# Patient Record
Sex: FEMALE | Race: WHITE | NOT HISPANIC OR LATINO | Employment: OTHER | URBAN - METROPOLITAN AREA
[De-identification: names, ages, dates, MRNs, and addresses within clinical notes are randomized per-mention and may not be internally consistent; named-entity substitution may affect disease eponyms.]

---

## 2017-11-16 ENCOUNTER — TRANSFERRED RECORDS (OUTPATIENT)
Dept: HEALTH INFORMATION MANAGEMENT | Facility: CLINIC | Age: 62
End: 2017-11-16

## 2017-11-17 ENCOUNTER — MEDICAL CORRESPONDENCE (OUTPATIENT)
Dept: HEALTH INFORMATION MANAGEMENT | Facility: CLINIC | Age: 62
End: 2017-11-17

## 2017-11-17 ENCOUNTER — TRANSFERRED RECORDS (OUTPATIENT)
Dept: HEALTH INFORMATION MANAGEMENT | Facility: CLINIC | Age: 62
End: 2017-11-17

## 2017-11-27 ENCOUNTER — TRANSFERRED RECORDS (OUTPATIENT)
Dept: HEALTH INFORMATION MANAGEMENT | Facility: CLINIC | Age: 62
End: 2017-11-27

## 2017-11-30 ENCOUNTER — TRANSFERRED RECORDS (OUTPATIENT)
Dept: HEALTH INFORMATION MANAGEMENT | Facility: CLINIC | Age: 62
End: 2017-11-30

## 2017-11-30 PROCEDURE — 00000346 ZZHCL STATISTIC REVIEW OUTSIDE SLIDES TC 88321: Performed by: OBSTETRICS & GYNECOLOGY

## 2017-12-01 LAB — COPATH REPORT: NORMAL

## 2017-12-03 ASSESSMENT — ENCOUNTER SYMPTOMS
POLYPHAGIA: 0
SORE THROAT: 0
SWOLLEN GLANDS: 0
NIGHT SWEATS: 1
EXERCISE INTOLERANCE: 0
DECREASED APPETITE: 0
DISTURBANCES IN COORDINATION: 0
WEIGHT LOSS: 0
TROUBLE SWALLOWING: 0
FATIGUE: 1
SLEEP DISTURBANCES DUE TO BREATHING: 0
ORTHOPNEA: 0
HEADACHES: 0
DIARRHEA: 0
SINUS CONGESTION: 0
CONSTIPATION: 0
MEMORY LOSS: 0
BOWEL INCONTINENCE: 0
SMELL DISTURBANCE: 0
SYNCOPE: 0
HYPOTENSION: 0
NUMBNESS: 0
HOARSE VOICE: 0
WEAKNESS: 1
TASTE DISTURBANCE: 0
LOSS OF CONSCIOUSNESS: 0
WEIGHT GAIN: 1
LEG PAIN: 0
NECK MASS: 0
JAUNDICE: 0
ALTERED TEMPERATURE REGULATION: 0
POLYDIPSIA: 0
DIZZINESS: 1
SPEECH CHANGE: 0
FEVER: 0
SINUS PAIN: 0
PALPITATIONS: 0
BLOOD IN STOOL: 0
LIGHT-HEADEDNESS: 1
VOMITING: 0
CHILLS: 0
TREMORS: 0
TINGLING: 0
PARALYSIS: 0
HALLUCINATIONS: 0
BRUISES/BLEEDS EASILY: 1
HEARTBURN: 0
RECTAL PAIN: 0
NAUSEA: 0
HYPERTENSION: 1
INCREASED ENERGY: 1
SEIZURES: 0
ABDOMINAL PAIN: 0
BLOATING: 1

## 2017-12-03 NOTE — PROGRESS NOTES
Consult Notes on Referred Patient    Date: 12/3/2017        Provider Not In System          RE: Julius Gilliam  : 1955  EJ: 2017    Dear  Provider Not In System:    I had the pleasure of seeing your patient Julius Gilliam here at the Gynecologic Cancer Clinic at the [facility] on 2017.  As you know she is a very pleasant 62 year old woman with a recent diagnosis of  High grade vulvar dysplasia.  Given these findings she was subsequently sent to the Gynecologic Cancer Clinic for new patient consultation.     She presented initially to [facility] and subsequently by Neisha Lei for evaluation of itching and mass.  Biospies were obtained of a lesion felt to be worrisome for malignancy.  These biopsies are sugestive of malignacy but disoriented and actual invasion is difficult to assess per our review.    SHe presents today largely without complaints and specifically denies bleeding or mass      Review of Systems:  Answers for HPI/ROS submitted by the patient on 12/3/2017   General Symptoms: Yes  Skin Symptoms: No  HENT Symptoms: Yes  EYE SYMPTOMS: No  HEART SYMPTOMS: Yes  LUNG SYMPTOMS: No  INTESTINAL SYMPTOMS: Yes  URINARY SYMPTOMS: No  GYNECOLOGIC SYMPTOMS: No  BREAST SYMPTOMS: No  SKELETAL SYMPTOMS: No  BLOOD SYMPTOMS: Yes  NERVOUS SYSTEM SYMPTOMS: Yes  MENTAL HEALTH SYMPTOMS: No  Fever: No  Loss of appetite: No  Weight loss: No  Weight gain: Yes  Fatigue: Yes  Night sweats: Yes  Chills: No  Increased stress: Yes  Excessive hunger: No  Excessive thirst: No  Feeling hot or cold when others believe the temperature is normal: No  Loss of height: Yes  Post-operative complications: No  Surgical site pain: No  Hallucinations: No  Change in or Loss of Energy: Yes  Hyperactivity: No  Confusion: No  Ear pain: No  Ear discharge: No  Hearing loss: Yes  Tinnitus: No  Nosebleeds: No  Congestion: No  Sinus pain: No  Trouble swallowing: No    Voice hoarseness: No  Mouth sores: No  Sore throat: No  Tooth pain: No  Gum tenderness: No  Bleeding gums: No  Change in taste: No  Change in sense of smell: No  Hearing aid used: No  Neck lump: No  Chest pain or pressure: No  Fast or irregular heartbeat: No  Pain in legs with walking: No  Trouble breathing while lying down: No  Fingers or toes appear blue: No  High blood pressure: Yes  Low blood pressure: No  Fainting: No  Murmurs: No  Pacemaker: No  Varicose veins: Yes  Wake up at night with shortness of breath: No  Light-headedness: Yes  Exercise intolerance: No  Heart burn or indigestion: No  Nausea: No  Vomiting: No  Abdominal pain: No  Bloating: Yes  Constipation: No  Diarrhea: No  Blood in stool: No  Black stools: No  Rectal or Anal pain: No  Fecal incontinence: No  Yellowing of skin or eyes: No  Vomit with blood: No  Change in stools: No  Anemia: No  Swollen glands: No  Easy bleeding or bruising: Yes  Trouble with coordination: No  Dizziness or trouble with balance: Yes  Fainting or black-out spells: No  Memory loss: No  Headache: No  Seizures: No  Speech problems: No  Tingling: No  Tremor: No  Weakness: Yes  Difficulty walking: No  Paralysis: No  Numbness: No    Past Medical History:    Past Medical History:   Diagnosis Date     Deafness in right ear      Multiple facial bone fractures (H)     also left ankle and left wrist     Vertigo          Past Surgical History:    Past Surgical History:   Procedure Laterality Date     ENDOSCOPIC STRIPPING VEIN(S)       FACIAL RECONSTRUCTION SURGERY      post horse trauma     repair of left arm fracture           Health Maintenance:  Health Maintenance Due   Topic Date Due     TETANUS IMMUNIZATION (SYSTEM ASSIGNED)  03/12/1974     HEPATITIS C SCREENING  03/12/1974     PAP SCREENING Q3 YR (SYSTEM ASSIGNED)  03/12/1977     LIPID SCREEN Q5 YR FEMALE (SYSTEM ASSIGNED)  03/12/2001     MAMMO SCREEN Q2 YR (SYSTEM ASSIGNED)  03/12/2006     COLON CANCER SCREEN (SYSTEM ASSIGNED)  " 03/12/2006     ADVANCE DIRECTIVE PLANNING Q5 YRS  03/12/2011     INFLUENZA VACCINE (SYSTEM ASSIGNED)  09/01/2017       Last Pap Smear: Remote    Last Mammogram: Last was >15 years ago (she reports she had a recent breast examination.    Last Colonoscopy: never                        Current Medications:     currently has no medications in their medication list.       Allergies:      No Known Allergies        Social History:     Social History   Substance Use Topics     Smoking status: Current Every Day Smoker     Smokeless tobacco: Not on file      Comment: 30 pack/yers     Alcohol use Yes      Comment: social       History   Drug Use Not on file           Family History:     The patient's family history is notable for .    Breast cancer in mother (age 81); no colon, ovarian, uterine cancer    Physical Exam:     PS 0  VS /BP (!) 161/108  Pulse 94  Temp 98.1  F (36.7  C) (Oral)  Resp 18  Ht 1.69 m (5' 6.53\")  Wt 65.7 kg (144 lb 12.8 oz)  SpO2 94%  BMI 23 kg/m2    General Appearance: Overtly anxious     HEENT:  no thyromegaly, no palpable nodules or masses        Cardiovascular: regular rate and rhythm, no gallops, rubs or murmurs     Respiratory: lungs clear, no rales, rhonchi or wheezes, normal diaphragmatic excursion    Musculoskeletal: extremities non tender and without edema    Skin: no lesions or rashes     Neurological: normal gait, no gross defects.  Interactive and appropriate in conversation     Psychiatric: appropriate mood and affect                               Hematological: normal cervical, supraclavicular and inguinal lymph nodes     Gastrointestinal:       abdomen soft, non-tender, non-distended, no organomegaly or masses    Genitourinary: External abnormal genitalia and with a ulcerative tumor which encircles most of the medial labia bilaterally (horseshoe shaped with involvement of the anterior anus).  After obtaining informed consent I attempt to re biopsy to document invasion.  Depite " multiple attempts at anesthetizing the patient with lidocaine there biopsy was obtained and cauterized with silver nitrate.  There are palpable nodes (2 in the left inguinal sandip area c/w mets) /   Assessment:    Julius Gilliam is a 62 year old woman with a new diagnosis of likely invasive vulvar cancer with likely inguinal sandip mets.     A total of 60 minutes was spent with the patient, 40 minutes of which were spent in counseling the patient and/or treatment planning.      Plan:     1.)   Vulvar cancer - I have discussed the findings with the patient and her sisters who were present throughout.  She is aware of my concern that this represents a non-resectable (stage III) vulvar cancer with inguinal sandip mets.  WE have discussed options for treatment and the potential difficulties of her living far away.  I have recommended the following:    A) CT/PET to r/o distant mets and evaluate the number fo inguinal mets  B) Resection of inguinal nodes if there is no distant disease.  C) radiation therapy consult with likely chemo potentiated RT to follow.     2.) Genetic risk factors were assessed and the patient does not meet the qualifications for a referral.      3.) Labs and/or tests ordered include:  See above, pre-operative labs.     4.) Health maintenance issues addressed today include none.    5.) Pre-op teaching was completed today.  Risks of surgery were discussed to include: bleeding, transfusion, infection, unintentional injury to surrounding organs/structures.      Thank you for allowing us to participate in the care of your patient.         Sincerely,    Santosh Vera      CC  Patient Care Team:  T[facility] Montesinos NP as PCP - General (Family Practice)  PROVIDER NOT IN SYSTEM

## 2017-12-04 ENCOUNTER — ONCOLOGY VISIT (OUTPATIENT)
Dept: ONCOLOGY | Facility: CLINIC | Age: 62
End: 2017-12-04
Attending: OBSTETRICS & GYNECOLOGY
Payer: COMMERCIAL

## 2017-12-04 VITALS
HEIGHT: 67 IN | BODY MASS INDEX: 22.73 KG/M2 | DIASTOLIC BLOOD PRESSURE: 108 MMHG | WEIGHT: 144.8 LBS | RESPIRATION RATE: 18 BRPM | OXYGEN SATURATION: 94 % | TEMPERATURE: 98.1 F | SYSTOLIC BLOOD PRESSURE: 161 MMHG | HEART RATE: 94 BPM

## 2017-12-04 DIAGNOSIS — C51.9 VULVAR CANCER, CARCINOMA (H): Primary | ICD-10-CM

## 2017-12-04 DIAGNOSIS — Z01.818 PRE-OP EXAM: ICD-10-CM

## 2017-12-04 LAB
ALBUMIN SERPL-MCNC: 3.6 G/DL (ref 3.4–5)
ALP SERPL-CCNC: 137 U/L (ref 40–150)
ALT SERPL W P-5'-P-CCNC: 54 U/L (ref 0–50)
ANION GAP SERPL CALCULATED.3IONS-SCNC: 8 MMOL/L (ref 3–14)
AST SERPL W P-5'-P-CCNC: 66 U/L (ref 0–45)
BASOPHILS # BLD AUTO: 0.1 10E9/L (ref 0–0.2)
BASOPHILS NFR BLD AUTO: 1.2 %
BILIRUB SERPL-MCNC: 1.6 MG/DL (ref 0.2–1.3)
BUN SERPL-MCNC: 7 MG/DL (ref 7–30)
CALCIUM SERPL-MCNC: 8.7 MG/DL (ref 8.5–10.1)
CHLORIDE SERPL-SCNC: 100 MMOL/L (ref 94–109)
CO2 SERPL-SCNC: 28 MMOL/L (ref 20–32)
CREAT SERPL-MCNC: 0.58 MG/DL (ref 0.52–1.04)
DIFFERENTIAL METHOD BLD: ABNORMAL
EOSINOPHIL # BLD AUTO: 0 10E9/L (ref 0–0.7)
EOSINOPHIL NFR BLD AUTO: 0.5 %
ERYTHROCYTE [DISTWIDTH] IN BLOOD BY AUTOMATED COUNT: 12.9 % (ref 10–15)
GFR SERPL CREATININE-BSD FRML MDRD: >90 ML/MIN/1.7M2
GLUCOSE SERPL-MCNC: 93 MG/DL (ref 70–99)
HCT VFR BLD AUTO: 45.9 % (ref 35–47)
HGB BLD-MCNC: 15.4 G/DL (ref 11.7–15.7)
IMM GRANULOCYTES # BLD: 0 10E9/L (ref 0–0.4)
IMM GRANULOCYTES NFR BLD: 0.3 %
LYMPHOCYTES # BLD AUTO: 1.5 10E9/L (ref 0.8–5.3)
LYMPHOCYTES NFR BLD AUTO: 17.3 %
MCH RBC QN AUTO: 34.5 PG (ref 26.5–33)
MCHC RBC AUTO-ENTMCNC: 33.6 G/DL (ref 31.5–36.5)
MCV RBC AUTO: 103 FL (ref 78–100)
MONOCYTES # BLD AUTO: 0.7 10E9/L (ref 0–1.3)
MONOCYTES NFR BLD AUTO: 8.1 %
NEUTROPHILS # BLD AUTO: 6.3 10E9/L (ref 1.6–8.3)
NEUTROPHILS NFR BLD AUTO: 72.6 %
NRBC # BLD AUTO: 0 10*3/UL
NRBC BLD AUTO-RTO: 0 /100
PLATELET # BLD AUTO: 234 10E9/L (ref 150–450)
POTASSIUM SERPL-SCNC: 3.9 MMOL/L (ref 3.4–5.3)
PROT SERPL-MCNC: 7.4 G/DL (ref 6.8–8.8)
RBC # BLD AUTO: 4.46 10E12/L (ref 3.8–5.2)
SODIUM SERPL-SCNC: 136 MMOL/L (ref 133–144)
WBC # BLD AUTO: 8.7 10E9/L (ref 4–11)

## 2017-12-04 PROCEDURE — 99212 OFFICE O/P EST SF 10 MIN: CPT | Mod: 25

## 2017-12-04 PROCEDURE — 85025 COMPLETE CBC W/AUTO DIFF WBC: CPT

## 2017-12-04 PROCEDURE — 88305 TISSUE EXAM BY PATHOLOGIST: CPT | Performed by: OBSTETRICS & GYNECOLOGY

## 2017-12-04 PROCEDURE — 56605 BIOPSY OF VULVA/PERINEUM: CPT | Mod: ZF | Performed by: OBSTETRICS & GYNECOLOGY

## 2017-12-04 PROCEDURE — 99203 OFFICE O/P NEW LOW 30 MIN: CPT | Mod: 25 | Performed by: OBSTETRICS & GYNECOLOGY

## 2017-12-04 PROCEDURE — 80053 COMPREHEN METABOLIC PANEL: CPT

## 2017-12-04 RX ORDER — MULTIPLE VITAMINS W/ MINERALS TAB 9MG-400MCG
1 TAB ORAL DAILY
COMMUNITY
End: 2018-05-21

## 2017-12-04 ASSESSMENT — PAIN SCALES - GENERAL: PAINLEVEL: NO PAIN (0)

## 2017-12-04 NOTE — MR AVS SNAPSHOT
After Visit Summary   12/4/2017    Julius Gilliam    MRN: [id number]           Patient Information     Date Of Birth          1955        Visit Information        Provider Department      12/4/2017 1:00 PM Santosh Vera MD [facility]        Today's Diagnoses     Vulvar cancer, carcinoma (H)    -  1    Pre-op exam           Follow-ups after your visit        Additional Services     RADIATION THERAPY REFERRAL       Vulvar cancer with overt clinical nodes                  Future tests that were ordered for you today     Open Future Orders        Priority Expected Expires Ordered    NPET Oncology (Eyes to Thighs) Routine  12/4/2018 12/4/2017            Who to contact     If you have questions or need follow up information about today's clinic visit or your schedule please contact [facility] directly at [phone].  Normal or non-critical lab and imaging results will be communicated to you by MyChart, letter or phone within 4 business days after the clinic has received the results. If you do not hear from us within 7 days, please contact the clinic through [employer] or phone. If you have a critical or abnormal lab result, we will notify you by phone as soon as possible.  Submit refill requests through [employer] or call your pharmacy and they will forward the refill request to us. Please allow 3 business days for your refill to be completed.          Additional Information About Your Visit        MyChart Information     [employer] gives you secure access to your electronic health record. If you see a primary care provider, you can also send messages to your care team and make appointments. If you have questions, please call your primary care clinic.  If you do not have a primary care provider, please call [phone] and they will assist you.        Care EveryWhere ID     This is your Care EveryWhere ID. This could be used by other organizations to  "access your [facility] medical records  [id number]        Your Vitals Were     Pulse Temperature Respirations Height Pulse Oximetry BMI (Body Mass Index)    94 98.1  F (36.7  C) (Oral) 18 1.69 m (5' 6.53\") 94% 23 kg/m2       Blood Pressure from Last 3 Encounters:   12/04/17 (!) 161/108    Weight from Last 3 Encounters:   12/04/17 65.7 kg (144 lb 12.8 oz)              We Performed the Following     Biopsy of Vulva     RADIATION THERAPY REFERRAL     Surgical pathology exam        Primary Care Provider Office Phone # Fax #    [facility] Montesinos, [phone]       [facility] [address] [facility] [address]        Equal Access to Services     CAREY COSME : Hadii melania ku hadasho Soomaali, waaxda luqadaha, qaybta kaalmada adeegyada, waxay brodyin haysaba cárdenas . So [facility] [phone].    ATENCIÓN: Si habla español, tiene a elizabeth disposición servicios gratuitos de asistencia lingüística. Llame al [phone].    We comply with applicable federal civil rights laws and [place] laws. We do not discriminate on the basis of race, color, national origin, age, disability, sex, sexual orientation, or gender identity.            Thank you!     Thank you for choosing [facility]  for your care. Our goal is always to provide you with excellent care. Hearing back from our patients is one way we can continue to improve our services. Please take a few minutes to complete the written survey that you may receive in the mail after your visit with us. Thank you!             Your Updated Medication List - Protect others around you: Learn how to safely use, store and throw away your medicines at www.disposemymeds.org.          This list is accurate as of: 12/4/17  3:51 PM.  Always use your most recent med list.                   Brand Name Dispense Instructions for use Diagnosis    LISINOPRIL PO      Take 10 mg by mouth daily        Multi-vitamin Tabs tablet      Take 1 tablet by " mouth daily

## 2017-12-04 NOTE — LETTER
2017       RE: Julius Gilliam  [address]   [facility] [address]     Dear Colleague,    Thank you for referring your patient, Julius Gilliam, to the [facility]. Please see a copy of my visit note below.                            Consult Notes on Referred Patient    Date: 12/3/2017       Dr. Roque Not In System          RE: Julius Gilliam  : 1955  EJ: 2017    Dear  Provider Not In System:    I had the pleasure of seeing your patient Julius Gilliam here at the Gynecologic Cancer Clinic at the [facility] on 2017.  As you know she is a very pleasant 62 year old woman with a recent diagnosis of  High grade vulvar dysplasia.  Given these findings she was subsequently sent to the Gynecologic Cancer Clinic for new patient consultation.     She presented initially to [facility] and subsequently by Neisha Lei for evaluation of itching and mass.  Biospies were obtained of a lesion felt to be worrisome for malignancy.  These biopsies are sugestive of malignacy but disoriented and actual invasion is difficult to assess per our review.    SHe presents today largely without complaints and specifically denies bleeding or mass      Review of Systems:  Answers for HPI/ROS submitted by the patient on 12/3/2017   General Symptoms: Yes  Skin Symptoms: No  HENT Symptoms: Yes  EYE SYMPTOMS: No  HEART SYMPTOMS: Yes  LUNG SYMPTOMS: No  INTESTINAL SYMPTOMS: Yes  URINARY SYMPTOMS: No  GYNECOLOGIC SYMPTOMS: No  BREAST SYMPTOMS: No  SKELETAL SYMPTOMS: No  BLOOD SYMPTOMS: Yes  NERVOUS SYSTEM SYMPTOMS: Yes  MENTAL HEALTH SYMPTOMS: No  Fever: No  Loss of appetite: No  Weight loss: No  Weight gain: Yes  Fatigue: Yes  Night sweats: Yes  Chills: No  Increased stress: Yes  Excessive hunger: No  Excessive thirst: No  Feeling hot or cold when others believe the temperature is normal: No  Loss of height: Yes  Post-operative complications: No  Surgical site  pain: No  Hallucinations: No  Change in or Loss of Energy: Yes  Hyperactivity: No  Confusion: No  Ear pain: No  Ear discharge: No  Hearing loss: Yes  Tinnitus: No  Nosebleeds: No  Congestion: No  Sinus pain: No  Trouble swallowing: No   Voice hoarseness: No  Mouth sores: No  Sore throat: No  Tooth pain: No  Gum tenderness: No  Bleeding gums: No  Change in taste: No  Change in sense of smell: No  Hearing aid used: No  Neck lump: No  Chest pain or pressure: No  Fast or irregular heartbeat: No  Pain in legs with walking: No  Trouble breathing while lying down: No  Fingers or toes appear blue: No  High blood pressure: Yes  Low blood pressure: No  Fainting: No  Murmurs: No  Pacemaker: No  Varicose veins: Yes  Wake up at night with shortness of breath: No  Light-headedness: Yes  Exercise intolerance: No  Heart burn or indigestion: No  Nausea: No  Vomiting: No  Abdominal pain: No  Bloating: Yes  Constipation: No  Diarrhea: No  Blood in stool: No  Black stools: No  Rectal or Anal pain: No  Fecal incontinence: No  Yellowing of skin or eyes: No  Vomit with blood: No  Change in stools: No  Anemia: No  Swollen glands: No  Easy bleeding or bruising: Yes  Trouble with coordination: No  Dizziness or trouble with balance: Yes  Fainting or black-out spells: No  Memory loss: No  Headache: No  Seizures: No  Speech problems: No  Tingling: No  Tremor: No  Weakness: Yes  Difficulty walking: No  Paralysis: No  Numbness: No    Past Medical History:    Past Medical History:   Diagnosis Date     Deafness in right ear      Multiple facial bone fractures (H)     also left ankle and left wrist     Vertigo          Past Surgical History:    Past Surgical History:   Procedure Laterality Date     ENDOSCOPIC STRIPPING VEIN(S)       FACIAL RECONSTRUCTION SURGERY      post horse trauma     repair of left arm fracture           Health Maintenance:  Health Maintenance Due   Topic Date Due     TETANUS IMMUNIZATION (SYSTEM ASSIGNED)  03/12/1974      "HEPATITIS C SCREENING  03/12/1974     PAP SCREENING Q3 YR (SYSTEM ASSIGNED)  03/12/1977     LIPID SCREEN Q5 YR FEMALE (SYSTEM ASSIGNED)  03/12/2001     MAMMO SCREEN Q2 YR (SYSTEM ASSIGNED)  03/12/2006     COLON CANCER SCREEN (SYSTEM ASSIGNED)  03/12/2006     ADVANCE DIRECTIVE PLANNING Q5 YRS  03/12/2011     INFLUENZA VACCINE (SYSTEM ASSIGNED)  09/01/2017       Last Pap Smear: Remote    Last Mammogram: Last was >15 years ago (she reports she had a recent breast examination.    Last Colonoscopy: never                        Current Medications:     currently has no medications in their medication list.       Allergies:      No Known Allergies        Social History:     Social History   Substance Use Topics     Smoking status: Current Every Day Smoker     Smokeless tobacco: Not on file      Comment: 30 pack/yers     Alcohol use Yes      Comment: social       History   Drug Use Not on file           Family History:     The patient's family history is notable for .    Breast cancer in mother (age 81); no colon, ovarian, uterine cancer    Physical Exam:     PS 0  VS /BP (!) 161/108  Pulse 94  Temp 98.1  F (36.7  C) (Oral)  Resp 18  Ht 1.69 m (5' 6.53\")  Wt 65.7 kg (144 lb 12.8 oz)  SpO2 94%  BMI 23 kg/m2    General Appearance: Overtly anxious     HEENT:  no thyromegaly, no palpable nodules or masses        Cardiovascular: regular rate and rhythm, no gallops, rubs or murmurs     Respiratory: lungs clear, no rales, rhonchi or wheezes, normal diaphragmatic excursion    Musculoskeletal: extremities non tender and without edema    Skin: no lesions or rashes     Neurological: normal gait, no gross defects.  Interactive and appropriate in conversation     Psychiatric: appropriate mood and affect                               Hematological: normal cervical, supraclavicular and inguinal lymph nodes     Gastrointestinal:       abdomen soft, non-tender, non-distended, no organomegaly or masses    Genitourinary: External " abnormal genitalia and with a ulcerative tumor which encircles most of the medial labia bilaterally (horseshoe shaped with involvement of the anterior anus).  After obtaining informed consent I attempt to re biopsy to document invasion.  Depite multiple attempts at anesthetizing the patient with lidocaine there biopsy was obtained and cauterized with silver nitrate.  There are palpable nodes (2 in the left inguinal sandip area c/w mets) /   Assessment:    Julius Gilliam is a 62 year old woman with a new diagnosis of likely invasive vulvar cancer with likely inguinal sandip mets.     A total of 60 minutes was spent with the patient, 40 minutes of which were spent in counseling the patient and/or treatment planning.      Plan:     1.)   Vulvar cancer - I have discussed the findings with the patient and her sisters who were present throughout.  She is aware of my concern that this represents a non-resectable (stage III) vulvar cancer with inguinal sandip mets.  WE have discussed options for treatment and the potential difficulties of her living far away.  I have recommended the following:    A) CT/PET to r/o distant mets and evaluate the number fo inguinal mets  B) Resection of inguinal nodes if there is no distant disease.  C) radiation therapy consult with likely chemo potentiated RT to follow.     2.) Genetic risk factors were assessed and the patient does not meet the qualifications for a referral.      3.) Labs and/or tests ordered include:  See above, pre-operative labs.     4.) Health maintenance issues addressed today include none.    5.) Pre-op teaching was completed today.  Risks of surgery were discussed to include: bleeding, transfusion, infection, unintentional injury to surrounding organs/structures.      Thank you for allowing us to participate in the care of your patient.         Sincerely,    Santosh Vera      CC  Patient Care Team:  T[facility] Montesinos NP as PCP - General (Family  Practice)  PROVIDER NOT IN SYSTEM

## 2017-12-04 NOTE — NURSING NOTE
"Oncology Rooming Note    December 4, 2017 12:38 PM   Julius Gilliam is a 62 year old female who presents for:    Chief Complaint   Patient presents with     Oncology Clinic Visit     New Pt Visit, SCC, Vulvar with Bilateral Lymphadenopathy.     Initial Vitals: BP (!) 162/117  Pulse 94  Temp 98.1  F (36.7  C) (Oral)  Resp 18  Ht 1.69 m (5' 6.53\")  Wt 65.7 kg (144 lb 12.8 oz)  SpO2 94%  BMI 23 kg/m2 Estimated body mass index is 23 kg/(m^2) as calculated from the following:    Height as of this encounter: 1.69 m (5' 6.53\").    Weight as of this encounter: 65.7 kg (144 lb 12.8 oz). Body surface area is 1.76 meters squared.  No Pain (0) Comment: Data Unavailable   No LMP recorded. Patient is postmenopausal.  Allergies reviewed: Yes  Medications reviewed: Yes    Medications: Medication refills not needed today.  Pharmacy name entered into EPIC: Data Unavailable    Clinical concerns: None Dr Vera was NOT notified.    7 minutes for nursing intake (face to face time)     Caro Clemens LPN              "

## 2017-12-05 NOTE — PATIENT INSTRUCTIONS
PET scan done in [place] at [facility]  OR date depending on PET results  Lab work today

## 2017-12-05 NOTE — NURSING NOTE
Pre Op Nurse Teaching Template    Relevant Diagnosis: vulvar cancer    Teaching Topic: removal of bilateral groin lymph nodes    Person(s) involved in teaching :  Patient  & other:*  Motivation Level:  Asks Questions:    Yes      Eager to Learn:     Yes     Cooperative:          Yes    Receptive (willing. Able to accept information):    Yes      Patient and those who are listed above demonstrates understanding of the following:   Reason for the appointment, diagnosis and treatment plan:   Yes   Knowledge of proper use of medications and conditions for which they are ordered (with special attention to potential side effects or drug interactions): Yes   Which situations necessitate calling provider and whom to contact: Yes         Nutritional needs and diet plan:  Yes      Pain management techniques:     Yes, Pain Scale   Diet:   Yes, [facility] Diet Instructions    Teaching Concerns addressed: Yes    Infection Prevention:  Patient and those who are listed above demonstrate understanding of the following:  Pre-Op CHG Bathing Instructions: Yes  Surgical procedure site care taught:   Yes   Signs and symptoms of infection taught: Yes       Instructional Materials Used/Given:  [facility] [place] Before You Surgery Booklet  Showering or Bathing before Surgery Instructions & CHG Product  Hysterectomy Guidelines  Pain Assessment Tool   Home Care after Major Abdominal or Vaginal Surgery  Map  Accommodations Brochure  Phone numbers for [facility] and [facility]  Copy of Surgical Consent      Preop Visit needed/not needed   Tests Ordered:  Pet scan at [facility]  Post Op Visit Scheduled:tbd  Comments:    Surgery date/time:tbd    Consent sent to file in medical records     .

## 2017-12-06 LAB — COPATH REPORT: NORMAL

## 2017-12-19 ENCOUNTER — TRANSFERRED RECORDS (OUTPATIENT)
Dept: HEALTH INFORMATION MANAGEMENT | Facility: CLINIC | Age: 62
End: 2017-12-19

## 2017-12-21 ENCOUNTER — TRANSFERRED RECORDS (OUTPATIENT)
Dept: HEALTH INFORMATION MANAGEMENT | Facility: CLINIC | Age: 62
End: 2017-12-21

## 2018-01-15 ENCOUNTER — PRE VISIT (OUTPATIENT)
Dept: RADIATION ONCOLOGY | Facility: CLINIC | Age: 63
End: 2018-01-15

## 2018-01-15 RX ORDER — PHENAZOPYRIDINE HYDROCHLORIDE 200 MG/1
200 TABLET, FILM COATED ORAL ONCE
Status: CANCELLED | OUTPATIENT
Start: 2018-01-15 | End: 2018-01-15

## 2018-01-15 NOTE — TELEPHONE ENCOUNTER
1.  Date of consult: 2/1/2018    2.  Reason for consult: vulvar cancer    3.  Referring provider/facility: Dr. Vera    4. Scheduled by: called patient to schedule    5.  Outside records requested from: [facility] - requested imaging    6.  Additional Information:

## 2018-01-16 ENCOUNTER — ANESTHESIA EVENT (OUTPATIENT)
Dept: SURGERY | Facility: CLINIC | Age: 63
End: 2018-01-16
Payer: COMMERCIAL

## 2018-01-19 ENCOUNTER — ANESTHESIA (OUTPATIENT)
Dept: SURGERY | Facility: CLINIC | Age: 63
End: 2018-01-19
Payer: COMMERCIAL

## 2018-01-28 ENCOUNTER — HEALTH MAINTENANCE LETTER (OUTPATIENT)
Age: 63
End: 2018-01-28

## 2018-02-07 ENCOUNTER — TRANSFERRED RECORDS (OUTPATIENT)
Dept: HEALTH INFORMATION MANAGEMENT | Facility: CLINIC | Age: 63
End: 2018-02-07

## 2018-02-09 ENCOUNTER — HOSPITAL ENCOUNTER (OUTPATIENT)
Facility: CLINIC | Age: 63
Discharge: HOME | End: 2018-02-10
Attending: OBSTETRICS & GYNECOLOGY | Admitting: OBSTETRICS & GYNECOLOGY
Payer: COMMERCIAL

## 2018-02-09 ENCOUNTER — SURGERY (OUTPATIENT)
Age: 63
End: 2018-02-09
Payer: COMMERCIAL

## 2018-02-09 DIAGNOSIS — Z98.890 H/O LYMPH NODE EXCISION: Primary | ICD-10-CM

## 2018-02-09 DIAGNOSIS — I10 ESSENTIAL HYPERTENSION: ICD-10-CM

## 2018-02-09 DIAGNOSIS — B19.20 HEPATITIS C VIRUS INFECTION, UNSPECIFIED CHRONICITY: ICD-10-CM

## 2018-02-09 LAB
CREAT SERPL-MCNC: 1.84 MG/DL (ref 0.52–1.04)
ERYTHROCYTE [DISTWIDTH] IN BLOOD BY AUTOMATED COUNT: 13.4 % (ref 10–15)
GFR SERPL CREATININE-BSD FRML MDRD: 28 ML/MIN/1.7M2
GLUCOSE BLDC GLUCOMTR-MCNC: 113 MG/DL (ref 70–99)
GLUCOSE BLDC GLUCOMTR-MCNC: 211 MG/DL (ref 70–99)
HCT VFR BLD AUTO: 44.3 % (ref 35–47)
HGB BLD-MCNC: 15.5 G/DL (ref 11.7–15.7)
MCH RBC QN AUTO: 35.5 PG (ref 26.5–33)
MCHC RBC AUTO-ENTMCNC: 35 G/DL (ref 31.5–36.5)
MCV RBC AUTO: 101 FL (ref 78–100)
PLATELET # BLD AUTO: 221 10E9/L (ref 150–450)
POTASSIUM SERPL-SCNC: 3.5 MMOL/L (ref 3.4–5.3)
RBC # BLD AUTO: 4.37 10E12/L (ref 3.8–5.2)
WBC # BLD AUTO: 11.8 10E9/L (ref 4–11)

## 2018-02-09 PROCEDURE — 25000125 ZZHC RX 250: Performed by: OBSTETRICS & GYNECOLOGY

## 2018-02-09 PROCEDURE — 82962 GLUCOSE BLOOD TEST: CPT

## 2018-02-09 PROCEDURE — 88307 TISSUE EXAM BY PATHOLOGIST: CPT | Performed by: OBSTETRICS & GYNECOLOGY

## 2018-02-09 PROCEDURE — 25000128 H RX IP 250 OP 636: Performed by: ANESTHESIOLOGY

## 2018-02-09 PROCEDURE — 38760 REMOVE GROIN LYMPH NODES: CPT | Mod: GC | Performed by: OBSTETRICS & GYNECOLOGY

## 2018-02-09 PROCEDURE — 37000008 ZZH ANESTHESIA TECHNICAL FEE, 1ST 30 MIN: Performed by: OBSTETRICS & GYNECOLOGY

## 2018-02-09 PROCEDURE — 36415 COLL VENOUS BLD VENIPUNCTURE: CPT | Performed by: ANESTHESIOLOGY

## 2018-02-09 PROCEDURE — 40000170 ZZH STATISTIC PRE-PROCEDURE ASSESSMENT II: Performed by: OBSTETRICS & GYNECOLOGY

## 2018-02-09 PROCEDURE — 82565 ASSAY OF CREATININE: CPT | Performed by: ANESTHESIOLOGY

## 2018-02-09 PROCEDURE — 40000065 ZZH STATISTIC EKG NON-CHARGEABLE

## 2018-02-09 PROCEDURE — 85027 COMPLETE CBC AUTOMATED: CPT | Performed by: ANESTHESIOLOGY

## 2018-02-09 PROCEDURE — 36000053 ZZH SURGERY LEVEL 2 EA 15 ADDTL MIN - UMMC: Performed by: OBSTETRICS & GYNECOLOGY

## 2018-02-09 PROCEDURE — 25000128 H RX IP 250 OP 636: Performed by: NURSE ANESTHETIST, CERTIFIED REGISTERED

## 2018-02-09 PROCEDURE — 27210794 ZZH OR GENERAL SUPPLY STERILE: Performed by: OBSTETRICS & GYNECOLOGY

## 2018-02-09 PROCEDURE — 36000051 ZZH SURGERY LEVEL 2 1ST 30 MIN - UMMC: Performed by: OBSTETRICS & GYNECOLOGY

## 2018-02-09 PROCEDURE — 25000125 ZZHC RX 250: Performed by: NURSE ANESTHETIST, CERTIFIED REGISTERED

## 2018-02-09 PROCEDURE — 84132 ASSAY OF SERUM POTASSIUM: CPT | Performed by: ANESTHESIOLOGY

## 2018-02-09 PROCEDURE — 71000015 ZZH RECOVERY PHASE 1 LEVEL 2 EA ADDTL HR: Performed by: OBSTETRICS & GYNECOLOGY

## 2018-02-09 PROCEDURE — 25000128 H RX IP 250 OP 636: Performed by: OBSTETRICS & GYNECOLOGY

## 2018-02-09 PROCEDURE — 25000132 ZZH RX MED GY IP 250 OP 250 PS 637: Performed by: OBSTETRICS & GYNECOLOGY

## 2018-02-09 PROCEDURE — 25000566 ZZH SEVOFLURANE, EA 15 MIN: Performed by: OBSTETRICS & GYNECOLOGY

## 2018-02-09 PROCEDURE — 37000009 ZZH ANESTHESIA TECHNICAL FEE, EACH ADDTL 15 MIN: Performed by: OBSTETRICS & GYNECOLOGY

## 2018-02-09 PROCEDURE — 93010 ELECTROCARDIOGRAM REPORT: CPT | Performed by: INTERNAL MEDICINE

## 2018-02-09 PROCEDURE — 71000014 ZZH RECOVERY PHASE 1 LEVEL 2 FIRST HR: Performed by: OBSTETRICS & GYNECOLOGY

## 2018-02-09 PROCEDURE — C9399 UNCLASSIFIED DRUGS OR BIOLOG: HCPCS | Performed by: NURSE ANESTHETIST, CERTIFIED REGISTERED

## 2018-02-09 PROCEDURE — P9041 ALBUMIN (HUMAN),5%, 50ML: HCPCS | Performed by: NURSE ANESTHETIST, CERTIFIED REGISTERED

## 2018-02-09 RX ORDER — LABETALOL HYDROCHLORIDE 5 MG/ML
10 INJECTION, SOLUTION INTRAVENOUS
Status: DISCONTINUED | OUTPATIENT
Start: 2018-02-09 | End: 2018-02-09 | Stop reason: HOSPADM

## 2018-02-09 RX ORDER — FENTANYL CITRATE 50 UG/ML
25-50 INJECTION, SOLUTION INTRAMUSCULAR; INTRAVENOUS
Status: DISCONTINUED | OUTPATIENT
Start: 2018-02-09 | End: 2018-02-09 | Stop reason: HOSPADM

## 2018-02-09 RX ORDER — NALOXONE HYDROCHLORIDE 0.4 MG/ML
.1-.4 INJECTION, SOLUTION INTRAMUSCULAR; INTRAVENOUS; SUBCUTANEOUS
Status: DISCONTINUED | OUTPATIENT
Start: 2018-02-09 | End: 2018-02-09

## 2018-02-09 RX ORDER — ACETAMINOPHEN 325 MG/1
650 TABLET ORAL EVERY 6 HOURS
Status: DISCONTINUED | OUTPATIENT
Start: 2018-02-09 | End: 2018-02-10 | Stop reason: HOSPADM

## 2018-02-09 RX ORDER — CEFAZOLIN SODIUM 2 G/100ML
2 INJECTION, SOLUTION INTRAVENOUS
Status: DISCONTINUED | OUTPATIENT
Start: 2018-02-09 | End: 2018-02-09 | Stop reason: HOSPADM

## 2018-02-09 RX ORDER — LIDOCAINE 40 MG/G
CREAM TOPICAL
Status: DISCONTINUED | OUTPATIENT
Start: 2018-02-09 | End: 2018-02-09 | Stop reason: HOSPADM

## 2018-02-09 RX ORDER — BUPIVACAINE HYDROCHLORIDE 2.5 MG/ML
INJECTION, SOLUTION INFILTRATION; PERINEURAL PRN
Status: DISCONTINUED | OUTPATIENT
Start: 2018-02-09 | End: 2018-02-09 | Stop reason: HOSPADM

## 2018-02-09 RX ORDER — HYDROMORPHONE HCL/0.9% NACL/PF 0.2MG/0.2
0.2 SYRINGE (ML) INTRAVENOUS
Status: DISCONTINUED | OUTPATIENT
Start: 2018-02-09 | End: 2018-02-10 | Stop reason: HOSPADM

## 2018-02-09 RX ORDER — ONDANSETRON 2 MG/ML
4 INJECTION INTRAMUSCULAR; INTRAVENOUS EVERY 30 MIN PRN
Status: DISCONTINUED | OUTPATIENT
Start: 2018-02-09 | End: 2018-02-09 | Stop reason: HOSPADM

## 2018-02-09 RX ORDER — ONDANSETRON 4 MG/1
4 TABLET, ORALLY DISINTEGRATING ORAL EVERY 30 MIN PRN
Status: DISCONTINUED | OUTPATIENT
Start: 2018-02-09 | End: 2018-02-09 | Stop reason: HOSPADM

## 2018-02-09 RX ORDER — OXYCODONE HYDROCHLORIDE 5 MG/1
5-10 TABLET ORAL
Status: DISCONTINUED | OUTPATIENT
Start: 2018-02-09 | End: 2018-02-10 | Stop reason: HOSPADM

## 2018-02-09 RX ORDER — ONDANSETRON 2 MG/ML
4 INJECTION INTRAMUSCULAR; INTRAVENOUS EVERY 6 HOURS PRN
Status: DISCONTINUED | OUTPATIENT
Start: 2018-02-09 | End: 2018-02-10 | Stop reason: HOSPADM

## 2018-02-09 RX ORDER — SODIUM CHLORIDE, SODIUM LACTATE, POTASSIUM CHLORIDE, CALCIUM CHLORIDE 600; 310; 30; 20 MG/100ML; MG/100ML; MG/100ML; MG/100ML
INJECTION, SOLUTION INTRAVENOUS CONTINUOUS
Status: DISCONTINUED | OUTPATIENT
Start: 2018-02-09 | End: 2018-02-09 | Stop reason: HOSPADM

## 2018-02-09 RX ORDER — ONDANSETRON 4 MG/1
4 TABLET, ORALLY DISINTEGRATING ORAL EVERY 6 HOURS PRN
Status: DISCONTINUED | OUTPATIENT
Start: 2018-02-09 | End: 2018-02-10 | Stop reason: HOSPADM

## 2018-02-09 RX ORDER — EPHEDRINE SULFATE 50 MG/ML
INJECTION, SOLUTION INTRAMUSCULAR; INTRAVENOUS; SUBCUTANEOUS PRN
Status: DISCONTINUED | OUTPATIENT
Start: 2018-02-09 | End: 2018-02-09

## 2018-02-09 RX ORDER — LIDOCAINE 40 MG/G
CREAM TOPICAL
Status: DISCONTINUED | OUTPATIENT
Start: 2018-02-09 | End: 2018-02-10 | Stop reason: HOSPADM

## 2018-02-09 RX ORDER — ONDANSETRON 2 MG/ML
INJECTION INTRAMUSCULAR; INTRAVENOUS PRN
Status: DISCONTINUED | OUTPATIENT
Start: 2018-02-09 | End: 2018-02-09

## 2018-02-09 RX ORDER — NALOXONE HYDROCHLORIDE 0.4 MG/ML
.1-.4 INJECTION, SOLUTION INTRAMUSCULAR; INTRAVENOUS; SUBCUTANEOUS
Status: DISCONTINUED | OUTPATIENT
Start: 2018-02-09 | End: 2018-02-10 | Stop reason: HOSPADM

## 2018-02-09 RX ORDER — ACETAMINOPHEN 10 MG/ML
1000 INJECTION, SOLUTION INTRAVENOUS ONCE
Status: COMPLETED | OUTPATIENT
Start: 2018-02-09 | End: 2018-02-09

## 2018-02-09 RX ORDER — PROPOFOL 10 MG/ML
INJECTION, EMULSION INTRAVENOUS PRN
Status: DISCONTINUED | OUTPATIENT
Start: 2018-02-09 | End: 2018-02-09

## 2018-02-09 RX ORDER — FENTANYL CITRATE 50 UG/ML
INJECTION, SOLUTION INTRAMUSCULAR; INTRAVENOUS PRN
Status: DISCONTINUED | OUTPATIENT
Start: 2018-02-09 | End: 2018-02-09

## 2018-02-09 RX ORDER — HYDROXYZINE HYDROCHLORIDE 25 MG/1
25 TABLET, FILM COATED ORAL EVERY 6 HOURS PRN
Status: DISCONTINUED | OUTPATIENT
Start: 2018-02-09 | End: 2018-02-10 | Stop reason: HOSPADM

## 2018-02-09 RX ORDER — CEFAZOLIN SODIUM 1 G/3ML
1 INJECTION, POWDER, FOR SOLUTION INTRAMUSCULAR; INTRAVENOUS SEE ADMIN INSTRUCTIONS
Status: DISCONTINUED | OUTPATIENT
Start: 2018-02-09 | End: 2018-02-09 | Stop reason: HOSPADM

## 2018-02-09 RX ORDER — ALBUMIN, HUMAN INJ 5% 5 %
SOLUTION INTRAVENOUS CONTINUOUS PRN
Status: DISCONTINUED | OUTPATIENT
Start: 2018-02-09 | End: 2018-02-09

## 2018-02-09 RX ORDER — SODIUM CHLORIDE, SODIUM LACTATE, POTASSIUM CHLORIDE, CALCIUM CHLORIDE 600; 310; 30; 20 MG/100ML; MG/100ML; MG/100ML; MG/100ML
INJECTION, SOLUTION INTRAVENOUS CONTINUOUS
Status: DISCONTINUED | OUTPATIENT
Start: 2018-02-09 | End: 2018-02-10

## 2018-02-09 RX ORDER — PHENAZOPYRIDINE HYDROCHLORIDE 200 MG/1
200 TABLET, FILM COATED ORAL ONCE
Status: DISCONTINUED | OUTPATIENT
Start: 2018-02-09 | End: 2018-02-09 | Stop reason: HOSPADM

## 2018-02-09 RX ADMIN — PHENYLEPHRINE HYDROCHLORIDE 100 MCG: 10 INJECTION, SOLUTION INTRAMUSCULAR; INTRAVENOUS; SUBCUTANEOUS at 14:24

## 2018-02-09 RX ADMIN — PHENYLEPHRINE HYDROCHLORIDE 100 MCG: 10 INJECTION, SOLUTION INTRAMUSCULAR; INTRAVENOUS; SUBCUTANEOUS at 14:22

## 2018-02-09 RX ADMIN — FENTANYL CITRATE 25 MCG: 50 INJECTION, SOLUTION INTRAMUSCULAR; INTRAVENOUS at 14:50

## 2018-02-09 RX ADMIN — PHENYLEPHRINE HYDROCHLORIDE 100 MCG: 10 INJECTION, SOLUTION INTRAMUSCULAR; INTRAVENOUS; SUBCUTANEOUS at 15:57

## 2018-02-09 RX ADMIN — VASOPRESSIN 1 UNITS: 20 INJECTION, SOLUTION INTRAMUSCULAR; SUBCUTANEOUS at 16:06

## 2018-02-09 RX ADMIN — CEFAZOLIN 2 G: 1 INJECTION, POWDER, FOR SOLUTION INTRAMUSCULAR; INTRAVENOUS at 14:37

## 2018-02-09 RX ADMIN — PHENYLEPHRINE HYDROCHLORIDE 100 MCG: 10 INJECTION, SOLUTION INTRAMUSCULAR; INTRAVENOUS; SUBCUTANEOUS at 14:55

## 2018-02-09 RX ADMIN — HYDROMORPHONE HYDROCHLORIDE 0.3 MG: 1 INJECTION, SOLUTION INTRAMUSCULAR; INTRAVENOUS; SUBCUTANEOUS at 17:09

## 2018-02-09 RX ADMIN — MIDAZOLAM 1 MG: 1 INJECTION INTRAMUSCULAR; INTRAVENOUS at 14:08

## 2018-02-09 RX ADMIN — PROPOFOL 100 MG: 10 INJECTION, EMULSION INTRAVENOUS at 14:23

## 2018-02-09 RX ADMIN — FENTANYL CITRATE 25 MCG: 50 INJECTION, SOLUTION INTRAMUSCULAR; INTRAVENOUS at 14:23

## 2018-02-09 RX ADMIN — PHENYLEPHRINE HYDROCHLORIDE 100 MCG: 10 INJECTION, SOLUTION INTRAMUSCULAR; INTRAVENOUS; SUBCUTANEOUS at 15:16

## 2018-02-09 RX ADMIN — ACETAMINOPHEN 650 MG: 325 TABLET, FILM COATED ORAL at 20:41

## 2018-02-09 RX ADMIN — FENTANYL CITRATE 25 MCG: 50 INJECTION, SOLUTION INTRAMUSCULAR; INTRAVENOUS at 15:47

## 2018-02-09 RX ADMIN — FENTANYL CITRATE 25 MCG: 50 INJECTION, SOLUTION INTRAMUSCULAR; INTRAVENOUS at 15:40

## 2018-02-09 RX ADMIN — ROCURONIUM BROMIDE 40 MG: 10 INJECTION INTRAVENOUS at 14:23

## 2018-02-09 RX ADMIN — Medication 5 MG: at 16:00

## 2018-02-09 RX ADMIN — BUPIVACAINE HYDROCHLORIDE 9 ML: 2.5 INJECTION, SOLUTION INFILTRATION; PERINEURAL at 15:55

## 2018-02-09 RX ADMIN — ALBUMIN HUMAN: 0.05 INJECTION, SOLUTION INTRAVENOUS at 15:21

## 2018-02-09 RX ADMIN — ROCURONIUM BROMIDE 10 MG: 10 INJECTION INTRAVENOUS at 15:55

## 2018-02-09 RX ADMIN — MIDAZOLAM 1 MG: 1 INJECTION INTRAMUSCULAR; INTRAVENOUS at 14:29

## 2018-02-09 RX ADMIN — ROCURONIUM BROMIDE 20 MG: 10 INJECTION INTRAVENOUS at 15:13

## 2018-02-09 RX ADMIN — ONDANSETRON 4 MG: 2 INJECTION INTRAMUSCULAR; INTRAVENOUS at 16:24

## 2018-02-09 RX ADMIN — PHENYLEPHRINE HYDROCHLORIDE 100 MCG: 10 INJECTION, SOLUTION INTRAMUSCULAR; INTRAVENOUS; SUBCUTANEOUS at 14:25

## 2018-02-09 RX ADMIN — FAMOTIDINE 20 MG: 10 INJECTION, SOLUTION INTRAVENOUS at 20:41

## 2018-02-09 RX ADMIN — ACETAMINOPHEN 1000 MG: 10 INJECTION, SOLUTION INTRAVENOUS at 15:01

## 2018-02-09 RX ADMIN — VASOPRESSIN 1 UNITS: 20 INJECTION, SOLUTION INTRAMUSCULAR; SUBCUTANEOUS at 16:19

## 2018-02-09 RX ADMIN — ROCURONIUM BROMIDE 10 MG: 10 INJECTION INTRAVENOUS at 14:36

## 2018-02-09 RX ADMIN — BUPIVACAINE HYDROCHLORIDE 10 ML: 2.5 INJECTION, SOLUTION INFILTRATION; PERINEURAL at 16:37

## 2018-02-09 RX ADMIN — SODIUM CHLORIDE, POTASSIUM CHLORIDE, SODIUM LACTATE AND CALCIUM CHLORIDE: 600; 310; 30; 20 INJECTION, SOLUTION INTRAVENOUS at 15:16

## 2018-02-09 RX ADMIN — SODIUM CHLORIDE, POTASSIUM CHLORIDE, SODIUM LACTATE AND CALCIUM CHLORIDE 250 ML: 600; 310; 30; 20 INJECTION, SOLUTION INTRAVENOUS at 12:23

## 2018-02-09 RX ADMIN — PHENYLEPHRINE HYDROCHLORIDE 100 MCG: 10 INJECTION, SOLUTION INTRAMUSCULAR; INTRAVENOUS; SUBCUTANEOUS at 15:27

## 2018-02-09 RX ADMIN — SUGAMMADEX 200 MG: 100 INJECTION, SOLUTION INTRAVENOUS at 16:36

## 2018-02-09 RX ADMIN — Medication 10 MG: at 16:04

## 2018-02-09 ASSESSMENT — LIFESTYLE VARIABLES: TOBACCO_USE: 1

## 2018-02-09 NOTE — OR NURSING
Dr. Carrasco at bedside and wants pt to have a total of 1 L of fluid prior to getting orthostatic BP.

## 2018-02-09 NOTE — PROGRESS NOTES
SPIRITUAL HEALTH SERVICES  [facility] 3C   PRE-SURGERY VISIT    Had pre-surgery visit with pt.  Provided spiritual support, prayer.     Neisha [facility]  Oncology   Pager [phone]

## 2018-02-09 NOTE — OR NURSING
Dr. Carrasco notified 12 lead EKG results, blood test results, and recent blood pressure reading post IV fluid bolus.  Per Dr. Carrasco, orthostatic blood pressures to be done and notify results.    Handoff report given to Rama Godoy RN.

## 2018-02-09 NOTE — DISCHARGE SUMMARY
Gynecologic Oncology Discharge Summary    Patient: Julius Gilliam  MRN: [id number]    Admit Date: 2/9/2018  Discharge Date: 2/10/2018  Admitting Provider: Dr. Vera  Discharge Provider: Chantelle Saeed MD    Admission Dx:   - Vulvar cancer  - Hypertension  - Acute kidney injury  - Tobacco use    Discharge Dx:  - Same as above  - Improvement in YSA    Patient Active Problem List   Diagnosis     Malignant neoplasm of vulva (H)     H/O lymph node excision       Procedures:  Bilateral Inguinal Lymph Node Dissection and placement of bilateral inguinal KASIA drains    Prior to Admission Medications:  Prescriptions Prior to Admission   Medication Sig Dispense Refill Last Dose     multivitamin, therapeutic with minerals (MULTI-VITAMIN) TABS tablet Take 1 tablet by mouth daily   Past Month at Unknown time     [DISCONTINUED] CHLORTHALIDONE PO Take 25 mg by mouth daily   2/7/2018     [DISCONTINUED] LISINOPRIL PO Take 40 mg by mouth daily    2/8/2018 at 0800       Discharge Medications:     Review of your medicines      START taking       Dose / Directions    acetaminophen 325 MG tablet   Commonly known as:  TYLENOL        Dose:  650 mg   Take 2 tablets (650 mg) by mouth every 6 hours   Quantity:  30 tablet   Refills:  0       oxyCODONE IR 5 MG tablet   Commonly known as:  ROXICODONE        Dose:  5 mg   Take 1 tablet (5 mg) by mouth every 4 hours as needed for pain   Quantity:  20 tablet   Refills:  0       sennosides 8.6 MG tablet   Commonly known as:  SENOKOT        Dose:  1 tablet   Take 1 tablet by mouth 2 times daily as needed for constipation   Quantity:  60 tablet   Refills:  0         CONTINUE these medicines which have NOT CHANGED       Dose / Directions    Multi-vitamin Tabs tablet        Dose:  1 tablet   Take 1 tablet by mouth daily   Refills:  0         STOP taking          CHLORTHALIDONE PO           LISINOPRIL PO                Where to get your medicines      These medications were sent to [facility]  [facility] - [place], [place] - [address]  [address], [facility] [address]     Phone:  [phone]      acetaminophen 325 MG tablet     sennosides 8.6 MG tablet         Some of these will need a paper prescription and others can be bought over the counter. Ask your nurse if you have questions.     Bring a paper prescription for each of these medications      oxyCODONE IR 5 MG tablet             Consultations:   None    Brief History of Illness:  Julius Gilliam is a 63 yo female with history of high grade vulvar dysplasia. She had a history of itching and a vulvar mass. On exam, there was concern for metastatic vulvar cancer to her inguinal lymph nodes. Therefore, lymphadenectomy was recommended. She will also need adjuvant chemotherapy for her cancer. The risks, benefits and alternatives to the procedure were discussed and she agreed to proceed.     Hospital Course:  Dz:   - Preoperative diagnosis was vulvar lesion concerning for dysplasia and vulvar cancer.  Final pathology is pending at the time of discharge.  She will follow-up postoperatively for a care plan.  FEN:   - She was maintained on IVF until POD#0, when her diet was slowly advanced.  By discharge, she was tolerating a regular diet without nausea and vomiting and able to maintain her hydration without IVF supplementation.  Pain:   - Her pain was initially controlled on PO pain medications.   Her pain was well controlled on this and she was discharged home with these medications.  CV:   - She has a history of hypertension and her antihypertensives were recently increased. Her antihypertensives were held in house due to hypotension. She was instructed to follow up with her primary care provider to restart these medications. She was also given a referral for internal medicine for further management of hypertension. For her Hepatitis C, she was given a referral to GI for further management. Her vital signs were stable while in house and  she had no acute CV issues.  PULM:   - She has no history of pulmonary issues. She was initially given O2 supplementation in to maintain her O2 sats in the immediate postop period and was transitioned off of this without difficulty.  By discharge, her O2 sats were greater than 94% on RA.  She was encouraged to use her bedside IS while in house.  She had no acute pulmonary issues while in house.  HEME:   - Her preoperative Hgb was 15.5.  Her hgb dropped to 12.8 postoperatively.  She had no other acute heme issues while in house.  GI:   - She was made NPO prior to the procedure.  On POD#0, her diet was advanced to clear liquids and then advanced slowly as tolerated.  At the time of discharge, she was tolerating a regular diet without nausea and vomiting.  She will be discharged with a bowel regimen to prevent constipation in the postoperative period.  She had no acute GI issues while in house.  :    - The patient was found to have YAS on admission with a Cr of 1.51, thought to be secondary to hypotension from recent changes in antihypertensives and subsequent hypotension. With IV hydration, her Cr improved to 1.15. A mcwilliams catheter was placed at the time of the surgery.  Once ambulating unassisted, the mcwilliams catheter was removed.  Prior to discharge, the patient was voiding spontaneously without difficulty.  She had no acute  issues while in house.  ID:   - The patient was AF during her hospitalization.  She received standard preoperative antibiotics without incident.    ENDO:   - no issues  PSYCH/NEURO:   - no issues  PPX:    -  She was given SCDs and PPI during her hospital course.  She tolerated these prophylactic interventions without incident.  They were discontinued at the time of her discharge.      Discharge Instructions and Follow up:  Ms. Julius Gilliam was discharged from the hospital with follow up as below.    Discharge Diet: Regular  Discharge Activity: Activity as tolerated  Discharge Follow up:  Dr. Vera in 10-14 days; Dr. Leal on 2/26.            Follow up with primary provider regarding restarting blood pressure medications           Follow up with GI regarding management of Hepatitis C    Discharge Disposition:  Discharged to home    Discharge Staff: Chantelle Pina MD   Resident Physician, PGY2  Obstetrics, Gynecology, and Women's Health    Chantelle Saeed MD  Gynecologic Oncology  [facility]

## 2018-02-09 NOTE — ANESTHESIA PREPROCEDURE EVALUATION
Anesthesia Evaluation     . Pt has had prior anesthetic.     No history of anesthetic complications          ROS/MED HX    ENT/Pulmonary:     (+)tobacco use, Current use 1 packs/day  , . .    Neurologic:       Cardiovascular: Comment: Hypotensive/dizzy on arrival to preop, EKG- NSR with occasional PACs. Patient reports recent increase in lisinopril dose and addition of chlorthalidone after which she has been experiencing some dizziness.  Given 1 L IVF in preop with improvement in symptoms and BP    (+) hypertension----. : . . . :. .       METS/Exercise Tolerance:     Hematologic:         Musculoskeletal:         GI/Hepatic:     (+) hepatitis type C,       Renal/Genitourinary: Comment: Bump in Cr from 0.5 to 1.8 in setting of new antihypertensives, giving IVF    (+) chronic renal disease, type: ARF,       Endo:         Psychiatric:         Infectious Disease:         Malignancy:   (+) Malignancy History of Other  Other CA vulva status post         Other:                     Physical Exam  Normal systems: dental    Airway   Mallampati: III  TM distance: >3 FB  Neck ROM: full    Dental     Cardiovascular   Rhythm and rate: regular and normal      Pulmonary    breath sounds clear to auscultation                    Anesthesia Plan      History & Physical Review  History and physical reviewed and following examination; no interval change.    ASA Status:  2 .    NPO Status:  > 8 hours    Plan for General and ETT with Intravenous induction. Maintenance will be Balanced.    PONV prophylaxis:  Ondansetron (or other 5HT-3) and Dexamethasone or Solumedrol  Given concern for wanting to start therapy and needing tissue will plan on proceeding.  Patient BP improved and symptoms resolving with IVF.  Will recommend surgery service keep her postop to monitor      Postoperative Care  Postoperative pain management:  IV analgesics.      Consents  Anesthetic plan, risks, benefits and alternatives discussed with:  Patient..           ANESTHESIA PREOP EVALUATION    HPI: Julius Gilliam is a 62 year old female who presents for Procedure(s):  Exam Under Anesthesia, Inguinal And Femoral Lymph Node Dissection - Wound Class: I-Clean    PMHx/PSHx/ROS:  Past Medical History:   Diagnosis Date     Deafness in right ear      Hypertension      Multiple facial bone fractures (H)     also left ankle and left wrist     Vertigo        Past Surgical History:   Procedure Laterality Date     ENDOSCOPIC STRIPPING VEIN(S)       FACIAL RECONSTRUCTION SURGERY      post horse trauma     repair of left arm fracture         Past Anes Hx: No personal or family h/o anesthesia problems    Soc Hx:   Social History   Substance Use Topics     Smoking status: Current Every Day Smoker     Types: Cigarettes     Smokeless tobacco: Never Used      Comment: <1 pack/ day.  30 pack/yers     Alcohol use Yes      Comment: 0-3 drinks per day       Allergies: No Known Allergies    Meds:   Current Facility-Administered Medications   Medication     ceFAZolin (ANCEF) intermittent infusion 2 g in 100 mL dextrose PRE-MIX     ceFAZolin (ANCEF) 1 g vial to attach to  ml bag for ADULT or 50 ml bag for PEDS     phenazopyridine (PYRIDIUM) tablet 200 mg     lidocaine 1 % 1 mL     lidocaine (LMX4) kit     sodium chloride (PF) 0.9% PF flush 3 mL     sodium chloride (PF) 0.9% PF flush 3 mL     lactated ringers infusion     lactated ringers infusion       NPO Status: >8 hours     Labs:    BMP:  Recent Labs   Lab Test  02/09/18   1226  12/04/17   1534   NA   --   136   POTASSIUM  3.5  3.9   CHLORIDE   --   100   CO2   --   28   BUN   --   7   CR  1.84*  0.58   GLC   --   93   BERHANE   --   8.7     CBC:   Recent Labs   Lab Test  02/09/18   1226   WBC  11.8*   RBC  4.37   HGB  15.5   HCT  44.3   MCV  101*   MCH  35.5*   MCHC  35.0   RDW  13.4   PLT  221     Coags:  No results for input(s): INR, PTT, FIBR in the last 48357 hours.    Mara Carrasco MD  Staff Anesthesiologist  Pager  [phone]  2/9/2018  2:04 PM                      .

## 2018-02-09 NOTE — IP AVS SNAPSHOT
[facility] [address] [address]    Phone:  [phone]    Fax:  [phone]                                       After Visit Summary   2/9/2018    Julius Gilliam    MRN: [id number]           After Visit Summary Signature Page     I have received my discharge instructions, and my questions have been answered. I have discussed any challenges I see with this plan with the nurse or doctor.    ..........................................................................................................................................  Patient/Patient Representative Signature      ..........................................................................................................................................  Patient Representative Print Name and Relationship to Patient    ..................................................               ................................................  Date                                            Time    ..........................................................................................................................................  Reviewed by Signature/Title    ...................................................              ..............................................  Date                                                            Time

## 2018-02-09 NOTE — IP AVS SNAPSHOT
MRN:[id number]                      After Visit Summary   2/9/2018    Julius Gilliam    MRN: [id number]           Thank you!     Thank you for choosing [facility] for your care. Our goal is always to provide you with excellent care. Hearing back from our patients is one way we can continue to improve our services. Please take a few minutes to complete the written survey that you may receive in the mail after you visit with us. Thank you!        Patient Information     Date Of Birth          1955        About your hospital stay     You were admitted on:  February 9, 2018 You last received care in the:  Unit 6D Observation [facility]    You were discharged on:  February 10, 2018        Reason for your hospital stay       For lymph node dissection and post-op care                  Who to Call     For medical emergencies, please call 911.  For non-urgent questions about your medical care, please call your primary care provider or clinic, [phone]  For questions related to your surgery, please call your surgery clinic        Attending Provider     Provider Santosh Lopez MD Oncology       Primary Care Provider Office Phone # Fax #    Tova ADE Montesinos [phone]       When to contact your care team       Call the Gyn Onc clinic or go to the ED if you have any of the following: temperature greater than 100.4F,  increased shortness of breath, increased drainage greater than 100mL from drain sites or leaking around the drain sites, or increased pain not controlled with oral medications.                  After Care Instructions     Activity       Your activity upon discharge: activity as tolerated. No lifting >15lbs for 6weeks.            Diet       Follow this diet upon discharge: Regular            Diet Instructions       Resume pre-procedure diet            Discharge Instructions       GENERAL POST-OPERATIVE  PATIENT INSTRUCTIONS      FOLLOW-UP:     Call Surgeon if you have:  Temperature greater than 100.4  Persistent nausea and vomiting  Severe uncontrolled pain  Redness, tenderness, or signs of infection (pain, swelling, redness, odor or green/yellow discharge around the site)  Difficulty breathing, headache or visual disturbances  Hives  Persistent dizziness or light-headedness  Extreme fatigue  Any other questions or concerns you may have after discharge    In an emergency, call 911 or go to an Emergency Department at a nearby hospital       WOUND CARE INSTRUCTIONS:  Keep a dry clean dressing on the wound if there is drainage. If you had a bandage initially, it may be removed after 24 hours.  Once the wound has quit draining you may leave it open to air.  If clothing rubs against the wound or causes irritation and the wound is not draining you may cover it with a dry dressing during the daytime.  Try to keep the wound dry and avoid ointments on the wound unless directed to do so.  If the wound becomes bright red and painful or starts to drain infected material that is not clear, please contact your physician immediately.    1.  You may shower 24 hrs after surgery   2.  No soaking in the tub for 4 weeks       DIET:  There are no dietary restrictions.  You may eat any foods that you can tolerate unless instructed otherwise.  It is a good idea to eat a high fiber diet and take in plenty of fluids to prevent constipation.  If you become constipated, please follow the instructions below.    ACTIVITY:  You are encouraged to cough, deep breath and use your incentive spirometer if you were given one, every 15-30 minutes when awake.  This will help prevent respiratory complications and low grade fevers post-operatively.  You may want to hug a pillow when coughing and sneezing to add additional support to the surgical area, if you had abdominal surgery, which will decrease pain during these times.      1.  No heavy lifting >20lbs or strenuous exercise for  six-eight weeks.  No exercise in which you are using core muscles (yoga, pilates, swimming, weight lifting)  2.  You may walk as much as you wish.  You are encouraged to increase your activity each day after surgery.  Stairs are okay.   3.  Nothing per vagina for eight weeks.  No tampons, no intercourse, no douching.  You can expect some light vaginal spotting and discharge for up to six weeks.  If bleeding becomes heavy, please contact the office.     MEDICATIONS:  Try to take narcotic medications and anti-inflammatory medications, such as tylenol, ibuprofen, naprosyn, etc., with food.  This will minimize stomach upset from the medication.  Should you develop nausea and vomiting from the pain medication, or develop a rash, please discontinue the medication and contact your physician.  You should not drive, make important decisions, or operate machinery when taking narcotic pain medication.    OTHER:  Patients are often constipated after general anesthesia and surgery.  The patient should continue to take stool softeners (for example, Senokot-S) for the next six weeks (unless diarrhea develops) and consume adequate amounts of water.  If the patient remains constipated or unable to pass stool, please try one or all of the following measures:  1.  Milk of Magnesia 30cc twice a day as needed by mouth  2.  Metamucil 2 tablespoons in 12 ounces of fluid  3.  Dulcolax oral or suppositories  4.  Prunes or prune juice  5.  Miralax daily      QUESTIONS:  Please feel free to call your physician or the hospital  if you have any questions, and they will be glad to assist you.            Discharge Instructions       Your blood pressure medication have been held. Please do not take them on discharge until you follow up with your primary care provider regarding your blood pressure.            Dressing       Keep dressing clean and dry.  Dressing / incisional care as instructed by RN and or Surgeon            Ice to affected  area       Ice to operative site PRN            Monitor and record       Drain output. If either drain puts out less than 20mL per day please call clinic to be seen for possible removal.            No Alcohol       For 24 hours post procedure            No driving or operating machinery        until the day after procedure            No lifting        No lifting over 20 lbs and no strenuous physical activity for 6 weeks            Tubes and drains       You are going home with the following tubes or drains: 2 KASIA drains  These drains will stay in place for 10-14 days until clinic follow up (should make at appt for within 10-14 days after discharge date) UNLESS they either drain is putting out less than 20mL/day--then you should call clinic to be seen earlier.    The Mark Mcdonald drainage system (KASIA drain) draws out fluid that collects under your incision (surgical cut) after your surgery.    It has a soft plastic bulb with a stopper and flexible tubing attached (see Figure 1). The drainage end of the tubing (flat white portion) is placed into your surgical site through a small opening near your incision. This area is called the insertion site. A suture (stitch) will hold it in place. The rest of the tube will extend outside your body and will be attached to the bulb.    When the bulb is compressed (squeezed) with the stopper in place, a constant gentle suction is created. The bulb should be compressed at all times, except when you are emptying the drainage.    How long you will have your Mark-Mcdonald depends on your surgery and the amount of drainage you're having. Everyone's drainage is different. Some people drain a lot, some only a little. The Mark-Mcdonald is usually removed when the drainage is 20 mL or less over 24 hours. You will record the amount of drainage in the drainage log. It's important to bring the log with you to your follow-up appointments.    CARING FOR YOUR KASIA DRAIN AT HOME WILL  INCLUDE:   Milking the tubing to help move clots.   Emptying the drain 2 times a day and recording the amount of drainage on the Mark-Mcdonald Drainage Record. *If you have more than 1 drain, make sure to measure and record the drainage of each one separately. Do not add them together.     Caring for your insertion site.   Recognizing when there is a problem.    MILKING THE TUBING  These steps will help you move clots through the tubing and keep the drainage flowing.    Milk the tubing before you open the stopper to empty and measure your drainage. You should also do this if you see fluid leaking around the insertion site.  1.Clean your hands. To wash your hands with soap and water, wet your hands, apply soap, rub them together thoroughly for 15 seconds, then rinse. Dry your hands with a disposable towel, and use that same towel to turn off the faucet. If you're using an alcohol-based hand , cover all of your hands with it, rubbing them together until they're dry.  2.Look in the mirror at the tubing. This will help you see where your hands need to be.  3.Pinch the tubing close to where it goes into your skin between the thumb and forefinger of your hand. This will help to make sure that you're not tugging on your skin, which can be painful.  4.With the thumb and forefinger of your other hand, pinch the tubing right below your other fingers. Keeping your fingers pinched; slide them down the tubing, pushing any clots down toward the drainage bulb. You may want to use alcohol wipes to help you slide your fingers down the tubing.  5.Repeat steps 3 and 4 as necessary to push clots from the tubing into the bulb. If you are not able to move a clot into the bulb and there is little or no drainage in the bulb, call your doctor or nurse.    EMPTYING YOUR KASIA DRAIN AND RECORDING THE DRAINAGE  You will need to empty your Mark-Mcdonald in the morning and in the evening.    Supplies   Measuring container your nurse gave  you   Mark-Mcdonald Drainage Record   Pen or pencil    Instructions  1.Prepare a clean area to work on and gather your supplies. This can be done in your bathroom or in an area with a dry, uncluttered surface.  2.Clean your hands. To wash your hands with soap and water, wet your hands, apply soap, rub them together thoroughly for 15 seconds, then rinse. Dry your hands with a disposable towel, and use that same towel to turn off the faucet. If you're using an alcohol-based hand , cover all of your hands with it, rubbing them together until they're dry.  3.If the drainage bulb is attached to your surgical bra or wrap, first remove it from there.  4.Unplug the stopper on top of the bulb. This will cause the bulb to expand. Do not touch the inside of the stopper or the inner area of the opening on the bulb.  5.Turn the bulb upside down, gently squeeze the bulb, and pour the drainage into the measuring container (see Figure 2).  6.Turn your bulb right side up.  7.Squeeze the bulb until your fingers feel the palm of your hand.  8.Continue to squeeze the bulb while you replug the stopper.  9.Check to see that the bulb stays fully compressed to ensure a constant gentle suction.  10.Do not let the drain dangle. You may tuck the drains into your underwear or under an abdominal wrap if you have one.  11.Check the amount and color of drainage in the measuring container. The first couple of days after surgery, the fluid may be dark red in color. This is normal. As you continue to heal it may appear pink or pale yellow.  12.Record this amount and the color of drainage on your Mark-Mcdonald Drainage Record.  13.Flush the drainage down the toilet and rinse the measuring container with water.  14.At the end of each day, add up the total amount of drainage for the 24-hour period and record it in the last column of the drainage record. If you have more than 1 drain, measure and record each one separately.    CARING FOR THE  INSERTION SITE  Once you have emptied the drainage, clean your hands again. Check the area around the insertion site. Look for tenderness, swelling, or pus from the insertion site. If you have any of these, or if you have a temperature of 101  F (38.3  C) or higher, you may have an infection. Call your doctor's office.    Sometimes the drain causes redness about the size of a dime at your insertion site. This is normal. Your healthcare provider will tell you if you should place a bandage over the insertion site.    Keep your insertion site clean and dry by washing it with soap and water and then gently patting it dry.            Wound care and dressings       Instructions to care for your wound at home: keep wound clean and dry, may get incision wet in shower but do not soak or scrub.                  Follow-up Appointments     Adult [facility] Follow-up and recommended labs and tests       Follow up with Dr. Vera for a post-op visit in 10-14 days. Please call to make this appointment.  Follow up with Dr. Leal in radiation oncology as previously scheduled 2/26/18 at 1pm    Appointments on [place] and/or [facility] (with [facility] or [facility] provider or service). Call [phone] if you haven't heard regarding these appointments within 7 days of discharge.                  Your next 10 appointments already scheduled     Feb 26, 2018  1:00 PM CST   CONSULT with Yvon Leal MD   Radiation Oncology Clinic ([facility])    [facility]  1st Floor  [address] [id number] [phone]              Additional Services     GASTROENTEROLOGY ADULT REF CONSULT ONLY       Preferred Location: [facility] [phone]      Please be aware that coverage of these services is subject to the terms and limitations of your health insurance plan.  Call member services at your health plan with any benefit or coverage questions.  Any procedures must be performed at a  [facility] OR coordinated by your clinic's referral office.    Please bring the following with you to your appointment:    (1) Any X-Rays, CTs or MRIs which have been performed.  Contact the facility where they were done to arrange for  prior to your scheduled appointment.    (2) List of current medications   (3) This referral request   (4) Any documents/labs given to you for this referral            INTERNAL MEDICINE REFERRAL       Follow up with your primary care doctor in 2-4 weeks for your blood pressure medications. Those medications have been discontinued because your blood pressure has been low while in the hospital. They will re-start your blood pressure medications when appropriate.     Your provider has referred you to: [facility]: Primary Care Clinic - [facility] Clinics and Surgery [facility] [phone]   [email or web address]    Please be aware that coverage of these services is subject to the terms and limitations of your health insurance plan.  Call member services at your health plan with any benefit or coverage questions.      Please bring the following to your appointment:  >>   Any x-rays, CTs or MRIs which have been performed.  Contact the facility where they were done to arrange for  prior to your scheduled appointment.   >>   List of current medications   >>   This referral request   >>   Any documents/labs given to you for this referral                  Additional Information     If you use hormonal birth control (such as the pill, patch, ring or implants): You'll need a second form of birth control for 7 days (condoms, a diaphragm or contraceptive foam). While in the hospital, you received a medicine called Bridion. Your normal birth control will not work as well for a week after taking this medicine.          Pending Results     Date and Time Order Name Status Description    2/9/2018 1211 EKG 12-lead, tracing only Preliminary         "     Statement of Approval     Ordered          02/10/18 1048  I have reviewed and agree with all the recommendations and orders detailed in this document.  EFFECTIVE NOW     Approved and electronically signed by:  Maria Isabel Pina MD             Admission Information     Date & Time Provider Department Dept. Phone    2/9/2018 Santosh Vera MD Unit 6D Observation [facility] [phone]      Your Vitals Were     Blood Pressure Pulse Temperature Respirations Height Weight    108/70 (BP Location: Right arm) 76 97.9  F (36.6  C) (Oral) 16 1.689 m (5' 6.5\") 63.8 kg (140 lb 10.5 oz)    Pulse Oximetry BMI (Body Mass Index)                98% 22.36 kg/m2          [email or web address] Information     [employer] gives you secure access to your electronic health record. If you see a primary care provider, you can also send messages to your care team and make appointments. If you have questions, please call your primary care clinic.  If you do not have a primary care provider, please call [phone] and they will assist you.        Care EveryWhere ID     This is your Care EveryWhere ID. This could be used by other organizations to access your [facility] medical records  [id number]        Equal Access to Services     CAREY COSME AH: Hadii melania Miguel, waroseannda janny, qaybta kaalmada surjit, talha jones. So [facility] [phone].    ATENCIÓN: Si habla español, tiene a elizabeth disposición servicios gratuitos de asistencia lingüística. Teodora al [phone].    We comply with applicable federal civil rights laws and [place] laws. We do not discriminate on the basis of race, color, national origin, age, disability, sex, sexual orientation, or gender identity.               Review of your medicines      START taking        Dose / Directions    acetaminophen 325 MG tablet   Commonly known as:  TYLENOL        Dose:  650 mg   Take 2 tablets (650 mg) by mouth every 6 hours   Quantity:  30 tablet "   Refills:  0       oxyCODONE IR 5 MG tablet   Commonly known as:  ROXICODONE        Dose:  5 mg   Take 1 tablet (5 mg) by mouth every 4 hours as needed for pain   Quantity:  20 tablet   Refills:  0       sennosides 8.6 MG tablet   Commonly known as:  SENOKOT        Dose:  1 tablet   Take 1 tablet by mouth 2 times daily as needed for constipation   Quantity:  60 tablet   Refills:  0         CONTINUE these medicines which have NOT CHANGED        Dose / Directions    Multi-vitamin Tabs tablet        Dose:  1 tablet   Take 1 tablet by mouth daily   Refills:  0         STOP taking     CHLORTHALIDONE PO           LISINOPRIL PO                Where to get your medicines      These medications were sent to [facility] [place], [place] - [address]  [address] [address]     Phone:  [phone]     acetaminophen 325 MG tablet    sennosides 8.6 MG tablet         Some of these will need a paper prescription and others can be bought over the counter. Ask your nurse if you have questions.     Bring a paper prescription for each of these medications     oxyCODONE IR 5 MG tablet                Protect others around you: Learn how to safely use, store and throw away your medicines at www.disposemymeds.org.        Information about OPIOIDS     PRESCRIPTION OPIOIDS: WHAT YOU NEED TO KNOW    Prescription opioids can be used to help relieve moderate to severe pain and are often prescribed following a surgery or injury, or for certain health conditions. These medications can be an important part of treatment but also come with serious risks. It is important to work with your health care provider to make sure you are getting the safest, most effective care.    WHAT ARE THE RISKS AND SIDE EFFECTS OF OPIOID USE?  Prescription opioids carry serious risks of addiction and overdose, especially with prolonged use. An opioid overdose, often marked by slowed breathing can cause sudden death. The  use of prescription opioids can have a number of side effects as well, even when taken as directed:      Tolerance - meaning you might need to take more of a medication for the same pain relief    Physical dependence - meaning you have symptoms of withdrawal when a medication is stopped    Increased sensitivity to pain    Constipation    Nausea, vomiting, and dry mouth    Sleepiness and dizziness    Confusion    Depression    Low levels of testosterone that can result in lower sex drive, energy, and strength    Itching and sweating    RISKS ARE GREATER WITH:    History of drug misuse, substance use disorder, or overdose    Mental health conditions (such as depression or anxiety)    Sleep apnea    Older age (65 years or older)    Pregnancy    Avoid alcohol while taking prescription opioids.   Also, unless specifically advised by your health care provider, medications to avoid include:    Benzodiazepines (such as Xanax or Valium)    Muscle relaxants (such as Soma or Flexeril)    Hypnotics (such as Ambien or Lunesta)    Other prescription opioids    KNOW YOUR OPTIONS:  Talk to your health care provider about ways to manage your pain that do not involve prescription opioids. Some of these options may actually work better and have fewer risks and side effects:    Pain relievers such as acetaminophen, ibuprofen, and naproxen    Some medications that are also used for depression or seizures    Physical therapy and exercise    Cognitive behavioral therapy, a psychological, goal-directed approach, in which patients learn how to modify physical, behavioral, and emotional triggers of pain and stress    IF YOU ARE PRESCRIBED OPIOIDS FOR PAIN:    Never take opioids in greater amounts or more often than prescribed    Follow up with your primary health care provider and work together to create a plan on how to manage your pain.    Talk about ways to help manage your pain that do not involve prescription opioids    Talk about all  concerns and side effects    Help prevent misuse and abuse    Never sell or share prescription opioids    Never use another person's prescription opioids    Store prescription opioids in a secure place and out of reach of others (this may include visitors, children, friends, and family)    Visit www.cdc.gov/drugoverdose to learn about risks of opioid abuse and overdose    If you believe you may be struggling with addiction, tell your health care provider and ask for guidance or call [facility]'s National Helpline at 5-274-451-HELP    LEARN MORE / www.cdc.gov/drugoverdose/prescribing/guideline.html    Safely dispose of unused prescription opioids: Find your local drug take-back programs and more information about the importance of safe disposal at www.doseofreality.[place].gov             Medication List: This is a list of all your medications and when to take them. Check marks below indicate your daily home schedule. Keep this list as a reference.      Medications           Morning Afternoon Evening Bedtime As Needed    acetaminophen 325 MG tablet   Commonly known as:  TYLENOL   Take 2 tablets (650 mg) by mouth every 6 hours   Last time this was given:  650 mg on 2/10/2018  8:29 AM                                Multi-vitamin Tabs tablet   Take 1 tablet by mouth daily                                oxyCODONE IR 5 MG tablet   Commonly known as:  ROXICODONE   Take 1 tablet (5 mg) by mouth every 4 hours as needed for pain                                sennosides 8.6 MG tablet   Commonly known as:  SENOKOT   Take 1 tablet by mouth 2 times daily as needed for constipation

## 2018-02-09 NOTE — ANESTHESIA CARE TRANSFER NOTE
Patient: Julius Gilliam    Procedure(s):  Bilateral Inguinal Lymph Node Dissection - Wound Class: I-Clean    Diagnosis: Vulvar Cancer  Diagnosis Additional Information: No value filed.    Anesthesia Type:   General, ETT     Note:  Airway :Face Mask  Patient transferred to:PACU  Comments: Pt to recovery room.  Spontaneous respirations.   Report given to RN.  Handoff Report: Identifed the Patient, Identified the Reponsible Provider, Reviewed the pertinent medical history, Discussed the surgical course, Reviewed Intra-OP anesthesia mangement and issues during anesthesia, Set expectations for post-procedure period and Allowed opportunity for questions and acknowledgement of understanding      Vitals: (Last set prior to Anesthesia Care Transfer)    CRNA VITALS  2/9/2018 1618 - 2/9/2018 1652      2/9/2018             Pulse: 86    SpO2: 99 %    Resp Rate (set): 10                Electronically Signed By: CAROLE Knox CRNA  February 9, 2018  4:52 PM

## 2018-02-09 NOTE — ANESTHESIA POSTPROCEDURE EVALUATION
Patient: Julius Gilliam    Procedure(s):  Bilateral Inguinal Lymph Node Dissection - Wound Class: I-Clean    Diagnosis:Vulvar Cancer  Diagnosis Additional Information: No value filed.    Anesthesia Type:  General, ETT    Note:  Anesthesia Post Evaluation    Patient location during evaluation: PACU  Patient participation: Able to fully participate in evaluation  Level of consciousness: awake and alert  Pain management: satisfactory to patient  Airway patency: patent  Cardiovascular status: acceptable and stable  Respiratory status: acceptable and spontaneous ventilation  Hydration status: euvolemic  PONV: controlled     Anesthetic complications: None          Last vitals:  Vitals:    02/09/18 1700 02/09/18 1715 02/09/18 1730   BP: 98/65 102/59 (!) 88/57   Resp: 16 12 16   Temp: 36.3  C (97.4  F) 36.2  C (97.2  F) 36.1  C (97  F)   SpO2: 99% 95% 93%         Electronically Signed By: Pancho Arroyo MD  February 9, 2018  5:45 PM

## 2018-02-09 NOTE — OR NURSING
Patient arrived to pre op area unsteady gait requiring SBA, c/o nausea, headache, and dizziness.  Blood pressure 70's/50's.  Dr. Carrasco notified and has assessed patient in pre op.  Verbal order with readback obtained for stat 12 lead EKG, CBC, Potassium, creatinine, and IV fluid bolus 250 ml LR.    Patient assisted by writing RN and NST with changing into surgical gown.

## 2018-02-09 NOTE — BRIEF OP NOTE
"   [facility], [place]  Brief Operative Note    Pre-operative diagnosis:    - Vulvar Cancer    Post-operative diagnosis   - same    Procedure:  Bilateral Inguinal Lymph Node Dissection and placement of bilateral inguinal KASIA drains    Surgeon:     * Santosh Vera MD - Primary    Assistants:  Noe Horvath - Fellow  Sarahi Levy - PGY3  Maggie [facility] - MS3    Anesthesia: General   Estimated blood loss: 20mL  IVF: 1000mL, 250mL albumin (intra-op); 1000mL (pre-op)  UOP: 100mL    Drains:  Mark-Mcdonald x2 - bilateral inguinal regions    Findings: bilateral ulcerated erythematous \"horse-shoe shaped\" vulvar lesion with 1/3 to 1/2 of vaginal involvement.  Palpable left lymph nodes on EUA.  No palpable nodes on right. On dissection, 3spn5pd and 2x2cm left lymph nodes. On right, 6wiw6bw and smaller 5mm lymph nodes. Hemostatic at end of case.  KASIA drain placed in right and left inguinal region.     Specimens:   ID Type Source Tests Collected by Time Destination   A : Left inguinal lymph node Tissue Lymph Node, Inguinal, Left SURGICAL PATHOLOGY EXAM Santosh Vera MD 2/9/2018  3:32 PM    B : Right inguinal lymph nodes Tissue Lymph Node, Inguinal, Right SURGICAL PATHOLOGY EXAM Santosh Vera MD 2/9/2018  4:13 PM      Complications: none apparent  Implants: none    Sarahi Levy MD MPH  OB/GYN, PGY3  Pager: [phone]  2/9/2018  4:47 PM        "

## 2018-02-10 VITALS
BODY MASS INDEX: 22.08 KG/M2 | WEIGHT: 140.65 LBS | TEMPERATURE: 97.9 F | HEIGHT: 67 IN | DIASTOLIC BLOOD PRESSURE: 70 MMHG | HEART RATE: 76 BPM | SYSTOLIC BLOOD PRESSURE: 108 MMHG | OXYGEN SATURATION: 98 % | RESPIRATION RATE: 16 BRPM

## 2018-02-10 LAB
ALBUMIN SERPL-MCNC: 2.9 G/DL (ref 3.4–5)
ALP SERPL-CCNC: 92 U/L (ref 40–150)
ALT SERPL W P-5'-P-CCNC: 32 U/L (ref 0–50)
ANION GAP SERPL CALCULATED.3IONS-SCNC: 10 MMOL/L (ref 3–14)
AST SERPL W P-5'-P-CCNC: 36 U/L (ref 0–45)
BILIRUB SERPL-MCNC: 1.5 MG/DL (ref 0.2–1.3)
BUN SERPL-MCNC: 30 MG/DL (ref 7–30)
CALCIUM SERPL-MCNC: 8 MG/DL (ref 8.5–10.1)
CHLORIDE SERPL-SCNC: 93 MMOL/L (ref 94–109)
CO2 SERPL-SCNC: 26 MMOL/L (ref 20–32)
CREAT SERPL-MCNC: 1.15 MG/DL (ref 0.52–1.04)
ERYTHROCYTE [DISTWIDTH] IN BLOOD BY AUTOMATED COUNT: 13.5 % (ref 10–15)
GFR SERPL CREATININE-BSD FRML MDRD: 48 ML/MIN/1.7M2
GLUCOSE BLDC GLUCOMTR-MCNC: 111 MG/DL (ref 70–99)
GLUCOSE SERPL-MCNC: 94 MG/DL (ref 70–99)
HCT VFR BLD AUTO: 37.7 % (ref 35–47)
HGB BLD-MCNC: 12.8 G/DL (ref 11.7–15.7)
MCH RBC QN AUTO: 34.5 PG (ref 26.5–33)
MCHC RBC AUTO-ENTMCNC: 34 G/DL (ref 31.5–36.5)
MCV RBC AUTO: 102 FL (ref 78–100)
PLATELET # BLD AUTO: 179 10E9/L (ref 150–450)
POTASSIUM SERPL-SCNC: 3.4 MMOL/L (ref 3.4–5.3)
PROT SERPL-MCNC: 6.1 G/DL (ref 6.8–8.8)
RBC # BLD AUTO: 3.71 10E12/L (ref 3.8–5.2)
SODIUM SERPL-SCNC: 130 MMOL/L (ref 133–144)
WBC # BLD AUTO: 7.2 10E9/L (ref 4–11)

## 2018-02-10 PROCEDURE — 40000893 ZZH STATISTIC PT IP EVAL DEFER: Performed by: PHYSICAL THERAPIST

## 2018-02-10 PROCEDURE — 99024 POSTOP FOLLOW-UP VISIT: CPT | Mod: GC | Performed by: OBSTETRICS & GYNECOLOGY

## 2018-02-10 PROCEDURE — 85027 COMPLETE CBC AUTOMATED: CPT | Performed by: OBSTETRICS & GYNECOLOGY

## 2018-02-10 PROCEDURE — 36415 COLL VENOUS BLD VENIPUNCTURE: CPT | Performed by: OBSTETRICS & GYNECOLOGY

## 2018-02-10 PROCEDURE — 25000132 ZZH RX MED GY IP 250 OP 250 PS 637: Performed by: OBSTETRICS & GYNECOLOGY

## 2018-02-10 PROCEDURE — 80053 COMPREHEN METABOLIC PANEL: CPT | Performed by: OBSTETRICS & GYNECOLOGY

## 2018-02-10 PROCEDURE — 25000128 H RX IP 250 OP 636: Performed by: OBSTETRICS & GYNECOLOGY

## 2018-02-10 PROCEDURE — 82962 GLUCOSE BLOOD TEST: CPT | Mod: 91

## 2018-02-10 RX ORDER — SENNOSIDES 8.6 MG
1 TABLET ORAL 2 TIMES DAILY PRN
Qty: 60 TABLET | Refills: 0 | Status: ON HOLD | OUTPATIENT
Start: 2018-02-10 | End: 2018-04-06

## 2018-02-10 RX ORDER — OXYCODONE HYDROCHLORIDE 5 MG/1
5 TABLET ORAL EVERY 4 HOURS PRN
Qty: 20 TABLET | Refills: 0 | Status: SHIPPED | OUTPATIENT
Start: 2018-02-09 | End: 2018-04-20

## 2018-02-10 RX ORDER — ACETAMINOPHEN 325 MG/1
650 TABLET ORAL EVERY 6 HOURS
Qty: 30 TABLET | Refills: 0 | Status: SHIPPED | OUTPATIENT
Start: 2018-02-10

## 2018-02-10 RX ADMIN — ACETAMINOPHEN 650 MG: 325 TABLET, FILM COATED ORAL at 01:46

## 2018-02-10 RX ADMIN — SODIUM CHLORIDE, POTASSIUM CHLORIDE, SODIUM LACTATE AND CALCIUM CHLORIDE: 600; 310; 30; 20 INJECTION, SOLUTION INTRAVENOUS at 01:47

## 2018-02-10 RX ADMIN — ACETAMINOPHEN 650 MG: 325 TABLET, FILM COATED ORAL at 08:29

## 2018-02-10 RX ADMIN — RANITIDINE 150 MG: 150 TABLET, FILM COATED ORAL at 08:29

## 2018-02-10 NOTE — PROGRESS NOTES
Gynecologic Oncology Progress Note  2/10/2018    POD#1 s/p b/l inguinal lymph node dissection    Disease: At least stage IIIA vulvar cancer, bilateral extensive vulvar lesion including anus    S: Feeling well, no complaints, no pain. Tolerated salad w/ no nausea or vomiting. Passing flatus. Denies dizziness sitting up, not yet ambulated. Mcwilliams in place.    O:  Vitals:    02/09/18 1830 02/09/18 1845 02/09/18 1923 02/09/18 2237   BP: (!) 86/52 (!) 88/53 (!) 87/63    BP Location:   Right arm Right arm   Pulse:    76   Resp: 16 16 16 14   Temp: 98.1  F (36.7  C)  98.1  F (36.7  C) 97.7  F (36.5  C)   TempSrc: Oral  Oral Oral   SpO2: 96% 95% 95% 95%   Weight:       Height:         Gen: alert and oriented, resting in bed  Cardio: rrr, nl s1 and s2, no m/r/g  Resp: lungs CTAB, no wheezes or crackles  Abdomen: soft, nontender to palpation  Incisions: bandages overlying b/l inguinal incisions, 1cm shadowing on left bandage at midline, none on right bandage. Right drain w/ serosang 75cc, left drain w/ serosang 75cc  Extremities: BLEs non-tender with SCDs in place    I/O last 3 completed shifts:  In: 1900 [I.V.:1650]  Out: 405 [Urine:370; Drains:15; Blood:20]     UOP: 180cc/hr since [place] (not yet recorded at 0500AM)  L drain: 75cc since [place]  R drain: 75cc since [place]    Labs:  Hgb 15.5 pre-op > 12.8  Cr 1.84 pre-op > 1.15    Asessment: 62 year old POD#1 s/p b/l inguinal LN dissection. Doing well.    Active Problem List:  - Vulvar cancer  - HTN  - Tobacco use  - YAS    FEN: Regular diet, mIVF LR 125cc/hr--d/c now  Pain: Scheduled tylenol and PRN PO oxycodone, silvadene cream  Heme: VSS stable, abd soft, no concern for active bleeding. EBL 20. Hgb drop likely diultional  CV: H/o HTN on lisinopril & chlorthalidone at home w/ hypotension pre-op and YAS - meds held  Pulm: Current smoker, nicotine gum PRN  GI: No PO intake yet. ADAT, prn antiemetics and bowel regimen   : Remove mcwilliams. YAS likely d/t diuretics induced dehydration. BL Cr  0.61.  ID: NI  Endo: NI  Psych/MSK/neuro: NI  PPX: SCDs, incentive spirometer  Dispo: Home likely today    Eleonora Nguyen MD PGY2  02/10/18  [facility] pgr [phone]    IBola personally examined and evaluated this patient on 02/10/18.  I discussed the patient with the resident and care team, and agree with the assessment and plan of care as documented in the residents note above.    I personally reviewed vital signs, laboratory values and imaging results.    Pt doing well post-operatively.  Plan d/c home today following trial of void.  Will d/c home with drains in place and home care.    Chantelle Saeed MD  Gynecologic Oncology  [facility]

## 2018-02-10 NOTE — PLAN OF CARE
Problem: Patient Care Overview  Goal: Individualization & Mutuality  Vitals stable,afebrile,Bell catheter was discontinued at 0830,pt voided spontaneously post discontinuation.Pain was adequately managed with scheduled tylenol.Right and left .KASIA patent,bloody  output.KASIA drain teaching done,pt returned demonstration.Patient ambulated [place] without problems.Post op pain comfortably managed with scheduled tylenol.Patient was seen by gyn team,discharge order written,discharge order and instructions explained to patient,discharge,follow up appointment explained to patient, medications explained to patient,pt would  discharge medication form pharmacy on her way home.Supplies for drain care at home given to pt,care of drain at home instruction booklet given to patient.Patient verbalized understanding of information,all questions were answered, teach back done by patient.Copy of discharge order given to patient..Patient was discharge home accompanied by her family at 1250

## 2018-02-10 NOTE — PROGRESS NOTES
Left KASIA drain malfunctioning and losing suction. New KASIA drain obtained from OR control desk and old KASIA drain replaced. New KASIA drain functioning properly. Reviewed again with patient how to use KASIA drain and how to record output. She was able to demonstrate appropriate use of KASIA drain.     Maria Isabel Pina MD   Resident Physician, PGY2  Obstetrics, Gynecology, and Women's Health

## 2018-02-10 NOTE — PROGRESS NOTES
Patient S/P  B/L inguinal lymph node dissection POD #1. Patient is alert and oriented X4. Dressings to bilateral groin sites. Right side CDI. Left side drainage present and marked, no changes. KASIA drains in place, Left output 80ml and Right output 45ml. Bell output was 1200. Patient denied pain but received scheduled tylenol. Tolerated Regular Diet.

## 2018-02-10 NOTE — PLAN OF CARE
Problem: Patient Care Overview  Goal: Plan of Care/Patient Progress Review  PT 6D: PT orders received. Pt seen up walking IND in [place] with visitor, discussed the role of PT with pt, pt denies any needs, reports she is moving IND without concerns, preparing for discharge later today. PT will defer and orders will be completed.

## 2018-02-10 NOTE — OR NURSING
Dr. Melo notified of SBP in the mid 80's and MAP's >60 in the PACU. He said he was OK with this for now.

## 2018-02-10 NOTE — PROGRESS NOTES
Gynecologic Oncology Postoperative Check Note  2/9/2018    S: Feeling well, no complaints, no pain. Hasn't eaten yet but no nausea or vomiting. Denies dizziness sitting up, not yet ambulated. Bell in place.    O:  Vitals:    02/09/18 1815 02/09/18 1830 02/09/18 1845 02/09/18 1923   BP: (!) 89/58 (!) 86/52 (!) 88/53 (!) 87/63   BP Location:    Right arm   Resp: 16 16 16 16   Temp:  98.1  F (36.7  C)  98.1  F (36.7  C)   TempSrc:  Oral  Oral   SpO2: 95% 96% 95% 95%   Weight:       Height:         Gen: alert and oriented, resting in bed  Cardio: rrr, nl s1 and s2, no m/r/g  Resp: lungs CTAB, no wheezes or crackles  Abdomen: soft, nontender to palpation  Incisions: bandages overlying b/l inguinal incisions, 1cm shadowing on left bandage at midline, none on right bandage. Right drain w/ serosang 25cc, left drain w/ serosang 10cc  Extremities: BLEs non-tender with SCDs in place    I/O last 3 completed shifts:  In: 300 [I.V.:300]  Out: 50 [Urine:50]     Hgb 15.5 pre-op  Cr 1.8 pre-op    A: 62 year old POD#0 s/p b/l inguinal LN dissection. Doing well.    FEN: Regular diet, mIVF LR 125cc/hr--d/c in AM if creatinine improved  Pain: Scheduled tylenol and PRN PO oxycodone, silvadene cream  Heme: VSS stable, abd soft, no concern for active bleeding. EBL 20. AM CBC ordered  CV: H/o HTN on lisinopril & chlorthalidone at home w/ hypotension pre-op and YAS - meds held  Pulm: Current smoker, nicotine gum PRN  GI: No PO intake yet. ADAT, prn antiemetics and bowel regimen   : Bell in place, remove POD#1. YAS likely d/t diuretics induced dehydration. BL Cr 0.61. AM BMP ordered    PPX: SCDs, incentive spirometer  Dispo: Home likely POD#1-2     Eleonora Nguyen MD  OB/GYN PGY2  02/09/18 7:28 PM  Gyn pgr [phone]

## 2018-02-12 ENCOUNTER — CARE COORDINATION (OUTPATIENT)
Dept: ONCOLOGY | Facility: CLINIC | Age: 63
End: 2018-02-12

## 2018-02-12 LAB — INTERPRETATION ECG - MUSE: NORMAL

## 2018-02-12 NOTE — OP NOTE
GYNECOLOGIC ONCOLOGY OPERATIVE NOTE    PATIENT: Julius Gilliam  MRN: [id number]  DATE OF SURGERY: 02/09/2018                PREOPERATIVE DIAGNOSES:   1. Squamous cell carcinoma of the vulva     POSTOPERATIVE DIAGNOSES:   1. Squamous cell carcinoma of the vulva     OPERATIVE PROCEDURES:   1. Exam under anesthesia  2. Bilateral inguinal lymph node dissection     SURGEON: Santosh Vera MD      ASSISTANTS:   1. Noe Horvath MD, 2nd year fellow  2. Sarahi Levy MD, 3rd year resident     ANESTHESIA: General endotracheal      ESTIMATED BLOOD LOSS: 20 mL     TOTAL INTRAVENOUS FLUIDS: crystalloid 1000 mL, albumin 250 ml      TOTAL URINE OUTPUT: 100 mL, clear urine     TRANSFUSIONS: none     DRAIN: mcwilliams cathether, bilateral inguinal KASIA drains     SPECIMENS REMOVED: Bilateral inguinal lymph nodes     OPERATIVE FINDINGS:   On exam under anesthesia, bilateral indurated and ulcerated vulvar lesion in a horse-shoe shape involving the anus and 1/3-1/2 of vagina. There were at least 2 palpable lymph nodes on the left inguinal region, with largest ~ 2-3 cm. No palpable nodes noted in right inguinal region. On dissection, 3 enlarged lymph nodes resected on the left, with largest ~ 2x3 cm. On the right, we removed smaller lymph nodes. KASIA drains were placed in the beds of bilateral inguinal lymph nodes at end of procedure.      COMPLICATIONS: None noted.      CONDITION: Stable on transfer.      INDICATIONS FOR PROCEDURE: This patient is a 62 year old with known SCC of the vulva who was seen in clinic with Dr. Vera. There, he palpated several enlarged inguinal lymph nodes on the left and plan was made to debulk the nodes prior to definitive chemoradiation therapy with radiation oncology.      OPERATIVE PROCEDURE IN DETAIL: The consent was reviewed with the patient in the preoperative setting and confirmed. She was transferred to the operating room and placed on the operating room table in the dorsal lithotomy position.  General anesthesia was administered in the usual manner. The patient was then prepped and draped in the standard fashion. A timeout was called at which point the patient's name, operative procedure and site was confirmed by the operative team.      On exam under anesthesia, known vulvar carcinoma was appreciated with large, erythematous, horse-shoe shaped lesion with involvement of anus and lower 1/3-1/2 of vagina. We started the case on the left side. The landmarks of anterior superior iliac spine and pubic tubercle were delineated and line drawn over the inguinal ligament. Approximately 2 cm inferior, a 8 cm incision was made parallel to the inguinal canal. The artery was palpated to help identify our landmarks. The superficial nodes were easily identified due to their enlarged size. Caution was taken to maintain good vasculature to the skin during dissection. The deep fascia was identified and greater saphenous vein noted. Careful dissection was carried out to keep vessels intact. We had to carry out an extensive dissection of the inguinal triangle to remove the enlarged lymph nodes. In all, there was ~ 3 enlarged lymph nodes, with largest measuring ~ 2x3 cm. The borders of the dissection included the sartorious, the adductor, and the inguinal canal. We did not palpate any enlarged deep inguinal lymph nodes and so dissection was not carried out further. We did use one clip in the bed across a large vein. We irrigated the bed copiously. We then closed the space in multiple layers after we placed a KASIA drain. Skin was then closed with staples.    We then turned our attention to the right. In a similar manner, the landmarks of anterior superior iliac spine and pubic tubercle were delineated and line drawn over the inguinal ligament. Approximately 2 cm inferior, a 8 cm incision was made parallel to the inguinal canal. The artery was palpated to help identify our landmarks. The superficial sandip packet was then removed  without dificulty. Caution was taken to maintain good vasculature to the skin during dissection. The deep fascia was identified and greater saphenous vein noted. Careful dissection was carried out to keep vessels intact. The borders of the dissection included the sartorious, the adductor, and the inguinal canal. We did not palpate any enlarged deep inguinal lymph nodes and so dissection was not carried out further. We did use one clip in the bed across a large vein. We irrigated the bed copiously. We then closed the space in multiple layers after we placed a KASIA drain. Skin was then closed with chivo.    Dr. Vera was present for the entire procedure.    Noe Horvath MD  Gynecologic Oncology Fellow

## 2018-02-14 LAB — COPATH REPORT: NORMAL

## 2018-02-20 NOTE — PROGRESS NOTES
Surgery on 2/9/18  Post op visit 2/19/18 2/12/18  Left message on voice mail    2/20/18   Spoke with pt confirmed post op appt  Pt is doing well   Denies any issues or complaints  No fever, legs no redness/swelling/tenderness, incision looks fine but still draining  Eating and drinking well  No issues eating/drinking, urinating,

## 2018-02-25 NOTE — PROGRESS NOTES
Follow Up Notes on Referred Patient    Date: 2018       Dr. Tova Montesinos, NP  [facility]  [address] [place] [address]       RE: Julius Gilliam  : 1955  EJ: 2018    Dear Dr. Tova Montesinos:    Julius Gilliam is a 62 year old woman with a diagnosis of  vulvar cancer with inguinal node mets .   She is here today for post-operative follow-up.     She underwent bilateral inguinal LN disection on 2018    PATH:   FINAL DIAGNOSIS:   A. Lymph nodes, left inguinal:   - Metastatic squamous cell carcinoma to three of seven lymph nodes   examined (3/7)   - The largest metastatic focus is 2.0 cm in greatest dimension with extranodal extension     B. Lymph nodes, right inguinal:   - Two reactive lymph nodes, negative for malignancy (0/2)       Review of Systems:    Answers for HPI/ROS submitted by the patient on 2018   General Symptoms: No  Skin Symptoms: No  HENT Symptoms: No  EYE SYMPTOMS: No  HEART SYMPTOMS: Yes  LUNG SYMPTOMS: No  INTESTINAL SYMPTOMS: No  URINARY SYMPTOMS: No  GYNECOLOGIC SYMPTOMS: No  BREAST SYMPTOMS: No  SKELETAL SYMPTOMS: No  BLOOD SYMPTOMS: No  NERVOUS SYSTEM SYMPTOMS: No  MENTAL HEALTH SYMPTOMS: No  Chest pain or pressure: No  Fast or irregular heartbeat: No  Pain in legs with walking: No  Trouble breathing while lying down: No  Fingers or toes appear blue: No  High blood pressure: Yes  Low blood pressure: Yes  Fainting: No  Murmurs: No  Pacemaker: No  Varicose veins: Yes  Edema or swelling: No  Wake up at night with shortness of breath: No  Light-headedness: No  Exercise intolerance: No      Past Medical History:    Past Medical History:   Diagnosis Date     Deafness in right ear      Hypertension      Multiple facial bone fractures (H)     also left ankle and left wrist     Vertigo          Past Surgical History:    Past Surgical History:   Procedure Laterality Date     DISSECT LYMPH NODE INGUINAL N/A 2018    Procedure:  DISSECT LYMPH NODE INGUINAL;  Bilateral Inguinal Lymph Node Dissection;  Surgeon: Santosh Vera MD;  Location: [facility] OR     ENDOSCOPIC STRIPPING VEIN(S)       FACIAL RECONSTRUCTION SURGERY      post horse trauma     repair of left arm fracture           Health Maintenance Due   Topic Date Due     TETANUS IMMUNIZATION (SYSTEM ASSIGNED)  03/12/1974     HEPATITIS C SCREENING  03/12/1974     PAP SCREENING Q3 YR (SYSTEM ASSIGNED)  03/12/1977     LIPID SCREEN Q5 YR FEMALE (SYSTEM ASSIGNED)  03/12/2001     MAMMO SCREEN Q2 YR (SYSTEM ASSIGNED)  03/12/2006     COLON CANCER SCREEN (SYSTEM ASSIGNED)  03/12/2006     ADVANCE DIRECTIVE PLANNING Q5 YRS  03/12/2011     INFLUENZA VACCINE (SYSTEM ASSIGNED)  09/01/2017       Current Medications:     Current Outpatient Prescriptions   Medication Sig Dispense Refill     acetaminophen (TYLENOL) 325 MG tablet Take 2 tablets (650 mg) by mouth every 6 hours 30 tablet 0     oxyCODONE IR (ROXICODONE) 5 MG tablet Take 1 tablet (5 mg) by mouth every 4 hours as needed for pain 20 tablet 0     sennosides (SENOKOT) 8.6 MG tablet Take 1 tablet by mouth 2 times daily as needed for constipation 60 tablet 0     multivitamin, therapeutic with minerals (MULTI-VITAMIN) TABS tablet Take 1 tablet by mouth daily           Allergies:      No Known Allergies     Social History:     Social History   Substance Use Topics     Smoking status: Current Every Day Smoker     Types: Cigarettes     Smokeless tobacco: Never Used      Comment: <1 pack/ day.  30 pack/yers     Alcohol use Yes      Comment: 0-3 drinks per day       History   Drug Use No         Family History:     The patient's family history is notable for .    No family history on file.      Physical Exam:     There were no vitals taken for this visit.  There is no height or weight on file to calculate BMI.    General Appearance: healthy and alert, no distress     HEENT:  no thyromegaly, no palpable nodules or masses         Cardiovascular: regular rate and rhythm, no gallops, rubs or murmurs     Respiratory: lungs clear, no rales, rhonchi or wheezes, normal diaphragmatic excursion    Musculoskeletal: extremities non tender and without edema    Skin: no lesions or rashes     Neurological: normal gait, no gross defects     Psychiatric: appropriate mood and affect                               Hematological: normal cervical, supraclavicular and inguinal lymph nodes     Gastrointestinal:       abdomen soft, non-tender, non-distended, no organomegaly or masses    Genitourinary: External genitalia and urethral meatus appears normal.  Vagina is smooth without nodularity or masses.  Cervix appears normal and without lesions.  Bimanual exam reveal no masses, nodularity or fullness.  Recto-vaginal exam confirms these findings.      Assessment:    Julius Gilliam is a 62 year old woman with a diagnosis of vulvar cancer with extensive involvement of the labia, distal vagina and left inguinal LN.    A total of 30 minutes was spent with the patient, 25 minutes of which were spent in counseling the patient and/or treatment planning.      Plan:     1.)   Vulvar cancer - we have discussed the pathologic and clinical findings.  I have recommended that she consider a combination of chemo and RT for definitive treatment.  She will likely have significant scarring and may require reconstruction if there is significant tissue loss.  Alternately she may be a candidate for chemo followed by radiation to a smaller field.  She is meeting with the radiation oncology team today.     2.) Genetic risk factors were assessed and the patient does not meet the qualifications for a referral.      3.) Labs and/or tests ordered include:  none.     4.) Health maintenance issues addressed today include none.      Santosh CURRAN  Patient Care Team:  Tova De Luna, NP as PCP - General (Family Practice)  TOVA DE LUNA

## 2018-02-26 ENCOUNTER — OFFICE VISIT (OUTPATIENT)
Dept: RADIATION ONCOLOGY | Facility: CLINIC | Age: 63
End: 2018-02-26
Attending: RADIOLOGY
Payer: COMMERCIAL

## 2018-02-26 ENCOUNTER — CARE COORDINATION (OUTPATIENT)
Dept: ONCOLOGY | Facility: CLINIC | Age: 63
End: 2018-02-26

## 2018-02-26 ENCOUNTER — ONCOLOGY VISIT (OUTPATIENT)
Dept: ONCOLOGY | Facility: CLINIC | Age: 63
End: 2018-02-26
Attending: OBSTETRICS & GYNECOLOGY
Payer: COMMERCIAL

## 2018-02-26 VITALS
HEART RATE: 66 BPM | TEMPERATURE: 98.2 F | OXYGEN SATURATION: 96 % | DIASTOLIC BLOOD PRESSURE: 85 MMHG | WEIGHT: 144.7 LBS | RESPIRATION RATE: 18 BRPM | BODY MASS INDEX: 23.25 KG/M2 | HEIGHT: 66 IN | SYSTOLIC BLOOD PRESSURE: 144 MMHG

## 2018-02-26 VITALS
DIASTOLIC BLOOD PRESSURE: 93 MMHG | OXYGEN SATURATION: 94 % | WEIGHT: 146.5 LBS | HEART RATE: 98 BPM | SYSTOLIC BLOOD PRESSURE: 153 MMHG | BODY MASS INDEX: 23.29 KG/M2

## 2018-02-26 DIAGNOSIS — C51.9 VULVAR CANCER, CARCINOMA (H): Primary | ICD-10-CM

## 2018-02-26 DIAGNOSIS — C51.9 VULVAR CANCER (H): Primary | ICD-10-CM

## 2018-02-26 PROBLEM — Z79.899 ENCOUNTER FOR LONG-TERM (CURRENT) USE OF MEDICATIONS: Status: ACTIVE | Noted: 2018-02-26

## 2018-02-26 PROCEDURE — G0463 HOSPITAL OUTPT CLINIC VISIT: HCPCS | Mod: ZF

## 2018-02-26 PROCEDURE — G0463 HOSPITAL OUTPT CLINIC VISIT: HCPCS | Mod: 25 | Performed by: RADIOLOGY

## 2018-02-26 PROCEDURE — 99024 POSTOP FOLLOW-UP VISIT: CPT | Mod: ZP | Performed by: OBSTETRICS & GYNECOLOGY

## 2018-02-26 RX ORDER — ALBUTEROL SULFATE 0.83 MG/ML
2.5 SOLUTION RESPIRATORY (INHALATION)
Status: CANCELLED | OUTPATIENT
Start: 2018-04-17

## 2018-02-26 RX ORDER — LORAZEPAM 2 MG/ML
1 INJECTION INTRAMUSCULAR EVERY 6 HOURS PRN
Status: CANCELLED
Start: 2018-03-12

## 2018-02-26 RX ORDER — EPINEPHRINE 1 MG/ML
0.3 INJECTION, SOLUTION, CONCENTRATE INTRAVENOUS EVERY 5 MIN PRN
Status: CANCELLED | OUTPATIENT
Start: 2018-03-19

## 2018-02-26 RX ORDER — DEXTROSE, SODIUM CHLORIDE, AND POTASSIUM CHLORIDE 5; .45; .15 G/100ML; G/100ML; G/100ML
2000 INJECTION INTRAVENOUS ONCE
Status: CANCELLED
Start: 2018-03-12 | End: 2018-03-12

## 2018-02-26 RX ORDER — PALONOSETRON 0.05 MG/ML
0.25 INJECTION, SOLUTION INTRAVENOUS ONCE
Status: CANCELLED
Start: 2018-04-10

## 2018-02-26 RX ORDER — EPINEPHRINE 1 MG/ML
0.3 INJECTION, SOLUTION, CONCENTRATE INTRAVENOUS EVERY 5 MIN PRN
Status: CANCELLED | OUTPATIENT
Start: 2018-04-17

## 2018-02-26 RX ORDER — LORAZEPAM 2 MG/ML
1 INJECTION INTRAMUSCULAR EVERY 6 HOURS PRN
Status: CANCELLED
Start: 2018-03-19

## 2018-02-26 RX ORDER — MEPERIDINE HYDROCHLORIDE 25 MG/ML
25 INJECTION INTRAMUSCULAR; INTRAVENOUS; SUBCUTANEOUS EVERY 30 MIN PRN
Status: CANCELLED | OUTPATIENT
Start: 2018-03-12

## 2018-02-26 RX ORDER — DEXTROSE, SODIUM CHLORIDE, AND POTASSIUM CHLORIDE 5; .45; .15 G/100ML; G/100ML; G/100ML
2000 INJECTION INTRAVENOUS ONCE
Status: CANCELLED
Start: 2018-04-17 | End: 2018-04-16

## 2018-02-26 RX ORDER — SODIUM CHLORIDE 9 MG/ML
1000 INJECTION, SOLUTION INTRAVENOUS CONTINUOUS PRN
Status: CANCELLED
Start: 2018-03-19

## 2018-02-26 RX ORDER — PALONOSETRON 0.05 MG/ML
0.25 INJECTION, SOLUTION INTRAVENOUS ONCE
Status: CANCELLED
Start: 2018-04-02

## 2018-02-26 RX ORDER — EPINEPHRINE 1 MG/ML
0.3 INJECTION, SOLUTION, CONCENTRATE INTRAVENOUS EVERY 5 MIN PRN
Status: CANCELLED | OUTPATIENT
Start: 2018-04-02

## 2018-02-26 RX ORDER — DEXTROSE, SODIUM CHLORIDE, AND POTASSIUM CHLORIDE 5; .45; .15 G/100ML; G/100ML; G/100ML
2000 INJECTION INTRAVENOUS ONCE
Status: CANCELLED
Start: 2018-03-19 | End: 2018-03-19

## 2018-02-26 RX ORDER — SODIUM CHLORIDE 9 MG/ML
1000 INJECTION, SOLUTION INTRAVENOUS CONTINUOUS PRN
Status: CANCELLED
Start: 2018-04-02

## 2018-02-26 RX ORDER — DIPHENHYDRAMINE HYDROCHLORIDE 50 MG/ML
50 INJECTION INTRAMUSCULAR; INTRAVENOUS
Status: CANCELLED
Start: 2018-04-02

## 2018-02-26 RX ORDER — ALBUTEROL SULFATE 0.83 MG/ML
2.5 SOLUTION RESPIRATORY (INHALATION)
Status: CANCELLED | OUTPATIENT
Start: 2018-03-12

## 2018-02-26 RX ORDER — ALBUTEROL SULFATE 90 UG/1
1-2 AEROSOL, METERED RESPIRATORY (INHALATION)
Status: CANCELLED
Start: 2018-04-17

## 2018-02-26 RX ORDER — DIPHENHYDRAMINE HYDROCHLORIDE 50 MG/ML
50 INJECTION INTRAMUSCULAR; INTRAVENOUS
Status: CANCELLED
Start: 2018-04-17

## 2018-02-26 RX ORDER — ALBUTEROL SULFATE 0.83 MG/ML
2.5 SOLUTION RESPIRATORY (INHALATION)
Status: CANCELLED | OUTPATIENT
Start: 2018-04-02

## 2018-02-26 RX ORDER — PALONOSETRON 0.05 MG/ML
0.25 INJECTION, SOLUTION INTRAVENOUS ONCE
Status: CANCELLED
Start: 2018-03-26

## 2018-02-26 RX ORDER — PALONOSETRON 0.05 MG/ML
0.25 INJECTION, SOLUTION INTRAVENOUS ONCE
Status: CANCELLED
Start: 2018-03-19

## 2018-02-26 RX ORDER — ALBUTEROL SULFATE 0.83 MG/ML
2.5 SOLUTION RESPIRATORY (INHALATION)
Status: CANCELLED | OUTPATIENT
Start: 2018-04-10

## 2018-02-26 RX ORDER — PALONOSETRON 0.05 MG/ML
0.25 INJECTION, SOLUTION INTRAVENOUS ONCE
Status: CANCELLED
Start: 2018-04-17

## 2018-02-26 RX ORDER — LORAZEPAM 2 MG/ML
1 INJECTION INTRAMUSCULAR EVERY 6 HOURS PRN
Status: CANCELLED
Start: 2018-04-10

## 2018-02-26 RX ORDER — DEXTROSE, SODIUM CHLORIDE, AND POTASSIUM CHLORIDE 5; .45; .15 G/100ML; G/100ML; G/100ML
2000 INJECTION INTRAVENOUS ONCE
Status: CANCELLED
Start: 2018-03-26 | End: 2018-03-26

## 2018-02-26 RX ORDER — ALBUTEROL SULFATE 90 UG/1
1-2 AEROSOL, METERED RESPIRATORY (INHALATION)
Status: CANCELLED
Start: 2018-04-10

## 2018-02-26 RX ORDER — LORAZEPAM 2 MG/ML
1 INJECTION INTRAMUSCULAR EVERY 6 HOURS PRN
Status: CANCELLED
Start: 2018-04-17

## 2018-02-26 RX ORDER — EPINEPHRINE 0.3 MG/.3ML
0.3 INJECTION SUBCUTANEOUS EVERY 5 MIN PRN
Status: CANCELLED | OUTPATIENT
Start: 2018-03-26

## 2018-02-26 RX ORDER — MEPERIDINE HYDROCHLORIDE 25 MG/ML
25 INJECTION INTRAMUSCULAR; INTRAVENOUS; SUBCUTANEOUS EVERY 30 MIN PRN
Status: CANCELLED | OUTPATIENT
Start: 2018-03-19

## 2018-02-26 RX ORDER — MEPERIDINE HYDROCHLORIDE 25 MG/ML
25 INJECTION INTRAMUSCULAR; INTRAVENOUS; SUBCUTANEOUS EVERY 30 MIN PRN
Status: CANCELLED | OUTPATIENT
Start: 2018-03-26

## 2018-02-26 RX ORDER — MEPERIDINE HYDROCHLORIDE 25 MG/ML
25 INJECTION INTRAMUSCULAR; INTRAVENOUS; SUBCUTANEOUS EVERY 30 MIN PRN
Status: CANCELLED | OUTPATIENT
Start: 2018-04-17

## 2018-02-26 RX ORDER — EPINEPHRINE 0.3 MG/.3ML
0.3 INJECTION SUBCUTANEOUS EVERY 5 MIN PRN
Status: CANCELLED | OUTPATIENT
Start: 2018-04-17

## 2018-02-26 RX ORDER — METHYLPREDNISOLONE SODIUM SUCCINATE 125 MG/2ML
125 INJECTION, POWDER, LYOPHILIZED, FOR SOLUTION INTRAMUSCULAR; INTRAVENOUS
Status: CANCELLED
Start: 2018-04-10

## 2018-02-26 RX ORDER — EPINEPHRINE 1 MG/ML
0.3 INJECTION, SOLUTION, CONCENTRATE INTRAVENOUS EVERY 5 MIN PRN
Status: CANCELLED | OUTPATIENT
Start: 2018-04-10

## 2018-02-26 RX ORDER — ALBUTEROL SULFATE 90 UG/1
1-2 AEROSOL, METERED RESPIRATORY (INHALATION)
Status: CANCELLED
Start: 2018-04-02

## 2018-02-26 RX ORDER — METHYLPREDNISOLONE SODIUM SUCCINATE 125 MG/2ML
125 INJECTION, POWDER, LYOPHILIZED, FOR SOLUTION INTRAMUSCULAR; INTRAVENOUS
Status: CANCELLED
Start: 2018-03-26

## 2018-02-26 RX ORDER — DEXTROSE, SODIUM CHLORIDE, AND POTASSIUM CHLORIDE 5; .45; .15 G/100ML; G/100ML; G/100ML
2000 INJECTION INTRAVENOUS ONCE
Status: CANCELLED
Start: 2018-04-02 | End: 2018-04-02

## 2018-02-26 RX ORDER — SODIUM CHLORIDE 9 MG/ML
1000 INJECTION, SOLUTION INTRAVENOUS CONTINUOUS PRN
Status: CANCELLED
Start: 2018-03-26

## 2018-02-26 RX ORDER — LORAZEPAM 2 MG/ML
1 INJECTION INTRAMUSCULAR EVERY 6 HOURS PRN
Status: CANCELLED
Start: 2018-03-26

## 2018-02-26 RX ORDER — METHYLPREDNISOLONE SODIUM SUCCINATE 125 MG/2ML
125 INJECTION, POWDER, LYOPHILIZED, FOR SOLUTION INTRAMUSCULAR; INTRAVENOUS
Status: CANCELLED
Start: 2018-04-17

## 2018-02-26 RX ORDER — ALBUTEROL SULFATE 90 UG/1
1-2 AEROSOL, METERED RESPIRATORY (INHALATION)
Status: CANCELLED
Start: 2018-03-12

## 2018-02-26 RX ORDER — PALONOSETRON 0.05 MG/ML
0.25 INJECTION, SOLUTION INTRAVENOUS ONCE
Status: CANCELLED
Start: 2018-03-12

## 2018-02-26 RX ORDER — SODIUM CHLORIDE 9 MG/ML
1000 INJECTION, SOLUTION INTRAVENOUS CONTINUOUS PRN
Status: CANCELLED
Start: 2018-03-12

## 2018-02-26 RX ORDER — ALBUTEROL SULFATE 0.83 MG/ML
2.5 SOLUTION RESPIRATORY (INHALATION)
Status: CANCELLED | OUTPATIENT
Start: 2018-03-19

## 2018-02-26 RX ORDER — ALBUTEROL SULFATE 90 UG/1
1-2 AEROSOL, METERED RESPIRATORY (INHALATION)
Status: CANCELLED
Start: 2018-03-19

## 2018-02-26 RX ORDER — METHYLPREDNISOLONE SODIUM SUCCINATE 125 MG/2ML
125 INJECTION, POWDER, LYOPHILIZED, FOR SOLUTION INTRAMUSCULAR; INTRAVENOUS
Status: CANCELLED
Start: 2018-03-12

## 2018-02-26 RX ORDER — EPINEPHRINE 0.3 MG/.3ML
0.3 INJECTION SUBCUTANEOUS EVERY 5 MIN PRN
Status: CANCELLED | OUTPATIENT
Start: 2018-03-19

## 2018-02-26 RX ORDER — MEPERIDINE HYDROCHLORIDE 25 MG/ML
25 INJECTION INTRAMUSCULAR; INTRAVENOUS; SUBCUTANEOUS EVERY 30 MIN PRN
Status: CANCELLED | OUTPATIENT
Start: 2018-04-10

## 2018-02-26 RX ORDER — EPINEPHRINE 0.3 MG/.3ML
0.3 INJECTION SUBCUTANEOUS EVERY 5 MIN PRN
Status: CANCELLED | OUTPATIENT
Start: 2018-03-12

## 2018-02-26 RX ORDER — EPINEPHRINE 1 MG/ML
0.3 INJECTION, SOLUTION, CONCENTRATE INTRAVENOUS EVERY 5 MIN PRN
Status: CANCELLED | OUTPATIENT
Start: 2018-03-12

## 2018-02-26 RX ORDER — DEXTROSE, SODIUM CHLORIDE, AND POTASSIUM CHLORIDE 5; .45; .15 G/100ML; G/100ML; G/100ML
2000 INJECTION INTRAVENOUS ONCE
Status: CANCELLED
Start: 2018-04-10 | End: 2018-04-09

## 2018-02-26 RX ORDER — MEPERIDINE HYDROCHLORIDE 25 MG/ML
25 INJECTION INTRAMUSCULAR; INTRAVENOUS; SUBCUTANEOUS EVERY 30 MIN PRN
Status: CANCELLED | OUTPATIENT
Start: 2018-04-02

## 2018-02-26 RX ORDER — ALBUTEROL SULFATE 90 UG/1
1-2 AEROSOL, METERED RESPIRATORY (INHALATION)
Status: CANCELLED
Start: 2018-03-26

## 2018-02-26 RX ORDER — DIPHENHYDRAMINE HYDROCHLORIDE 50 MG/ML
50 INJECTION INTRAMUSCULAR; INTRAVENOUS
Status: CANCELLED
Start: 2018-04-10

## 2018-02-26 RX ORDER — EPINEPHRINE 0.3 MG/.3ML
0.3 INJECTION SUBCUTANEOUS EVERY 5 MIN PRN
Status: CANCELLED | OUTPATIENT
Start: 2018-04-10

## 2018-02-26 RX ORDER — ALBUTEROL SULFATE 0.83 MG/ML
2.5 SOLUTION RESPIRATORY (INHALATION)
Status: CANCELLED | OUTPATIENT
Start: 2018-03-26

## 2018-02-26 RX ORDER — DIPHENHYDRAMINE HYDROCHLORIDE 50 MG/ML
50 INJECTION INTRAMUSCULAR; INTRAVENOUS
Status: CANCELLED
Start: 2018-03-12

## 2018-02-26 RX ORDER — SODIUM CHLORIDE 9 MG/ML
1000 INJECTION, SOLUTION INTRAVENOUS CONTINUOUS PRN
Status: CANCELLED
Start: 2018-04-10

## 2018-02-26 RX ORDER — SODIUM CHLORIDE 9 MG/ML
1000 INJECTION, SOLUTION INTRAVENOUS CONTINUOUS PRN
Status: CANCELLED
Start: 2018-04-17

## 2018-02-26 RX ORDER — METHYLPREDNISOLONE SODIUM SUCCINATE 125 MG/2ML
125 INJECTION, POWDER, LYOPHILIZED, FOR SOLUTION INTRAMUSCULAR; INTRAVENOUS
Status: CANCELLED
Start: 2018-04-02

## 2018-02-26 RX ORDER — DIPHENHYDRAMINE HYDROCHLORIDE 50 MG/ML
50 INJECTION INTRAMUSCULAR; INTRAVENOUS
Status: CANCELLED
Start: 2018-03-26

## 2018-02-26 RX ORDER — EPINEPHRINE 0.3 MG/.3ML
0.3 INJECTION SUBCUTANEOUS EVERY 5 MIN PRN
Status: CANCELLED | OUTPATIENT
Start: 2018-04-02

## 2018-02-26 RX ORDER — LORAZEPAM 2 MG/ML
1 INJECTION INTRAMUSCULAR EVERY 6 HOURS PRN
Status: CANCELLED
Start: 2018-04-02

## 2018-02-26 RX ORDER — METHYLPREDNISOLONE SODIUM SUCCINATE 125 MG/2ML
125 INJECTION, POWDER, LYOPHILIZED, FOR SOLUTION INTRAMUSCULAR; INTRAVENOUS
Status: CANCELLED
Start: 2018-03-19

## 2018-02-26 RX ORDER — DIPHENHYDRAMINE HYDROCHLORIDE 50 MG/ML
50 INJECTION INTRAMUSCULAR; INTRAVENOUS
Status: CANCELLED
Start: 2018-03-19

## 2018-02-26 RX ORDER — EPINEPHRINE 1 MG/ML
0.3 INJECTION, SOLUTION, CONCENTRATE INTRAVENOUS EVERY 5 MIN PRN
Status: CANCELLED | OUTPATIENT
Start: 2018-03-26

## 2018-02-26 ASSESSMENT — ENCOUNTER SYMPTOMS
BLURRED VISION: 0
DIZZINESS: 0
EXERCISE INTOLERANCE: 0
SEIZURES: 1
CONSTIPATION: 0
FALLS: 1
EYE PAIN: 0
FEVER: 0
LEG PAIN: 0
FREQUENCY: 0
COUGH: 0
DIARRHEA: 0
BLOOD IN STOOL: 0
WEIGHT LOSS: 0
LIGHT-HEADEDNESS: 0
NECK PAIN: 0
HYPERTENSION: 1
DEPRESSION: 0
NAUSEA: 0
INSOMNIA: 0
PALPITATIONS: 0
DIAPHORESIS: 0
ORTHOPNEA: 0
BRUISES/BLEEDS EASILY: 0
TINGLING: 0
SORE THROAT: 0
HYPOTENSION: 1
SYNCOPE: 0
HEMATURIA: 0
BACK PAIN: 0
DOUBLE VISION: 0
VOMITING: 0
NERVOUS/ANXIOUS: 1
HEADACHES: 0
CHILLS: 0
SLEEP DISTURBANCES DUE TO BREATHING: 0
DYSURIA: 0
SHORTNESS OF BREATH: 0
HEARTBURN: 0

## 2018-02-26 ASSESSMENT — PAIN SCALES - GENERAL: PAINLEVEL: NO PAIN (0)

## 2018-02-26 NOTE — PROGRESS NOTES
Care Coordinator Note  Message for patient to return to clinic to review chemotherapy part of treatment plan.    Heide SUAZO, RN  Care Coordinator  Gynecologic Cancer   Office:  [phone]  Pager: [phone] #[phone]

## 2018-02-26 NOTE — LETTER
2018       RE: Julius Gilliam  PO   [facility] [address]     Dear Colleague,    Thank you for referring your patient, Julius Gilliam, to the [facility]. Please see a copy of my visit note below.                    Follow Up Notes on Referred Patient    Date: 2018       Dr. Tova Montesinos, NP  [facility]  [address] [place] [address]       RE: Julius Gilliam  : 1955  EJ: 2018    Dear Dr. Tova Montesinos:    Julius Gilliam is a 62 year old woman with a diagnosis of  vulvar cancer with inguinal node mets .   She is here today for post-operative follow-up.     She underwent bilateral inguinal LN disection on 2018    PATH:   FINAL DIAGNOSIS:   A. Lymph nodes, left inguinal:   - Metastatic squamous cell carcinoma to three of seven lymph nodes   examined (3/7)   - The largest metastatic focus is 2.0 cm in greatest dimension with extranodal extension     B. Lymph nodes, right inguinal:   - Two reactive lymph nodes, negative for malignancy (0/2)       Review of Systems:    Answers for HPI/ROS submitted by the patient on 2018   General Symptoms: No  Skin Symptoms: No  HENT Symptoms: No  EYE SYMPTOMS: No  HEART SYMPTOMS: Yes  LUNG SYMPTOMS: No  INTESTINAL SYMPTOMS: No  URINARY SYMPTOMS: No  GYNECOLOGIC SYMPTOMS: No  BREAST SYMPTOMS: No  SKELETAL SYMPTOMS: No  BLOOD SYMPTOMS: No  NERVOUS SYSTEM SYMPTOMS: No  MENTAL HEALTH SYMPTOMS: No  Chest pain or pressure: No  Fast or irregular heartbeat: No  Pain in legs with walking: No  Trouble breathing while lying down: No  Fingers or toes appear blue: No  High blood pressure: Yes  Low blood pressure: Yes  Fainting: No  Murmurs: No  Pacemaker: No  Varicose veins: Yes  Edema or swelling: No  Wake up at night with shortness of breath: No  Light-headedness: No  Exercise intolerance: No      Past Medical History:    Past Medical History:   Diagnosis Date     Deafness in right ear      Hypertension       Multiple facial bone fractures (H)     also left ankle and left wrist     Vertigo          Past Surgical History:    Past Surgical History:   Procedure Laterality Date     DISSECT LYMPH NODE INGUINAL N/A 2/9/2018    Procedure: DISSECT LYMPH NODE INGUINAL;  Bilateral Inguinal Lymph Node Dissection;  Surgeon: Santosh Vera MD;  Location: [facility] OR     ENDOSCOPIC STRIPPING VEIN(S)       FACIAL RECONSTRUCTION SURGERY      post horse trauma     repair of left arm fracture           Health Maintenance Due   Topic Date Due     TETANUS IMMUNIZATION (SYSTEM ASSIGNED)  03/12/1974     HEPATITIS C SCREENING  03/12/1974     PAP SCREENING Q3 YR (SYSTEM ASSIGNED)  03/12/1977     LIPID SCREEN Q5 YR FEMALE (SYSTEM ASSIGNED)  03/12/2001     MAMMO SCREEN Q2 YR (SYSTEM ASSIGNED)  03/12/2006     COLON CANCER SCREEN (SYSTEM ASSIGNED)  03/12/2006     ADVANCE DIRECTIVE PLANNING Q5 YRS  03/12/2011     INFLUENZA VACCINE (SYSTEM ASSIGNED)  09/01/2017       Current Medications:     Current Outpatient Prescriptions   Medication Sig Dispense Refill     acetaminophen (TYLENOL) 325 MG tablet Take 2 tablets (650 mg) by mouth every 6 hours 30 tablet 0     oxyCODONE IR (ROXICODONE) 5 MG tablet Take 1 tablet (5 mg) by mouth every 4 hours as needed for pain 20 tablet 0     sennosides (SENOKOT) 8.6 MG tablet Take 1 tablet by mouth 2 times daily as needed for constipation 60 tablet 0     multivitamin, therapeutic with minerals (MULTI-VITAMIN) TABS tablet Take 1 tablet by mouth daily           Allergies:      No Known Allergies     Social History:     Social History   Substance Use Topics     Smoking status: Current Every Day Smoker     Types: Cigarettes     Smokeless tobacco: Never Used      Comment: <1 pack/ day.  30 pack/yers     Alcohol use Yes      Comment: 0-3 drinks per day       History   Drug Use No         Family History:     The patient's family history is notable for .    No family history on file.      Physical Exam:     There  were no vitals taken for this visit.  There is no height or weight on file to calculate BMI.    General Appearance: healthy and alert, no distress     HEENT:  no thyromegaly, no palpable nodules or masses        Cardiovascular: regular rate and rhythm, no gallops, rubs or murmurs     Respiratory: lungs clear, no rales, rhonchi or wheezes, normal diaphragmatic excursion    Musculoskeletal: extremities non tender and without edema    Skin: no lesions or rashes     Neurological: normal gait, no gross defects     Psychiatric: appropriate mood and affect                               Hematological: normal cervical, supraclavicular and inguinal lymph nodes     Gastrointestinal:       abdomen soft, non-tender, non-distended, no organomegaly or masses    Genitourinary: External genitalia and urethral meatus appears normal.  Vagina is smooth without nodularity or masses.  Cervix appears normal and without lesions.  Bimanual exam reveal no masses, nodularity or fullness.  Recto-vaginal exam confirms these findings.      Assessment:    Julius Gilliam is a 62 year old woman with a diagnosis of vulvar cancer with extensive involvement of the labia, distal vagina and left inguinal LN.    A total of 30 minutes was spent with the patient, 25 minutes of which were spent in counseling the patient and/or treatment planning.      Plan:     1.)   Vulvar cancer - we have discussed the pathologic and clinical findings.  I have recommended that she consider a combination of chemo and RT for definitive treatment.  She will likely have significant scarring and may require reconstruction if there is significant tissue loss.  Alternately she may be a candidate for chemo followed by radiation to a smaller field.  She is meeting with the radiation oncology team today.     2.) Genetic risk factors were assessed and the patient does not meet the qualifications for a referral.      3.) Labs and/or tests ordered include:  none.     4.) Health  maintenance issues addressed today include none.      Santosh Vera      CC  Patient Care Team:  Tova Montesinos NP as PCP - General (Family Practice)

## 2018-02-26 NOTE — PROGRESS NOTES
HPI  INITIAL PATIENT ASSESSMENT    Diagnosis: Vulvar cancer    Prior radiation therapy: None    Prior chemotherapy: None      Prior hormonal therapy:No    Pain Eval:  Denies    Psychosocial  Living arrangements: self  Fall Risk: independent   referral needs: Not needed    Advanced Directive: Yes - Location: In hospital chart  Implantable Cardiac Device? No    Onset of menarche: @ age 14  LMP: No LMP recorded. Patient is postmenopausal.  Onset of menopause: @ age 56  Abnormal vaginal bleeding/discharge: No  Are you pregnant? No  Reproductive note: 2 children        Review of Systems   Constitutional: Negative for chills, diaphoresis, fever, malaise/fatigue and weight loss.   HENT: Negative for ear pain, hearing loss, nosebleeds and sore throat.    Eyes: Negative for blurred vision, double vision and pain.   Respiratory: Negative for cough and shortness of breath.    Cardiovascular: Negative for chest pain and leg swelling.   Gastrointestinal: Negative for blood in stool, constipation, diarrhea, heartburn, nausea and vomiting.   Genitourinary: Negative for dysuria, frequency, hematuria and urgency.   Musculoskeletal: Positive for falls (slipped on steps on home today. Denies injury). Negative for back pain, joint pain and neck pain.   Skin: Positive for itching.   Neurological: Positive for seizures (At @ age 23 after fetting hit in the head with a cabnet. ). Negative for dizziness, tingling and headaches.   Endo/Heme/Allergies: Does not bruise/bleed easily.   Psychiatric/Behavioral: Negative for depression. The patient is nervous/anxious (Appointments and treatment). The patient does not have insomnia.        Nurse face-to-face time: Level 4:  15 min face to face time

## 2018-02-26 NOTE — NURSING NOTE
"Oncology Rooming Note    February 26, 2018 10:43 AM   Julius Gilliam is a 62 year old female who presents for:    Chief Complaint   Patient presents with     Oncology Clinic Visit     Post Op Drain Check.     Initial Vitals: /71  Pulse 66  Temp 98.2  F (36.8  C)  Resp 18  Ht 1.689 m (5' 6.5\")  Wt 65.6 kg (144 lb 11.2 oz)  SpO2 96%  BMI 23.01 kg/m2 Estimated body mass index is 23.01 kg/(m^2) as calculated from the following:    Height as of this encounter: 1.689 m (5' 6.5\").    Weight as of this encounter: 65.6 kg (144 lb 11.2 oz). Body surface area is 1.75 meters squared.  No Pain (0) Comment: Data Unavailable   No LMP recorded. Patient is postmenopausal.  Allergies reviewed: Yes  Medications reviewed: Yes    Medications: Medication refills not needed today.  Pharmacy name entered into EPIC: Data Unavailable    Clinical concerns: None Dr Vera was NOT notified.    7 minutes for nursing intake (face to face time)     Caro Clemens LPN              "

## 2018-02-26 NOTE — LETTER
2/26/2018       RE: Julius Gilliam  PO   [facility] [address]     Dear Colleague,    Thank you for referring your patient, Julius Gilliam, to the [facility]Y [facility]IC. Please see a copy of my visit note below.      HPI  INITIAL PATIENT ASSESSMENT    Diagnosis: Vulvar cancer    Prior radiation therapy: None    Prior chemotherapy: None      Prior hormonal therapy:No    Pain Eval:  Denies    Psychosocial  Living arrangements: self  Fall Risk: independent   referral needs: Not needed    Advanced Directive: Yes - Location: In hospital chart  Implantable Cardiac Device? No    Onset of menarche: @ age 14  LMP: No LMP recorded. Patient is postmenopausal.  Onset of menopause: @ age 56  Abnormal vaginal bleeding/discharge: No  Are you pregnant? No  Reproductive note: 2 children        Review of Systems   Constitutional: Negative for chills, diaphoresis, fever, malaise/fatigue and weight loss.   HENT: Negative for ear pain, hearing loss, nosebleeds and sore throat.    Eyes: Negative for blurred vision, double vision and pain.   Respiratory: Negative for cough and shortness of breath.    Cardiovascular: Negative for chest pain and leg swelling.   Gastrointestinal: Negative for blood in stool, constipation, diarrhea, heartburn, nausea and vomiting.   Genitourinary: Negative for dysuria, frequency, hematuria and urgency.   Musculoskeletal: Positive for falls (slipped on steps on home today. Denies injury). Negative for back pain, joint pain and neck pain.   Skin: Positive for itching.   Neurological: Positive for seizures (At @ age 23 after fetting hit in the head with a cabnet. ). Negative for dizziness, tingling and headaches.   Endo/Heme/Allergies: Does not bruise/bleed easily.   Psychiatric/Behavioral: Negative for depression. The patient is nervous/anxious (Appointments and treatment). The patient does not have insomnia.        Nurse face-to-face time: Level 4:  15 min face to face  time          Dictation #: [id number]    Service Date: 02/26/2018      PROBLEM:  Squamous cell carcinoma of the vulva, status post bilateral inguinal lymph node dissection, FIGO stage IIIC (T2 N2c).      Ms. Gilliam was seen for initial consultation in the Department of Radiation Oncology on 02/26/2018 at the request of Dr. Santosh Vera.      HISTORY OF PRESENT ILLNESS:  Ms. Gilliam is a 62-year-old female with a recently diagnosed locoregionally advanced vulvar cancer.  She presented in the fall of last year for full physical examination after not seeking medical care for many years.  During the visit, she reported a 6-month history of a mass in her vulvar area, as well as a 6-week history of a palpable lump in her left groin.  Examination revealed a large fungating mass encompassing the vulva with definite lymphadenopathy in the left groin and probable lymphadenopathy in the right groin.  She was subsequently referred to Dr. Kb Hallman on 11/18/2017.  On his examination, there was a fungating red mass encompassing the vaginal introitus which extended out onto the labia minora and majora bilaterally. Additionally, there was definite lymphadenopathy felt in the left inguinal region.  Speculum examination was not possible due to patient discomfort.  Biopsy revealed extensive squamous cell carcinoma in situ with focal area suspicious for invasion.  The slides were reviewed at the [facility] and there was 1 small area felt to represent well-differentiated invasive squamous carcinoma.      On 12/04/2017, Ms. Gilliam saw Dr. Santosh Vera.  Examination again showed ulcerative tumor which encircled most of the medial labia bilaterally in a horseshoe shape, as well as involvement of the anterior anus.  There were 2 palpable nodes in the left inguinal region.  PET/CT scan on 12/19/2017, showed max SUV of the vulvar mass to be 13.2.  There were multiple left groin nodes, the majority of which did  not demonstrate any FDG uptake except for 1 groin node with a small amount of hypermetabolic FDG uptake along the medial margin.  Dr. Vera recommended bilateral groin dissection followed by definitive radiation therapy for the vulvar mass.      On 02/09/2018, Ms. Gilliam underwent examination under anesthesia and bilateral inguinal lymph node dissection.  Intraoperatively, the known carcinoma was again appreciated, with involvement of the anus, as well as lower 3rd to half of the vagina.  During the dissection, there were at least 3 abnormal lymph nodes on the left side, with the largest measuring 2 x 3 cm.  These were all superficial and were removed.  Because there were no palpable enlarged deep inguinal nodes, the dissection was not carried out further.  On the right side, there were no palpable deep inguinal lymph nodes; therefore, only a superficial dissection was performed.  Final pathology revealed 3/7 positive lymph nodes on the left side, with the largest metastatic focus 2.0 cm in greatest dimension, with extracapsular extension.  On the right side, 2 lymph nodes were reactive.      Ms. Gilliam is here today with her daughter for discussion of radiation therapy.  She saw Dr. Vera earlier today and her bilateral inguinal drains were removed.  She reports minimal pain or discomfort from her vulvar mass.  In fact, she only had to take Tylenol after surgery.  She denies any change in her bowel habits or urinary habits.  She denies any vaginal bleeding.      PAST MEDICAL HISTORY AND PAST SURGICAL HISTORY:   1.  Remote history of facial fracture after being kicked in the face by a horse.  She did have facial reconstruction.   2.  History of left ankle fracture x 2 and left wrist fracture x 2.  She underwent surgical repair of her left wrist.   3.  Right-sided vertigo and deafness diagnosed in 2012.  She was recommended for further evaluation with MRI; however, this was not followed up, initially due  to lack of insurance, and more recently, she was not thought to be an MRI candidate due to her facial reconstruction.   4.  Status post vein stripping.   5.  Hepatitis C positivity.  This was diagnosed in 11/2017.  She has an appointment with Dr. Maryanne jorge at [facility] on 03/23/2018.      CHEMOTHERAPY HISTORY:  None.        RADIATION HISTORY:  None.      IMPLANTABLE CARDIAC DEVICE:  None.      MEDICATIONS:   1.  Tylenol.   2.  Oxycodone.   3.  Senokot.   4.  Multivitamin.      ALLERGIES:  NO KNOWN DRUG ALLERGIES.      FAMILY HISTORY:  Her mother was diagnosed with breast cancer at age 81.  She did move to [place] for her cancer treatment.  She passed away at age 83.  It was unclear whether the cause of death was due to breast cancer.      SOCIAL HISTORY:  Ms. Gilliam lives in [place], [place], which is near [place].  She is .  She has 2 children, both of whom live pretty close to her.  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  She is a social drinker and she has a 30-pack-year smoking history.  She is trying to cut down on her smoking, currently smoking about less than a pack a day.      REVIEW OF SYSTEMS:  A full review of system was performed by the nursing staff.  Positives include skin itching and some anxiety.  Please see their assessment sheet for details.      PHYSICAL EXAMINATION:   VITAL SIGNS:  Weight 66.5 pounds, height 5 feet 6-1/2 inches.  Blood pressure 153/93, pulse 98.   GENERAL:  She appears somewhat nervous but is in no acute distress, alert and oriented, speech fluent, interaction appropriate.   HEENT:  Unremarkable.   NECK:  Supple.   CARDIOVASCULAR:  Well-perfused.  No cyanosis.   RESPIRATORY:  Breathing comfortably on room air.  No audible wheezing.   ABDOMEN:  Soft, nontender.   EXTREMITIES:  No ankle edema.   GENITOURINARY:  On inspection, there is an erythematous mass involving both the labia minora and labia majora  bilaterally.  Anteriorly, the mass encroaches upon the urethra without further extension into the clitoris.  Posteriorly, there is definite involvement of the anterior anus.  The mass does extend beyond the anus, however, on both sides.  Upon insertion of the finger into the vagina, there is definite vaginal involvement primarily along the posterior wall from 3 o'clock to 9 o'clock.  The extent of vaginal involvement is at the most distal 2-3 cm.  Groin incision is clean, dry and intact, Steri-Strip in place.  No palpable mass in either groin.      ASSESSMENT AND PLAN:  In summary, Ms. Gilliam is a 62-year-old female with a local regionally advanced squamous cell carcinoma of the vulva.  She has a very impressive mass involving bilateral labia minora and labia majora.  She has definite involvement of the anterior anus and the tumor also encroached upon the urethra.  Because of this, she is not felt to be a surgical candidate.  She also has bulky lymphadenopathy on the left side and is status post bilateral inguinal dissection and found to have 3 positive lymph nodes in the left groin, with the largest being 2 cm with extracapsular extension.      I agree with Dr. Vera that her primary disease is best treated with concurrent chemoradiation therapy.  Additionally, due to the positive lymph node in the left groin, she also requires adjuvant radiation therapy to her bilateral groin, and low pelvis.  We discussed the rationale for radiation therapy as well as logistics and side effects.  She agrees to proceed.  She lives near [place] and will be arranged to stay in [facility] during her chemoradiotherapy.      Ms. Gilliam just had her drains removed this morning.  According to her record, she still had about 70+ mL of fluid from her left groin.  Therefore, we will allow a bit more healing and bring her back this Friday for a simulation.  She is also to connect with Dr. Vera's nurse for chemo teaching.       Thanks for allowing us to participate in the care of this patient.  Please do not hesitate to contact us with questions or concerns.         ALLAN LYON MD             D: 2018   T: 2018   MT: HÉCTOR      Name:     HELDER LOWE   MRN:      [id number]        Account:      [id number]   :      1955           Service Date: 2018      Document: [id number]

## 2018-02-26 NOTE — PATIENT INSTRUCTIONS
Preventive Care:    Breast Cancer Screening: During our visit today, we discussed that it is recommended you receive breast cancer screening. Please call or make an appointment with your primary care provider to discuss this with them. You may also call the  [facility] scheduling line ([phone]) to set up a mammography appointment at the [facility] within the [facility].    Colorectal Cancer Screening: During our visit today, we discussed that it is recommended you receive colorectal cancer screening. Please call or make an appointment with your primary care provider to discuss this. You may also call the  [facility] scheduling line ([phone]) to set up a colonoscopy appointment.    Pt will be starting chemo radiation soon and would like to postpone these studies until after she completes her treatment.    RN postponed both studies for 6 months until 8/31/18

## 2018-02-26 NOTE — MR AVS SNAPSHOT
After Visit Summary   2/26/2018    Julius Gilliam    MRN: [id number]           Patient Information     Date Of Birth          1955        Visit Information        Provider Department      2/26/2018 1:00 PM Yvon Leal MD Radiation Oncology Clinic        Care Instructions    Preventive Care:    Breast Cancer Screening: During our visit today, we discussed that it is recommended you receive breast cancer screening. Please call or make an appointment with your primary care provider to discuss this with them. You may also call the [facility] scheduling line ([phone]) to set up a mammography appointment at the Breast Center within the [facility].    Colorectal Cancer Screening: During our visit today, we discussed that it is recommended you receive colorectal cancer screening. Please call or make an appointment with your primary care provider to discuss this. You may also call the [facility] scheduling line ([phone]) to set up a colonoscopy appointment.    Pt will be starting chemo radiation soon and would like to postpone these studies until after she completes her treatment.    RN postponed both studies for 6 months until 8/31/18            Follow-ups after your visit        Your next 10 appointments already scheduled     Mar 02, 2018 11:00 AM CST   TCT/SIM Suite Visit with Yvon Leal MD   Radiation Oncology Clinic ([facility])    [facility]  1st Floor  [address] [facility] [place] [facility] [id number] [phone]            Mar 23, 2018  7:00 AM CDT   Lab with  LAB   [facility] ([facility])    [address]  1st Floor  [facility] [id number] [phone]            Mar 23, 2018  8:00 AM CDT   (Arrive by 7:45 AM)   New General Liver with Malgorzata Madden MD   [facility] ([facility])    [address]  [address]  [facility]  [id number] [phone]              Who to contact     Please call your clinic at [phone] to:    Ask questions about your health    Make or cancel appointments    Discuss your medicines    Learn about your test results    Speak to your doctor            Additional Information About Your Visit        [employer] Information     [employer] gives you secure access to your electronic health record. If you see a primary care provider, you can also send messages to your care team and make appointments. If you have questions, please call your primary care clinic.  If you do not have a primary care provider, please call [phone] and they will assist you.      [employer] is an electronic gateway that provides easy, online access to your medical records. With [employer], you can request a clinic appointment, read your test results, renew a prescription or communicate with your care team.     To access your existing account, please contact your [facility] or call [phone] for assistance.        Care EveryWhere ID     This is your Care EveryWhere ID. This could be used by other organizations to access your [facility] medical records  [id number]        Your Vitals Were     Pulse Pulse Oximetry BMI (Body Mass Index)             98 94% 23.29 kg/m2          Blood Pressure from Last 3 Encounters:   02/26/18 (!) 153/93   02/26/18 144/85   02/10/18 108/70    Weight from Last 3 Encounters:   02/26/18 66.5 kg (146 lb 8 oz)   02/26/18 65.6 kg (144 lb 11.2 oz)   02/09/18 63.8 kg (140 lb 10.5 oz)              Today, you had the following     No orders found for display         Today's Medication Changes          These changes are accurate as of 2/26/18  2:26 PM.  If you have any questions, ask your nurse or doctor.               These medicines have changed or have updated prescriptions.        Dose/Directions    acetaminophen 325 MG tablet   Commonly known as:  TYLENOL   This may have changed:    - when  to take this  - reasons to take this   Used for:  H/O lymph node excision        Dose:  650 mg   Take 2 tablets (650 mg) by mouth every 6 hours   Quantity:  30 tablet   Refills:  0                Primary Care Provider Office Phone # Fax #    Tova Montesinos [phone]       [facility] [address] [facility] [address]        Equal Access to Services     CAREY COSME : Hadii aad ku hadasho Soomaali, waaxda luqadaha, qaybta kaalmada adeegyada, waxay idiin hayaan adeaguila korinash ladaxn . So [facility] [phone].    ATENCIÓN: Si isabel humphreys, tiene a elizabeth disposición servicios gratuitos de asistencia lingüística. Silvestreame al [phone].    We comply with applicable federal civil rights laws and [place] laws. We do not discriminate on the basis of race, color, national origin, age, disability, sex, sexual orientation, or gender identity.            Thank you!     Thank you for choosing RADIATION ONCOLOGY CLINIC  for your care. Our goal is always to provide you with excellent care. Hearing back from our patients is one way we can continue to improve our services. Please take a few minutes to complete the written survey that you may receive in the mail after your visit with us. Thank you!             Your Updated Medication List - Protect others around you: Learn how to safely use, store and throw away your medicines at www.disposemymeds.org.          This list is accurate as of 2/26/18  2:26 PM.  Always use your most recent med list.                   Brand Name Dispense Instructions for use Diagnosis    acetaminophen 325 MG tablet    TYLENOL    30 tablet    Take 2 tablets (650 mg) by mouth every 6 hours    H/O lymph node excision       Multi-vitamin Tabs tablet      Take 1 tablet by mouth daily        oxyCODONE IR 5 MG tablet    ROXICODONE    20 tablet    Take 1 tablet (5 mg) by mouth every 4 hours as needed for pain    H/O lymph node excision       sennosides 8.6 MG tablet    SENOKOT    60  tablet    Take 1 tablet by mouth 2 times daily as needed for constipation    H/O lymph node excision

## 2018-02-26 NOTE — MR AVS SNAPSHOT
After Visit Summary   2/26/2018    Julius Gilliam    MRN: [id number]           Patient Information     Date Of Birth          1955        Visit Information        Provider Department      2/26/2018 10:30 AM Santosh Vera MD [facility]        Today's Diagnoses     Vulvar cancer, carcinoma (H)    -  1       Follow-ups after your visit        Your next 10 appointments already scheduled     Feb 26, 2018  1:00 PM CST   CONSULT with Yvon Leal MD   Radiation Oncology Clinic ([facility])    [facility]  1st Floor  [address] [facility] [address]   [phone]            Mar 23, 2018  7:00 AM CDT   Lab with  LAB   [facility] ([facility])    [address]  1st Floor  [facility] [address] [phone]            Mar 23, 2018  8:00 AM CDT   (Arrive by 7:45 AM)   New General Liver with Malgorzata Madden MD   [facility] ([facility])    [address] [place] [address]  [address]  [facility] [address] [phone]              Who to contact     If you have questions or need follow up information about today's clinic visit or your schedule please contact [facility] [place] directly at [phone].  Normal or non-critical lab and imaging results will be communicated to you by MyChart, letter or phone within 4 business days after the clinic has received the results. If you do not hear from us within 7 days, please contact the clinic through MyChart or phone. If you have a critical or abnormal lab result, we will notify you by phone as soon as possible.  Submit refill requests through [employer] or call your pharmacy and they will forward the refill request to us. Please allow 3 business days for your refill to be completed.          Additional Information About Your Visit        [employer] Information     [employer] gives you secure access to  "your electronic health record. If you see a primary care provider, you can also send messages to your care team and make appointments. If you have questions, please call your primary care clinic.  If you do not have a primary care provider, please call [phone] and they will assist you.        Care EveryWhere ID     This is your Care EveryWhere ID. This could be used by other organizations to access your [facility] medical records  [id number]        Your Vitals Were     Pulse Temperature Respirations Height Pulse Oximetry BMI (Body Mass Index)    66 98.2  F (36.8  C) (Oral) 18 1.689 m (5' 6.5\") 96% 23.01 kg/m2       Blood Pressure from Last 3 Encounters:   02/26/18 144/85   02/10/18 108/70   12/04/17 (!) 161/108    Weight from Last 3 Encounters:   02/26/18 65.6 kg (144 lb 11.2 oz)   02/09/18 63.8 kg (140 lb 10.5 oz)   12/04/17 65.7 kg (144 lb 12.8 oz)              Today, you had the following     No orders found for display         Today's Medication Changes          These changes are accurate as of 2/26/18 12:17 PM.  If you have any questions, ask your nurse or doctor.               These medicines have changed or have updated prescriptions.        Dose/Directions    acetaminophen 325 MG tablet   Commonly known as:  TYLENOL   This may have changed:    - when to take this  - reasons to take this   Used for:  H/O lymph node excision        Dose:  650 mg   Take 2 tablets (650 mg) by mouth every 6 hours   Quantity:  30 tablet   Refills:  0                Primary Care Provider Office Phone # Fax #    Tova Montesinos -[phone]       [facility] [address] [facility] [address]        Equal Access to Services     CAREY COSME AH: Baylee Miguel, flower soliman, talha onofre. So [facility] [phone].    ATENCIÓN: Si habla español, tiene a elizabeth disposición servicios gratuitos de asistencia lingüística. Llame al " [phone].    We comply with applicable federal civil rights laws and [place] laws. We do not discriminate on the basis of race, color, national origin, age, disability, sex, sexual orientation, or gender identity.            Thank you!     Thank you for choosing [facility]  for your care. Our goal is always to provide you with excellent care. Hearing back from our patients is one way we can continue to improve our services. Please take a few minutes to complete the written survey that you may receive in the mail after your visit with us. Thank you!             Your Updated Medication List - Protect others around you: Learn how to safely use, store and throw away your medicines at www.disposemymeds.org.          This list is accurate as of 2/26/18 12:17 PM.  Always use your most recent med list.                   Brand Name Dispense Instructions for use Diagnosis    acetaminophen 325 MG tablet    TYLENOL    30 tablet    Take 2 tablets (650 mg) by mouth every 6 hours    H/O lymph node excision       Multi-vitamin Tabs tablet      Take 1 tablet by mouth daily        oxyCODONE IR 5 MG tablet    ROXICODONE    20 tablet    Take 1 tablet (5 mg) by mouth every 4 hours as needed for pain    H/O lymph node excision       sennosides 8.6 MG tablet    SENOKOT    60 tablet    Take 1 tablet by mouth 2 times daily as needed for constipation    H/O lymph node excision

## 2018-02-27 NOTE — PROGRESS NOTES
Service Date: 02/26/2018      PROBLEM:  Squamous cell carcinoma of the vulva, status post bilateral inguinal lymph node dissection, FIGO stage IIIC (T2 N2c).      Ms. Gilliam was seen for initial consultation in the Department of Radiation Oncology on 02/26/2018 at the request of Dr. Santosh Vera.      HISTORY OF PRESENT ILLNESS:  Ms. Gilliam is a 62-year-old female with a recently diagnosed locoregionally advanced vulvar cancer.  She presented in the fall of last year for full physical examination after not seeking medical care for many years.  During the visit, she reported a 6-month history of a mass in her vulvar area, as well as a 6-week history of a palpable lump in her left groin.  Examination revealed a large fungating mass encompassing the vulva with definite lymphadenopathy in the left groin and probable lymphadenopathy in the right groin.  She was subsequently referred to Dr. Kb Hallman on 11/18/2017.  On his examination, there was a fungating red mass encompassing the vaginal introitus which extended out onto the labia minora and majora bilaterally. Additionally, there was definite lymphadenopathy felt in the left inguinal region.  Speculum examination was not possible due to patient discomfort.  Biopsy revealed extensive squamous cell carcinoma in situ with focal area suspicious for invasion.  The slides were reviewed at the [facility] and there was 1 small area felt to represent well-differentiated invasive squamous carcinoma.      On 12/04/2017, Ms. Gilliam saw Dr. Santosh Vera.  Examination again showed ulcerative tumor which encircled most of the medial labia bilaterally in a horseshoe shape, as well as involvement of the anterior anus.  There were 2 palpable nodes in the left inguinal region.  PET/CT scan on 12/19/2017, showed max SUV of the vulvar mass to be 13.2.  There were multiple left groin nodes, the majority of which did not demonstrate any FDG uptake except  for 1 groin node with a small amount of hypermetabolic FDG uptake along the medial margin.  Dr. Vera recommended bilateral groin dissection followed by definitive radiation therapy for the vulvar mass.      On 02/09/2018, Ms. Gilliam underwent examination under anesthesia and bilateral inguinal lymph node dissection.  Intraoperatively, the known carcinoma was again appreciated, with involvement of the anus, as well as lower 3rd to half of the vagina.  During the dissection, there were at least 3 abnormal lymph nodes on the left side, with the largest measuring 2 x 3 cm.  These were all superficial and were removed.  Because there were no palpable enlarged deep inguinal nodes, the dissection was not carried out further.  On the right side, there were no palpable deep inguinal lymph nodes; therefore, only a superficial dissection was performed.  Final pathology revealed 3/7 positive lymph nodes on the left side, with the largest metastatic focus 2.0 cm in greatest dimension, with extracapsular extension.  On the right side, 2 lymph nodes were reactive.      Ms. Gilliam is here today with her daughter for discussion of radiation therapy.  She saw Dr. Vera earlier today and her bilateral inguinal drains were removed.  She reports minimal pain or discomfort from her vulvar mass.  In fact, she only had to take Tylenol after surgery.  She denies any change in her bowel habits or urinary habits.  She denies any vaginal bleeding.      PAST MEDICAL HISTORY AND PAST SURGICAL HISTORY:   1.  Remote history of facial fracture after being kicked in the face by a horse.  She did have facial reconstruction.   2.  History of left ankle fracture x 2 and left wrist fracture x 2.  She underwent surgical repair of her left wrist.   3.  Right-sided vertigo and deafness diagnosed in 2012.  She was recommended for further evaluation with MRI; however, this was not followed up, initially due to lack of insurance, and more  recently, she was not thought to be an MRI candidate due to her facial reconstruction.   4.  Status post vein stripping.   5.  Hepatitis C positivity.  This was diagnosed in 11/2017.  She has an appointment with Dr. Maryanne jorge at [facility] GI service on 03/23/2018.      CHEMOTHERAPY HISTORY:  None.        RADIATION HISTORY:  None.      IMPLANTABLE CARDIAC DEVICE:  None.      MEDICATIONS:   1.  Tylenol.   2.  Oxycodone.   3.  Senokot.   4.  Multivitamin.      ALLERGIES:  NO KNOWN DRUG ALLERGIES.      FAMILY HISTORY:  Her mother was diagnosed with breast cancer at age 81.  She did move to [place] for her cancer treatment.  She passed away at age 83.  It was unclear whether the cause of death was due to breast cancer.      SOCIAL HISTORY:  Ms. Gilliam lives in [place], [place], which is near [place].  She is .  She has 2 children, both of whom live pretty close to her.  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  She is a social drinker and she has a 30-pack-year smoking history.  She is trying to cut down on her smoking, currently smoking about less than a pack a day.      REVIEW OF SYSTEMS:  A full review of system was performed by the nursing staff.  Positives include skin itching and some anxiety.  Please see their assessment sheet for details.      PHYSICAL EXAMINATION:   VITAL SIGNS:  Weight 66.5 pounds, height 5 feet 6-1/2 inches.  Blood pressure 153/93, pulse 98.   GENERAL:  She appears somewhat nervous but is in no acute distress, alert and oriented, speech fluent, interaction appropriate.   HEENT:  Unremarkable.   NECK:  Supple.   CARDIOVASCULAR:  Well-perfused.  No cyanosis.   RESPIRATORY:  Breathing comfortably on room air.  No audible wheezing.   ABDOMEN:  Soft, nontender.   EXTREMITIES:  No ankle edema.   GENITOURINARY:  On inspection, there is an erythematous mass involving both the labia minora and labia majora bilaterally.  Anteriorly, the mass  encroaches upon the urethra without further extension into the clitoris.  Posteriorly, there is definite involvement of the anterior anus.  The mass does extend beyond the anus, however, on both sides.  Upon insertion of the finger into the vagina, there is definite vaginal involvement primarily along the posterior wall from 3 o'clock to 9 o'clock.  The extent of vaginal involvement is at the most distal 2-3 cm.  Groin incision is clean, dry and intact, Steri-Strip in place.  No palpable mass in either groin.      ASSESSMENT AND PLAN:  In summary, Ms. Gilliam is a 62-year-old female with a local regionally advanced squamous cell carcinoma of the vulva.  She has a very impressive mass involving bilateral labia minora and labia majora.  She has definite involvement of the anterior anus and the tumor also encroached upon the urethra.  Because of this, she is not felt to be a surgical candidate.  She also has bulky lymphadenopathy on the left side and is status post bilateral inguinal dissection and found to have 3 positive lymph nodes in the left groin, with the largest being 2 cm with extracapsular extension.      I agree with Dr. Vera that her primary disease is best treated with concurrent chemoradiation therapy.  Additionally, due to the positive lymph node in the left groin, she also requires adjuvant radiation therapy to her bilateral groin, and low pelvis.  We discussed the rationale for radiation therapy as well as logistics and side effects.  She agrees to proceed.  She lives near [place] and will be arranged to stay in [facility] during her chemoradiotherapy.      Ms. Gilliam just had her drains removed this morning.  According to her record, she still had about 70+ mL of fluid from her left groin.  Therefore, we will allow a bit more healing and bring her back this Friday for a simulation.  She is also to connect with Dr. Vera's nurse for chemo teaching.      Thanks for allowing us to  participate in the care of this patient.  Please do not hesitate to contact us with questions or concerns.         ALLAN LYON MD             D: 2018   T: 2018   MT: HÉCTOR      Name:     HELDER LOWE   MRN:      [id number]        Account:      [id number]   :      1955           Service Date: 2018      Document: [id number]

## 2018-03-02 ENCOUNTER — APPOINTMENT (OUTPATIENT)
Dept: ONCOLOGY | Facility: CLINIC | Age: 63
End: 2018-03-02
Attending: OBSTETRICS & GYNECOLOGY
Payer: COMMERCIAL

## 2018-03-02 ENCOUNTER — ALLIED HEALTH/NURSE VISIT (OUTPATIENT)
Dept: RADIATION ONCOLOGY | Facility: CLINIC | Age: 63
End: 2018-03-02
Attending: RADIOLOGY
Payer: COMMERCIAL

## 2018-03-02 ENCOUNTER — CARE COORDINATION (OUTPATIENT)
Dept: ONCOLOGY | Facility: CLINIC | Age: 63
End: 2018-03-02

## 2018-03-02 DIAGNOSIS — C51.9 VULVAR CANCER (H): Primary | ICD-10-CM

## 2018-03-02 PROCEDURE — 77334 RADIATION TREATMENT AID(S): CPT | Performed by: RADIOLOGY

## 2018-03-02 PROCEDURE — 77470 SPECIAL RADIATION TREATMENT: CPT | Performed by: RADIOLOGY

## 2018-03-02 NOTE — PROGRESS NOTES
Radiation Therapy Patient Education    Person involved with teaching: Patient and     Patient educational needs for self management of treatment-related side effects assessment completed.  [facility] Patient Ed tab contains Patient Learning Assessment    Education Materials Given  Radiation Therapy and You, Radiation Therapy for GYN Patients and Skin Care During Radiation Treatment    Educational Topics Discussed  Side effects expected, Skin care, Activity and Nutrition and weight loss    Response To Teaching  Verbalizes understanding    GYN Only  Vaginal Dilator-given and educated: not given    Referrals sent: None    Chemotherapy?  Yes: weekly chemoradiation, she is meeting with chemo nurse today.  Schedule given.    She will be staying at [facility], referral sent.

## 2018-03-02 NOTE — PATIENT INSTRUCTIONS
Call your Care Coordinator if you have chills and/or temperature greater then or equal to 100.4, uncontrolled nausea and vomiting, diarrhea, constipation, dizziness, light headedness, shortness of breath, chest pain, unexplained bruising, bleeding that does not stop with 10 minutes of pressure or any new symptoms, questions/concerns  Monday- Friday.    If after hours, weekends or holidays call the [facility] hospital at [phone] and ask for the GYN ONCOLOGY resident on call.    Your  Care Coordinator is    Heide SUAZO, RN  Gynecologic Cancer   Office:  [phone]  Pager: [phone] #[phone]

## 2018-03-02 NOTE — PROGRESS NOTES
[facility] GYN-Oncology Teaching Note    Relevant Diagnosis:  Vulvar Cancer    Teaching Topic:  Chemotherapy:  Cisplatin Potentiation    Person(s) involved in teaching:  Patient and friend    Motivation Level:   Asks Questions:   Yes  Eager to Learn:  Yes  Cooperative:  Yes  Receptive (willing/able to accept information):  Yes    Patient demonstrates understanding of the following:  Reason for the appointment, diagnosis and treatment plan:  Yes  Knowledge of proper use of medications and conditions for which they are ordered (with special attention to potential side effects or drug interactions):  Yes  Which situations necessitate calling provider and whom to contact:  Yes    Teaching Concerns:  Plan start Cisplatin Potentiation 3/12/18.  Patient given copy of chemotherapy schedule and understands she will also have a clinic appointment scheduled for a mid treatment check.  She plans to stay at [facility] during treatment.  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  Message will be sent to [facility] patient navigator and cancer center  that patient is starting treatment.  Patient states she is deaf in right ear secondary to inner ear and sinus infection in 2012.  Perfect hearing in left ear.    Instructional Materials Used/Given:  Chemotherapy Packet and Cards  Disease Packet   Cancer Handbook   Care coordinator cards and after hours contact information     Time spent teaching with patient:  60 minutes    Heide SUAZO, RN  Care Coordinator  Gynecologic Cancer   Office:  [phone]  Pager: [phone] #[phone]

## 2018-03-05 ENCOUNTER — TELEPHONE (OUTPATIENT)
Dept: ONCOLOGY | Facility: CLINIC | Age: 63
End: 2018-03-05

## 2018-03-05 NOTE — TELEPHONE ENCOUNTER
Per RN[facility]/Patient's request,  completed and faxed [facility] referral for lodging dates 3/11/18 - 4/17/18. [facility] will contact Patient directly for confirmation of reservation.  will continue to provide support as needed.      GYPSY Vanegas, MSW   - [facility]  Phone: [phone]  Pager: [phone]  3/5/2018

## 2018-03-09 DIAGNOSIS — C51.9 VULVAR CANCER (H): Primary | ICD-10-CM

## 2018-03-09 RX ORDER — PROCHLORPERAZINE MALEATE 10 MG
10 TABLET ORAL EVERY 6 HOURS PRN
Qty: 30 TABLET | Refills: 2 | Status: SHIPPED | OUTPATIENT
Start: 2018-03-09 | End: 2021-08-25

## 2018-03-09 RX ORDER — LORAZEPAM 1 MG/1
1 TABLET ORAL EVERY 6 HOURS PRN
Qty: 30 TABLET | Refills: 1 | Status: SHIPPED | OUTPATIENT
Start: 2018-03-09 | End: 2021-08-25

## 2018-03-12 ENCOUNTER — APPOINTMENT (OUTPATIENT)
Dept: RADIATION ONCOLOGY | Facility: CLINIC | Age: 63
End: 2018-03-12
Attending: RADIOLOGY
Payer: COMMERCIAL

## 2018-03-12 ENCOUNTER — INFUSION THERAPY VISIT (OUTPATIENT)
Dept: ONCOLOGY | Facility: CLINIC | Age: 63
End: 2018-03-12
Attending: OBSTETRICS & GYNECOLOGY
Payer: COMMERCIAL

## 2018-03-12 ENCOUNTER — APPOINTMENT (OUTPATIENT)
Dept: LAB | Facility: CLINIC | Age: 63
End: 2018-03-12
Attending: OBSTETRICS & GYNECOLOGY
Payer: COMMERCIAL

## 2018-03-12 VITALS
DIASTOLIC BLOOD PRESSURE: 97 MMHG | RESPIRATION RATE: 16 BRPM | WEIGHT: 142.1 LBS | HEART RATE: 85 BPM | SYSTOLIC BLOOD PRESSURE: 135 MMHG | OXYGEN SATURATION: 96 % | BODY MASS INDEX: 22.59 KG/M2 | TEMPERATURE: 98.6 F

## 2018-03-12 DIAGNOSIS — C51.9 MALIGNANT NEOPLASM OF VULVA (H): ICD-10-CM

## 2018-03-12 DIAGNOSIS — Z79.899 ENCOUNTER FOR LONG-TERM (CURRENT) USE OF MEDICATIONS: Primary | ICD-10-CM

## 2018-03-12 LAB
ALBUMIN SERPL-MCNC: 3.4 G/DL (ref 3.4–5)
ALP SERPL-CCNC: 143 U/L (ref 40–150)
ALT SERPL W P-5'-P-CCNC: 20 U/L (ref 0–50)
ANION GAP SERPL CALCULATED.3IONS-SCNC: 9 MMOL/L (ref 3–14)
AST SERPL W P-5'-P-CCNC: 28 U/L (ref 0–45)
BASOPHILS # BLD AUTO: 0.1 10E9/L (ref 0–0.2)
BASOPHILS NFR BLD AUTO: 1.2 %
BILIRUB SERPL-MCNC: 1.6 MG/DL (ref 0.2–1.3)
BUN SERPL-MCNC: 9 MG/DL (ref 7–30)
CALCIUM SERPL-MCNC: 9 MG/DL (ref 8.5–10.1)
CHLORIDE SERPL-SCNC: 99 MMOL/L (ref 94–109)
CO2 SERPL-SCNC: 25 MMOL/L (ref 20–32)
CREAT SERPL-MCNC: 0.81 MG/DL (ref 0.52–1.04)
DIFFERENTIAL METHOD BLD: ABNORMAL
EOSINOPHIL # BLD AUTO: 0.1 10E9/L (ref 0–0.7)
EOSINOPHIL NFR BLD AUTO: 1.1 %
ERYTHROCYTE [DISTWIDTH] IN BLOOD BY AUTOMATED COUNT: 12.9 % (ref 10–15)
GFR SERPL CREATININE-BSD FRML MDRD: 71 ML/MIN/1.7M2
GLUCOSE SERPL-MCNC: 98 MG/DL (ref 70–99)
HCT VFR BLD AUTO: 44.6 % (ref 35–47)
HGB BLD-MCNC: 15.1 G/DL (ref 11.7–15.7)
IMM GRANULOCYTES # BLD: 0 10E9/L (ref 0–0.4)
IMM GRANULOCYTES NFR BLD: 0.4 %
LYMPHOCYTES # BLD AUTO: 1.2 10E9/L (ref 0.8–5.3)
LYMPHOCYTES NFR BLD AUTO: 20.7 %
MAGNESIUM SERPL-MCNC: 1.9 MG/DL (ref 1.6–2.3)
MCH RBC QN AUTO: 34 PG (ref 26.5–33)
MCHC RBC AUTO-ENTMCNC: 33.9 G/DL (ref 31.5–36.5)
MCV RBC AUTO: 101 FL (ref 78–100)
MONOCYTES # BLD AUTO: 0.6 10E9/L (ref 0–1.3)
MONOCYTES NFR BLD AUTO: 10.2 %
NEUTROPHILS # BLD AUTO: 3.7 10E9/L (ref 1.6–8.3)
NEUTROPHILS NFR BLD AUTO: 66.4 %
NRBC # BLD AUTO: 0 10*3/UL
NRBC BLD AUTO-RTO: 0 /100
PLATELET # BLD AUTO: 288 10E9/L (ref 150–450)
POTASSIUM SERPL-SCNC: 4.1 MMOL/L (ref 3.4–5.3)
PROT SERPL-MCNC: 7.3 G/DL (ref 6.8–8.8)
RBC # BLD AUTO: 4.44 10E12/L (ref 3.8–5.2)
SODIUM SERPL-SCNC: 132 MMOL/L (ref 133–144)
WBC # BLD AUTO: 5.6 10E9/L (ref 4–11)

## 2018-03-12 PROCEDURE — 96413 CHEMO IV INFUSION 1 HR: CPT

## 2018-03-12 PROCEDURE — 83735 ASSAY OF MAGNESIUM: CPT | Performed by: OBSTETRICS & GYNECOLOGY

## 2018-03-12 PROCEDURE — 77333 RADIATION TREATMENT AID(S): CPT | Performed by: RADIOLOGY

## 2018-03-12 PROCEDURE — 96361 HYDRATE IV INFUSION ADD-ON: CPT

## 2018-03-12 PROCEDURE — 85025 COMPLETE CBC W/AUTO DIFF WBC: CPT | Performed by: OBSTETRICS & GYNECOLOGY

## 2018-03-12 PROCEDURE — 25800025 ZZH RX 258: Mod: ZF | Performed by: OBSTETRICS & GYNECOLOGY

## 2018-03-12 PROCEDURE — 25000128 H RX IP 250 OP 636: Mod: ZF | Performed by: OBSTETRICS & GYNECOLOGY

## 2018-03-12 PROCEDURE — 96367 TX/PROPH/DG ADDL SEQ IV INF: CPT

## 2018-03-12 PROCEDURE — 80053 COMPREHEN METABOLIC PANEL: CPT | Performed by: OBSTETRICS & GYNECOLOGY

## 2018-03-12 PROCEDURE — 77331 SPECIAL RADIATION DOSIMETRY: CPT | Performed by: RADIOLOGY

## 2018-03-12 PROCEDURE — 96375 TX/PRO/DX INJ NEW DRUG ADDON: CPT

## 2018-03-12 PROCEDURE — 77386 ZZH IMRT TREATMENT DELIVERY, COMPLEX: CPT | Performed by: RADIOLOGY

## 2018-03-12 RX ORDER — DEXTROSE, SODIUM CHLORIDE, AND POTASSIUM CHLORIDE 5; .45; .15 G/100ML; G/100ML; G/100ML
2000 INJECTION INTRAVENOUS ONCE
Status: COMPLETED | OUTPATIENT
Start: 2018-03-12 | End: 2018-03-12

## 2018-03-12 RX ORDER — PALONOSETRON 0.05 MG/ML
0.25 INJECTION, SOLUTION INTRAVENOUS ONCE
Status: COMPLETED | OUTPATIENT
Start: 2018-03-12 | End: 2018-03-12

## 2018-03-12 RX ADMIN — PALONOSETRON HYDROCHLORIDE 0.25 MG: 0.25 INJECTION INTRAVENOUS at 10:52

## 2018-03-12 RX ADMIN — POTASSIUM CHLORIDE, DEXTROSE MONOHYDRATE AND SODIUM CHLORIDE 2000 ML: 150; 5; 450 INJECTION, SOLUTION INTRAVENOUS at 09:46

## 2018-03-12 RX ADMIN — DEXAMETHASONE SODIUM PHOSPHATE 150 MG: 10 INJECTION, SOLUTION INTRAMUSCULAR; INTRAVENOUS at 10:54

## 2018-03-12 RX ADMIN — CISPLATIN 70 MG: 1 INJECTION, SOLUTION INTRAVENOUS at 11:30

## 2018-03-12 ASSESSMENT — PAIN SCALES - GENERAL: PAINLEVEL: NO PAIN (0)

## 2018-03-12 NOTE — PATIENT INSTRUCTIONS
Contact Numbers    [facility] Main Line: [phone]  [facility] Triage:  [phone]    Call triage with chills and/or temperature greater than or equal to 100.5, uncontrolled nausea/vomiting, diarrhea, constipation, dizziness, shortness of breath, chest pain, bleeding, unexplained bruising, or any new/concerning symptoms, questions/concerns.     If you are having any concerning symptoms or wish to speak to a provider before your next infusion visit, please call your care coordinator or triage to notify them so we can adequately serve you.       After Hours: [phone]    If after hours, weekends, or holidays, call main hospital  and ask for Oncology doctor on call.           March 2018 Sunday Monday Tuesday Wednesday Thursday Friday Saturday                       1     2     [facility] TCT/SIM SUITE   11:00 AM   (60 min.)   Yvon Leal MD   [facility]     [facility] RETURN WITH ROOM   12:15 PM   (90 min.)   Nurse, [facility] 3       4     5     6     7     8     UMP TREATMENT PLAN VISIT    7:00 AM   (15 min.)   Yvon Leal MD   [facility] 9     10       11     12  Happy Birthday!     P [facility] DRAW    8:15 AM   (15 min.)    [facility] DRAW   [facility] Lab Draw     P ONC INFUSION 360    9:00 AM   (360 min.)    ONCOLOGY INFUSION   [facility] EXTERNAL RADIATION TREATMT    4:00 PM   (30 min.)   P RAD ONC MARIE   [facility] 13     P EXTERNAL RADIATION TREATMT    4:00 PM   (30 min.)   P RAD ONC MARIE   [facility] 14     [facility] EXTERNAL RADIATION TREATMT    4:00 PM   (30 min.)   P RAD ONC MARIE   [facility] 15     [facility] EXTERNAL RADIATION TREATMT    4:00 PM   (30 min.)   P RAD ONC MARIE   [facility]     P ON TREATMENT VISIT    4:30 PM   (15 min.)   Sydney Cruz MD   [facility] 16     [facility] EXTERNAL RADIATION TREATMT    4:00 PM   (30  min.)   [facility] RAD ONC MARIE   [facility] 17       18     19     [facility] DRAW    7:15 AM   (15 min.)    [facility] DRAW   [facility] Draw     [facility] ONC INFUSION 360    8:00 AM   (360 min.)   [facility] ONCOLOGY INFUSION   [facility]     UMP EXTERNAL RADIATION TREATMT    4:00 PM   (30 min.)   UMP RAD ONC MARIE   [facility] 20     UMP EXTERNAL RADIATION TREATMT    2:30 PM   (30 min.)   UMP RAD ONC MARIE   [facility] 21     UMP EXTERNAL RADIATION TREATMT    2:30 PM   (30 min.)   UMP RAD ONC MARIE   [facility] 22     UMP EXTERNAL RADIATION TREATMT    2:30 PM   (30 min.)   P RAD ONC MARIE   [facility] 23     LAB WITH HB CLINIC    7:00 AM   (15 min.)   [facility] LAB   [facility]     P NEW GENERAL LIVER    7:45 AM   (30 min.)   Malgorzata Madden MD   [facility]     UMP EXTERNAL RADIATION TREATMT    2:30 PM   (30 min.)   [facility] RAD ONC MARIE   [facility]     UMP ON TREATMENT VISIT    2:45 PM   (15 min.)   Yvon Leal MD   [facility] 24       25     26     [facility] DRAW    8:30 AM   (15 min.)    [facility] DRAW   [facility] Lab Draw     P ONC INFUSION 360    9:00 AM   (360 min.)   [facility] ONCOLOGY INFUSION   [facility]     UMP EXTERNAL RADIATION TREATMT    2:45 PM   (30 min.)   P RAD ONC MARIE   [facility] 27     UMP RETURN    2:15 PM   (30 min.)   Karin Scott APRN CNP   [facility]     UMP EXTERNAL RADIATION TREATMT    2:30 PM   (30 min.)   [facility] RAD ONC MARIE   [facility] 28     UMP EXTERNAL RADIATION TREATMT    2:30 PM   (30 min.)   UMP RAD ONC MARIE   [facility] 29     [facility] EXTERNAL RADIATION TREATMT    2:30 PM   (30 min.)   [facility] RAD ONC MARIE   [facility]     UMP ON TREATMENT VISIT    2:45 PM   (15 min.)   Yvon Leal MD   [facility] 30      [facility] EXTERNAL RADIATION TREATMT    2:30 PM   (30 min.)   [facility] RAD ONC MARIE   [facility] 31 April 2018 Sunday Monday Tuesday Wednesday Thursday Friday Saturday   1     2     [facility] DRAW    6:30 AM   (15 min.)   [facility] DRAW   [facility] Draw     UMP ONC INFUSION 360    7:00 AM   (360 min.)   [facility] ONCOLOGY INFUSION   [facility]     UMP EXTERNAL RADIATION TREATMT    2:15 PM   (30 min.)   UMP RAD ONC MARIE   [facility] 3     [facility] EXTERNAL RADIATION TREATMT    2:30 PM   (30 min.)   UMP RAD ONC MARIE   [facility] 4     [facility] EXTERNAL RADIATION TREATMT    2:30 PM   (30 min.)   [facility] RAD ONC MARIE   [facility] 5     [facility] EXTERNAL RADIATION TREATMT    2:30 PM   (30 min.)   P RAD ONC MARIE   [facility]     UMP ON TREATMENT VISIT    2:45 PM   (15 min.)   Yvon Leal MD   [facility] 6     UMP EXTERNAL RADIATION TREATMT    2:30 PM   (30 min.)   [facility] RAD ONC MARIE   [facility] 7       8     9     [facility] EXTERNAL RADIATION TREATMT    2:15 PM   (30 min.)   [facility] RAD ONC MARIE   [facility] 10     [facility] DRAW    6:30 AM   (15 min.)   [facility] DRAW   [facility] Draw     [facility] ONC INFUSION 360    7:00 AM   (360 min.)   [facility] ONCOLOGY INFUSION   [facility]     UMP EXTERNAL RADIATION TREATMT    2:30 PM   (30 min.)   [facility] RAD ONC MARIE   [facility] 11     UMP EXTERNAL RADIATION TREATMT    2:30 PM   (30 min.)   [facility] RAD ONC MARIE   [facility] 12     [facility] EXTERNAL RADIATION TREATMT    2:30 PM   (30 min.)   [facility] RAD ONC MARIE   [facility] 13     UMP EXTERNAL RADIATION TREATMT    2:30 PM   (30 min.)   [facility] RAD ONC MARIE   [facility] 14       15     16     [facility] DRAW    7:30 AM   (15 min.)   [facility] DRAW   [facility] Draw     UMP ONC INFUSION 360    8:00 AM   (360  min.)    ONCOLOGY [facility]     UMP EXTERNAL RADIATION TREATMT    2:30 PM   (30 min.)   UMP RAD ONC MARIE   [facility] 17     UMP EXTERNAL RADIATION TREATMT    2:30 PM   (30 min.)   [facility] RAD ONC MARIE   [facility] 18     UMP EXTERNAL RADIATION TREATMT   12:30 PM   (30 min.)   [facility] RAD ONC MARIE   [facility] 19     [facility] EXTERNAL RADIATION TREATMT    2:30 PM   (30 min.)   UMP RAD ONC MARIE   [facility] 20     [facility] EXTERNAL RADIATION TREATMT    2:30 PM   (30 min.)   UMP RAD ONC MARIE   [facility] 21       22     23     UMP EXTERNAL RADIATION TREATMT    2:15 PM   (30 min.)   UMP RAD ONC MARIE   [facility] 24     UMP EXTERNAL RADIATION TREATMT    2:30 PM   (30 min.)   UMP RAD ONC MARIE   [facility] 25     UMP EXTERNAL RADIATION TREATMT    2:30 PM   (30 min.)   P RAD ONC MARIE   [facility] 26     UMP EXTERNAL RADIATION TREATMT    2:30 PM   (30 min.)   P RAD ONC MARIE   [facility] 27     UMP EXTERNAL RADIATION TREATMT    2:30 PM   (30 min.)   UMP RAD ONC MARIE   [facility] 28       29     30     UMP EXTERNAL RADIATION TREATMT    2:30 PM   (30 min.)   P RAD ONC MARIE   Radiation Oncology Clinic                                       Lab Results:  Recent Results (from the past 12 hour(s))   CBC with platelets differential    Collection Time: 03/12/18  8:44 AM   Result Value Ref Range    WBC 5.6 4.0 - 11.0 10e9/L    RBC Count 4.44 3.8 - 5.2 10e12/L    Hemoglobin 15.1 11.7 - 15.7 g/dL    Hematocrit 44.6 35.0 - 47.0 %     (H) 78 - 100 fl    MCH 34.0 (H) 26.5 - 33.0 pg    MCHC 33.9 31.5 - 36.5 g/dL    RDW 12.9 10.0 - 15.0 %    Platelet Count 288 150 - 450 10e9/L    Diff Method Automated Method     % Neutrophils 66.4 %    % Lymphocytes 20.7 %    % Monocytes 10.2 %    % Eosinophils 1.1 %    % Basophils 1.2 %    % Immature Granulocytes 0.4 %    Nucleated  RBCs 0 0 /100    Absolute Neutrophil 3.7 1.6 - 8.3 10e9/L    Absolute Lymphocytes 1.2 0.8 - 5.3 10e9/L    Absolute Monocytes 0.6 0.0 - 1.3 10e9/L    Absolute Eosinophils 0.1 0.0 - 0.7 10e9/L    Absolute Basophils 0.1 0.0 - 0.2 10e9/L    Abs Immature Granulocytes 0.0 0 - 0.4 10e9/L    Absolute Nucleated RBC 0.0    Comprehensive metabolic panel    Collection Time: 03/12/18  8:44 AM   Result Value Ref Range    Sodium 132 (L) 133 - 144 mmol/L    Potassium 4.1 3.4 - 5.3 mmol/L    Chloride 99 94 - 109 mmol/L    Carbon Dioxide 25 20 - 32 mmol/L    Anion Gap 9 3 - 14 mmol/L    Glucose 98 70 - 99 mg/dL    Urea Nitrogen 9 7 - 30 mg/dL    Creatinine 0.81 0.52 - 1.04 mg/dL    GFR Estimate 71 >60 mL/min/1.7m2    GFR Estimate If Black 86 >60 mL/min/1.7m2    Calcium 9.0 8.5 - 10.1 mg/dL    Bilirubin Total 1.6 (H) 0.2 - 1.3 mg/dL    Albumin 3.4 3.4 - 5.0 g/dL    Protein Total 7.3 6.8 - 8.8 g/dL    Alkaline Phosphatase 143 40 - 150 U/L    ALT 20 0 - 50 U/L    AST 28 0 - 45 U/L   Magnesium    Collection Time: 03/12/18  8:44 AM   Result Value Ref Range    Magnesium 1.9 1.6 - 2.3 mg/dL

## 2018-03-12 NOTE — PROGRESS NOTES
Infusion Nursing Note:  Julius Gilliam presents today for cycle 1, day 1 Cisplatin.    Patient seen by provider today: No   present during visit today: Not Applicable.    Note: Patient arrived to infusion center with complaints of some fatigue. Otherwise, denies complaints of pain, dyspnea, dizziness, nausea, numbness and tingling, and diarrhea at this time.    Patient new to oncology infusion room and is receiving treatment for the first time. Oriented to infusion room, location of bathrooms, nutrition stations, and call light. This writer also reinforced teaching regarding Cisplatin. Patient received new patient folder prior to coming to infusion. This writer reinforced teaching in infusion. All medications and side effects reviewed with patient.    MAR correction: Cisplatin complete and stopped at 1233.    Intravenous Access:  Peripheral IV placed.    Treatment Conditions:  Lab Results   Component Value Date    HGB 15.1 03/12/2018     Lab Results   Component Value Date    WBC 5.6 03/12/2018      Lab Results   Component Value Date    ANEU 3.7 03/12/2018     Lab Results   Component Value Date     03/12/2018      Lab Results   Component Value Date     03/12/2018                   Lab Results   Component Value Date    POTASSIUM 4.1 03/12/2018           Lab Results   Component Value Date    MAG 1.9 03/12/2018            Lab Results   Component Value Date    CR 0.81 03/12/2018                   Lab Results   Component Value Date    BERHANE 9.0 03/12/2018                Lab Results   Component Value Date    BILITOTAL 1.6 03/12/2018           Lab Results   Component Value Date    ALBUMIN 3.4 03/12/2018                    Lab Results   Component Value Date    ALT 20 03/12/2018           Lab Results   Component Value Date    AST 28 03/12/2018     Results reviewed, labs MET treatment parameters, ok to proceed with treatment.    Post Infusion Assessment:  Patient tolerated infusion without  incident.  Blood return noted pre and post infusion.  Site patent and intact, free from redness, edema or discomfort.  No evidence of extravasations.  Access discontinued per protocol.    Discharge Plan:   Prescription refills given for ativan and compazine.  Discharge instructions reviewed with: Patient.  Patient and/or family verbalized understanding of discharge instructions and all questions answered.  Copy of AVS reviewed with patient and/or family.  Patient will return 3/19/2018 for next appointment.  Patient discharged in stable condition accompanied by: self.  Departure Mode: Ambulatory.    Justo Irving RN

## 2018-03-12 NOTE — MR AVS SNAPSHOT
After Visit Summary   3/12/2018    Julius Gilliam    MRN: [id number]           Patient Information     Date Of Birth          1955        Visit Information        Provider Department      3/12/2018 9:00 AM  26 ATC;  ONCOLOGY INFUSION [facility]        Today's Diagnoses     Encounter for long-term (current) use of medications    -  1    Malignant neoplasm of vulva (H)          Care Instructions    Contact Numbers    [facility] Main Line: [phone]  [facility] Triage:  [phone]    Call triage with chills and/or temperature greater than or equal to 100.5, uncontrolled nausea/vomiting, diarrhea, constipation, dizziness, shortness of breath, chest pain, bleeding, unexplained bruising, or any new/concerning symptoms, questions/concerns.     If you are having any concerning symptoms or wish to speak to a provider before your next infusion visit, please call your care coordinator or triage to notify them so we can adequately serve you.       After Hours: [phone]    If after hours, weekends, or holidays, call main hospital  and ask for Oncology doctor on call.           March 2018 Sunday Monday Tuesday Wednesday Thursday Friday Saturday                       1     2     [facility] TCT/SIM SUITE   11:00 AM   (60 min.)   Yvon eLal MD   Radiation Oncology Clinic     [facility] RETURN WITH ROOM   12:15 PM   (90 min.)   Nurse, [facility] 3       4     5     6     7     8     [facility] TREATMENT PLAN VISIT    7:00 AM   (15 min.)   Yvon Leal MD   Radiation Oncology Clinic 9     10       11     12  Happy Birthday!     [facility]ON[facility] LAB DRAW    8:15 AM   (15 min.)   [facility] D[facility]   [facility] Lab Draw     [facility] ONC INFUSION 360    9:00 AM   (360 min.)    ONCOLOGY INFUSION   [facility] EXTERNAL RADIATION TREATMT    4:00 PM   (30 min.)   [facility] RAD ONC MARIE   Radiation Oncology Clinic 13     [facility] EXTERNAL RADIATION TREATMT     4:00 PM   (30 min.)   [facility] RAD ONC MARIE   Radiation Oncology Clinic 14     [facility] EXTERNAL RADIATION TREATMT    4:00 PM   (30 min.)   [facility] RAD ONC MARIE   [facility] Oncology Clinic 15     [facility] EXTERNAL RADIATION TREATMT    4:00 PM   (30 min.)   [facility] RAD ONC MARIE   Radiation Oncology Clinic     [facility] ON TREATMENT VISIT    4:30 PM   (15 min.)   Sydney Cruz MD   Radiation Oncology Clinic 16     [facility] EXTERNAL RADIATION TREATMT    4:00 PM   (30 min.)   [facility] RAD ONC MARIE   Radiation Oncology Clinic 17       18     19     [facility] DRAW    7:15 AM   (15 min.)    [facility] LAB DRAW   [facility] Lab Draw     P ONC INFUSION 360    8:00 AM   (360 min.)   [facility] ONCOLOGY INFUSION   [facility]     [facility] EXTERNAL RADIATION TREATMT    4:00 PM   (30 min.)   P RAD ONC MARIE   Radiation Oncology Clinic 20     [facility] EXTERNAL RADIATION TREATMT    2:30 PM   (30 min.)   P RAD ONC MARIE   Radiation Oncology Clinic 21     [facility] EXTERNAL RADIATION TREATMT    2:30 PM   (30 min.)   P RAD ONC MARIE   Radiation Oncology Clinic 22     [facility] EXTERNAL RADIATION TREATMT    2:30 PM   (30 min.)   P RAD ONC MARIE   Radiation Oncology Clinic 23     LAB WITH HB CLINIC    7:00 AM   (15 min.)   [facility] LAB   [facility] Lab     P NEW GENERAL LIVER    7:45 AM   (30 min.)   Malgorzata Madden MD   [facility] Hepatology     [facility] EXTERNAL RADIATION TREATMT    2:30 PM   (30 min.)   [facility] RAD ONC MARIE   Radiation Oncology Clinic     [facility] ON TREATMENT VISIT    2:45 PM   (15 min.)   Yvon Leal MD   Radiation Oncology Clinic 24       25     26     [facility] DRAW    8:30 AM   (15 min.)    [facility]     P ONC INFUSION 360    9:00 AM   (360 min.)   [facility] ONCOLOGY INFUSION   [facility]     [facility] EXTERNAL RADIATION TREATMT    2:45 PM   (30 min.)   P RAD ONC MARIE   Radiation Oncology Clinic 27     [facility] RETURN    2:15 PM   (30 min.)   Karin Scott APRN CNP   [facility]  [facility]     [facility] EXTERNAL RADIATION TREATMT    2:30 PM   (30 min.)   [facility] RAD ONC MARIE   Radiation Oncology Clinic 28     [facility] EXTERNAL RADIATION TREATMT    2:30 PM   (30 min.)   [facility] RAD ONC MARIE   Radiation Oncology Clinic 29     [facility] EXTERNAL RADIATION TREATMT    2:30 PM   (30 min.)   [facility] RAD ONC MARIE   Radiation Oncology Clinic     [facility] ON TREATMENT VISIT    2:45 PM   (15 min.)   Yvon Leal MD   Radiation Oncology Clinic 30     [facility] EXTERNAL RADIATION TREATMT    2:30 PM   (30 min.)   [facility] RAD ONC MARIE   Radiation Oncology Clinic 31 April 2018 Sunday Monday Tuesday Wednesday Thursday Friday Saturday   1     2     [facility] DRAW    6:30 AM   (15 min.)   [facility] DRAW   [facility] Draw     P ONC INFUSION 360    7:00 AM   (360 min.)   [facility] ONCOLOGY INFUSION   [facility]     [facility] EXTERNAL RADIATION TREATMT    2:15 PM   (30 min.)   [facility] RAD ONC MARIE   Radiation Oncology Clinic 3     [facility] EXTERNAL RADIATION TREATMT    2:30 PM   (30 min.)   [facility] RAD ONC MARIE   Radiation Oncology Clinic 4     [facility] EXTERNAL RADIATION TREATMT    2:30 PM   (30 min.)   [facility] RAD ONC MARIE   Radiation Oncology Clinic 5     [facility] EXTERNAL RADIATION TREATMT    2:30 PM   (30 min.)   [facility] RAD ONC MARIE   Radiation Oncology Clinic     [facility] ON TREATMENT VISIT    2:45 PM   (15 min.)   Yvon Leal MD   Radiation Oncology Clinic 6     [facility] EXTERNAL RADIATION TREATMT    2:30 PM   (30 min.)   [facility] RAD ONC MARIE   Radiation Oncology Clinic 7       8     9     [facility] EXTERNAL RADIATION TREATMT    2:15 PM   (30 min.)   [facility] RAD ONC MARIE   Radiation Oncology Clinic 10     [facility] LAB DRAW    6:30 AM   (15 min.)   [facility] DRAW   [facility] Lab Draw     [facility] ONC INFUSION 360    7:00 AM   (360 min.)   [facility] ONCOLOGY INFUSION   [facility]     [facility] EXTERNAL RADIATION TREATMT    2:30 PM   (30 min.)   P RAD ONC MARIE   Radiation Oncology Clinic 11     [facility] EXTERNAL RADIATION  TREATMT    2:30 PM   (30 min.)   [facility] RAD ONC MARIE   Radiation Oncology Clinic 12     [facility] EXTERNAL RADIATION TREATMT    2:30 PM   (30 min.)   P RAD ONC MARIE   Radiation Oncology Clinic 13     [facility] EXTERNAL RADIATION TREATMT    2:30 PM   (30 min.)   P RAD ONC MARIE   Radiation Oncology Clinic 14       15     16     [facility] LAB DRAW    7:30 AM   (15 min.)    [facility] LAB DRAW   [facility] Lab Draw     P ONC INFUSION 360    8:00 AM   (360 min.)    ONCOLOGY INFUSION   [facility]     [facility] EXTERNAL RADIATION TREATMT    2:30 PM   (30 min.)   P RAD ONC MARIE   Radiation Oncology Clinic 17     [facility] EXTERNAL RADIATION TREATMT    2:30 PM   (30 min.)   P RAD ONC MARIE   Radiation Oncology Clinic 18     [facility] EXTERNAL RADIATION TREATMT   12:30 PM   (30 min.)   P RAD ONC MARIE   Radiation Oncology Clinic 19     [facility] EXTERNAL RADIATION TREATMT    2:30 PM   (30 min.)   P RAD ONC MARIE   Radiation Oncology Clinic 20     [facility] EXTERNAL RADIATION TREATMT    2:30 PM   (30 min.)   P RAD ONC MARIE   Radiation Oncology Clinic 21       22     23     [facility] EXTERNAL RADIATION TREATMT    2:15 PM   (30 min.)   P RAD ONC MARIE   Radiation Oncology Clinic 24     [facility] EXTERNAL RADIATION TREATMT    2:30 PM   (30 min.)   P RAD ONC MARIE   Radiation Oncology Clinic 25     [facility] EXTERNAL RADIATION TREATMT    2:30 PM   (30 min.)   P RAD ONC MARIE   Radiation Oncology Clinic 26     [facility] EXTERNAL RADIATION TREATMT    2:30 PM   (30 min.)   P RAD ONC MARIE   Radiation Oncology Clinic 27     [facility] EXTERNAL RADIATION TREATMT    2:30 PM   (30 min.)   P RAD ONC MARIE   Radiation Oncology Clinic 28       29     30     [facility] EXTERNAL RADIATION TREATMT    2:30 PM   (30 min.)   P RAD ONC MARIE   Radiation Oncology Clinic                                       Lab Results:  Recent Results (from the past 12 hour(s))   CBC with platelets differential    Collection Time: 03/12/18  8:44 AM   Result Value Ref Range    WBC 5.6 4.0 - 11.0  10e9/L    RBC Count 4.44 3.8 - 5.2 10e12/L    Hemoglobin 15.1 11.7 - 15.7 g/dL    Hematocrit 44.6 35.0 - 47.0 %     (H) 78 - 100 fl    MCH 34.0 (H) 26.5 - 33.0 pg    MCHC 33.9 31.5 - 36.5 g/dL    RDW 12.9 10.0 - 15.0 %    Platelet Count 288 150 - 450 10e9/L    Diff Method Automated Method     % Neutrophils 66.4 %    % Lymphocytes 20.7 %    % Monocytes 10.2 %    % Eosinophils 1.1 %    % Basophils 1.2 %    % Immature Granulocytes 0.4 %    Nucleated RBCs 0 0 /100    Absolute Neutrophil 3.7 1.6 - 8.3 10e9/L    Absolute Lymphocytes 1.2 0.8 - 5.3 10e9/L    Absolute Monocytes 0.6 0.0 - 1.3 10e9/L    Absolute Eosinophils 0.1 0.0 - 0.7 10e9/L    Absolute Basophils 0.1 0.0 - 0.2 10e9/L    Abs Immature Granulocytes 0.0 0 - 0.4 10e9/L    Absolute Nucleated RBC 0.0    Comprehensive metabolic panel    Collection Time: 03/12/18  8:44 AM   Result Value Ref Range    Sodium 132 (L) 133 - 144 mmol/L    Potassium 4.1 3.4 - 5.3 mmol/L    Chloride 99 94 - 109 mmol/L    Carbon Dioxide 25 20 - 32 mmol/L    Anion Gap 9 3 - 14 mmol/L    Glucose 98 70 - 99 mg/dL    Urea Nitrogen 9 7 - 30 mg/dL    Creatinine 0.81 0.52 - 1.04 mg/dL    GFR Estimate 71 >60 mL/min/1.7m2    GFR Estimate If Black 86 >60 mL/min/1.7m2    Calcium 9.0 8.5 - 10.1 mg/dL    Bilirubin Total 1.6 (H) 0.2 - 1.3 mg/dL    Albumin 3.4 3.4 - 5.0 g/dL    Protein Total 7.3 6.8 - 8.8 g/dL    Alkaline Phosphatase 143 40 - 150 U/L    ALT 20 0 - 50 U/L    AST 28 0 - 45 U/L   Magnesium    Collection Time: 03/12/18  8:44 AM   Result Value Ref Range    Magnesium 1.9 1.6 - 2.3 mg/dL               Follow-ups after your visit        Your next 10 appointments already scheduled     Mar 12, 2018  4:00 PM CDT   EXTERNAL RADIATION TREATMENT with [facility] RAD ONC MARIE   Radiation Oncology Clinic ([facility])    [facility]  1st Floor  [address] [address]   [phone]            Mar 13, 2018  4:00 PM CDT   EXTERNAL RADIATION TREATMENT  with [facility] RAD ONC MARIE   Radiation Oncology Clinic ([facility])    [facility]  1st Floor  [address] [place] [address]   [phone]            Mar 14, 2018  4:00 PM CDT   EXTERNAL RADIATION TREATMENT with P RAD ONC MARIE   Radiation Oncology Clinic ([facility])    [facility]  1st Floor  [address] [place] [address]   [phone]            Mar 15, 2018  4:00 PM CDT   EXTERNAL RADIATION TREATMENT with [facility] RAD ONC MARIE   Radiation Oncology Clinic ([facility])    [facility]  1st Floor  [address] [address]   [phone]            Mar 15, 2018  4:30 PM CDT   ON TREATMENT VISIT with Sydney Cruz MD   Radiation Oncology Clinic ([facility])    [facility]  1st Floor  [address] [address]   [phone]            Mar 16, 2018  4:00 PM CDT   EXTERNAL RADIATION TREATMENT with [facility] RAD ONC MARIE   Radiation Oncology Clinic ([facility])    [facility]  1st Floor  [address] [address]   [phone]            Mar 19, 2018  7:15 AM CDT   [facility] Lab Draw with  [facility] LAB DRAW   [facility]    [address]  [address]  [place] [address]   [phone]            Mar 19, 2018  8:00 AM CDT   Infusion 360 with [facility] ONCOLOGY INFUSION, [facility] 18 ATC   [facility]    [address]  [address]  [place] [address]   [phone]            Mar 19, 2018  4:00 PM CDT   EXTERNAL RADIATION TREATMENT with [facility] RAD ONC MARIE   Radiation Oncology Clinic ([facility])    [facility]  1st Floor  [address] [address]   [phone]              Who to contact     If you have questions  or need follow up information about today's clinic visit or your schedule please contact [facility] CANCER CLINIC directly at [phone].  Normal or non-critical lab and imaging results will be communicated to you by [employer], letter or phone within 4 business days after the clinic has received the results. If you do not hear from us within 7 days, please contact the clinic through [email or web address] or phone. If you have a critical or abnormal lab result, we will notify you by phone as soon as possible.  Submit refill requests through [employer] or call your pharmacy and they will forward the refill request to us. Please allow 3 business days for your refill to be completed.          Additional Information About Your Visit        [employer] Information     [employer] gives you secure access to your electronic health record. If you see a primary care provider, you can also send messages to your care team and make appointments. If you have questions, please call your primary care clinic.  If you do not have a primary care provider, please call [phone] and they will assist you.        Care EveryWhere ID     This is your Care EveryWhere ID. This could be used by other organizations to access your [place] medical records  [id number]        Your Vitals Were     Pulse Temperature Respirations Pulse Oximetry BMI (Body Mass Index)       85 98.6  F (37  C) (Oral) 16 96% 22.59 kg/m2        Blood Pressure from Last 3 Encounters:   03/12/18 (!) 135/97   02/26/18 (!) 153/93   02/26/18 144/85    Weight from Last 3 Encounters:   03/12/18 64.5 kg (142 lb 1.6 oz)   02/26/18 66.5 kg (146 lb 8 oz)   02/26/18 65.6 kg (144 lb 11.2 oz)              We Performed the Following     CBC with platelets differential     Comprehensive metabolic panel     Magnesium          Today's Medication Changes          These changes are accurate as of 3/12/18  1:45 PM.  If you have any questions, ask your nurse or doctor.               Start taking these  medicines.        Dose/Directions    LORazepam 1 MG tablet   Commonly known as:  ATIVAN   Used for:  Encounter for long-term (current) use of medications, Malignant neoplasm of vulva (H)        Dose:  1 mg   Take 1 tablet (1 mg) by mouth every 6 hours as needed (nausea/vomiting, anxiety or sleep )   Quantity:  30 tablet   Refills:  1       prochlorperazine 10 MG tablet   Commonly known as:  COMPAZINE   Used for:  Encounter for long-term (current) use of medications, Malignant neoplasm of vulva (H)        Dose:  10 mg   Take 1 tablet (10 mg) by mouth every 6 hours as needed (nausea/vomiting)   Quantity:  30 tablet   Refills:  2         These medicines have changed or have updated prescriptions.        Dose/Directions    acetaminophen 325 MG tablet   Commonly known as:  TYLENOL   This may have changed:    - when to take this  - reasons to take this   Used for:  H/O lymph node excision        Dose:  650 mg   Take 2 tablets (650 mg) by mouth every 6 hours   Quantity:  30 tablet   Refills:  0            Where to get your medicines      These medications were sent to [address] [address]    Hours:  TRANSPLANT PHONE NUMBER [phone] Phone:  [phone]     prochlorperazine 10 MG tablet         Some of these will need a paper prescription and others can be bought over the counter.  Ask your nurse if you have questions.     Bring a paper prescription for each of these medications     LORazepam 1 MG tablet                Primary Care Provider Office Phone # Fax #    Tova Donahuetim, [phone]       [facility] [address] [place] [address]        Equal Access to Services     [facility]: Baylee Miguel, waadalgisa lujessica, qaybta kaaltalha dawson. So [facility] [phone].    ATENCIÓN: Si melvi childs  disposición servicios gratuitos de asistencia lingüística. Teodora singer [phone].    We comply with applicable federal civil rights laws and [place] laws. We do not discriminate on the basis of race, color, national origin, age, disability, sex, sexual orientation, or gender identity.            Thank you!     Thank you for choosing [facility] CANCER [facility]  for your care. Our goal is always to provide you with excellent care. Hearing back from our patients is one way we can continue to improve our services. Please take a few minutes to complete the written survey that you may receive in the mail after your visit with us. Thank you!             Your Updated Medication List - Protect others around you: Learn how to safely use, store and throw away your medicines at www.disposemymeds.org.          This list is accurate as of 3/12/18  1:45 PM.  Always use your most recent med list.                   Brand Name Dispense Instructions for use Diagnosis    acetaminophen 325 MG tablet    TYLENOL    30 tablet    Take 2 tablets (650 mg) by mouth every 6 hours    H/O lymph node excision       LORazepam 1 MG tablet    ATIVAN    30 tablet    Take 1 tablet (1 mg) by mouth every 6 hours as needed (nausea/vomiting, anxiety or sleep )    Encounter for long-term (current) use of medications, Malignant neoplasm of vulva (H)       Multi-vitamin Tabs tablet      Take 1 tablet by mouth daily        oxyCODONE IR 5 MG tablet    ROXICODONE    20 tablet    Take 1 tablet (5 mg) by mouth every 4 hours as needed for pain    H/O lymph node excision       prochlorperazine 10 MG tablet    COMPAZINE    30 tablet    Take 1 tablet (10 mg) by mouth every 6 hours as needed (nausea/vomiting)    Encounter for long-term (current) use of medications, Malignant neoplasm of vulva (H)       sennosides 8.6 MG tablet    SENOKOT    60 tablet    Take 1 tablet by mouth 2 times daily as needed for constipation    H/O lymph node excision

## 2018-03-12 NOTE — NURSING NOTE
Chief Complaint   Patient presents with     Blood Draw     labs drawn with PIV start by rn.  vs taken     Labs drawn with PIV start by rn.  Pt tolerated well.  VS taken.  Pt checked in for next appt.  Tabatha Mcneil RN

## 2018-03-13 ENCOUNTER — APPOINTMENT (OUTPATIENT)
Dept: RADIATION ONCOLOGY | Facility: CLINIC | Age: 63
End: 2018-03-13
Attending: RADIOLOGY
Payer: COMMERCIAL

## 2018-03-13 PROCEDURE — 77386 ZZH IMRT TREATMENT DELIVERY, COMPLEX: CPT | Performed by: RADIOLOGY

## 2018-03-14 ENCOUNTER — DOCUMENTATION ONLY (OUTPATIENT)
Dept: INTERVENTIONAL RADIOLOGY/VASCULAR | Facility: CLINIC | Age: 63
End: 2018-03-14

## 2018-03-14 ENCOUNTER — APPOINTMENT (OUTPATIENT)
Dept: RADIATION ONCOLOGY | Facility: CLINIC | Age: 63
End: 2018-03-14
Attending: RADIOLOGY
Payer: COMMERCIAL

## 2018-03-14 PROCEDURE — 77386 ZZH IMRT TREATMENT DELIVERY, COMPLEX: CPT | Performed by: RADIOLOGY

## 2018-03-14 NOTE — PROGRESS NOTES
Patient to be placed  on Interventional radiology schedule  for  a US  guided  Left groin lymph node biopsy    Discussed with Dr. James Mann [facility]  [phone] Call pager  [phone] pager

## 2018-03-15 ENCOUNTER — APPOINTMENT (OUTPATIENT)
Dept: RADIATION ONCOLOGY | Facility: CLINIC | Age: 63
End: 2018-03-15
Attending: RADIOLOGY
Payer: COMMERCIAL

## 2018-03-15 VITALS — BODY MASS INDEX: 23.33 KG/M2 | WEIGHT: 146.7 LBS

## 2018-03-15 DIAGNOSIS — C51.9 MALIGNANT NEOPLASM OF VULVA (H): Primary | ICD-10-CM

## 2018-03-15 PROCEDURE — 77386 ZZH IMRT TREATMENT DELIVERY, COMPLEX: CPT | Performed by: RADIOLOGY

## 2018-03-15 NOTE — LETTER
3/15/2018       RE: Julius Gilliam  [address]   [facility] [facility] [address]     Dear Colleague,    Thank you for referring your patient, Julius Gilliam, to the RADIATION ONCOLOGY CLINIC. Please see a copy of my visit note below.    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    Plan for definitive concurrent chemoRT with boost to vulva and suspicious inguinal nodes    Treatment Summary to Date   Site Treated: Pelvis, Vulva,      inguinal Nodes   Current Dose: 700cGy/4550cGy         Boost: 0/1800cGy   Fractions: 4/26        0/10     Subjective: Today the patient states that she is doing well. She is early in the course of her chemoRT. Her skin dose was checked by [facility] this week, with no major concerns raised. She denies bladder or bowel dysfunction. She otherwise has no complaints or concerns.    Objective:   Gen: A/Ox3. NAD  Skin: Visual inspection of the vulva reveals little change in the patient's near circumferential vulvovaginal lesion. No new skin changes in the perineal region are appreciated.    Toxicities: No appreciable treatment-related toxicities    Labs: CBC, CMP and Mg from 3/12/2018 are largely WNLs, with the exception of mild hyponatremia.    Assessment:    Tolerating radiation therapy well.  All questions and concerns addressed.    Plan:   1. Continue current therapy.    2. We have recommended that the patient acquire Proshield cream for PPX, and have provided her with a squirt bottle to facilitate genito-perineal douching and vulvar hygeine    Carlos A Cortez MD-PhD PGY-2  Radiation Oncology Resident, [facility]    I saw the patient with the resident.  I agree with the resident note and plan of care.      Sydney CHAUHAN  MD Nancy  [phone]

## 2018-03-15 NOTE — PROGRESS NOTES
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    Plan for definitive concurrent chemoRT with boost to vulva and suspicious inguinal nodes    Treatment Summary to Date   Site Treated: Pelvis, Vulva,      inguinal Nodes   Current Dose: 700cGy/4550cGy         Boost: 0/1800cGy   Fractions: 4/26        0/10     Subjective: Today the patient states that she is doing well. She is early in the course of her chemoRT. Her skin dose was checked by [facility] this week, with no major concerns raised. She denies bladder or bowel dysfunction. She otherwise has no complaints or concerns.    Objective:   Gen: A/Ox3. NAD  Skin: Visual inspection of the vulva reveals little change in the patient's near circumferential vulvovaginal lesion. No new skin changes in the perineal region are appreciated.    Toxicities: No appreciable treatment-related toxicities    Labs: CBC, CMP and Mg from 3/12/2018 are largely WNLs, with the exception of mild hyponatremia.    Assessment:    Tolerating radiation therapy well.  All questions and concerns addressed.    Plan:   1. Continue current therapy.    2. We have recommended that the patient acquire Proshield cream for PPX, and have provided her with a squirt bottle to facilitate genito-perineal douching and vulvar hygeine    Carlos A Cortez MD-PhD PGY-2  Radiation Oncology Resident, [facility]    I saw the patient with the resident.  I agree with the resident note and plan of care.      Sydney Cruz MD  [phone]

## 2018-03-15 NOTE — MR AVS SNAPSHOT
After Visit Summary   3/15/2018    Julius Gilliam    MRN: [id number]           Patient Information     Date Of Birth          1955        Visit Information        Provider Department      3/15/2018 4:30 PM Sydney Cruz MD Rad[facility]        Today's Diagnoses     Malignant neoplasm of vulva (H)    -  1       Follow-ups after your visit        Your next 10 appointments already scheduled     Mar 16, 2018 12:15 PM CDT   EXTERNAL RADIATION TREATMENT with P RAD ONC MARIE   Radiation Oncology Clinic ([facility])    [facility]  1st Floor  [address] [facility] [address]   [phone]            Mar 19, 2018  7:15 AM CDT   [facility] Draw with  [facility] DRAW   [facility] ([facility])    [address]  [address]  [facility] [address]   [phone]            Mar 19, 2018  8:00 AM CDT   Infusion 360 with UC ONCOLOGY INFUSION, UC 18 ATC   [facility] ([facility])    [address]  [address]  [facility] [address]   [phone]            Mar 19, 2018  4:00 PM CDT   EXTERNAL RADIATION TREATMENT with [facility] RAD ONC MARIE   Radiation Oncology Clinic ([facility])    [facility]  1st Floor  [address] [address]   [phone]            Mar 20, 2018  2:30 PM CDT   EXTERNAL RADIATION TREATMENT with [facility] RAD ONC MARIE   Radiation Oncology Clinic ([facility])    [facility]  1st Floor  [address] [address]   [phone]            Mar 21, 2018  2:30 PM CDT   EXTERNAL RADIATION TREATMENT with P RAD ONC MARIE   Radiation Oncology Clinic ([facility])    [facility]  1st Floor  [address] [address]   [phone]            Mar 22, 2018  2:30 PM CDT   EXTERNAL RADIATION  TREATMENT with [facility] RAD ONC MARIE   Radiation Oncology Clinic ([facility])    [facility]  1st Floor  [address]  [facility] [id number] [phone]            Mar 23, 2018  7:00 AM CDT   Lab with  LAB   [facility] ([facility])    [address]  1st Floor  [facility] [address] [phone]            Mar 23, 2018  8:00 AM CDT   (Arrive by 7:45 AM)   New General Liver with Malgorzata Madden MD   [facility] Hepatology ([facility])    [address]  [address]  [facility] [address] [phone]              Who to contact     Please call your clinic at [phone] to:    Ask questions about your health    Make or cancel appointments    Discuss your medicines    Learn about your test results    Speak to your doctor            Additional Information About Your Visit        [employer] Information     [employer] gives you secure access to your electronic health record. If you see a primary care provider, you can also send messages to your care team and make appointments. If you have questions, please call your primary care clinic.  If you do not have a primary care provider, please call [phone] and they will assist you.      [employer] is an electronic gateway that provides easy, online access to your medical records. With [employer], you can request a clinic appointment, read your test results, renew a prescription or communicate with your care team.     To access your existing account, please contact your [facility] or call [phone] for assistance.        Care EveryWhere ID     This is your Care EveryWhere ID. This could be used by other organizations to access your [place] medical records  [id number]        Your Vitals Were     BMI (Body Mass Index)                   23.33 kg/m2            Blood Pressure from Last 3 Encounters:   03/12/18 (!) 135/97    02/26/18 (!) 153/93   02/26/18 144/85    Weight from Last 3 Encounters:   03/15/18 66.5 kg (146 lb 11.2 oz)   03/12/18 64.5 kg (142 lb 1.6 oz)   02/26/18 66.5 kg (146 lb 8 oz)              Today, you had the following     No orders found for display         Today's Medication Changes          These changes are accurate as of 3/15/18 11:59 PM.  If you have any questions, ask your nurse or doctor.               These medicines have changed or have updated prescriptions.        Dose/Directions    acetaminophen 325 MG tablet   Commonly known as:  TYLENOL   This may have changed:    - when to take this  - reasons to take this   Used for:  H/O lymph node excision        Dose:  650 mg   Take 2 tablets (650 mg) by mouth every 6 hours   Quantity:  30 tablet   Refills:  0                Primary Care Provider Office Phone # Fax #    Tova Montesinos, [phone]       [facility] [address] [address]        Equal Access to Services     CAREY COSME AH: Hadii melania arangoo Sosandro, waaxda luqadaha, qaybta kaalmada adekarolina, talha cárdenas . So [facility] [phone].    ATENCIÓN: Si habla español, tiene a elizabeth disposición servicios gratuitos de asistencia lingüística. [facility] [phone].    We comply with applicable federal civil rights laws and [place] laws. We do not discriminate on the basis of race, color, national origin, age, disability, sex, sexual orientation, or gender identity.            Thank you!     Thank you for choosing [facility]  for your care. Our goal is always to provide you with excellent care. Hearing back from our patients is one way we can continue to improve our services. Please take a few minutes to complete the written survey that you may receive in the mail after your visit with us. Thank you!             Your Updated Medication List - Protect others around you: Learn how to safely use, store and throw away your  medicines at www.disposemymeds.org.          This list is accurate as of 3/15/18 11:59 PM.  Always use your most recent med list.                   Brand Name Dispense Instructions for use Diagnosis    acetaminophen 325 MG tablet    TYLENOL    30 tablet    Take 2 tablets (650 mg) by mouth every 6 hours    H/O lymph node excision       LORazepam 1 MG tablet    ATIVAN    30 tablet    Take 1 tablet (1 mg) by mouth every 6 hours as needed (nausea/vomiting, anxiety or sleep )    Encounter for long-term (current) use of medications, Malignant neoplasm of vulva (H)       Multi-vitamin Tabs tablet      Take 1 tablet by mouth daily        oxyCODONE IR 5 MG tablet    ROXICODONE    20 tablet    Take 1 tablet (5 mg) by mouth every 4 hours as needed for pain    H/O lymph node excision       prochlorperazine 10 MG tablet    COMPAZINE    30 tablet    Take 1 tablet (10 mg) by mouth every 6 hours as needed (nausea/vomiting)    Encounter for long-term (current) use of medications, Malignant neoplasm of vulva (H)       sennosides 8.6 MG tablet    SENOKOT    60 tablet    Take 1 tablet by mouth 2 times daily as needed for constipation    H/O lymph node excision

## 2018-03-16 ENCOUNTER — APPOINTMENT (OUTPATIENT)
Dept: RADIATION ONCOLOGY | Facility: CLINIC | Age: 63
End: 2018-03-16
Attending: RADIOLOGY
Payer: COMMERCIAL

## 2018-03-16 PROCEDURE — 77336 RADIATION PHYSICS CONSULT: CPT | Performed by: RADIOLOGY

## 2018-03-16 PROCEDURE — 77386 ZZH IMRT TREATMENT DELIVERY, COMPLEX: CPT | Performed by: RADIOLOGY

## 2018-03-19 ENCOUNTER — INFUSION THERAPY VISIT (OUTPATIENT)
Dept: ONCOLOGY | Facility: CLINIC | Age: 63
End: 2018-03-19
Attending: OBSTETRICS & GYNECOLOGY
Payer: COMMERCIAL

## 2018-03-19 ENCOUNTER — TELEPHONE (OUTPATIENT)
Dept: GASTROENTEROLOGY | Facility: CLINIC | Age: 63
End: 2018-03-19

## 2018-03-19 ENCOUNTER — APPOINTMENT (OUTPATIENT)
Dept: RADIATION ONCOLOGY | Facility: CLINIC | Age: 63
End: 2018-03-19
Attending: RADIOLOGY
Payer: COMMERCIAL

## 2018-03-19 ENCOUNTER — APPOINTMENT (OUTPATIENT)
Dept: LAB | Facility: CLINIC | Age: 63
End: 2018-03-19
Attending: OBSTETRICS & GYNECOLOGY
Payer: COMMERCIAL

## 2018-03-19 VITALS
WEIGHT: 142.1 LBS | RESPIRATION RATE: 18 BRPM | SYSTOLIC BLOOD PRESSURE: 149 MMHG | OXYGEN SATURATION: 97 % | TEMPERATURE: 98 F | HEART RATE: 78 BPM | BODY MASS INDEX: 22.59 KG/M2 | DIASTOLIC BLOOD PRESSURE: 94 MMHG

## 2018-03-19 DIAGNOSIS — Z79.899 ENCOUNTER FOR LONG-TERM (CURRENT) USE OF MEDICATIONS: Primary | ICD-10-CM

## 2018-03-19 DIAGNOSIS — C51.9 MALIGNANT NEOPLASM OF VULVA (H): ICD-10-CM

## 2018-03-19 LAB
ALBUMIN SERPL-MCNC: 3.6 G/DL (ref 3.4–5)
ALP SERPL-CCNC: 111 U/L (ref 40–150)
ALT SERPL W P-5'-P-CCNC: 31 U/L (ref 0–50)
ANION GAP SERPL CALCULATED.3IONS-SCNC: 9 MMOL/L (ref 3–14)
AST SERPL W P-5'-P-CCNC: 34 U/L (ref 0–45)
BASOPHILS # BLD AUTO: 0.1 10E9/L (ref 0–0.2)
BASOPHILS NFR BLD AUTO: 1.2 %
BILIRUB SERPL-MCNC: 1.1 MG/DL (ref 0.2–1.3)
BUN SERPL-MCNC: 11 MG/DL (ref 7–30)
CALCIUM SERPL-MCNC: 9 MG/DL (ref 8.5–10.1)
CHLORIDE SERPL-SCNC: 96 MMOL/L (ref 94–109)
CO2 SERPL-SCNC: 28 MMOL/L (ref 20–32)
CREAT SERPL-MCNC: 0.7 MG/DL (ref 0.52–1.04)
DIFFERENTIAL METHOD BLD: ABNORMAL
EOSINOPHIL # BLD AUTO: 0.1 10E9/L (ref 0–0.7)
EOSINOPHIL NFR BLD AUTO: 1.4 %
ERYTHROCYTE [DISTWIDTH] IN BLOOD BY AUTOMATED COUNT: 12.6 % (ref 10–15)
GFR SERPL CREATININE-BSD FRML MDRD: 85 ML/MIN/1.7M2
GLUCOSE SERPL-MCNC: 102 MG/DL (ref 70–99)
HCT VFR BLD AUTO: 41.2 % (ref 35–47)
HGB BLD-MCNC: 14.3 G/DL (ref 11.7–15.7)
IMM GRANULOCYTES # BLD: 0.1 10E9/L (ref 0–0.4)
IMM GRANULOCYTES NFR BLD: 0.8 %
LYMPHOCYTES # BLD AUTO: 0.8 10E9/L (ref 0.8–5.3)
LYMPHOCYTES NFR BLD AUTO: 12.9 %
MAGNESIUM SERPL-MCNC: 1.8 MG/DL (ref 1.6–2.3)
MCH RBC QN AUTO: 34.6 PG (ref 26.5–33)
MCHC RBC AUTO-ENTMCNC: 34.7 G/DL (ref 31.5–36.5)
MCV RBC AUTO: 100 FL (ref 78–100)
MONOCYTES # BLD AUTO: 0.8 10E9/L (ref 0–1.3)
MONOCYTES NFR BLD AUTO: 12.9 %
NEUTROPHILS # BLD AUTO: 4.6 10E9/L (ref 1.6–8.3)
NEUTROPHILS NFR BLD AUTO: 70.8 %
NRBC # BLD AUTO: 0 10*3/UL
NRBC BLD AUTO-RTO: 0 /100
PLATELET # BLD AUTO: 258 10E9/L (ref 150–450)
POTASSIUM SERPL-SCNC: 3.8 MMOL/L (ref 3.4–5.3)
PROT SERPL-MCNC: 7.3 G/DL (ref 6.8–8.8)
RBC # BLD AUTO: 4.13 10E12/L (ref 3.8–5.2)
SODIUM SERPL-SCNC: 133 MMOL/L (ref 133–144)
WBC # BLD AUTO: 6.4 10E9/L (ref 4–11)

## 2018-03-19 PROCEDURE — 77386 ZZH IMRT TREATMENT DELIVERY, COMPLEX: CPT | Performed by: RADIOLOGY

## 2018-03-19 PROCEDURE — 25000128 H RX IP 250 OP 636: Mod: ZF | Performed by: OBSTETRICS & GYNECOLOGY

## 2018-03-19 PROCEDURE — 80053 COMPREHEN METABOLIC PANEL: CPT | Performed by: OBSTETRICS & GYNECOLOGY

## 2018-03-19 PROCEDURE — 96361 HYDRATE IV INFUSION ADD-ON: CPT

## 2018-03-19 PROCEDURE — 83735 ASSAY OF MAGNESIUM: CPT | Performed by: OBSTETRICS & GYNECOLOGY

## 2018-03-19 PROCEDURE — 96367 TX/PROPH/DG ADDL SEQ IV INF: CPT

## 2018-03-19 PROCEDURE — 96413 CHEMO IV INFUSION 1 HR: CPT

## 2018-03-19 PROCEDURE — 25800025 ZZH RX 258: Mod: ZF | Performed by: OBSTETRICS & GYNECOLOGY

## 2018-03-19 PROCEDURE — 96375 TX/PRO/DX INJ NEW DRUG ADDON: CPT

## 2018-03-19 PROCEDURE — 85025 COMPLETE CBC W/AUTO DIFF WBC: CPT | Performed by: OBSTETRICS & GYNECOLOGY

## 2018-03-19 RX ORDER — PALONOSETRON 0.05 MG/ML
0.25 INJECTION, SOLUTION INTRAVENOUS ONCE
Status: COMPLETED | OUTPATIENT
Start: 2018-03-19 | End: 2018-03-19

## 2018-03-19 RX ORDER — DEXTROSE, SODIUM CHLORIDE, AND POTASSIUM CHLORIDE 5; .45; .15 G/100ML; G/100ML; G/100ML
2000 INJECTION INTRAVENOUS ONCE
Status: COMPLETED | OUTPATIENT
Start: 2018-03-19 | End: 2018-03-19

## 2018-03-19 RX ADMIN — DEXAMETHASONE SODIUM PHOSPHATE 150 MG: 10 INJECTION, SOLUTION INTRAMUSCULAR; INTRAVENOUS at 09:34

## 2018-03-19 RX ADMIN — CISPLATIN 70 MG: 1 INJECTION, SOLUTION INTRAVENOUS at 10:55

## 2018-03-19 RX ADMIN — PALONOSETRON HYDROCHLORIDE 0.25 MG: 0.25 INJECTION INTRAVENOUS at 09:32

## 2018-03-19 RX ADMIN — POTASSIUM CHLORIDE, DEXTROSE MONOHYDRATE AND SODIUM CHLORIDE 2000 ML: 150; 5; 450 INJECTION, SOLUTION INTRAVENOUS at 09:10

## 2018-03-19 ASSESSMENT — PAIN SCALES - GENERAL: PAINLEVEL: NO PAIN (0)

## 2018-03-19 NOTE — PROGRESS NOTES
Infusion Nursing Note:  Julius Gilliam presents today for Cycle 1, day 8 Cisplatin.    Patient seen by provider today: No    Note: Patient presents to clinic today feeling well with no questions.      Intravenous Access:  Peripheral IV placed.    Treatment Conditions:  Lab Results   Component Value Date    HGB 14.3 03/19/2018     Lab Results   Component Value Date    WBC 6.4 03/19/2018      Lab Results   Component Value Date    ANEU 4.6 03/19/2018     Lab Results   Component Value Date     03/19/2018      Lab Results   Component Value Date     03/19/2018                   Lab Results   Component Value Date    POTASSIUM 3.8 03/19/2018           Lab Results   Component Value Date    MAG 1.8 03/19/2018            Lab Results   Component Value Date    CR 0.70 03/19/2018                   Lab Results   Component Value Date    BERHANE 9.0 03/19/2018                Lab Results   Component Value Date    BILITOTAL 1.1 03/19/2018           Lab Results   Component Value Date    ALBUMIN 3.6 03/19/2018                    Lab Results   Component Value Date    ALT 31 03/19/2018           Lab Results   Component Value Date    AST 34 03/19/2018     Results reviewed, labs MET treatment parameters, ok to proceed with treatment.    Post Infusion Assessment:  Patient tolerated infusion without incident.  Blood return noted pre and post infusion.  Site patent and intact, free from redness, edema or discomfort.  No evidence of extravasations.  Access discontinued per protocol.  Patient voided prior to/throughout and after Cisplatin appropriately.    Discharge Plan:   Patient declined prescription refills.  Discharge instructions reviewed with: Patient.  Patient and/or family verbalized understanding of discharge instructions and all questions answered.  Copy of AVS reviewed with patient and/or family.  Patient will return 3/26/2018 for next appointment.  Departure Mode: Ambulatory.    Cari Altamirano  RN

## 2018-03-19 NOTE — PATIENT INSTRUCTIONS
Contact Numbers    [facility] Main Line: [phone]  [facility] Triage:  [phone]    Call triage with chills and/or temperature greater than or equal to 100.5, uncontrolled nausea/vomiting, diarrhea, constipation, dizziness, shortness of breath, chest pain, bleeding, unexplained bruising, or any new/concerning symptoms, questions/concerns.     If you are having any concerning symptoms or wish to speak to a provider before your next infusion visit, please call your care coordinator or triage to notify them so we can adequately serve you.       After Hours: [phone]    If after hours, weekends, or holidays, call main hospital  and ask for Oncology doctor on call.         March 2018 Sunday Monday Tuesday Wednesday Thursday Friday Saturday                       1     2     [facility] TCT/SIM SUITE   11:00 AM   (60 min.)   Yvon Leal MD   Rad[facility] Oncology Clinic     [facility] RETURN WITH ROOM   12:15 PM   (90 min.)   Nurse, [facility] 3       4     5     6     7     8     UMP TREATMENT PLAN VISIT    7:00 AM   (15 min.)   Yvon Leal MD   [facility] 9     10       11     12  Happy Birthday!     P [facility] DRAW    8:15 AM   (15 min.)    [facility] LAB DRAW   [facility] Lab Draw     P ONC INFUSION 360    9:00 AM   (360 min.)    ONCOLOGY INFUSION   [facility] EXTERNAL RADIATION TREATMT    4:00 PM   (30 min.)   P RAD ONC MARIE   [facility]cology [facility] 13     P EXTERNAL RADIATION TREATMT    4:00 PM   (30 min.)   P RAD ONC MARIE   [facility] 14     [facility] EXTERNAL RADIATION TREATMT    4:00 PM   (30 min.)   P RAD ONC MARIE   [facility] 15     P EXTERNAL RADIATION TREATMT    4:00 PM   (30 min.)   P RAD ONC MARIE   [facility]cology Clinic     P ON TREATMENT VISIT    4:30 PM   (15 min.)   Sydney Cruz MD   [facility] 16     UMP EXTERNAL RADIATION TREATMT   12:15 PM   (30 min.)    [facility] RAD ONC MARIE   [facility] 17       18     19     [facility] LAB DRAW    7:15 AM   (15 min.)    [facility] LAB DRAW   [facility] Lab Draw     UMP ONC INFUSION 360    8:00 AM   (360 min.)   UC ONCOLOGY INFUSION   [facility]     UMP EXTERNAL RADIATION TREATMT    4:00 PM   (30 min.)   [facility] RAD ONC MARIE   [facility] 20     UMP EXTERNAL RADIATION TREATMT    2:30 PM   (30 min.)   [facility] RAD ONC MARIE   [facility] 21     [facility] EXTERNAL RADIATION TREATMT    2:30 PM   (30 min.)   [facility] RAD ONC MARIE   [facility] 22     [facility] EXTERNAL RADIATION TREATMT    2:30 PM   (30 min.)   P RAD ONC MARIE   [facility] 23     LAB WITH  CLINIC    7:00 AM   (15 min.)   [facility] LAB   [facility] Lab     UMP NEW GENERAL LIVER    7:45 AM   (30 min.)   Malgorzata Madden MD   [facility] Hepatology     UMP EXTERNAL RADIATION TREATMT    2:30 PM   (30 min.)   [facility] RAD ONC MARIE   [facility]cology Clinic     UMP ON TREATMENT VISIT    2:45 PM   (15 min.)   Yvon Leal MD   [facility]cology Clinic 24       25     26     [facility] DRAW    8:30 AM   (15 min.)    [facility] DRAW   [facility] Lab Draw     P ONC INFUSION 360    9:00 AM   (360 min.)   [facility] ONCOLOGY INFUSION   [facility]     [facility] EXTERNAL RADIATION TREATMT    2:45 PM   (30 min.)   P RAD ONC MARIE   [facility] 27     UMP RETURN    2:15 PM   (30 min.)   Karin Scott APRN CNP   [facility]     UMP EXTERNAL RADIATION TREATMT    2:30 PM   (30 min.)   [facility] RAD ONC MARIE   [facility] 28     [facility] EXTERNAL RADIATION TREATMT    2:30 PM   (30 min.)   UMP RAD ONC MARIE   [facility] 29     [facility] EXTERNAL RADIATION TREATMT    2:30 PM   (30 min.)   [facility] RAD ONC MARIE   [facility]     UMP ON TREATMENT VISIT    2:45 PM   (15 min.)   Yvon Leal MD   [facility] 30     UMP  EXTERNAL RADIATION TREATMT    2:30 PM   (30 min.)   [facility] RAD ONC MARIE   [facility] 31 April 2018 Sunday Monday Tuesday Wednesday Thursday Friday Saturday   1     2     [facility] DRAW    6:30 AM   (15 min.)   [facility] DRAW   [facility] Draw     UMP ONC INFUSION 360    7:00 AM   (360 min.)   UC ONCOLOGY INFUSION   [facility]     UMP EXTERNAL RADIATION TREATMT    2:15 PM   (30 min.)   UMP RAD ONC MARIE   [facility] 3     UMP EXTERNAL RADIATION TREATMT    2:30 PM   (30 min.)   UMP RAD ONC MARIE   [facility] 4     UMP EXTERNAL RADIATION TREATMT    2:30 PM   (30 min.)   UMP RAD ONC MARIE   [facility] 5     UMP EXTERNAL RADIATION TREATMT    2:30 PM   (30 min.)   P RAD ONC MARIE   [facility]     UMP ON TREATMENT VISIT    2:45 PM   (15 min.)   Yvon Leal MD   [facility] 6     UMP EXTERNAL RADIATION TREATMT    2:30 PM   (30 min.)   UMP RAD ONC MARIE   [facility] 7       8     9     UMP EXTERNAL RADIATION TREATMT    2:15 PM   (30 min.)   UMP RAD ONC MARIE   [facility] 10     [facility] DRAW    6:30 AM   (15 min.)   [facility] DRAW   [facility] Draw     UMP ONC INFUSION 360    7:00 AM   (360 min.)   UC ONCOLOGY INFUSION   [facility]     UMP EXTERNAL RADIATION TREATMT    2:30 PM   (30 min.)   UMP RAD ONC MARIE   [facility] 11     UMP EXTERNAL RADIATION TREATMT    2:30 PM   (30 min.)   UMP RAD ONC MARIE   [facility] 12     UMP EXTERNAL RADIATION TREATMT    2:30 PM   (30 min.)   UMP RAD ONC MARIE   [facility] 13     UMP EXTERNAL RADIATION TREATMT    2:30 PM   (30 min.)   UMP RAD ONC MARIE   [facility] Clinic 14       15     16     [facility] DRAW    7:30 AM   (15 min.)   [facility] DRAW   [facility] Draw     UMP ONC INFUSION 360    8:00 AM   (360  min.)    ONCOLOGY [facility]     UMP EXTERNAL RADIATION TREATMT    2:30 PM   (30 min.)   UMP RAD ONC MARIE   [facility] 17     UMP EXTERNAL RADIATION TREATMT    2:30 PM   (30 min.)   UMP RAD ONC MARIE   [facility] 18     UMP EXTERNAL RADIATION TREATMT   12:30 PM   (30 min.)   UMP RAD ONC MARIE   [facility] 19     UMP EXTERNAL RADIATION TREATMT    2:30 PM   (30 min.)   UMP RAD ONC MARIE   [facility] 20     UMP EXTERNAL RADIATION TREATMT    2:30 PM   (30 min.)   UMP RAD ONC MARIE   [facility] Clinic 21       22     23     UMP EXTERNAL RADIATION TREATMT    2:15 PM   (30 min.)   UMP RAD ONC MARIE   [facility] 24     UMP EXTERNAL RADIATION TREATMT    2:30 PM   (30 min.)   UMP RAD ONC MARIE   [facility] 25     UMP EXTERNAL RADIATION TREATMT    2:30 PM   (30 min.)   P RAD ONC MARIE   [facility] 26     UMP EXTERNAL RADIATION TREATMT    2:30 PM   (30 min.)   P RAD ONC MARIE   [facility] 27     UMP EXTERNAL RADIATION TREATMT    2:30 PM   (30 min.)   UMP RAD ONC MARIE   [facility] 28       29     30     UMP EXTERNAL RADIATION TREATMT    2:30 PM   (30 min.)   P RAD ONC MARIE   [facility] Oncology Clinic                                       Lab Results:  Recent Results (from the past 12 hour(s))   CBC with platelets differential    Collection Time: 03/19/18  8:15 AM   Result Value Ref Range    WBC 6.4 4.0 - 11.0 10e9/L    RBC Count 4.13 3.8 - 5.2 10e12/L    Hemoglobin 14.3 11.7 - 15.7 g/dL    Hematocrit 41.2 35.0 - 47.0 %     78 - 100 fl    MCH 34.6 (H) 26.5 - 33.0 pg    MCHC 34.7 31.5 - 36.5 g/dL    RDW 12.6 10.0 - 15.0 %    Platelet Count 258 150 - 450 10e9/L    Diff Method Automated Method     % Neutrophils 70.8 %    % Lymphocytes 12.9 %    % Monocytes 12.9 %    % Eosinophils 1.4 %    % Basophils 1.2 %    % Immature Granulocytes 0.8 %    Nucleated  RBCs 0 0 /100    Absolute Neutrophil 4.6 1.6 - 8.3 10e9/L    Absolute Lymphocytes 0.8 0.8 - 5.3 10e9/L    Absolute Monocytes 0.8 0.0 - 1.3 10e9/L    Absolute Eosinophils 0.1 0.0 - 0.7 10e9/L    Absolute Basophils 0.1 0.0 - 0.2 10e9/L    Abs Immature Granulocytes 0.1 0 - 0.4 10e9/L    Absolute Nucleated RBC 0.0    Comprehensive metabolic panel    Collection Time: 03/19/18  8:15 AM   Result Value Ref Range    Sodium 133 133 - 144 mmol/L    Potassium 3.8 3.4 - 5.3 mmol/L    Chloride 96 94 - 109 mmol/L    Carbon Dioxide 28 20 - 32 mmol/L    Anion Gap 9 3 - 14 mmol/L    Glucose 102 (H) 70 - 99 mg/dL    Urea Nitrogen 11 7 - 30 mg/dL    Creatinine 0.70 0.52 - 1.04 mg/dL    GFR Estimate 85 >60 mL/min/1.7m2    GFR Estimate If Black >90 >60 mL/min/1.7m2    Calcium 9.0 8.5 - 10.1 mg/dL    Bilirubin Total 1.1 0.2 - 1.3 mg/dL    Albumin 3.6 3.4 - 5.0 g/dL    Protein Total 7.3 6.8 - 8.8 g/dL    Alkaline Phosphatase 111 40 - 150 U/L    ALT 31 0 - 50 U/L    AST 34 0 - 45 U/L   Magnesium    Collection Time: 03/19/18  8:15 AM   Result Value Ref Range    Magnesium 1.8 1.6 - 2.3 mg/dL

## 2018-03-19 NOTE — MR AVS SNAPSHOT
After Visit Summary   3/19/2018    Julius Gilliam    MRN: [id number]           Patient Information     Date Of Birth          1955        Visit Information        Provider Department      3/19/2018 8:00 AM  18 ATC;  ONCOLOGY INFUSION [facility]        Today's Diagnoses     Encounter for long-term (current) use of medications    -  1    Malignant neoplasm of vulva (H)          Care Instructions    Contact Numbers    [facility] Main Line: [phone]  [facility] Triage:  [phone]    Call triage with chills and/or temperature greater than or equal to 100.5, uncontrolled nausea/vomiting, diarrhea, constipation, dizziness, shortness of breath, chest pain, bleeding, unexplained bruising, or any new/concerning symptoms, questions/concerns.     If you are having any concerning symptoms or wish to speak to a provider before your next infusion visit, please call your care coordinator or triage to notify them so we can adequately serve you.       After Hours: [phone]    If after hours, weekends, or holidays, call main hospital  and ask for Oncology doctor on call.         March 2018 Sunday Monday Tuesday Wednesday Thursday Friday Saturday                       1     2     [facility] TCT/SIM SUITE   11:00 AM   (60 min.)   Yvon Leal MD   Radiation Oncology Clinic     [facility] RETURN WITH ROOM   12:15 PM   (90 min.)   Nurse, [facility] 3       4     5     6     7     8     [facility] TREATMENT PLAN VISIT    7:00 AM   (15 min.)   Yvon Leal MD   Radiation Oncology Clinic 9     10       11     12  Happy Birthday!     [facility] LAB DRAW    8:15 AM   (15 min.)   [facility]   [facility] Lab Draw     [facility] ONC INFUSION 360    9:00 AM   (360 min.)    ONCOLOGY INFUSION   formerly [facility] EXTERNAL RADIATION TREATMT    4:00 PM   (30 min.)   [facility] RAD ONC MARIE   Radiation Oncology Clinic 13     [facility] EXTERNAL RADIATION TREATMT     4:00 PM   (30 min.)   [facility] RAD ONC MARIE   [facility] Clinic 14     [facility] EXTERNAL RADIATION TREATMT    4:00 PM   (30 min.)   [facility] RAD ONC MARIE   [facility] Oncology Clinic 15     [facility] EXTERNAL RADIATION TREATMT    4:00 PM   (30 min.)   [facility] RAD ONC MARIE   Radiation Oncology Clinic     [facility] ON TREATMENT VISIT    4:30 PM   (15 min.)   Sydney Cruz MD   Radiation Oncology Clinic 16     [facility] EXTERNAL RADIATION TREATMT   12:15 PM   (30 min.)   [facility] RAD ONC MARIE   [facility] Oncology Clinic 17       18     19     [facility] DRAW    7:15 AM   (15 min.)    [facility] LAB DRAW   [facility] Lab Draw     P ONC INFUSION 360    8:00 AM   (360 min.)   [facility] ONCOLOGY INFUSION   [facility]     [facility] EXTERNAL RADIATION TREATMT    4:00 PM   (30 min.)   P RAD ONC MARIE   Radiation Oncology Clinic 20     [facility] EXTERNAL RADIATION TREATMT    2:30 PM   (30 min.)   P RAD ONC MARIE   Radiation Oncology Clinic 21     [facility] EXTERNAL RADIATION TREATMT    2:30 PM   (30 min.)   P RAD ONC MARIE   Radiation Oncology Clinic 22     [facility] EXTERNAL RADIATION TREATMT    2:30 PM   (30 min.)   P RAD ONC MARIE   Radiation Oncology Clinic 23     LAB WITH HB CLINIC    7:00 AM   (15 min.)   [facility] LAB   [facility] Lab     P NEW GENERAL LIVER    7:45 AM   (30 min.)   Malgorzata Madden MD   [facility] Hepatology     [facility] EXTERNAL RADIATION TREATMT    2:30 PM   (30 min.)   [facility] RAD ONC MARIE   Radiation Oncology Clinic     [facility] ON TREATMENT VISIT    2:45 PM   (15 min.)   Yvon Leal MD   Radiation Oncology Clinic 24       25     26     [facility] DRAW    8:30 AM   (15 min.)    [facility]     P ONC INFUSION 360    9:00 AM   (360 min.)   [facility] ONCOLOGY INFUSION   [facility]     [facility] EXTERNAL RADIATION TREATMT    2:45 PM   (30 min.)   P RAD ONC MARIE   Radiation Oncology Clinic 27     [facility] RETURN    2:15 PM   (30 min.)   Karin Scott APRN CNP   [facility]  [facility]     [facility] EXTERNAL RADIATION TREATMT    2:30 PM   (30 min.)   [facility] RAD ONC MARIE   Radiation Oncology Clinic 28     [facility] EXTERNAL RADIATION TREATMT    2:30 PM   (30 min.)   [facility] RAD ONC MARIE   [facility] Oncology Clinic 29     [facility] EXTERNAL RADIATION TREATMT    2:30 PM   (30 min.)   [facility] RAD ONC MARIE   Rad[facility] Oncology Clinic     [facility] ON TREATMENT VISIT    2:45 PM   (15 min.)   Yvon Leal MD   [facility] Oncology Clinic 30     [facility] EXTERNAL RADIATION TREATMT    2:30 PM   (30 min.)   [facility] RAD ONC MARIE   Rad[facility] Oncology Clinic 31 April 2018 Sunday Monday Tuesday Wednesday Thursday Friday Saturday   1     2     [facility] DRAW    6:30 AM   (15 min.)   [facility] DRAW   [facility] Lab Draw     P ONC INFUSION 360    7:00 AM   (360 min.)   [facility] ONCOLOGY INFUSION   [facility]     [facility] EXTERNAL RADIATION TREATMT    2:15 PM   (30 min.)   [facility] RAD ONC MARIE   Rad[facility] Oncology Clinic 3     [facility] EXTERNAL RADIATION TREATMT    2:30 PM   (30 min.)   [facility] RAD ONC MARIE   [facility] Oncology Clinic 4     [facility] EXTERNAL RADIATION TREATMT    2:30 PM   (30 min.)   [facility] RAD ONC MARIE   [facility] Oncology Clinic 5     [facility] EXTERNAL RADIATION TREATMT    2:30 PM   (30 min.)   [facility] RAD ONC MARIE   [facility] Oncology Clinic     [facility] ON TREATMENT VISIT    2:45 PM   (15 min.)   Yvon Leal MD   Radiation Oncology Clinic 6     [facility] EXTERNAL RADIATION TREATMT    2:30 PM   (30 min.)   [facility] RAD ONC MARIE   Rad[facility] Oncology Clinic 7       8     9     [facility] EXTERNAL RADIATION TREATMT    2:15 PM   (30 min.)   [facility] RAD ONC MARIE   [facility] Oncology Clinic 10     [facility] DRAW    6:30 AM   (15 min.)   [facility] DRAW   [facility] Lab Draw     [facility] ONC INFUSION 360    7:00 AM   (360 min.)   [facility] ONCOLOGY INFUSION   [facility]     [facility] EXTERNAL RADIATION TREATMT    2:30 PM   (30 min.)   P RAD ONC MARIE   Radiation Oncology Clinic 11     [facility] EXTERNAL RADIATION  TREATMT    2:30 PM   (30 min.)   [facility] RAD ONC MARIE   [facility] Oncology Clinic 12     [facility] EXTERNAL RADIATION TREATMT    2:30 PM   (30 min.)   P RAD ONC MARIE   Radiation Oncology Clinic 13     [facility] EXTERNAL RADIATION TREATMT    2:30 PM   (30 min.)   P RAD ONC MARIE   Radiation Oncology Clinic 14       15     16     [facility] LAB DRAW    7:30 AM   (15 min.)    [facility] LAB DRAW   [facility] Lab Draw     P ONC INFUSION 360    8:00 AM   (360 min.)    ONCOLOGY INFUSION   [facility]     [facility] EXTERNAL RADIATION TREATMT    2:30 PM   (30 min.)   P RAD ONC MARIE   Radiation Oncology Clinic 17     [facility] EXTERNAL RADIATION TREATMT    2:30 PM   (30 min.)   P RAD ONC MARIE   Radiation Oncology Clinic 18     [facility] EXTERNAL RADIATION TREATMT   12:30 PM   (30 min.)   P RAD ONC MARIE   Radiation Oncology Clinic 19     [facility] EXTERNAL RADIATION TREATMT    2:30 PM   (30 min.)   P RAD ONC MARIE   Radiation Oncology Clinic 20     [facility] EXTERNAL RADIATION TREATMT    2:30 PM   (30 min.)   P RAD ONC MARIE   Radiation Oncology Clinic 21       22     23     [facility] EXTERNAL RADIATION TREATMT    2:15 PM   (30 min.)   P RAD ONC MARIE   Radiation Oncology Clinic 24     [facility] EXTERNAL RADIATION TREATMT    2:30 PM   (30 min.)   P RAD ONC MARIE   Radiation Oncology Clinic 25     [facility] EXTERNAL RADIATION TREATMT    2:30 PM   (30 min.)   P RAD ONC MARIE   Radiation Oncology Clinic 26     [facility] EXTERNAL RADIATION TREATMT    2:30 PM   (30 min.)   P RAD ONC MARIE   Radiation Oncology Clinic 27     [facility] EXTERNAL RADIATION TREATMT    2:30 PM   (30 min.)   P RAD ONC MARIE   Radiation Oncology Clinic 28       29     30     [facility] EXTERNAL RADIATION TREATMT    2:30 PM   (30 min.)   P RAD ONC MARIE   Radiation Oncology Clinic                                       Lab Results:  Recent Results (from the past 12 hour(s))   CBC with platelets differential    Collection Time: 03/19/18  8:15 AM   Result Value Ref Range    WBC 6.4 4.0 - 11.0  10e9/L    RBC Count 4.13 3.8 - 5.2 10e12/L    Hemoglobin 14.3 11.7 - 15.7 g/dL    Hematocrit 41.2 35.0 - 47.0 %     78 - 100 fl    MCH 34.6 (H) 26.5 - 33.0 pg    MCHC 34.7 31.5 - 36.5 g/dL    RDW 12.6 10.0 - 15.0 %    Platelet Count 258 150 - 450 10e9/L    Diff Method Automated Method     % Neutrophils 70.8 %    % Lymphocytes 12.9 %    % Monocytes 12.9 %    % Eosinophils 1.4 %    % Basophils 1.2 %    % Immature Granulocytes 0.8 %    Nucleated RBCs 0 0 /100    Absolute Neutrophil 4.6 1.6 - 8.3 10e9/L    Absolute Lymphocytes 0.8 0.8 - 5.3 10e9/L    Absolute Monocytes 0.8 0.0 - 1.3 10e9/L    Absolute Eosinophils 0.1 0.0 - 0.7 10e9/L    Absolute Basophils 0.1 0.0 - 0.2 10e9/L    Abs Immature Granulocytes 0.1 0 - 0.4 10e9/L    Absolute Nucleated RBC 0.0    Comprehensive metabolic panel    Collection Time: 03/19/18  8:15 AM   Result Value Ref Range    Sodium 133 133 - 144 mmol/L    Potassium 3.8 3.4 - 5.3 mmol/L    Chloride 96 94 - 109 mmol/L    Carbon Dioxide 28 20 - 32 mmol/L    Anion Gap 9 3 - 14 mmol/L    Glucose 102 (H) 70 - 99 mg/dL    Urea Nitrogen 11 7 - 30 mg/dL    Creatinine 0.70 0.52 - 1.04 mg/dL    GFR Estimate 85 >60 mL/min/1.7m2    GFR Estimate If Black >90 >60 mL/min/1.7m2    Calcium 9.0 8.5 - 10.1 mg/dL    Bilirubin Total 1.1 0.2 - 1.3 mg/dL    Albumin 3.6 3.4 - 5.0 g/dL    Protein Total 7.3 6.8 - 8.8 g/dL    Alkaline Phosphatase 111 40 - 150 U/L    ALT 31 0 - 50 U/L    AST 34 0 - 45 U/L   Magnesium    Collection Time: 03/19/18  8:15 AM   Result Value Ref Range    Magnesium 1.8 1.6 - 2.3 mg/dL               Follow-ups after your visit        Your next 10 appointments already scheduled     Mar 19, 2018  4:00 PM CDT   EXTERNAL RADIATION TREATMENT with [facility] RAD ONC MARIE   Radiation Oncology Clinic ([facility])    [facility]  1st Floor  [address] [address]   [phone]            Mar 20, 2018  2:30 PM CDT   EXTERNAL RADIATION TREATMENT with [facility]  RAD ONC MARIE   Radiation Oncology Clinic ([facility])    [facility]  1st Floor  [address]  [facility] [address]   [phone]            Mar 21, 2018  2:30 PM CDT   EXTERNAL RADIATION TREATMENT with [facility] RAD ONC MARIE   Radiation Oncology Clinic ([facility])    [facility]  1st Floor  [address] [address]   [phone]            Mar 22, 2018  2:30 PM CDT   EXTERNAL RADIATION TREATMENT with [facility] RAD ONC MARIE   Radiation Oncology Clinic ([facility])    [facility]  1st Floor  [address] [address]   [phone]            Mar 23, 2018  7:00 AM CDT   Lab with [facility] LAB   [facility] ([facility])    [address]  1st Floor  [facility] [address]   [phone]            Mar 23, 2018  8:00 AM CDT   (Arrive by 7:45 AM)   New General Liver with Malgorzata Madden MD   [facility] Hepatology ([facility])    [address]  [address]  [facility] [address]   [phone]            Mar 23, 2018  2:30 PM CDT   EXTERNAL RADIATION TREATMENT with [facility] RAD ONC MARIE   Radiation Oncology Clinic ([facility])    [facility]  1st Floor  [address] [address]   [phone]            Mar 23, 2018  2:45 PM CDT   ON TREATMENT VISIT with Yvon Leal MD   Radiation Oncology Clinic ([facility])    [facility]  1st Floor  [address] [address]   [phone]            Mar 26, 2018  8:30 AM CDT   [facility] Draw with  [facility] LAB DRAW   [facility] Draw ([facility])    [address]  [address]  [facility] [address] [phone]            Mar 26, 2018  9:00 AM CDT   Infusion 360 with UC ONCOLOGY INFUSION   [facility] (  [facility])    [address]  [address]  [place] [address] [phone]              Who to contact     If you have questions or need follow up information about today's clinic visit or your schedule please contact [facility] directly at [phone].  Normal or non-critical lab and imaging results will be communicated to you by MyChart, letter or phone within 4 business days after the clinic has received the results. If you do not hear from us within 7 days, please contact the clinic through Trans[employer]art or phone. If you have a critical or abnormal lab result, we will notify you by phone as soon as possible.  Submit refill requests through [employer] or call your pharmacy and they will forward the refill request to us. Please allow 3 business days for your refill to be completed.          Additional Information About Your Visit        [employer] Information     [employer] gives you secure access to your electronic health record. If you see a primary care provider, you can also send messages to your care team and make appointments. If you have questions, please call your primary care clinic.  If you do not have a primary care provider, please call [phone] and they will assist you.        Care EveryWhere ID     This is your Care EveryWhere ID. This could be used by other organizations to access your [facility] medical records  [id number]        Your Vitals Were     Pulse Temperature Respirations Pulse Oximetry BMI (Body Mass Index)       78 98  F (36.7  C) (Oral) 18 97% 22.59 kg/m2        Blood Pressure from Last 3 Encounters:   03/19/18 (!) 149/94   03/12/18 (!) 135/97   02/26/18 (!) 153/93    Weight from Last 3 Encounters:   03/19/18 64.5 kg (142 lb 1.6 oz)   03/15/18 66.5 kg (146 lb 11.2 oz)   03/12/18 64.5 kg (142 lb 1.6 oz)              We Performed the Following     CBC with platelets differential     Comprehensive metabolic panel     Magnesium          Today's  Medication Changes          These changes are accurate as of 3/19/18  8:51 AM.  If you have any questions, ask your nurse or doctor.               These medicines have changed or have updated prescriptions.        Dose/Directions    acetaminophen 325 MG tablet   Commonly known as:  TYLENOL   This may have changed:    - when to take this  - reasons to take this   Used for:  H/O lymph node excision        Dose:  650 mg   Take 2 tablets (650 mg) by mouth every 6 hours   Quantity:  30 tablet   Refills:  0                Primary Care Provider Office Phone # Fax #    Tova Montesinos, [phone]       [facility] [address] [facility] [address]        Equal Access to Services     CAREY COSME : Baylee Miguel, flower soliman, abdelrahman eddy, talha jones. So [facility] [phone].    ATENCIÓN: Si habla español, tiene a elizabeth disposición servicios gratuitos de asistencia lingüística. [facility] [phone].    We comply with applicable federal civil rights laws and [place] laws. We do not discriminate on the basis of race, color, national origin, age, disability, sex, sexual orientation, or gender identity.            Thank you!     Thank you for choosing [facility]  for your care. Our goal is always to provide you with excellent care. Hearing back from our patients is one way we can continue to improve our services. Please take a few minutes to complete the written survey that you may receive in the mail after your visit with us. Thank you!             Your Updated Medication List - Protect others around you: Learn how to safely use, store and throw away your medicines at www.disposemymeds.org.          This list is accurate as of 3/19/18  8:51 AM.  Always use your most recent med list.                   Brand Name Dispense Instructions for use Diagnosis    acetaminophen 325 MG tablet    TYLENOL    30 tablet    Take 2  tablets (650 mg) by mouth every 6 hours    H/O lymph node excision       LORazepam 1 MG tablet    ATIVAN    30 tablet    Take 1 tablet (1 mg) by mouth every 6 hours as needed (nausea/vomiting, anxiety or sleep )    Encounter for long-term (current) use of medications, Malignant neoplasm of vulva (H)       Multi-vitamin Tabs tablet      Take 1 tablet by mouth daily        oxyCODONE IR 5 MG tablet    ROXICODONE    20 tablet    Take 1 tablet (5 mg) by mouth every 4 hours as needed for pain    H/O lymph node excision       prochlorperazine 10 MG tablet    COMPAZINE    30 tablet    Take 1 tablet (10 mg) by mouth every 6 hours as needed (nausea/vomiting)    Encounter for long-term (current) use of medications, Malignant neoplasm of vulva (H)       sennosides 8.6 MG tablet    SENOKOT    60 tablet    Take 1 tablet by mouth 2 times daily as needed for constipation    H/O lymph node excision

## 2018-03-20 ENCOUNTER — CARE COORDINATION (OUTPATIENT)
Dept: ONCOLOGY | Facility: CLINIC | Age: 63
End: 2018-03-20

## 2018-03-20 ENCOUNTER — APPOINTMENT (OUTPATIENT)
Dept: RADIATION ONCOLOGY | Facility: CLINIC | Age: 63
End: 2018-03-20
Attending: RADIOLOGY
Payer: COMMERCIAL

## 2018-03-20 PROCEDURE — 77386 ZZH IMRT TREATMENT DELIVERY, COMPLEX: CPT | Performed by: RADIOLOGY

## 2018-03-20 NOTE — PROGRESS NOTES
Care Coordinator Note  Call from patient requesting that  call her regarding [place].  Message sent to .    Patient verbalized back understanding of the above information discussed.     Heide Perez MSN, RN  Care Coordinator  Gynecologic Cancer   Office:  [phone]  Pager: [phone] #[phone]

## 2018-03-21 ENCOUNTER — APPOINTMENT (OUTPATIENT)
Dept: RADIATION ONCOLOGY | Facility: CLINIC | Age: 63
End: 2018-03-21
Attending: RADIOLOGY
Payer: COMMERCIAL

## 2018-03-21 DIAGNOSIS — Z76.89 ENCOUNTER TO ESTABLISH CARE: Primary | ICD-10-CM

## 2018-03-21 DIAGNOSIS — B19.20 HEPATITIS C VIRUS INFECTION, UNSPECIFIED CHRONICITY: Primary | ICD-10-CM

## 2018-03-21 PROCEDURE — 77386 ZZH IMRT TREATMENT DELIVERY, COMPLEX: CPT | Performed by: RADIOLOGY

## 2018-03-22 ENCOUNTER — APPOINTMENT (OUTPATIENT)
Dept: RADIATION ONCOLOGY | Facility: CLINIC | Age: 63
End: 2018-03-22
Attending: RADIOLOGY
Payer: COMMERCIAL

## 2018-03-22 ENCOUNTER — TELEPHONE (OUTPATIENT)
Dept: ONCOLOGY | Facility: CLINIC | Age: 63
End: 2018-03-22

## 2018-03-22 PROCEDURE — 77386 ZZH IMRT TREATMENT DELIVERY, COMPLEX: CPT | Performed by: RADIOLOGY

## 2018-03-22 NOTE — TELEPHONE ENCOUNTER
"Social Work Note: Telephone Call  Oncology Clinic    Data/Intervention:  Patient Name:  Julius Gilliam  /Age:  1955 (63 year old)    Call From:  Social work contacted patient over the phone.  Reason for Call:  [place] needs    Assessment:  Social work spoke with patient over the phone regarding referral about [place] needs.  Patient stated she was \"able to work it out.\" No other needs reported.    Plan:  SW is available to assist with any other identified needs.     Soo Yeon Han, MSW, LICSW  Pager: [phone]  Phone: [phone]            "

## 2018-03-23 ENCOUNTER — APPOINTMENT (OUTPATIENT)
Dept: RADIATION ONCOLOGY | Facility: CLINIC | Age: 63
End: 2018-03-23
Attending: RADIOLOGY
Payer: COMMERCIAL

## 2018-03-23 ENCOUNTER — OFFICE VISIT (OUTPATIENT)
Dept: GASTROENTEROLOGY | Facility: CLINIC | Age: 63
End: 2018-03-23
Attending: INTERNAL MEDICINE
Payer: COMMERCIAL

## 2018-03-23 VITALS
HEART RATE: 80 BPM | DIASTOLIC BLOOD PRESSURE: 77 MMHG | SYSTOLIC BLOOD PRESSURE: 127 MMHG | HEIGHT: 67 IN | WEIGHT: 139.4 LBS | BODY MASS INDEX: 21.88 KG/M2 | TEMPERATURE: 98.5 F

## 2018-03-23 VITALS — WEIGHT: 142.1 LBS | BODY MASS INDEX: 22.59 KG/M2

## 2018-03-23 DIAGNOSIS — C51.9 MALIGNANT NEOPLASM OF VULVA (H): Primary | ICD-10-CM

## 2018-03-23 DIAGNOSIS — B19.20 HEPATITIS C VIRUS INFECTION, UNSPECIFIED CHRONICITY: ICD-10-CM

## 2018-03-23 LAB
ALBUMIN SERPL-MCNC: 3.4 G/DL (ref 3.4–5)
ALP SERPL-CCNC: 95 U/L (ref 40–150)
ALT SERPL W P-5'-P-CCNC: 32 U/L (ref 0–50)
ANION GAP SERPL CALCULATED.3IONS-SCNC: 7 MMOL/L (ref 3–14)
AST SERPL W P-5'-P-CCNC: 28 U/L (ref 0–45)
BASOPHILS # BLD AUTO: 0 10E9/L (ref 0–0.2)
BASOPHILS NFR BLD AUTO: 0.7 %
BILIRUB DIRECT SERPL-MCNC: 0.2 MG/DL (ref 0–0.2)
BILIRUB SERPL-MCNC: 0.8 MG/DL (ref 0.2–1.3)
BUN SERPL-MCNC: 10 MG/DL (ref 7–30)
CALCIUM SERPL-MCNC: 8.4 MG/DL (ref 8.5–10.1)
CHLORIDE SERPL-SCNC: 96 MMOL/L (ref 94–109)
CO2 SERPL-SCNC: 30 MMOL/L (ref 20–32)
CREAT SERPL-MCNC: 0.81 MG/DL (ref 0.52–1.04)
DIFFERENTIAL METHOD BLD: ABNORMAL
EOSINOPHIL # BLD AUTO: 0.1 10E9/L (ref 0–0.7)
EOSINOPHIL NFR BLD AUTO: 1.6 %
ERYTHROCYTE [DISTWIDTH] IN BLOOD BY AUTOMATED COUNT: 12.5 % (ref 10–15)
GFR SERPL CREATININE-BSD FRML MDRD: 72 ML/MIN/1.7M2
GLUCOSE SERPL-MCNC: 111 MG/DL (ref 70–99)
HBV CORE AB SERPL QL IA: NONREACTIVE
HBV SURFACE AB SERPL IA-ACNC: 0.41 M[IU]/ML
HBV SURFACE AG SERPL QL IA: NONREACTIVE
HCT VFR BLD AUTO: 39.9 % (ref 35–47)
HGB BLD-MCNC: 13.9 G/DL (ref 11.7–15.7)
IMM GRANULOCYTES # BLD: 0 10E9/L (ref 0–0.4)
IMM GRANULOCYTES NFR BLD: 0.5 %
INR PPP: 0.91 (ref 0.86–1.14)
LYMPHOCYTES # BLD AUTO: 0.8 10E9/L (ref 0.8–5.3)
LYMPHOCYTES NFR BLD AUTO: 18 %
MCH RBC QN AUTO: 34.8 PG (ref 26.5–33)
MCHC RBC AUTO-ENTMCNC: 34.8 G/DL (ref 31.5–36.5)
MCV RBC AUTO: 100 FL (ref 78–100)
MONOCYTES # BLD AUTO: 0.5 10E9/L (ref 0–1.3)
MONOCYTES NFR BLD AUTO: 11 %
NEUTROPHILS # BLD AUTO: 3 10E9/L (ref 1.6–8.3)
NEUTROPHILS NFR BLD AUTO: 68.2 %
NRBC # BLD AUTO: 0 10*3/UL
NRBC BLD AUTO-RTO: 0 /100
PLATELET # BLD AUTO: 219 10E9/L (ref 150–450)
POTASSIUM SERPL-SCNC: 3.4 MMOL/L (ref 3.4–5.3)
PROT SERPL-MCNC: 6.9 G/DL (ref 6.8–8.8)
RBC # BLD AUTO: 3.99 10E12/L (ref 3.8–5.2)
SODIUM SERPL-SCNC: 132 MMOL/L (ref 133–144)
WBC # BLD AUTO: 4.4 10E9/L (ref 4–11)

## 2018-03-23 PROCEDURE — 91200 LIVER ELASTOGRAPHY: CPT | Mod: ZF

## 2018-03-23 PROCEDURE — 87902 NFCT AGT GNTYP ALYS HEP C: CPT | Performed by: INTERNAL MEDICINE

## 2018-03-23 PROCEDURE — 85025 COMPLETE CBC W/AUTO DIFF WBC: CPT | Performed by: INTERNAL MEDICINE

## 2018-03-23 PROCEDURE — 85610 PROTHROMBIN TIME: CPT | Performed by: INTERNAL MEDICINE

## 2018-03-23 PROCEDURE — 77386 ZZH IMRT TREATMENT DELIVERY, COMPLEX: CPT | Performed by: RADIOLOGY

## 2018-03-23 PROCEDURE — G0463 HOSPITAL OUTPT CLINIC VISIT: HCPCS | Mod: 25

## 2018-03-23 PROCEDURE — G0499 HEPB SCREEN HIGH RISK INDIV: HCPCS | Performed by: INTERNAL MEDICINE

## 2018-03-23 PROCEDURE — 86704 HEP B CORE ANTIBODY TOTAL: CPT | Performed by: INTERNAL MEDICINE

## 2018-03-23 PROCEDURE — 77336 RADIATION PHYSICS CONSULT: CPT | Performed by: RADIOLOGY

## 2018-03-23 PROCEDURE — 36415 COLL VENOUS BLD VENIPUNCTURE: CPT | Performed by: INTERNAL MEDICINE

## 2018-03-23 PROCEDURE — 86706 HEP B SURFACE ANTIBODY: CPT | Performed by: INTERNAL MEDICINE

## 2018-03-23 PROCEDURE — 80076 HEPATIC FUNCTION PANEL: CPT | Performed by: INTERNAL MEDICINE

## 2018-03-23 PROCEDURE — 87522 HEPATITIS C REVRS TRNSCRPJ: CPT | Performed by: INTERNAL MEDICINE

## 2018-03-23 PROCEDURE — 80048 BASIC METABOLIC PNL TOTAL CA: CPT | Performed by: INTERNAL MEDICINE

## 2018-03-23 ASSESSMENT — PAIN SCALES - GENERAL: PAINLEVEL: NO PAIN (0)

## 2018-03-23 NOTE — NURSING NOTE
"Chief Complaint   Patient presents with     Consult     establish care with Tino, justin cma       Initial /77  Pulse 80  Temp 98.5  F (36.9  C) (Oral)  Ht 1.689 m (5' 6.5\")  Wt 63.2 kg (139 lb 6.4 oz)  BMI 22.16 kg/m2 Estimated body mass index is 22.16 kg/(m^2) as calculated from the following:    Height as of this encounter: 1.689 m (5' 6.5\").    Weight as of this encounter: 63.2 kg (139 lb 6.4 oz).  Medication Reconciliation: complete    "

## 2018-03-23 NOTE — LETTER
3/23/2018       RE: Julius Gilliam  PO   [facility] [address]     Dear Colleague,    Thank you for referring your patient, Julius Gilliam, to the [facility] HEPATOLOGY at [facility]. Please see a copy of my visit note below.    [facility]    Hepatology New Patient Visit    Referring provider:  Eleonora Nguyen    CHIEF COMPLAINT:  Reason for the visit is hepatitis C virus infection.      HISTORY OF PRESENT ILLNESS:  Ms. 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  According to Ms. Gilliam, she never had jaundice nor did she have any signs of chronic liver disease.  She did not have any fluid retention, like edema of the lower extremities or abdominal distention.  She denied any hepatic encephalopathy, lethargy or confusion.  She never had also any GI bleed including melena, hematemesis or hematochezia.  She never had blood transfusions. She had tattoos in 2004.  She never had IV drug use and was not quite sure how she acquired the hepatitis C.      Regarding other GI symptoms, she tells me that because of the chemotherapy she was recently using she had some nausea, but no abdominal pain or vomiting.  She otherwise has also 1 bowel movement with no blood.  She never had a colonoscopy in her life, and her weight has remained stable.  Denied also any fevers, sweats or chills.     Medical hx Surgical hx   Past Medical History:   Diagnosis Date     Deafness in right ear      Hypertension      Multiple facial bone fractures (H)      Vertigo      Vulvar cancer (H) 12/2017      Past Surgical History:   Procedure Laterality Date     DISSECT LYMPH NODE INGUINAL N/A 2/9/2018    Procedure: DISSECT LYMPH NODE INGUINAL;  Bilateral Inguinal Lymph Node Dissection;  Surgeon: Santosh Vera  MD Chris;  Location: [facility] OR     ENDOSCOPIC STRIPPING VEIN(S)       FACIAL RECONSTRUCTION SURGERY      post horse trauma     repair of left arm fracture            Medications  Prior to Admission medications    Medication Sig Start Date End Date Taking? Authorizing Provider   LORazepam (ATIVAN) 1 MG tablet Take 1 tablet (1 mg) by mouth every 6 hours as needed (nausea/vomiting, anxiety or sleep )  Patient not taking: Reported on 3/23/2018 3/9/18  Yes Santosh Vera MD   prochlorperazine (COMPAZINE) 10 MG tablet Take 1 tablet (10 mg) by mouth every 6 hours as needed (nausea/vomiting) 3/9/18  Yes Santohs Vera MD   sennosides (SENOKOT) 8.6 MG tablet Take 1 tablet by mouth 2 times daily as needed for constipation  Patient not taking: Reported on 3/23/2018 2/10/18  Yes Eleonora Nguyen MD   multivitamin, therapeutic with minerals (MULTI-VITAMIN) TABS tablet Take 1 tablet by mouth daily   Yes Reported, Patient   acetaminophen (TYLENOL) 325 MG tablet Take 2 tablets (650 mg) by mouth every 6 hours  Patient not taking: Reported on 3/23/2018 2/10/18   Eleonora Nguyen MD   oxyCODONE IR (ROXICODONE) 5 MG tablet Take 1 tablet (5 mg) by mouth every 4 hours as needed for pain  Patient not taking: Reported on 3/23/2018 2/9/18   Eleonora Nguyen MD       Allergies  No Known Allergies    Family hx Social hx   Family History   Problem Relation Age of Onset     CANCER Mother 81     Breast ca      Social History   Substance Use Topics     Smoking status: Current Every Day Smoker     Types: Cigarettes     Smokeless tobacco: Never Used      Comment: <1 pack/ day.  30 pack/yers     Alcohol use Yes      Comment: 0-3 drinks per day          Review of systems  Ms. Gilliam tells me that did not have any infections recently.  She has fatigue.  No headaches or seizures.  Denies any persistent cough, although she has had a cough at times.  No shortness of breath or chest pain.  Denies any anemia or  "easy bruising.  She was never diagnosed with diabetes or thyroid issues.  She denies also any joint problems.  She denies any psychiatric problems also.  She has right ear decreased hearing.  She otherwise has no eye or skin issues.  Otherwise, a comprehensive review of systems was noncontributory.     Examination  /77  Pulse 80  Temp 98.5  F (36.9  C) (Oral)  Ht 1.689 m (5' 6.5\")  Wt 63.2 kg (139 lb 6.4 oz)  BMI 22.16 kg/m2  Body mass index is 22.16 kg/(m^2).    Gen- well, NAD, A+Ox3, normal color  Eye- EOMI  ENT- MMM, normal oropharynx  Lym- no palpable lymphadenopathy  CVS- S1, S2 normal, no added sounds, RRR  RS- CTA  Abd- Not distended. No hepatosplenomegaly.  Extr- pulses good, no MATTHEW  MS- hands normal- no clubbing  Neuro- A+Ox3, no asterixis  Skin- no rash or jaundice  Psych- normal mood    Laboratory  Lab Results   Component Value Date     03/23/2018    POTASSIUM 3.4 03/23/2018    CHLORIDE 96 03/23/2018    CO2 30 03/23/2018    BUN 10 03/23/2018    CR 0.81 03/23/2018       Lab Results   Component Value Date    BILITOTAL 0.8 03/23/2018    ALT 32 03/23/2018    AST 28 03/23/2018    ALKPHOS 95 03/23/2018       Lab Results   Component Value Date    ALBUMIN 3.4 03/23/2018    PROTTOTAL 6.9 03/23/2018        Lab Results   Component Value Date    WBC 4.4 03/23/2018    HGB 13.9 03/23/2018     03/23/2018     03/23/2018       Lab Results   Component Value Date    INR 0.91 03/23/2018         Radiology    Assessment and plan:  Ms. Gilliam is a 63-year-old  female who has a new diagnosis of hepatitis C.  Her mode of acquisition is not very clear, as patient denies any IV drug use in the past; but regardless of how she acquired it, we will need to know the genotype of the hepatitis C.  We will repeat also her hepatitis C RNA.  We will get hepatitis B viral markers, and abdominal ultrasound.  She will need to get a fibrosis scan today, and once we have the whole picture, then we will " decide which direct antiviral therapy we are going to use.  This is going to depend mostly on the genotype and viral load of the virus.  It appears that she is doing quite well, does not have any signs of chronic liver disease as of now.  For her other medical issues, she will follow with her oncologist and her primary care physician.  She will be seen here once the above evaluation is complete.  She will see our physician assistant at that time, and we will prescribe her our medications.      This was a 45 minute visit of which more than 50% was spent in explaining to the patient what our plan of care was.  We answered all of her questions.

## 2018-03-23 NOTE — MR AVS SNAPSHOT
After Visit Summary   3/23/2018    Julius Gilliam    MRN: [id number]           Patient Information     Date Of Birth          1955        Visit Information        Provider Department      3/23/2018 2:45 PM Yvon Leal MD Radiation Oncology Clinic        Today's Diagnoses     Malignant neoplasm of vulva (H)    -  1       Follow-ups after your visit        Your next 10 appointments already scheduled     Mar 26, 2018  8:30 AM CDT   [facility] Lab Draw with [facility]IC LAB DRAW   [facility] Lab Draw ([facility])    [address]  [address]  [place] [address]   [phone]            Mar 26, 2018  9:00 AM CDT   Infusion 360 with  ONCOLOGY INFUSION, UC 17 ATC   [facility] Clinic ([facility])    [address]  [address]  [place] [address] [phone]            Mar 26, 2018  2:45 PM CDT   EXTERNAL RADIATION TREATMENT with [facility] RAD ONC MARIE   Radiation Oncology Clinic ([facility])    [facility]  1st Floor  [address] [address] [phone]            Mar 27, 2018  2:30 PM CDT   (Arrive by 2:15 PM)   Return Visit with CAROLE Perez CNP   [facility] ([facility])    [address]  [address]  [place] [address] [phone]            Mar 27, 2018  2:30 PM CDT   EXTERNAL RADIATION TREATMENT with [facility] RAD ONC MARIE   Radiation Oncology Clinic ([facility])    [facility]  1st Floor  [address] [facility] [address] [phone]            Mar 28, 2018 11:15 AM CDT   US ABDOMEN COMPLETE with UCUS2   [facility] US ([facility])    [address]  1st Floor  [place] [address] [phone]           Please bring a list of your medicines (including vitamins, minerals and  over-the-counter drugs). Also, tell your doctor about any allergies you may have. Wear comfortable clothes and leave your valuables at home.  Adults: No eating or drinking for 8 hours before the exam. You may take medicine with a small sip of water.  Children: - Children 6+ years: No food or drink for 6 hours before exam. - Children 1-5 years: No food or drink for 4 hours before exam. - Infants, breast-fed: may have breast milk up to 2 hours before exam. - Infants, formula: may have bottle until 4 hours before exam.  Please call the Imaging Department at your exam site with any questions.            Mar 28, 2018  2:30 PM CDT   EXTERNAL RADIATION TREATMENT with [facility] RAD ONC MARIE   Radiation Oncology Clinic ([facility])    [facility]  1st Floor  [address] [address] [phone]            Mar 29, 2018  2:30 PM CDT   EXTERNAL RADIATION TREATMENT with [facility] RAD ONC MARIE   Radiation Oncology Clinic ([facility])    [facility]  1st Floor  [address] [address] [phone]            Mar 29, 2018  2:45 PM CDT   ON TREATMENT VISIT with Yvon Leal MD   Radiation Oncology Clinic ([facility])    [facility]  1st Floor  [address] [address] [phone]              Future tests that were ordered for you today     Open Future Orders        Priority Expected Expires Ordered    US abdomen complete Routine  3/23/2019 3/23/2018            Who to contact     Please call your clinic at [phone] to:    Ask questions about your health    Make or cancel appointments    Discuss your medicines    Learn about your test results    Speak to your doctor            Additional Information About Your Visit        [employer] Information     [employer] gives you secure access to your electronic health record. If you see a primary care provider, you can also send messages  to your care team and make appointments. If you have questions, please call your primary care clinic.  If you do not have a primary care provider, please call [phone] and they will assist you.      [employer] is an electronic gateway that provides easy, online access to your medical records. With [employer], you can request a clinic appointment, read your test results, renew a prescription or communicate with your care team.     To access your existing account, please contact your [facility] or call [phone] for assistance.        Care EveryWhere ID     This is your Care EveryWhere ID. This could be used by other organizations to access your [place] medical records  [id number]        Your Vitals Were     BMI (Body Mass Index)                   22.59 kg/m2            Blood Pressure from Last 3 Encounters:   03/23/18 127/77   03/19/18 (!) 149/94   03/12/18 (!) 135/97    Weight from Last 3 Encounters:   03/23/18 64.5 kg (142 lb 1.6 oz)   03/23/18 63.2 kg (139 lb 6.4 oz)   03/19/18 64.5 kg (142 lb 1.6 oz)              Today, you had the following     No orders found for display       Primary Care Provider Office Phone # Fax #    Tova Montesinos, [phone]       [facility] [address] [facility] [address]        Equal Access to Services     CAREY COSME : Hadii melania arangoo Sosandro, waaxda luqadaha, qaybta kaalmada surjit, talha cárdenas . So [facility] [phone].    ATENCIÓN: Si habla español, tiene a elizabeth disposición servicios gratuitos de asistencia lingüística. Llame al [phone].    We comply with applicable federal civil rights laws and [place] laws. We do not discriminate on the basis of race, color, national origin, age, disability, sex, sexual orientation, or gender identity.            Thank you!     Thank you for choosing RADIATION ONCOLOGY CLINIC  for your care. Our goal is always to provide you with  excellent care. Hearing back from our patients is one way we can continue to improve our services. Please take a few minutes to complete the written survey that you may receive in the mail after your visit with us. Thank you!             Your Updated Medication List - Protect others around you: Learn how to safely use, store and throw away your medicines at www.disposemymeds.org.          This list is accurate as of 3/23/18 11:59 PM.  Always use your most recent med list.                   Brand Name Dispense Instructions for use Diagnosis    acetaminophen 325 MG tablet    TYLENOL    30 tablet    Take 2 tablets (650 mg) by mouth every 6 hours    H/O lymph node excision       LORazepam 1 MG tablet    ATIVAN    30 tablet    Take 1 tablet (1 mg) by mouth every 6 hours as needed (nausea/vomiting, anxiety or sleep )    Encounter for long-term (current) use of medications, Malignant neoplasm of vulva (H)       Multi-vitamin Tabs tablet      Take 1 tablet by mouth daily        oxyCODONE IR 5 MG tablet    ROXICODONE    20 tablet    Take 1 tablet (5 mg) by mouth every 4 hours as needed for pain    H/O lymph node excision       prochlorperazine 10 MG tablet    COMPAZINE    30 tablet    Take 1 tablet (10 mg) by mouth every 6 hours as needed (nausea/vomiting)    Encounter for long-term (current) use of medications, Malignant neoplasm of vulva (H)       sennosides 8.6 MG tablet    SENOKOT    60 tablet    Take 1 tablet by mouth 2 times daily as needed for constipation    H/O lymph node excision

## 2018-03-23 NOTE — MR AVS SNAPSHOT
After Visit Summary   3/23/2018    Julius Gilliam    MRN: [id number]           Patient Information     Date Of Birth          1955        Visit Information        Provider Department      3/23/2018 8:00 AM Malgorzata Madden MD [facility] Hepatology        Today's Diagnoses     Hepatitis C virus infection, unspecified chronicity           Follow-ups after your visit        Follow-up notes from your care team     Return in about 6 weeks (around 5/4/2018).      Your next 10 appointments already scheduled     Mar 23, 2018  9:30 AM CDT   Lab with  LAB   [facility] Lab ([facility])    [address]  1st Floor  [place] [address]   [phone]            Mar 23, 2018  2:30 PM CDT   EXTERNAL RADIATION TREATMENT with [facility] RAD ONC MARIE   Radiation Oncology Clinic ([facility])    [facility]  1st Floor  [address] [address]   [phone]            Mar 23, 2018  2:45 PM CDT   ON TREATMENT VISIT with Yvon Leal MD   Radiation Oncology Clinic ([facility])    [facility]  1st Floor  [address] [address]   [phone]            Mar 26, 2018  8:30 AM CDT   [facility] Lab Draw with  [facility] LAB DRAW   [facility] Lab Draw ([facility])    [address]  [address]  [place] [address]   [phone]            Mar 26, 2018  9:00 AM CDT   Infusion 360 with  ONCOLOGY INFUSION, UC 17 ATC   [facility] ([facility])    [address]  [address]  [place] [address]   [phone]            Mar 26, 2018  2:45 PM CDT   EXTERNAL RADIATION TREATMENT with [facility] RAD ONC MARIE   Radiation Oncology Clinic ([facility])    [facility]  1st Floor  [address] [address]   [phone]            Mar 27, 2018   2:30 PM CDT   (Arrive by 2:15 PM)   Return Visit with CAROLE Perez CNP   [facility] Cancer Clinic ([facility])    [address]  [address]  [place] [address] [phone]            Mar 27, 2018  2:30 PM CDT   EXTERNAL RADIATION TREATMENT with [facility] RAD ONC MARIE   Radiation Oncology Clinic ([facility])    [facility]  1st Floor  [address]  [place] [id number] [phone]            Mar 28, 2018 11:15 AM CDT   US ABDOMEN COMPLETE with UCUS2   [facility] US ([facility])    [address]  1st Floor  [place] [address] [phone]           Please bring a list of your medicines (including vitamins, minerals and over-the-counter drugs). Also, tell your doctor about any allergies you may have. Wear comfortable clothes and leave your valuables at home.  Adults: No eating or drinking for 8 hours before the exam. You may take medicine with a small sip of water.  Children: - Children 6+ years: No food or drink for 6 hours before exam. - Children 1-5 years: No food or drink for 4 hours before exam. - Infants, breast-fed: may have breast milk up to 2 hours before exam. - Infants, formula: may have bottle until 4 hours before exam.  Please call the Imaging Department at your exam site with any questions.              Future tests that were ordered for you today     Open Future Orders        Priority Expected Expires Ordered    Fibrosis Scan (In-Clinic) Routine 3/23/2018 3/23/2019 3/23/2018    US abdomen complete Routine  3/23/2019 3/23/2018    Hepatitis B core antibody Routine  4/22/2018 3/23/2018    Hepatitis B Surface Antibody Routine  4/22/2018 3/23/2018            Who to contact     If you have questions or need follow up information about today's clinic visit or your schedule please contact [facility] HEPATOLOGY directly at [phone].  Normal or non-critical  "lab and imaging results will be communicated to you by [employer], letter or phone within 4 business days after the clinic has received the results. If you do not hear from us within 7 days, please contact the clinic through [employer] or phone. If you have a critical or abnormal lab result, we will notify you by phone as soon as possible.  Submit refill requests through [employer] or call your pharmacy and they will forward the refill request to us. Please allow 3 business days for your refill to be completed.          Additional Information About Your Visit        [employer] Information     [employer] gives you secure access to your electronic health record. If you see a primary care provider, you can also send messages to your care team and make appointments. If you have questions, please call your primary care clinic.  If you do not have a primary care provider, please call [phone] and they will assist you.        Care EveryWhere ID     This is your Care EveryWhere ID. This could be used by other organizations to access your [place] medical records  [id number]        Your Vitals Were     Pulse Temperature Height BMI (Body Mass Index)          80 98.5  F (36.9  C) (Oral) 1.689 m (5' 6.5\") 22.16 kg/m2         Blood Pressure from Last 3 Encounters:   03/23/18 127/77   03/19/18 (!) 149/94   03/12/18 (!) 135/97    Weight from Last 3 Encounters:   03/23/18 63.2 kg (139 lb 6.4 oz)   03/19/18 64.5 kg (142 lb 1.6 oz)   03/15/18 66.5 kg (146 lb 11.2 oz)              We Performed the Following     HBsAg ([facility])          Today's Medication Changes          These changes are accurate as of 3/23/18  9:05 AM.  If you have any questions, ask your nurse or doctor.               These medicines have changed or have updated prescriptions.        Dose/Directions    acetaminophen 325 MG tablet   Commonly known as:  TYLENOL   This may have changed:    - when to take this  - reasons to take this   Used for:  H/O lymph node excision        " Dose:  650 mg   Take 2 tablets (650 mg) by mouth every 6 hours   Quantity:  30 tablet   Refills:  0                Primary Care Provider Office Phone # Fax #    Tova Montesinos [phone]       [facility] [address] [facility] [address]        Equal Access to Services     CAREY COSME : Hadii aad ku hadasho Soomaali, waaxda luqadaha, qaybta kaalmada adeegyada, talha skinnerin haybahmann adeaguila korinarosalino cárdenas . So [facility] [phone].    ATENCIÓN: Si habla español, tiene a elizabeth disposición servicios gratuitos de asistencia lingüística. Teodora al [phone].    We comply with applicable federal civil rights laws and [place] laws. We do not discriminate on the basis of race, color, national origin, age, disability, sex, sexual orientation, or gender identity.            Thank you!     Thank you for choosing [facility] HEPATOLOGY  for your care. Our goal is always to provide you with excellent care. Hearing back from our patients is one way we can continue to improve our services. Please take a few minutes to complete the written survey that you may receive in the mail after your visit with us. Thank you!             Your Updated Medication List - Protect others around you: Learn how to safely use, store and throw away your medicines at www.disposemymeds.org.          This list is accurate as of 3/23/18  9:05 AM.  Always use your most recent med list.                   Brand Name Dispense Instructions for use Diagnosis    acetaminophen 325 MG tablet    TYLENOL    30 tablet    Take 2 tablets (650 mg) by mouth every 6 hours    H/O lymph node excision       LORazepam 1 MG tablet    ATIVAN    30 tablet    Take 1 tablet (1 mg) by mouth every 6 hours as needed (nausea/vomiting, anxiety or sleep )    Encounter for long-term (current) use of medications, Malignant neoplasm of vulva (H)       Multi-vitamin Tabs tablet      Take 1 tablet by mouth daily        oxyCODONE IR 5 MG tablet    ROXICODONE    20  tablet    Take 1 tablet (5 mg) by mouth every 4 hours as needed for pain    H/O lymph node excision       prochlorperazine 10 MG tablet    COMPAZINE    30 tablet    Take 1 tablet (10 mg) by mouth every 6 hours as needed (nausea/vomiting)    Encounter for long-term (current) use of medications, Malignant neoplasm of vulva (H)       sennosides 8.6 MG tablet    SENOKOT    60 tablet    Take 1 tablet by mouth 2 times daily as needed for constipation    H/O lymph node excision

## 2018-03-23 NOTE — PROGRESS NOTES
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    Plan for definitive concurrent chemoRT with boost to vulva and suspicious inguinal nodes    Treatment Summary to Date   Site Treated: Pelvis, Vulva,      inguinal Nodes   Current Dose: 1750cGy/4550cGy         Boost: 0/1800cGy   Fractions: 10/26        0/10     Subjective: Today the patient states that she continues to do well. She denies bladder dysfunction, but states that she has had a few episodes of intermittent diarrhea over the last several days. She otherwise has no complaints or concerns. The patient's previously scheduled IR-guided LN biopsy has been delayed, and is now planned for 3/30/2018.    Objective:   Gen: A/Ox3. NAD  Skin: Visual inspection of the vulva reveals brisk erythema but little other change in the patient's near circumferential vulvovaginal lesion.  No desquamation.     Toxicities:   Grade 1 diarhhea  Skin: grade 2 dermatitis.      Labs: CBC, CMP and Mg from 3/19/2018 are largely WNLs.    Assessment:    Tolerating radiation therapy well.  All questions and concerns addressed.    Plan:   1. Continue current therapy.    2. Routine vulvar hygeine and douching  3. OTC imodium PRN to address intermittent diarrhea  4. We will contact IR services to assess whether the patient can undergo lymph node biopsy in an expedited manner.    Carlos A Cortez MD-PhD PGY-2  Radiation Oncology Resident, [facility]    I saw and examined the patient with the resident.  I have reviewed and agree with the resident's note and plan of care.      Yvon Leal MD

## 2018-03-23 NOTE — LETTER
3/23/2018       RE: Julius Gilliam  [address]   [facility] [id number]     Dear Colleague,    Thank you for referring your patient, Julius Gilliam, to the RADIATION ONCOLOGY CLINIC. Please see a copy of my visit note below.    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    Plan for definitive concurrent chemoRT with boost to vulva and suspicious inguinal nodes    Treatment Summary to Date   Site Treated: Pelvis, Vulva,      inguinal Nodes   Current Dose: 1750cGy/4550cGy         Boost: 0/1800cGy   Fractions: 10/26        0/10     Subjective: Today the patient states that she continues to do well. She denies bladder dysfunction, but states that she has had a few episodes of intermittent diarrhea over the last several days. She otherwise has no complaints or concerns. The patient's previously scheduled IR-guided LN biopsy has been delayed, and is now planned for 3/30/2018.    Objective:   Gen: A/Ox3. NAD  Skin: Visual inspection of the vulva reveals brisk erythema but little other change in the patient's near circumferential vulvovaginal lesion.  No desquamation.     Toxicities:   Grade 1 diarhhea  Skin: grade 2 dermatitis.      Labs: CBC, CMP and Mg from 3/19/2018 are largely WNLs.    Assessment:    Tolerating radiation therapy well.  All questions and concerns addressed.    Plan:   1. Continue current therapy.    2. Routine vulvar hygeine and douching  3. OTC imodium PRN to address intermittent diarrhea  4. We will contact IR services to assess whether the patient can undergo lymph node biopsy in an expedited manner.    Carlos A Cortez MD-PhD PGY-2  Radiation Oncology Resident, [facility]    I saw and examined the patient with  the resident.  I have reviewed and agree with the resident's note and plan of care.      Yvon Leal MD    Again, thank you for allowing me to participate in the care of your patient.      Sincerely,    Yvon Leal MD

## 2018-03-23 NOTE — PROGRESS NOTES
[facility]    Hepatology New Patient Visit    Referring provider:  Eleonora Nguyen    CHIEF COMPLAINT:  Reason for the visit is hepatitis C virus infection.      HISTORY OF PRESENT ILLNESS:  Ms. 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  According to Ms. Gilliam, she never had jaundice nor did she have any signs of chronic liver disease.  She did not have any fluid retention, like edema of the lower extremities or abdominal distention.  She denied any hepatic encephalopathy, lethargy or confusion.  She never had also any GI bleed including melena, hematemesis or hematochezia.  She never had blood transfusions. She had tattoos in 2004.  She never had IV drug use and was not quite sure how she acquired the hepatitis C.      Regarding other GI symptoms, she tells me that because of the chemotherapy she was recently using she had some nausea, but no abdominal pain or vomiting.  She otherwise has also 1 bowel movement with no blood.  She never had a colonoscopy in her life, and her weight has remained stable.  Denied also any fevers, sweats or chills.     Medical hx Surgical hx   Past Medical History:   Diagnosis Date     Deafness in right ear      Hypertension      Multiple facial bone fractures (H)      Vertigo      Vulvar cancer (H) 12/2017      Past Surgical History:   Procedure Laterality Date     DISSECT LYMPH NODE INGUINAL N/A 2/9/2018    Procedure: DISSECT LYMPH NODE INGUINAL;  Bilateral Inguinal Lymph Node Dissection;  Surgeon: Santosh Vera MD;  Location: [facility] OR     ENDOSCOPIC STRIPPING VEIN(S)       FACIAL RECONSTRUCTION SURGERY      post horse trauma     repair of left arm fracture            Medications  Prior to Admission medications    Medication Sig Start Date End Date Taking? Authorizing Provider    LORazepam (ATIVAN) 1 MG tablet Take 1 tablet (1 mg) by mouth every 6 hours as needed (nausea/vomiting, anxiety or sleep )  Patient not taking: Reported on 3/23/2018 3/9/18  Yes Santosh Vera MD   prochlorperazine (COMPAZINE) 10 MG tablet Take 1 tablet (10 mg) by mouth every 6 hours as needed (nausea/vomiting) 3/9/18  Yes Santosh Vera MD   sennosides (SENOKOT) 8.6 MG tablet Take 1 tablet by mouth 2 times daily as needed for constipation  Patient not taking: Reported on 3/23/2018 2/10/18  Yes Eleonora Nguyen MD   multivitamin, therapeutic with minerals (MULTI-VITAMIN) TABS tablet Take 1 tablet by mouth daily   Yes Reported, Patient   acetaminophen (TYLENOL) 325 MG tablet Take 2 tablets (650 mg) by mouth every 6 hours  Patient not taking: Reported on 3/23/2018 2/10/18   Eleonora Nguyen MD   oxyCODONE IR (ROXICODONE) 5 MG tablet Take 1 tablet (5 mg) by mouth every 4 hours as needed for pain  Patient not taking: Reported on 3/23/2018 2/9/18   Eleonora Nguyen MD       Allergies  No Known Allergies    Family hx Social hx   Family History   Problem Relation Age of Onset     CANCER Mother 81     Breast ca      Social History   Substance Use Topics     Smoking status: Current Every Day Smoker     Types: Cigarettes     Smokeless tobacco: Never Used      Comment: <1 pack/ day.  30 pack/yers     Alcohol use Yes      Comment: 0-3 drinks per day          Review of systems  Ms. Gilliam tells me that did not have any infections recently.  She has fatigue.  No headaches or seizures.  Denies any persistent cough, although she has had a cough at times.  No shortness of breath or chest pain.  Denies any anemia or easy bruising.  She was never diagnosed with diabetes or thyroid issues.  She denies also any joint problems.  She denies any psychiatric problems also.  She has right ear decreased hearing.  She otherwise has no eye or skin issues.  Otherwise, a comprehensive review of systems  "was noncontributory.     Examination  /77  Pulse 80  Temp 98.5  F (36.9  C) (Oral)  Ht 1.689 m (5' 6.5\")  Wt 63.2 kg (139 lb 6.4 oz)  BMI 22.16 kg/m2  Body mass index is 22.16 kg/(m^2).    Gen- well, NAD, A+Ox3, normal color  Eye- EOMI  ENT- MMM, normal oropharynx  Lym- no palpable lymphadenopathy  CVS- S1, S2 normal, no added sounds, RRR  RS- CTA  Abd- Not distended. No hepatosplenomegaly.  Extr- pulses good, no MATTHEW  MS- hands normal- no clubbing  Neuro- A+Ox3, no asterixis  Skin- no rash or jaundice  Psych- normal mood    Laboratory  Lab Results   Component Value Date     03/23/2018    POTASSIUM 3.4 03/23/2018    CHLORIDE 96 03/23/2018    CO2 30 03/23/2018    BUN 10 03/23/2018    CR 0.81 03/23/2018       Lab Results   Component Value Date    BILITOTAL 0.8 03/23/2018    ALT 32 03/23/2018    AST 28 03/23/2018    ALKPHOS 95 03/23/2018       Lab Results   Component Value Date    ALBUMIN 3.4 03/23/2018    PROTTOTAL 6.9 03/23/2018        Lab Results   Component Value Date    WBC 4.4 03/23/2018    HGB 13.9 03/23/2018     03/23/2018     03/23/2018       Lab Results   Component Value Date    INR 0.91 03/23/2018         Radiology    Assessment and plan:  Ms. Gillima is a 63-year-old  female who has a new diagnosis of hepatitis C.  Her mode of acquisition is not very clear, as patient denies any IV drug use in the past; but regardless of how she acquired it, we will need to know the genotype of the hepatitis C.  We will repeat also her hepatitis C RNA.  We will get hepatitis B viral markers, and abdominal ultrasound.  She will need to get a fibrosis scan today, and once we have the whole picture, then we will decide which direct antiviral therapy we are going to use.  This is going to depend mostly on the genotype and viral load of the virus.  It appears that she is doing quite well, does not have any signs of chronic liver disease as of now.  For her other medical issues, she will " follow with her oncologist and her primary care physician.  She will be seen here once the above evaluation is complete.  She will see our physician assistant at that time, and we will prescribe her our medications.      This was a 45 minute visit of which more than 50% was spent in explaining to the patient what our plan of care was.  We answered all of her questions.      cc:  Primary care physician     Malgorzata Madden MD  Hepatology  [facility]

## 2018-03-23 NOTE — LETTER
Date:March 26, 2018      Provider requested that no letter be sent. Do not send.       [facility] Health Information

## 2018-03-26 ENCOUNTER — APPOINTMENT (OUTPATIENT)
Dept: RADIATION ONCOLOGY | Facility: CLINIC | Age: 63
End: 2018-03-26
Attending: RADIOLOGY
Payer: COMMERCIAL

## 2018-03-26 ENCOUNTER — APPOINTMENT (OUTPATIENT)
Dept: LAB | Facility: CLINIC | Age: 63
End: 2018-03-26
Attending: OBSTETRICS & GYNECOLOGY
Payer: COMMERCIAL

## 2018-03-26 ENCOUNTER — INFUSION THERAPY VISIT (OUTPATIENT)
Dept: ONCOLOGY | Facility: CLINIC | Age: 63
End: 2018-03-26
Attending: OBSTETRICS & GYNECOLOGY
Payer: COMMERCIAL

## 2018-03-26 VITALS
DIASTOLIC BLOOD PRESSURE: 73 MMHG | OXYGEN SATURATION: 97 % | TEMPERATURE: 99 F | HEART RATE: 66 BPM | SYSTOLIC BLOOD PRESSURE: 127 MMHG | BODY MASS INDEX: 22.31 KG/M2 | WEIGHT: 140.3 LBS | RESPIRATION RATE: 16 BRPM

## 2018-03-26 DIAGNOSIS — Z79.899 ENCOUNTER FOR LONG-TERM (CURRENT) USE OF MEDICATIONS: Primary | ICD-10-CM

## 2018-03-26 DIAGNOSIS — C51.9 MALIGNANT NEOPLASM OF VULVA (H): ICD-10-CM

## 2018-03-26 LAB
ALBUMIN SERPL-MCNC: 3.3 G/DL (ref 3.4–5)
ALP SERPL-CCNC: 100 U/L (ref 40–150)
ALT SERPL W P-5'-P-CCNC: 28 U/L (ref 0–50)
ANION GAP SERPL CALCULATED.3IONS-SCNC: 7 MMOL/L (ref 3–14)
AST SERPL W P-5'-P-CCNC: 27 U/L (ref 0–45)
BASOPHILS # BLD AUTO: 0 10E9/L (ref 0–0.2)
BASOPHILS NFR BLD AUTO: 1 %
BILIRUB SERPL-MCNC: 0.6 MG/DL (ref 0.2–1.3)
BUN SERPL-MCNC: 9 MG/DL (ref 7–30)
CALCIUM SERPL-MCNC: 8.2 MG/DL (ref 8.5–10.1)
CHLORIDE SERPL-SCNC: 92 MMOL/L (ref 94–109)
CO2 SERPL-SCNC: 30 MMOL/L (ref 20–32)
CREAT SERPL-MCNC: 0.8 MG/DL (ref 0.52–1.04)
DIFFERENTIAL METHOD BLD: ABNORMAL
EOSINOPHIL # BLD AUTO: 0.1 10E9/L (ref 0–0.7)
EOSINOPHIL NFR BLD AUTO: 1.9 %
ERYTHROCYTE [DISTWIDTH] IN BLOOD BY AUTOMATED COUNT: 12.6 % (ref 10–15)
GFR SERPL CREATININE-BSD FRML MDRD: 73 ML/MIN/1.7M2
GLUCOSE SERPL-MCNC: 101 MG/DL (ref 70–99)
HCT VFR BLD AUTO: 39.9 % (ref 35–47)
HCV RNA SERPL NAA+PROBE-ACNC: ABNORMAL [IU]/ML
HCV RNA SERPL NAA+PROBE-LOG IU: 7 LOG IU/ML
HGB BLD-MCNC: 13.6 G/DL (ref 11.7–15.7)
IMM GRANULOCYTES # BLD: 0 10E9/L (ref 0–0.4)
IMM GRANULOCYTES NFR BLD: 0.5 %
LYMPHOCYTES # BLD AUTO: 0.5 10E9/L (ref 0.8–5.3)
LYMPHOCYTES NFR BLD AUTO: 12 %
MAGNESIUM SERPL-MCNC: 1.2 MG/DL (ref 1.6–2.3)
MCH RBC QN AUTO: 34 PG (ref 26.5–33)
MCHC RBC AUTO-ENTMCNC: 34.1 G/DL (ref 31.5–36.5)
MCV RBC AUTO: 100 FL (ref 78–100)
MONOCYTES # BLD AUTO: 0.4 10E9/L (ref 0–1.3)
MONOCYTES NFR BLD AUTO: 10.6 %
NEUTROPHILS # BLD AUTO: 3.1 10E9/L (ref 1.6–8.3)
NEUTROPHILS NFR BLD AUTO: 74 %
NRBC # BLD AUTO: 0 10*3/UL
NRBC BLD AUTO-RTO: 0 /100
PLATELET # BLD AUTO: 137 10E9/L (ref 150–450)
POTASSIUM SERPL-SCNC: 3.2 MMOL/L (ref 3.4–5.3)
PROT SERPL-MCNC: 7 G/DL (ref 6.8–8.8)
RBC # BLD AUTO: 4 10E12/L (ref 3.8–5.2)
SODIUM SERPL-SCNC: 129 MMOL/L (ref 133–144)
WBC # BLD AUTO: 4.2 10E9/L (ref 4–11)

## 2018-03-26 PROCEDURE — 80053 COMPREHEN METABOLIC PANEL: CPT | Performed by: OBSTETRICS & GYNECOLOGY

## 2018-03-26 PROCEDURE — 96375 TX/PRO/DX INJ NEW DRUG ADDON: CPT

## 2018-03-26 PROCEDURE — 25000132 ZZH RX MED GY IP 250 OP 250 PS 637: Mod: ZF | Performed by: OBSTETRICS & GYNECOLOGY

## 2018-03-26 PROCEDURE — 40000141 ZZH STATISTIC PERIPHERAL IV START W/O US GUIDANCE: Mod: ZF

## 2018-03-26 PROCEDURE — 25000128 H RX IP 250 OP 636: Mod: ZF | Performed by: OBSTETRICS & GYNECOLOGY

## 2018-03-26 PROCEDURE — 25800025 ZZH RX 258: Mod: ZF | Performed by: OBSTETRICS & GYNECOLOGY

## 2018-03-26 PROCEDURE — 83735 ASSAY OF MAGNESIUM: CPT | Performed by: OBSTETRICS & GYNECOLOGY

## 2018-03-26 PROCEDURE — 77386 ZZH IMRT TREATMENT DELIVERY, COMPLEX: CPT | Performed by: RADIOLOGY

## 2018-03-26 PROCEDURE — 96413 CHEMO IV INFUSION 1 HR: CPT

## 2018-03-26 PROCEDURE — 85025 COMPLETE CBC W/AUTO DIFF WBC: CPT | Performed by: OBSTETRICS & GYNECOLOGY

## 2018-03-26 PROCEDURE — 96366 THER/PROPH/DIAG IV INF ADDON: CPT

## 2018-03-26 PROCEDURE — 96367 TX/PROPH/DG ADDL SEQ IV INF: CPT

## 2018-03-26 RX ORDER — POTASSIUM CHLORIDE 1500 MG/1
40 TABLET, EXTENDED RELEASE ORAL ONCE
Status: COMPLETED | OUTPATIENT
Start: 2018-03-26 | End: 2018-03-26

## 2018-03-26 RX ORDER — PALONOSETRON 0.05 MG/ML
0.25 INJECTION, SOLUTION INTRAVENOUS ONCE
Status: COMPLETED | OUTPATIENT
Start: 2018-03-26 | End: 2018-03-26

## 2018-03-26 RX ADMIN — CISPLATIN 70 MG: 1 INJECTION, SOLUTION INTRAVENOUS at 12:45

## 2018-03-26 RX ADMIN — POTASSIUM CHLORIDE 40 MEQ: 20 TABLET, EXTENDED RELEASE ORAL at 11:40

## 2018-03-26 RX ADMIN — POTASSIUM CHLORIDE, DEXTROSE MONOHYDRATE AND SODIUM CHLORIDE: 150; 5; 450 INJECTION, SOLUTION INTRAVENOUS at 10:11

## 2018-03-26 RX ADMIN — MAGNESIUM SULFATE HEPTAHYDRATE: 500 INJECTION, SOLUTION INTRAMUSCULAR; INTRAVENOUS at 13:43

## 2018-03-26 RX ADMIN — DEXAMETHASONE SODIUM PHOSPHATE 150 MG: 10 INJECTION, SOLUTION INTRAMUSCULAR; INTRAVENOUS at 11:38

## 2018-03-26 RX ADMIN — PALONOSETRON HYDROCHLORIDE 0.25 MG: 0.25 INJECTION INTRAVENOUS at 11:34

## 2018-03-26 ASSESSMENT — PAIN SCALES - GENERAL: PAINLEVEL: MILD PAIN (2)

## 2018-03-26 NOTE — MR AVS SNAPSHOT
After Visit Summary   3/26/2018    Julius Gilliam    MRN: [id number]           Patient Information     Date Of Birth          1955        Visit Information        Provider Department      3/26/2018 9:00 AM  17 ATC;  ONCOLOGY INFUSION [facility]ly [facility]        Today's Diagnoses     Encounter for long-term (current) use of medications    -  1    Malignant neoplasm of vulva (H)          Care Instructions    Contact Numbers    [facility] Main Line: [phone]  [facility] Triage:  [phone]    Call triage with chills and/or temperature greater than or equal to 100.5, uncontrolled nausea/vomiting, diarrhea, constipation, dizziness, shortness of breath, chest pain, bleeding, unexplained bruising, or any new/concerning symptoms, questions/concerns.     If you are having any concerning symptoms or wish to speak to a provider before your next infusion visit, please call your care coordinator or triage to notify them so we can adequately serve you.       After Hours: [phone]    If after hours, weekends, or holidays, call main hospital  and ask for Oncology doctor on call.           March 2018 Sunday Monday Tuesday Wednesday Thursday Friday Saturday                       1     2     [facility] TCT/SIM SUITE   11:00 AM   (60 min.)   Yvon Leal MD   Radiation Oncology Clinic     [facility] RETURN WITH ROOM   12:15 PM   (90 min.)   Nurse, [facility] 3       4     5     6     7     8     [facility] TREATMENT PLAN VISIT    7:00 AM   (15 min.)   Yvon Leal MD   Radiation Oncology Clinic 9     10       11     12  Happy Birthday!     [facility] LAB DRAW    8:15 AM   (15 min.)   [facility] D[facility]   [facility] Lab Draw     [facility] ONC INFUSION 360    9:00 AM   (360 min.)    ONCOLOGY INFUSION   [facility] EXTERNAL RADIATION TREATMT    4:00 PM   (30 min.)   [facility] RAD ONC MARIE   Radiation Oncology Clinic 13     [facility] EXTERNAL RADIATION TREATMT     4:00 PM   (30 min.)   [facility] ONC MARIE   [facility] Oncology Clinic 14     [facility] EXTERNAL RADIATION TREATMT    4:00 PM   (30 min.)   [facility] RAD ONC MARIE   [facility] 15     [facility] EXTERNAL RADIATION TREATMT    4:00 PM   (30 min.)   [facility] RAD ONC MARIE   Radiation Oncology Clinic     [facility] ON TREATMENT VISIT    4:30 PM   (15 min.)   Sydney Cruz MD   Radiation Oncology Clinic 16     [facility] EXTERNAL RADIATION TREATMT   12:15 PM   (30 min.)   [facility] RAD ONC MARIE   [facility] Oncology Clinic 17       18     19     [facility] DRAW    7:15 AM   (15 min.)   [facility]   [facility]     P ONC INFUSION 360    8:00 AM   (360 min.)   [facility] ONCOLOGY INFUSION   [facility]     [facility] EXTERNAL RADIATION TREATMT    4:00 PM   (30 min.)   P RAD ONC MARIE   Radiation Oncology Clinic 20     [facility] EXTERNAL RADIATION TREATMT    2:30 PM   (30 min.)   P RAD ONC MARIE   Radiation Oncology Clinic 21     [facility] EXTERNAL RADIATION TREATMT    2:30 PM   (30 min.)   P RAD ONC MARIE   Radiation Oncology Clinic 22     [facility] EXTERNAL RADIATION TREATMT    2:30 PM   (30 min.)   P RAD ONC MARIE   Radiation Oncology Clinic 23     LAB WITH HB CLINIC    7:00 AM   (15 min.)   [facility] LAB   [facility] Lab     P NEW GENERAL LIVER    7:45 AM   (30 min.)   Malgorzata Madden MD   [facility] Hepatology     [facility] FIBROSIS SCAN    8:45 AM   (30 min.)   [facility] FIBROSIS SCAN   [facility] Hepatology     LAB WITH HB CLINIC    9:30 AM   (15 min.)   [facility] LAB   [facility] Lab     [facility] EXTERNAL RADIATION TREATMT    2:30 PM   (30 min.)   P RAD ONC MARIE   Radiation Oncology Clinic     P ON TREATMENT VISIT    2:45 PM   (15 min.)   Yvon Leal MD   Radiation Oncology Clinic 24       25     26     [facility] DRAW    8:30 AM   (15 min.)   [facility] DRAW   [facility] Lab Draw     P ONC INFUSION 360    9:00 AM   (360 min.)   [facility] ONCOLOGY INFUSION   [facility]     [facility] EXTERNAL RADIATION TREATMT     2:45 PM   (30 min.)   P RAD ONC MARIE   Radiation Oncology Clinic 27     [facility] RETURN    2:15 PM   (30 min.)   Karin Scott APRN CNP   [facility]     [facility] EXTERNAL RADIATION TREATMT    2:30 PM   (30 min.)   [facility] RAD ONC MARIE   Radiation Oncology Clinic 28     US ABDOMEN COMPLETE   11:15 AM   (45 min.)   UCUS2   [facility] US     [facility] EXTERNAL RADIATION TREATMT    2:30 PM   (30 min.)   P RAD ONC MARIE   Radiation Oncology Clinic     P ON TREATMENT VISIT    3:00 PM   (15 min.)   Yvon Leal MD   Radiation Oncology Clinic 29     [facility] EXTERNAL RADIATION TREATMT    2:30 PM   (30 min.)   [facility] RAD ONC MARIE   Radiation Oncology Clinic 30     PROCEDURE - 4.5 HR   10:30 AM   (270 min.)   U2A ROOM 11   Unit 2A [facility]     IR LYMPH NODE BIOPSY   11:45 AM   (90 min.)   UUIR6   [facility], Interventional Radiology     P EXTERNAL RADIATION TREATMT    2:30 PM   (30 min.)   [facility] RAD ONC MARIE   Radiation Oncology Clinic 31 April 2018 Sunday Monday Tuesday Wednesday Thursday Friday Saturday   1     2     [facility] LAB DRAW    6:30 AM   (15 min.)    [facility] LAB DRAW   [facility] Lab Draw     P ONC INFUSION 360    7:00 AM   (360 min.)   [facility] ONCOLOGY INFUSION   [facility] EXTERNAL RADIATION TREATMT    2:15 PM   (30 min.)   [facility] RAD ONC MARIE   Radiation Oncology Clinic 3     [facility] EXTERNAL RADIATION TREATMT    2:30 PM   (30 min.)   P RAD ONC MARIE   Radiation Oncology Clinic 4     [facility] EXTERNAL RADIATION TREATMT    2:30 PM   (30 min.)   P RAD ONC MARIE   Radiation Oncology Clinic 5     [facility] EXTERNAL RADIATION TREATMT    2:30 PM   (30 min.)   P RAD ONC MARIE   Radiation Oncology Clinic     P ON TREATMENT VISIT    2:45 PM   (15 min.)   Yvon Leal MD   Radiation Oncology Clinic 6     [facility] EXTERNAL RADIATION TREATMT    2:30 PM   (30 min.)   P RAD ONC MARIE   Radiation Oncology Clinic 7       8     9     [facility] EXTERNAL RADIATION  TREATMT    2:15 PM   (30 min.)   [facility] RAD ONC MARIE   [facility] Oncology Clinic 10     [facility] DRAW    6:30 AM   (15 min.)   [facility] DRAW   [facility] Lab Draw     [facility] ONC INFUSION 360    7:00 AM   (360 min.)   [facility] ONCOLOGY INFUSION   [facility]     [facility] EXTERNAL RADIATION TREATMT    2:30 PM   (30 min.)   [facility] RAD ONC MARIE   [facility] Oncology Clinic 11     [facility] EXTERNAL RADIATION TREATMT    2:30 PM   (30 min.)   [facility] RAD ONC MARIE   [facility] Oncology Clinic 12     [facility] EXTERNAL RADIATION TREATMT    2:30 PM   (30 min.)   [facility] RAD ONC MARIE   [facility] Oncology Clinic 13     [facility] EXTERNAL RADIATION TREATMT    2:30 PM   (30 min.)   [facility] RAD ONC MARIE   Radiation Oncology Clinic 14       15     16     [facility] DRAW    7:30 AM   (15 min.)   [facility] DRAW   [facility] Lab Draw     [facility] ONC INFUSION 360    8:00 AM   (360 min.)   [facility] ONCOLOGY INFUSION   [facility]     [facility] EXTERNAL RADIATION TREATMT    2:30 PM   (30 min.)   [facility] RAD ONC MARIE   Radiation Oncology Clinic 17     [facility] EXTERNAL RADIATION TREATMT    2:30 PM   (30 min.)   P RAD ONC MARIE   Radiation Oncology Clinic 18     [facility] EXTERNAL RADIATION TREATMT   12:30 PM   (30 min.)   P RAD ONC MARIE   Radiation Oncology Clinic 19     [facility] EXTERNAL RADIATION TREATMT    2:30 PM   (30 min.)   P RAD ONC MARIE   Radiation Oncology Clinic 20     [facility] EXTERNAL RADIATION TREATMT    2:30 PM   (30 min.)   [facility] RAD ONC MAIRE   Radiation Oncology Clinic 21       22     23     [facility] EXTERNAL RADIATION TREATMT    2:15 PM   (30 min.)   [facility] RAD ONC MARIE   Radiation Oncology Clinic 24     [facility] EXTERNAL RADIATION TREATMT    2:30 PM   (30 min.)   [facility] RAD ONC MARIE   Radiation Oncology Clinic 25     [facility] EXTERNAL RADIATION TREATMT    2:30 PM   (30 min.)   P RAD ONC MARIE   Radiation Oncology Clinic 26     [facility] EXTERNAL RADIATION TREATMT    2:30 PM   (30 min.)   P RAD ONC MARIE   Radiation Oncology Clinic 27     [facility] EXTERNAL RADIATION  TREATMT    2:30 PM   (30 min.)   [facility] RAD ONC MARIE   Radiation Oncology Clinic 28       29     30     [facility] EXTERNAL RADIATION TREATMT    2:30 PM   (30 min.)   [facility] RAD ONC MARIE   Radiation Oncology Clinic                                       Lab Results:  Recent Results (from the past 12 hour(s))   CBC with platelets differential    Collection Time: 03/26/18  9:13 AM   Result Value Ref Range    WBC 4.2 4.0 - 11.0 10e9/L    RBC Count 4.00 3.8 - 5.2 10e12/L    Hemoglobin 13.6 11.7 - 15.7 g/dL    Hematocrit 39.9 35.0 - 47.0 %     78 - 100 fl    MCH 34.0 (H) 26.5 - 33.0 pg    MCHC 34.1 31.5 - 36.5 g/dL    RDW 12.6 10.0 - 15.0 %    Platelet Count 137 (L) 150 - 450 10e9/L    Diff Method Automated Method     % Neutrophils 74.0 %    % Lymphocytes 12.0 %    % Monocytes 10.6 %    % Eosinophils 1.9 %    % Basophils 1.0 %    % Immature Granulocytes 0.5 %    Nucleated RBCs 0 0 /100    Absolute Neutrophil 3.1 1.6 - 8.3 10e9/L    Absolute Lymphocytes 0.5 (L) 0.8 - 5.3 10e9/L    Absolute Monocytes 0.4 0.0 - 1.3 10e9/L    Absolute Eosinophils 0.1 0.0 - 0.7 10e9/L    Absolute Basophils 0.0 0.0 - 0.2 10e9/L    Abs Immature Granulocytes 0.0 0 - 0.4 10e9/L    Absolute Nucleated RBC 0.0    Comprehensive metabolic panel    Collection Time: 03/26/18  9:13 AM   Result Value Ref Range    Sodium 129 (L) 133 - 144 mmol/L    Potassium 3.2 (L) 3.4 - 5.3 mmol/L    Chloride 92 (L) 94 - 109 mmol/L    Carbon Dioxide 30 20 - 32 mmol/L    Anion Gap 7 3 - 14 mmol/L    Glucose 101 (H) 70 - 99 mg/dL    Urea Nitrogen 9 7 - 30 mg/dL    Creatinine 0.80 0.52 - 1.04 mg/dL    GFR Estimate 73 >60 mL/min/1.7m2    GFR Estimate If Black 88 >60 mL/min/1.7m2    Calcium 8.2 (L) 8.5 - 10.1 mg/dL    Bilirubin Total 0.6 0.2 - 1.3 mg/dL    Albumin 3.3 (L) 3.4 - 5.0 g/dL    Protein Total 7.0 6.8 - 8.8 g/dL    Alkaline Phosphatase 100 40 - 150 U/L    ALT 28 0 - 50 U/L    AST 27 0 - 45 U/L   Magnesium    Collection Time: 03/26/18  9:13 AM   Result Value Ref Range     Magnesium 1.2 (L) 1.6 - 2.3 mg/dL               Follow-ups after your visit        Your next 10 appointments already scheduled     Mar 26, 2018  2:45 PM CDT   EXTERNAL RADIATION TREATMENT with [facility] RAD ONC MARIE   Radiation Oncology Clinic ([facility])    [facility]  1st Floor  [address] [address]   [phone]            Mar 27, 2018  2:30 PM CDT   (Arrive by 2:15 PM)   Return Visit with CAROLE Perez [facility] ([facility])    [address]  [address]  [facility] [place] [facility] [address]   [phone]            Mar 27, 2018  2:30 PM CDT   EXTERNAL RADIATION TREATMENT with [facility] RAD ONC MARIE   Radiation Oncology Clinic ([facility])    [facility]  1st Floor  [address] [address]   [phone]            Mar 28, 2018 11:15 AM CDT   US ABDOMEN COMPLETE with UCUS2   [facility] US ([facility])    [address]  1st Floor  [place] [place] [facility] [address]   [phone]           Please bring a list of your medicines (including vitamins, minerals and over-the-counter drugs). Also, tell your doctor about any allergies you may have. Wear comfortable clothes and leave your valuables at home.  Adults: No eating or drinking for 8 hours before the exam. You may take medicine with a small sip of water.  Children: - Children 6+ years: No food or drink for 6 hours before exam. - Children 1-5 years: No food or drink for 4 hours before exam. - Infants, breast-fed: may have breast milk up to 2 hours before exam. - Infants, formula: may have bottle until 4 hours before exam.  Please call the Imaging Department at your exam site with any questions.            Mar 28, 2018  2:30 PM CDT   EXTERNAL RADIATION TREATMENT with [facility] RAD ONC MARIE   Radiation Oncology Clinic ([facility])    [facility]  1st Floor  [address] [address]   [phone]            Mar 28, 2018  3:00 PM CDT   ON TREATMENT VISIT with Yvon Leal MD   Radiation Oncology Clinic ([facility])    [facility]  1st Floor  [address] [address]   [phone]            Mar 29, 2018  2:30 PM CDT   EXTERNAL RADIATION TREATMENT with [facility] RAD ONC MARIE   Radiation Oncology Clinic ([facility])    [facility]  1st Floor  [address] [address]   [phone]            Mar 30, 2018 10:30 AM CDT   Procedure 4.5 hour with U2A ROOM 11   Unit [address]A [facility] ([facility])    [address] [address]               Mar 30, 2018 12:00 PM CDT   (Arrive by 11:45 AM)   IR LYMPH NODE BIOPSY with UUIR6   [facility], Interventional Radiology ([facility])    [address] [address]   [phone]           1. Laboratory test are to be obtained by your doctor prior to the exam (CBCP, INR and PTT) 2. Someone will need to drive you to and from the hospital. 3. If you are or may be pregnant, contact your doctor or a Radiology nurse prior to the day of the exam. 4. If you have diabetes, check with your doctor or a Radiology nurse to see if your insulin needs to be adjusted for the exam. 5. If you are taking Coumadin (to thin you blood) please contact your doctor or a Radiology nurse at least 3 days before the exam for special instructions. 6. The day before your exam you may eat your regular diet and are encouraged to drink at least 2 quarts of clear liquids. Drink no alcoholic beverages for 24 hours prior to the exam. 7. Do not eat any solid food or milk products for 6 hours prior to the exam. You may drink clear liquids until 2 hours prior to the exam. Clear liquids include the following:  water, Jell-O, clear broth, apple juice or any noncarbonated drink that you can see through (no pop!) 8. The morning of the exam you may brush your teeth and take medications as directed with a sip of water. 9. Tell the Radiology nurse if you have any allergies. 10. You will be asked to empty your bladder before the exam begins. 11. You may resume your normal activities the day after the exam. Do not do any strenuous exercises or lifting for 48 hours. 12. If a drainage tube is to remain in place, your doctor s office will need to make arrangements for you to learn how to care for this tube. Ask them about this before the date of the exam. You may need to obtain supplies from your local pharmacy. Please make an appointment before leaving your current appointment. You should understand the plan for your care prior to leaving this appointment            Mar 30, 2018  2:30 PM CDT   EXTERNAL RADIATION TREATMENT with [facility] RAD ONC MARIE   Radiation Oncology Clinic ([facility])    [facility]  1st Floor  [address] [address] [phone]              Who to contact     If you have questions or need follow up information about today's clinic visit or your schedule please contact [facility] CANCER CLINIC directly at [phone].  Normal or non-critical lab and imaging results will be communicated to you by [employer], letter or phone within 4 business days after the clinic has received the results. If you do not hear from us within 7 days, please contact the clinic through [employer] or phone. If you have a critical or abnormal lab result, we will notify you by phone as soon as possible.  Submit refill requests through [employer] or call your pharmacy and they will forward the refill request to us. Please allow 3 business days for your refill to be completed.          Additional Information About Your Visit        Hotreader Information     [employer] gives you secure access to  your electronic health record. If you see a primary care provider, you can also send messages to your care team and make appointments. If you have questions, please call your primary care clinic.  If you do not have a primary care provider, please call [phone] and they will assist you.        Care EveryWhere ID     This is your Care EveryWhere ID. This could be used by other organizations to access your [place] medical records  [id number]        Your Vitals Were     Pulse Temperature Respirations Pulse Oximetry BMI (Body Mass Index)       66 99  F (37.2  C) (Oral) 16 97% 22.31 kg/m2        Blood Pressure from Last 3 Encounters:   03/26/18 127/73   03/23/18 127/77   03/19/18 (!) 149/94    Weight from Last 3 Encounters:   03/26/18 63.6 kg (140 lb 4.8 oz)   03/23/18 64.5 kg (142 lb 1.6 oz)   03/23/18 63.2 kg (139 lb 6.4 oz)              We Performed the Following     CBC with platelets differential     Comprehensive metabolic panel     Magnesium        Primary Care Provider Office Phone # Fax #    Tova MontesinosADE [phone]       [facility] [address] [facility] [address]        Equal Access to Services     CAREY COSME : Hadii aad ku hadasho Soomaali, waaxda luqadaha, qaybta kaalmada adeegyada, waxay sharon kaurn horace cárdenas . So [facility] [phone].    ATENCIÓN: Si habla español, tiene a elizabeth disposición servicios gratuitos de asistencia lingüística. Llame al [phone].    We comply with applicable federal civil rights laws and [place] laws. We do not discriminate on the basis of race, color, national origin, age, disability, sex, sexual orientation, or gender identity.            Thank you!     Thank you for choosing [facility]  for your care. Our goal is always to provide you with excellent care. Hearing back from our patients is one way we can continue to improve our services. Please take a few minutes to complete the written survey that you  may receive in the mail after your visit with us. Thank you!             Your Updated Medication List - Protect others around you: Learn how to safely use, store and throw away your medicines at www.disposemymeds.org.          This list is accurate as of 3/26/18  1:57 PM.  Always use your most recent med list.                   Brand Name Dispense Instructions for use Diagnosis    acetaminophen 325 MG tablet    TYLENOL    30 tablet    Take 2 tablets (650 mg) by mouth every 6 hours    H/O lymph node excision       LORazepam 1 MG tablet    ATIVAN    30 tablet    Take 1 tablet (1 mg) by mouth every 6 hours as needed (nausea/vomiting, anxiety or sleep )    Encounter for long-term (current) use of medications, Malignant neoplasm of vulva (H)       Multi-vitamin Tabs tablet      Take 1 tablet by mouth daily        oxyCODONE IR 5 MG tablet    ROXICODONE    20 tablet    Take 1 tablet (5 mg) by mouth every 4 hours as needed for pain    H/O lymph node excision       prochlorperazine 10 MG tablet    COMPAZINE    30 tablet    Take 1 tablet (10 mg) by mouth every 6 hours as needed (nausea/vomiting)    Encounter for long-term (current) use of medications, Malignant neoplasm of vulva (H)       sennosides 8.6 MG tablet    SENOKOT    60 tablet    Take 1 tablet by mouth 2 times daily as needed for constipation    H/O lymph node excision

## 2018-03-26 NOTE — PROGRESS NOTES
Follow Up Notes on Referred Patient    Date: 3/27/2018       Dr. Tova Montesinos, NP  [facility]  [address] [place] [address]       RE: Julius Gilliam  : 1955  EJ: 3/27/2018    Dear Dr. Tova Montesinos:    Julius Gilliam is a 63 year old woman with a diagnosis of vulvar cancer.   She is here today for follow up and disease management.     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 Lymph nodes, left inguinal:   - Metastatic squamous cell carcinoma to three of seven lymph nodes examined (3/7)   - The largest metastatic focus is 2.0 cm in greatest dimension with extranodal extension     B. Lymph nodes, right inguinal:   - Two reactive lymph nodes, negative for malignancy (0/2)    3/12/18: Cisplatin + radiation      Today she comes to clinic stating she is tolerating her treatments pretty well. She is using Proshield as needed. She has started to have some diarrhea and does have Imodium for this. She denies any n/v related to her chemotherapy; she has taken her Ativan 3 times and has not taken her Compazine. She denies any vaginal bleeding, no changes in her bowel or bladder habits, no lower extremity edema, and no difficulties eating or sleeping. She denies any abdominal discomfort/bloating, no fevers or chills, and no chest pain or shortness of breath. She does not need any medication  refills. She continues to smoke about 1/2 ppd.       Review of Systems     Constitutional:  Negative for fever, chills, weight loss, weight gain, fatigue, decreased appetite, night sweats, recent stressors, height gain, height loss, post-operative complications, incisional pain, hallucinations, increased energy, hyperactivity and confused.   HENT:  Negative for ear pain, hearing loss, tinnitus, nosebleeds, trouble swallowing, hoarse voice, mouth sores, sore throat, ear discharge, tooth pain, gum tenderness, taste disturbance, smell disturbance, hearing aid, bleeding gums, dry mouth, sinus pain, sinus congestion and neck mass.    Eyes:  Negative for double vision, pain, redness, eye pain, decreased vision, eye watering, eye bulging, eye dryness, flashing lights, spots, floaters, strabismus, tunnel vision, jaundice and eye irritation.   Respiratory:   Negative for cough, hemoptysis, sputum production, shortness of breath, wheezing, sleep disturbances due to breathing, snores loudly, respiratory pain, dyspnea on exertion, cough disturbing sleep and postural dyspnea.    Cardiovascular:  Negative for chest pain, dyspnea on exertion, palpitations, orthopnea, claudication, leg swelling, fingers/toes turn blue, hypertension, hypotension, syncope, history of heart murmur, chest pain on exertion, chest pain at rest, pacemaker, few scattered varicosities, leg pain, sleep disturbances due to breathing, tachycardia, light-headedness, exercise intolerance and edema.   Gastrointestinal:  Negative for heartburn, nausea, vomiting, abdominal pain, diarrhea, constipation, blood in stool, melena, rectal pain, bloating, hemorrhoids, bowel incontinence, jaundice, rectal bleeding, coffee ground emesis and change in stool.   Genitourinary:  Negative for bladder incontinence, dysuria, urgency, hematuria, flank pain, vaginal discharge, difficulty urinating, genital sores, dyspareunia, decreased libido, nocturia, voiding less frequently,  arousal difficulty, abnormal vaginal bleeding, excessive menstruation, menstrual changes, hot flashes, vaginal dryness and postmenopausal bleeding.   Musculoskeletal:  Negative for myalgias, back pain, joint swelling, arthralgias, stiffness, muscle cramps, neck pain, bone pain, muscle weakness and fracture.   Skin:  Negative for nail changes, itching, poor wound healing, rash, hair changes, skin changes, acne, warts, poor wound healing, scarring, flaky skin, Raynaud's phenomenon, sensitivity to sunlight and skin thickening.   Neurological:  Negative for dizziness, tingling, tremors, speech change, seizures, loss of consciousness, weakness, light-headedness, numbness, headaches, disturbances in coordination, extremity numbness, memory loss, difficulty walking and paralysis.   Endo/Heme:  Negative for anemia, swollen glands and bruises/bleeds easily.   Psychiatric/Behavioral:  Negative for depression, hallucinations, memory loss, decreased concentration, mood swings and panic attacks.    Breast:  Negative for breast discharge, breast mass, breast pain and nipple retraction.   Endocrine:  Negative for altered temperature regulation, polyphagia, polydipsia, unwanted hair growth and change in facial hair.        Past Medical History:    Past Medical History:   Diagnosis Date     Deafness in right ear      Hypertension      Multiple facial bone fractures (H)     also left ankle and left wrist     Vertigo      Vulvar cancer (H) 12/2017         Past Surgical History:    Past Surgical History:   Procedure Laterality Date     DISSECT LYMPH NODE INGUINAL N/A 2/9/2018    Procedure: DISSECT LYMPH NODE INGUINAL;  Bilateral Inguinal Lymph Node Dissection;  Surgeon: Santosh Vera MD;  Location: [facility] OR     ENDOSCOPIC STRIPPING VEIN(S)       FACIAL RECONSTRUCTION SURGERY      post horse trauma     repair of left arm fracture           Health Maintenance Due   Topic Date Due     TETANUS IMMUNIZATION (SYSTEM ASSIGNED)   "03/12/1974     PAP SCREENING Q3 YR (SYSTEM ASSIGNED)  03/12/1977     LIPID SCREEN Q5 YR FEMALE (SYSTEM ASSIGNED)  03/12/2001     ADVANCE DIRECTIVE PLANNING Q5 YRS  03/12/2011       Current Medications:     Current Outpatient Prescriptions   Medication Sig Dispense Refill     LORazepam (ATIVAN) 1 MG tablet Take 1 tablet (1 mg) by mouth every 6 hours as needed (nausea/vomiting, anxiety or sleep ) 30 tablet 1     prochlorperazine (COMPAZINE) 10 MG tablet Take 1 tablet (10 mg) by mouth every 6 hours as needed (nausea/vomiting) 30 tablet 2     acetaminophen (TYLENOL) 325 MG tablet Take 2 tablets (650 mg) by mouth every 6 hours 30 tablet 0     sennosides (SENOKOT) 8.6 MG tablet Take 1 tablet by mouth 2 times daily as needed for constipation 60 tablet 0     multivitamin, therapeutic with minerals (MULTI-VITAMIN) TABS tablet Take 1 tablet by mouth daily       oxyCODONE IR (ROXICODONE) 5 MG tablet Take 1 tablet (5 mg) by mouth every 4 hours as needed for pain (Patient not taking: Reported on 3/27/2018) 20 tablet 0         Allergies:      No Known Allergies     Social History:     Social History   Substance Use Topics     Smoking status: Current Every Day Smoker     Types: Cigarettes     Smokeless tobacco: Never Used      Comment: <1 pack/ day.  30 pack/yers     Alcohol use Yes      Comment: 0-3 drinks per day       History   Drug Use No         Family History:       Family History   Problem Relation Age of Onset     CANCER Mother 81     Breast ca         Physical Exam:     /80  Pulse 64  Temp 97.6  F (36.4  C) (Oral)  Resp 16  Ht 1.689 m (5' 6.5\")  Wt 65.2 kg (143 lb 11.2 oz)  SpO2 97%  BMI 22.85 kg/m2  Body mass index is 22.85 kg/(m^2).    General Appearance: healthy and alert, no distress     HEENT: no thyromegaly, no palpable nodules or masses        Cardiovascular: regular rate and rhythm, no gallops, rubs or murmurs     Respiratory: lungs clear, no rales, rhonchi or wheezes, normal diaphragmatic " excursion    Musculoskeletal: extremities non tender and without edema    Skin: no lesions or rashes     Neurological: normal gait, no gross defects     Psychiatric: appropriate mood and affect                               Hematological: normal cervical, supraclavicular lymph nodes     Gastrointestinal:       abdomen soft, non-tender, non-distended    Genitourinary: deferred      Assessment:    Julius Gilliam is a 63 year old woman with a diagnosis of  vulvar cancer.  She is here today for follow up and disease management.     20 minutes were spent with this patient, over 50% of that time was spent in symptom management, treatment planning and in counseling and coordination of care.      Plan:     1.)        Continue with current regimen; encouraged to contact Heide KURTZ chemo RN, with any chemo related concerns. Discussed adding in a nutritional supplement if she feels like she is not eating enough. Reviewed signs and symptoms for when she should contact the clinic or seek additional care. Patient to contact the clinic with any questions or concerns in the interim.     2.) Hypomagnesemia: Rx Mg given today.     3.)        Hypokalemia: Rx K given today.    4.) Labs and/or tests ordered include: none today.      5.) Health maintenance issues addressed today include annual health maintenance and non-gynecologic issues with PCP.    CAROLE Gillette, WHNP-BC, ANP-BC  Women's Health Nurse Practitioner  Adult Nurse Pracitioner  Division of Gynecologic Oncology          CC  Patient Care Team:  Candi De Luna NP as PCP - General (Family Practice)  Lisa Perez, RN as Continuity Care Coordinator (Gyn-Onc)  CANDI DE LUNA

## 2018-03-26 NOTE — PROGRESS NOTES
Infusion Nursing Note:  Julius GONZALEZ Gilliam presents today for cycle 1, day 15 Cisplatin.    Patient seen by provider today: No   present during visit today: Not Applicable.    Note: Patient arrived to infusion center complaining of fatigue, mild weakness, some exertional dyspnea, and a new reddened, tender, slightly raised area to right arm. Reports the redness started yesterday. Heat pack provided and patient instructed to report fever, chills, changes to the reddened area; patient verbalized understanding. Denies complaints of mouth sores, diarrhea, constipation, dysuria, fever, and nausea.    K replaced orally per protocol; mag replaced via IV -- see MAR for details.    After emend infused -- see MAR for details, patient reported some burning at IV site. IV appeared to be infiltrated - tenderness with flush with a small bubble at IV site extending slightly proximally. IV removed, and new IV placed. No redness, tenderness only present with palpation. Warm pack provided for comfort.    Intravenous Access:  Peripheral IV placed.    Treatment Conditions:  Lab Results   Component Value Date    HGB 13.6 03/26/2018     Lab Results   Component Value Date    WBC 4.2 03/26/2018      Lab Results   Component Value Date    ANEU 3.1 03/26/2018     Lab Results   Component Value Date     03/26/2018      Lab Results   Component Value Date     03/26/2018                   Lab Results   Component Value Date    POTASSIUM 3.2 03/26/2018           Lab Results   Component Value Date    MAG 1.2 03/26/2018            Lab Results   Component Value Date    CR 0.80 03/26/2018                   Lab Results   Component Value Date    BERHANE 8.2 03/26/2018                Lab Results   Component Value Date    BILITOTAL 0.6 03/26/2018           Lab Results   Component Value Date    ALBUMIN 3.3 03/26/2018                    Lab Results   Component Value Date    ALT 28 03/26/2018           Lab Results   Component Value Date     AST 27 03/26/2018     Results reviewed, labs MET treatment parameters, ok to proceed with treatment.    Post Infusion Assessment:  Patient tolerated infusion without incident.  Blood return noted pre and post infusion.  Site patent and intact, free from redness, edema or discomfort.  No evidence of extravasations.  Access discontinued per protocol.    Discharge Plan:   Patient declined prescription refills.  Discharge instructions reviewed with: Patient.  Patient and/or family verbalized understanding of discharge instructions and all questions answered.  Copy of AVS reviewed with patient and/or family.  Patient will return 4/2/2018 for next appointment.  Patient discharged in stable condition accompanied by: self.  Departure Mode: Ambulatory.    Justo Irving RN

## 2018-03-26 NOTE — PATIENT INSTRUCTIONS
Contact Numbers    [facility] Main Line: [phone]  [facility] Triage:  [phone]    Call triage with chills and/or temperature greater than or equal to 100.5, uncontrolled nausea/vomiting, diarrhea, constipation, dizziness, shortness of breath, chest pain, bleeding, unexplained bruising, or any new/concerning symptoms, questions/concerns.     If you are having any concerning symptoms or wish to speak to a provider before your next infusion visit, please call your care coordinator or triage to notify them so we can adequately serve you.       After Hours: [phone]    If after hours, weekends, or holidays, call main hospital  and ask for Oncology doctor on call.           March 2018 Sunday Monday Tuesday Wednesday Thursday Friday Saturday                       1     2     [facility] TCT/SIM SUITE   11:00 AM   (60 min.)   Yvon Leal MD   Radiation Oncology Clinic     [facility] RETURN WITH ROOM   12:15 PM   (90 min.)   Nurse, [facility] 3       4     5     6     7     8     [facility] TREATMENT PLAN VISIT    7:00 AM   (15 min.)   Yvon Leal MD   [facility] Oncology Clinic 9     10       11     12  Happy Birthday!     [facility] LAB DRAW    8:15 AM   (15 min.)    [facility] DRAW   [facility] Lab Draw     P ONC INFUSION 360    9:00 AM   (360 min.)    [facility]    4:00 PM   (30 min.)   P RAD ONC MARIE   [facility] Oncology Clinic 13     P EXTERNAL RADIATION TREATMT    4:00 PM   (30 min.)   P RAD ONC MARIE   [facility] Oncology [facility] 14     [facility] EXTERNAL RADIATION TREATMT    4:00 PM   (30 min.)   P RAD ONC MARIE   [facility]cology [facility] 15     [facility] EXTERNAL RADIATION TREATMT    4:00 PM   (30 min.)   P RAD ONC MARIE   Radiation Oncology Clinic     P ON TREATMENT VISIT    4:30 PM   (15 min.)   Sydney Cruz MD   [facility]cology [facility] 16     [facility] EXTERNAL RADIATION TREATMT   12:15 PM   (30  min.)   [facility] RAD ONC MARIE   [facility] 17       18     19     [facility] DRAW    7:15 AM   (15 min.)    [facility]     [facility] ONC INFUSION 360    8:00 AM   (360 min.)   [facility] ONCOLOGY INFUSION   [facility]     [facility] EXTERNAL RADIATION TREATMT    4:00 PM   (30 min.)   [facility] RAD ONC MARIE   [facility] 20     [facility] EXTERNAL RADIATION TREATMT    2:30 PM   (30 min.)   [facility] RAD ONC MARIE   [facility] 21     [facility] EXTERNAL RADIATION TREATMT    2:30 PM   (30 min.)   [facility] RAD ONC MARIE   [facility] 22     [facility] EXTERNAL RADIATION TREATMT    2:30 PM   (30 min.)   P RAD ONC MARIE   [facility]cology Clinic 23     LAB WITH HB CLINIC    7:00 AM   (15 min.)    LAB   [facility] Lab     UMP NEW GENERAL LIVER    7:45 AM   (30 min.)   Malgorzata Madden MD   [facility] Hepatology     UMP FIBROSIS SCAN    8:45 AM   (30 min.)   [facility] FIBROSIS SCAN   [facility] Hepatology     LAB WITH HB CLINIC    9:30 AM   (15 min.)    LAB   [facility] Lab     [facility] EXTERNAL RADIATION TREATMT    2:30 PM   (30 min.)   P RAD ONC MARIE   Radiation Oncology Clinic     [facility] ON TREATMENT VISIT    2:45 PM   (15 min.)   Yvon Leal MD   [facility]cology Clinic 24       25     26     [facility] DRAW    8:30 AM   (15 min.)    [facility] LAB DRAW   [facility] Lab Draw     [facility] ONC INFUSION 360    9:00 AM   (360 min.)   [facility] ONCOLOGY INFUSION   [facility]     [facility] EXTERNAL RADIATION TREATMT    2:45 PM   (30 min.)   P RAD ONC MARIE   [facility] Oncology Clinic 27     [facility] RETURN    2:15 PM   (30 min.)   Karin Scott APRN CNP   [facility]     UMP EXTERNAL RADIATION TREATMT    2:30 PM   (30 min.)   P RAD ONC MARIE   [facility]cology [facility] 28     US ABDOMEN COMPLETE   11:15 AM   (45 min.)   [id number]   [facility] US     UMP EXTERNAL RADIATION TREATMT    2:30 PM   (30 min.)   [facility] RAD ONC MARIE    Radiation Oncology Clinic     [facility] ON TREATMENT VISIT    3:00 PM   (15 min.)   Yvon Leal MD   Radiation Oncology Clinic 29     [facility] EXTERNAL RADIATION TREATMT    2:30 PM   (30 min.)   [facility] RAD ONC MARIE   Radiation Oncology Clinic 30     PROCEDURE - 4.5 HR   10:30 AM   (270 min.)   U2A ROOM 11   [address] [facility]     IR LYMPH NODE BIOPSY   11:45 AM   (90 min.)   [id number]   [facility] [place], Interventional Radiology     P EXTERNAL RADIATION TREATMT    2:30 PM   (30 min.)   P RAD ONC MARIE   [facility] Oncology Clinic 31 April 2018 Sunday Monday Tuesday Wednesday Thursday Friday Saturday   1     2     [facility] DRAW    6:30 AM   (15 min.)   [facility] DRAW    [facility] Lab Draw     P ONC INFUSION 360    7:00 AM   (360 min.)   [facility] ONCOLOGY INFUSION   [facility]    2:15 PM   (30 min.)   P RAD ONC MARIE   Rad[facility] Oncology Clinic 3     [facility] EXTERNAL RADIATION TREATMT    2:30 PM   (30 min.)   P RAD ONC MARIE   [facility] Oncology Clinic 4     [facility] EXTERNAL RADIATION TREATMT    2:30 PM   (30 min.)   P RAD ONC MARIE   [facility] Oncology Clinic 5     [facility] EXTERNAL RADIATION TREATMT    2:30 PM   (30 min.)   [facility] RAD ONC MARIE   Radiation Oncology Clinic     [facility] ON TREATMENT VISIT    2:45 PM   (15 min.)   Yvon Leal MD   [facility] Oncology Clinic 6     [facility] EXTERNAL RADIATION TREATMT    2:30 PM   (30 min.)   P RAD ONC MARIE   [facility]cology Clinic 7       8     9     [facility] EXTERNAL RADIATION TREATMT    2:15 PM   (30 min.)   P RAD ONC MARIE   [facility] 10     [facility] DRAW    6:30 AM   (15 min.)   [facility] DRAW    [facility] Lab Draw     P ONC INFUSION 360    7:00 AM   (360 min.)   [facility] ONCOLOGY INFUSION   [facility]     [facility] EXTERNAL RADIATION TREATMT    2:30 PM   (30 min.)   [facility] RAD ONC MARIE   Radiation Oncology Clinic 11     [facility] EXTERNAL RADIATION TREATMT    2:30 PM   (30  min.)   [facility] RAD ONC MARIE   [facility]cology [facility] 12     [facility] EXTERNAL RADIATION TREATMT    2:30 PM   (30 min.)   [facility] RAD ONC MARIE   [facility]cology Clinic 13     [facility] EXTERNAL RADIATION TREATMT    2:30 PM   (30 min.)   [facility] RAD ONC MARIE   [facility] Oncology Clinic 14       15     16     [facility] DRAW    7:30 AM   (15 min.)    [facility] LAB DRAW   [facility] Lab Draw     [facility] ONC INFUSION 360    8:00 AM   (360 min.)   [facility] ONCOLOGY INFUSION   [facility]     UMP EXTERNAL RADIATION TREATMT    2:30 PM   (30 min.)   [facility] RAD ONC MARIE   [facility] Oncology Clinic 17     [facility] EXTERNAL RADIATION TREATMT    2:30 PM   (30 min.)   [facility] RAD ONC MARIE   [facility] Oncology Clinic 18     [facility] EXTERNAL RADIATION TREATMT   12:30 PM   (30 min.)   P RAD ONC MARIE   [facility] Oncology Clinic 19     [facility] EXTERNAL RADIATION TREATMT    2:30 PM   (30 min.)   P RAD ONC MARIE   [facility] Oncology Clinic 20     UMP EXTERNAL RADIATION TREATMT    2:30 PM   (30 min.)   P RAD ONC MARIE   Radiation Oncology Clinic 21       22     23     [facility] EXTERNAL RADIATION TREATMT    2:15 PM   (30 min.)   [facility] RAD ONC MARIE   [facility]cology Clinic 24     [facility] EXTERNAL RADIATION TREATMT    2:30 PM   (30 min.)   [facility] RAD ONC MARIE   [facility] 25     [facility] EXTERNAL RADIATION TREATMT    2:30 PM   (30 min.)   P RAD ONC MARIE   [facility] Oncology Clinic 26     [facility] EXTERNAL RADIATION TREATMT    2:30 PM   (30 min.)   P RAD ONC MARIE   [facility] Oncology Clinic 27     UMP EXTERNAL RADIATION TREATMT    2:30 PM   (30 min.)   P RAD ONC MARIE   [facility] Oncology Clinic 28       29     30     UMP EXTERNAL RADIATION TREATMT    2:30 PM   (30 min.)   P RAD ONC MARIE   Radiation Oncology Clinic                                       Lab Results:  Recent Results (from the past 12 hour(s))   CBC with platelets differential    Collection Time: 03/26/18  9:13 AM   Result Value Ref Range    WBC 4.2 4.0 - 11.0 10e9/L    RBC Count 4.00 3.8  - 5.2 10e12/L    Hemoglobin 13.6 11.7 - 15.7 g/dL    Hematocrit 39.9 35.0 - 47.0 %     78 - 100 fl    MCH 34.0 (H) 26.5 - 33.0 pg    MCHC 34.1 31.5 - 36.5 g/dL    RDW 12.6 10.0 - 15.0 %    Platelet Count 137 (L) 150 - 450 10e9/L    Diff Method Automated Method     % Neutrophils 74.0 %    % Lymphocytes 12.0 %    % Monocytes 10.6 %    % Eosinophils 1.9 %    % Basophils 1.0 %    % Immature Granulocytes 0.5 %    Nucleated RBCs 0 0 /100    Absolute Neutrophil 3.1 1.6 - 8.3 10e9/L    Absolute Lymphocytes 0.5 (L) 0.8 - 5.3 10e9/L    Absolute Monocytes 0.4 0.0 - 1.3 10e9/L    Absolute Eosinophils 0.1 0.0 - 0.7 10e9/L    Absolute Basophils 0.0 0.0 - 0.2 10e9/L    Abs Immature Granulocytes 0.0 0 - 0.4 10e9/L    Absolute Nucleated RBC 0.0    Comprehensive metabolic panel    Collection Time: 03/26/18  9:13 AM   Result Value Ref Range    Sodium 129 (L) 133 - 144 mmol/L    Potassium 3.2 (L) 3.4 - 5.3 mmol/L    Chloride 92 (L) 94 - 109 mmol/L    Carbon Dioxide 30 20 - 32 mmol/L    Anion Gap 7 3 - 14 mmol/L    Glucose 101 (H) 70 - 99 mg/dL    Urea Nitrogen 9 7 - 30 mg/dL    Creatinine 0.80 0.52 - 1.04 mg/dL    GFR Estimate 73 >60 mL/min/1.7m2    GFR Estimate If Black 88 >60 mL/min/1.7m2    Calcium 8.2 (L) 8.5 - 10.1 mg/dL    Bilirubin Total 0.6 0.2 - 1.3 mg/dL    Albumin 3.3 (L) 3.4 - 5.0 g/dL    Protein Total 7.0 6.8 - 8.8 g/dL    Alkaline Phosphatase 100 40 - 150 U/L    ALT 28 0 - 50 U/L    AST 27 0 - 45 U/L   Magnesium    Collection Time: 03/26/18  9:13 AM   Result Value Ref Range    Magnesium 1.2 (L) 1.6 - 2.3 mg/dL

## 2018-03-27 ENCOUNTER — ONCOLOGY VISIT (OUTPATIENT)
Dept: ONCOLOGY | Facility: CLINIC | Age: 63
End: 2018-03-27
Attending: NURSE PRACTITIONER
Payer: COMMERCIAL

## 2018-03-27 ENCOUNTER — TELEPHONE (OUTPATIENT)
Dept: INTERVENTIONAL RADIOLOGY/VASCULAR | Facility: CLINIC | Age: 63
End: 2018-03-27

## 2018-03-27 ENCOUNTER — APPOINTMENT (OUTPATIENT)
Dept: RADIATION ONCOLOGY | Facility: CLINIC | Age: 63
End: 2018-03-27
Attending: RADIOLOGY
Payer: COMMERCIAL

## 2018-03-27 VITALS
DIASTOLIC BLOOD PRESSURE: 80 MMHG | RESPIRATION RATE: 16 BRPM | TEMPERATURE: 97.6 F | SYSTOLIC BLOOD PRESSURE: 115 MMHG | HEART RATE: 64 BPM | WEIGHT: 143.7 LBS | HEIGHT: 66 IN | BODY MASS INDEX: 23.09 KG/M2 | OXYGEN SATURATION: 97 %

## 2018-03-27 DIAGNOSIS — C51.9 MALIGNANT NEOPLASM OF VULVA (H): Primary | ICD-10-CM

## 2018-03-27 DIAGNOSIS — E87.6 HYPOKALEMIA: ICD-10-CM

## 2018-03-27 DIAGNOSIS — Z79.899 ENCOUNTER FOR LONG-TERM (CURRENT) USE OF MEDICATIONS: ICD-10-CM

## 2018-03-27 DIAGNOSIS — E83.42 HYPOMAGNESEMIA: ICD-10-CM

## 2018-03-27 DIAGNOSIS — Z51.11 ENCOUNTER FOR ANTINEOPLASTIC CHEMOTHERAPY: ICD-10-CM

## 2018-03-27 PROCEDURE — 77386 ZZH IMRT TREATMENT DELIVERY, COMPLEX: CPT | Performed by: RADIOLOGY

## 2018-03-27 PROCEDURE — G0463 HOSPITAL OUTPT CLINIC VISIT: HCPCS | Mod: 25

## 2018-03-27 PROCEDURE — 99214 OFFICE O/P EST MOD 30 MIN: CPT | Mod: 24 | Performed by: NURSE PRACTITIONER

## 2018-03-27 PROCEDURE — G0463 HOSPITAL OUTPT CLINIC VISIT: HCPCS | Mod: ZF

## 2018-03-27 RX ORDER — POTASSIUM CHLORIDE 1500 MG/1
20 TABLET, EXTENDED RELEASE ORAL DAILY
Qty: 60 TABLET | Refills: 0 | Status: SHIPPED | OUTPATIENT
Start: 2018-03-27 | End: 2018-04-04

## 2018-03-27 ASSESSMENT — ENCOUNTER SYMPTOMS
BRUISES/BLEEDS EASILY: 0
EYE WATERING: 0
BLOOD IN STOOL: 0
MUSCLE WEAKNESS: 0
INSOMNIA: 0
NECK PAIN: 0
HYPERTENSION: 0
LEG PAIN: 0
DECREASED CONCENTRATION: 0
SWOLLEN GLANDS: 0
WEAKNESS: 0
EYE REDNESS: 0
CHILLS: 0
WEIGHT LOSS: 0
SLEEP DISTURBANCES DUE TO BREATHING: 0
FLANK PAIN: 0
DECREASED LIBIDO: 0
SNORES LOUDLY: 0
ALTERED TEMPERATURE REGULATION: 0
HOARSE VOICE: 0
EYE IRRITATION: 0
VOMITING: 0
SPUTUM PRODUCTION: 0
STIFFNESS: 0
SPEECH CHANGE: 0
PANIC: 0
SKIN CHANGES: 0
DOUBLE VISION: 0
EXERCISE INTOLERANCE: 0
RECTAL PAIN: 0
BLOATING: 0
BREAST PAIN: 0
HEARTBURN: 0
PALPITATIONS: 0
NAUSEA: 0
DIARRHEA: 0
JOINT SWELLING: 0
BACK PAIN: 0
TINGLING: 0
SHORTNESS OF BREATH: 0
NIGHT SWEATS: 0
EXTREMITY NUMBNESS: 0
HOT FLASHES: 0
RECTAL BLEEDING: 0
COUGH: 0
WHEEZING: 0
MYALGIAS: 0
POLYDIPSIA: 0
POOR WOUND HEALING: 0
SEIZURES: 0
JAUNDICE: 0
DISTURBANCES IN COORDINATION: 0
NUMBNESS: 0
SORE THROAT: 0
CLAUDICATION: 0
DYSURIA: 0
DYSPNEA ON EXERTION: 0
SINUS PAIN: 0
COUGH DISTURBING SLEEP: 0
HEMOPTYSIS: 0
ARTHRALGIAS: 0
ABDOMINAL PAIN: 0
DIZZINESS: 0
HYPOTENSION: 0
NERVOUS/ANXIOUS: 0
SYNCOPE: 0
LIGHT-HEADEDNESS: 0
MUSCLE CRAMPS: 0
PARALYSIS: 0
SINUS CONGESTION: 0
DIFFICULTY URINATING: 0
DEPRESSION: 0
EYE PAIN: 0
WEIGHT GAIN: 0
MEMORY LOSS: 0
TACHYCARDIA: 0
FATIGUE: 0
CONSTIPATION: 0
TASTE DISTURBANCE: 0
NAIL CHANGES: 0
NECK MASS: 0
HEADACHES: 0
BOWEL INCONTINENCE: 0
INCREASED ENERGY: 0
FEVER: 0
LEG SWELLING: 0
SMELL DISTURBANCE: 0
HEMATURIA: 0
LOSS OF CONSCIOUSNESS: 0
BREAST MASS: 0
HALLUCINATIONS: 0
RESPIRATORY PAIN: 0
DECREASED APPETITE: 0
TREMORS: 0
ORTHOPNEA: 0
TROUBLE SWALLOWING: 0
POLYPHAGIA: 0
POSTURAL DYSPNEA: 0

## 2018-03-27 ASSESSMENT — PAIN SCALES - GENERAL: PAINLEVEL: NO PAIN (0)

## 2018-03-27 NOTE — LETTER
3/27/2018       RE: Julius Gilliam  PO   [facility] [address]     Dear Colleague,    Thank you for referring your patient, Julius Gilliam, to the [facility]. Please see a copy of my visit note below.                Follow Up Notes on Referred Patient    Date: 3/27/2018       Dr. Tova Montesinos, NP  [facility]  [address] [place] [address]       RE: Julius Gilliam  : 1955  EJ: 3/27/2018    Dear Dr. Tova Montesinos:    Julius Gilliam is a 63 year old woman with a diagnosis of vulvar cancer.   She is here today for follow up and disease management.     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 Lymph nodes, left inguinal:   - Metastatic squamous cell carcinoma to three of seven lymph nodes examined (3/7)   - The largest metastatic focus is 2.0 cm in greatest dimension with extranodal extension     B. Lymph nodes, right inguinal:   - Two reactive lymph nodes, negative for malignancy (0/2)    3/12/18: Cisplatin + radiation      Today she comes to clinic stating she is tolerating her treatments pretty well. She is using Proshield as needed. She has started to have some diarrhea and does have Imodium for this. She denies any n/v related to her chemotherapy; she has taken her Ativan 3 times and has not taken her Compazine. She denies any vaginal bleeding, no changes in her  bowel or bladder habits, no lower extremity edema, and no difficulties eating or sleeping. She denies any abdominal discomfort/bloating, no fevers or chills, and no chest pain or shortness of breath. She does not need any medication refills. She continues to smoke about 1/2 ppd.       Review of Systems     Constitutional:  Negative for fever, chills, weight loss, weight gain, fatigue, decreased appetite, night sweats, recent stressors, height gain, height loss, post-operative complications, incisional pain, hallucinations, increased energy, hyperactivity and confused.   HENT:  Negative for ear pain, hearing loss, tinnitus, nosebleeds, trouble swallowing, hoarse voice, mouth sores, sore throat, ear discharge, tooth pain, gum tenderness, taste disturbance, smell disturbance, hearing aid, bleeding gums, dry mouth, sinus pain, sinus congestion and neck mass.    Eyes:  Negative for double vision, pain, redness, eye pain, decreased vision, eye watering, eye bulging, eye dryness, flashing lights, spots, floaters, strabismus, tunnel vision, jaundice and eye irritation.   Respiratory:   Negative for cough, hemoptysis, sputum production, shortness of breath, wheezing, sleep disturbances due to breathing, snores loudly, respiratory pain, dyspnea on exertion, cough disturbing sleep and postural dyspnea.    Cardiovascular:  Negative for chest pain, dyspnea on exertion, palpitations, orthopnea, claudication, leg swelling, fingers/toes turn blue, hypertension, hypotension, syncope, history of heart murmur, chest pain on exertion, chest pain at rest, pacemaker, few scattered varicosities, leg pain, sleep disturbances due to breathing, tachycardia, light-headedness, exercise intolerance and edema.   Gastrointestinal:  Negative for heartburn, nausea, vomiting, abdominal pain, diarrhea, constipation, blood in stool, melena, rectal pain, bloating, hemorrhoids, bowel incontinence, jaundice, rectal bleeding, coffee ground emesis and  change in stool.   Genitourinary:  Negative for bladder incontinence, dysuria, urgency, hematuria, flank pain, vaginal discharge, difficulty urinating, genital sores, dyspareunia, decreased libido, nocturia, voiding less frequently, arousal difficulty, abnormal vaginal bleeding, excessive menstruation, menstrual changes, hot flashes, vaginal dryness and postmenopausal bleeding.   Musculoskeletal:  Negative for myalgias, back pain, joint swelling, arthralgias, stiffness, muscle cramps, neck pain, bone pain, muscle weakness and fracture.   Skin:  Negative for nail changes, itching, poor wound healing, rash, hair changes, skin changes, acne, warts, poor wound healing, scarring, flaky skin, Raynaud's phenomenon, sensitivity to sunlight and skin thickening.   Neurological:  Negative for dizziness, tingling, tremors, speech change, seizures, loss of consciousness, weakness, light-headedness, numbness, headaches, disturbances in coordination, extremity numbness, memory loss, difficulty walking and paralysis.   Endo/Heme:  Negative for anemia, swollen glands and bruises/bleeds easily.   Psychiatric/Behavioral:  Negative for depression, hallucinations, memory loss, decreased concentration, mood swings and panic attacks.    Breast:  Negative for breast discharge, breast mass, breast pain and nipple retraction.   Endocrine:  Negative for altered temperature regulation, polyphagia, polydipsia, unwanted hair growth and change in facial hair.        Past Medical History:    Past Medical History:   Diagnosis Date     Deafness in right ear      Hypertension      Multiple facial bone fractures (H)     also left ankle and left wrist     Vertigo      Vulvar cancer (H) 12/2017         Past Surgical History:    Past Surgical History:   Procedure Laterality Date     DISSECT LYMPH NODE INGUINAL N/A 2/9/2018    Procedure: DISSECT LYMPH NODE INGUINAL;  Bilateral Inguinal Lymph Node Dissection;  Surgeon: Santosh Vera MD;   "Location: [facility] OR     ENDOSCOPIC STRIPPING VEIN(S)       FACIAL RECONSTRUCTION SURGERY      post horse trauma     repair of left arm fracture           Health Maintenance Due   Topic Date Due     TETANUS IMMUNIZATION (SYSTEM ASSIGNED)  03/12/1974     PAP SCREENING Q3 YR (SYSTEM ASSIGNED)  03/12/1977     LIPID SCREEN Q5 YR FEMALE (SYSTEM ASSIGNED)  03/12/2001     ADVANCE DIRECTIVE PLANNING Q5 YRS  03/12/2011       Current Medications:     Current Outpatient Prescriptions   Medication Sig Dispense Refill     LORazepam (ATIVAN) 1 MG tablet Take 1 tablet (1 mg) by mouth every 6 hours as needed (nausea/vomiting, anxiety or sleep ) 30 tablet 1     prochlorperazine (COMPAZINE) 10 MG tablet Take 1 tablet (10 mg) by mouth every 6 hours as needed (nausea/vomiting) 30 tablet 2     acetaminophen (TYLENOL) 325 MG tablet Take 2 tablets (650 mg) by mouth every 6 hours 30 tablet 0     sennosides (SENOKOT) 8.6 MG tablet Take 1 tablet by mouth 2 times daily as needed for constipation 60 tablet 0     multivitamin, therapeutic with minerals (MULTI-VITAMIN) TABS tablet Take 1 tablet by mouth daily       oxyCODONE IR (ROXICODONE) 5 MG tablet Take 1 tablet (5 mg) by mouth every 4 hours as needed for pain (Patient not taking: Reported on 3/27/2018) 20 tablet 0         Allergies:      No Known Allergies     Social History:     Social History   Substance Use Topics     Smoking status: Current Every Day Smoker     Types: Cigarettes     Smokeless tobacco: Never Used      Comment: <1 pack/ day.  30 pack/yers     Alcohol use Yes      Comment: 0-3 drinks per day       History   Drug Use No         Family History:       Family History   Problem Relation Age of Onset     CANCER Mother 81     Breast ca         Physical Exam:     /80  Pulse 64  Temp 97.6  F (36.4  C) (Oral)  Resp 16  Ht 1.689 m (5' 6.5\")  Wt 65.2 kg (143 lb 11.2 oz)  SpO2 97%  BMI 22.85 kg/m2  Body mass index is 22.85 kg/(m^2).    General Appearance: healthy and alert, no " distress     HEENT: no thyromegaly, no palpable nodules or masses        Cardiovascular: regular rate and rhythm, no gallops, rubs or murmurs     Respiratory: lungs clear, no rales, rhonchi or wheezes, normal diaphragmatic excursion    Musculoskeletal: extremities non tender and without edema    Skin: no lesions or rashes     Neurological: normal gait, no gross defects     Psychiatric: appropriate mood and affect                               Hematological: normal cervical, supraclavicular lymph nodes     Gastrointestinal:       abdomen soft, non-tender, non-distended    Genitourinary: deferred      Assessment:    Julius Gilliam is a 63 year old woman with a diagnosis of  vulvar cancer.  She is here today for follow up and disease management.     20 minutes were spent with this patient, over 50% of that time was spent in symptom management, treatment planning and in counseling and coordination of care.      Plan:     1.)        Continue with current regimen; encouraged to contact karina Pennington RN, with any chemo related concerns. Discussed adding in a nutritional supplement if she feels like she is not eating enough. Reviewed signs and symptoms for when she should contact the clinic or seek additional care. Patient to contact the clinic with any questions or concerns in the interim.     2.) Hypomagnesemia: Rx Mg given today.     3.)        Hypokalemia: Rx K given today.    4.) Labs and/or tests ordered include: none today.      5.) Health maintenance issues addressed today include annual health maintenance and non-gynecologic issues with PCP.    CAROLE Gillette, WHNP-BC, ANP-BC  Women's Health Nurse Practitioner  Adult Nurse Pracitioner  Division of Gynecologic Oncology          CC  Patient Care Team:  Tova Montesinos NP as PCP - General (Family Practice)  Lisa Perez RN as Continuity Care Coordinator (Gyn-Onc)

## 2018-03-27 NOTE — NURSING NOTE
"Oncology Rooming Note    March 27, 2018 2:38 PM   Julius Gilliam is a 63 year old female who presents for:    Chief Complaint   Patient presents with     Oncology Clinic Visit     f/u Malignant neoplasm of Vulva     Initial Vitals: /80  Pulse 64  Temp 97.6  F (36.4  C) (Oral)  Resp 16  Ht 1.689 m (5' 6.5\")  Wt 65.2 kg (143 lb 11.2 oz)  SpO2 97%  BMI 22.85 kg/m2 Estimated body mass index is 22.85 kg/(m^2) as calculated from the following:    Height as of this encounter: 1.689 m (5' 6.5\").    Weight as of this encounter: 65.2 kg (143 lb 11.2 oz). Body surface area is 1.75 meters squared.  No Pain (0) Comment: Data Unavailable   No LMP recorded. Patient is postmenopausal.  Allergies reviewed: Yes  Medications reviewed: Yes    Medications: Medication refills not needed today.  Pharmacy name entered into EPIC: Data Unavailable    Clinical concerns: none Karin was NOT notified.    10 minutes for nursing intake (face to face time)     NAY IRAHETA LPN            "

## 2018-03-27 NOTE — MR AVS SNAPSHOT
After Visit Summary   3/27/2018    Julius Gilliam    MRN: [id number]           Patient Information     Date Of Birth          1955        Visit Information        Provider Department      3/27/2018 2:30 PM Karin Scott APRN [facility]        Today's Diagnoses     Malignant neoplasm of vulva (H)    -  1    Encounter for long-term (current) use of medications        Encounter for antineoplastic chemotherapy        Hypokalemia        Hypomagnesemia           Follow-ups after your visit        Follow-up notes from your care team     Return if symptoms worsen or fail to improve.      Your next 10 appointments already scheduled     Mar 28, 2018 11:15 AM CDT   US ABDOMEN COMPLETE with UCUS2   [facility] US ([facility])    [address]  1st Floor  [facility] [id number] [phone]           Please bring a list of your medicines (including vitamins, minerals and over-the-counter drugs). Also, tell your doctor about any allergies you may have. Wear comfortable clothes and leave your valuables at home.  Adults: No eating or drinking for 8 hours before the exam. You may take medicine with a small sip of water.  Children: - Children 6+ years: No food or drink for 6 hours before exam. - Children 1-5 years: No food or drink for 4 hours before exam. - Infants, breast-fed: may have breast milk up to 2 hours before exam. - Infants, formula: may have bottle until 4 hours before exam.  Please call the Imaging Department at your exam site with any questions.            Mar 28, 2018  2:30 PM CDT   EXTERNAL RADIATION TREATMENT with [facility] RAD ONC MARIE   Radiation Oncology Clinic ([facility])    [facility]  1st Floor  [address] [id number] [phone]            Mar 28, 2018  3:00 PM CDT   ON TREATMENT VISIT with Yvon Leal MD   Radiation Oncology Clinic ([facility])     [facility]  1st Floor  [address] [address]   [phone]            Mar 29, 2018  2:30 PM CDT   EXTERNAL RADIATION TREATMENT with [facility] RAD ONC MARIE   Radiation Oncology Clinic ([facility])    [facility]  1st Floor  [address] [address]   [phone]            Mar 30, 2018 10:30 AM CDT   Procedure 4.5 hour with U2A ROOM 11   [address] [facility] ([facility])    [address] [address]               Mar 30, 2018 12:00 PM CDT   (Arrive by 11:45 AM)   IR LYMPH NODE BIOPSY with UUIR6   [facility] [place], Interventional Radiology ([facility])    [address] [address]   [phone]           1. Laboratory test are to be obtained by your doctor prior to the exam (CBCP, INR and PTT) 2. Someone will need to drive you to and from the hospital. 3. If you are or may be pregnant, contact your doctor or a Radiology nurse prior to the day of the exam. 4. If you have diabetes, check with your doctor or a Radiology nurse to see if your insulin needs to be adjusted for the exam. 5. If you are taking Coumadin (to thin you blood) please contact your doctor or a Radiology nurse at least 3 days before the exam for special instructions. 6. The day before your exam you may eat your regular diet and are encouraged to drink at least 2 quarts of clear liquids. Drink no alcoholic beverages for 24 hours prior to the exam. 7. Do not eat any solid food or milk products for 6 hours prior to the exam. You may drink clear liquids until 2 hours prior to the exam. Clear liquids include the following: water, Jell-O, clear broth, apple juice or any noncarbonated drink that you can see through (no pop!) 8. The morning of the exam you may brush your teeth and take medications as directed with a sip of water.  9. Tell the Radiology nurse if you have any allergies. 10. You will be asked to empty your bladder before the exam begins. 11. You may resume your normal activities the day after the exam. Do not do any strenuous exercises or lifting for 48 hours. 12. If a drainage tube is to remain in place, your doctor s office will need to make arrangements for you to learn how to care for this tube. Ask them about this before the date of the exam. You may need to obtain supplies from your local pharmacy. Please make an appointment before leaving your current appointment. You should understand the plan for your care prior to leaving this appointment            Mar 30, 2018  2:30 PM CDT   EXTERNAL RADIATION TREATMENT with [facility] RAD ONC MARIE   Radiation Oncology Clinic ([facility])    [facility]  1st Floor  [address] [address]   [phone]            Apr 02, 2018  6:30 AM CDT   [facility] Lab Draw with UC MASONIC LAB DRAW   [facility] Lab Draw ([facility])    [address]  [address]  [facility] [address] [phone]            Apr 02, 2018  7:00 AM CDT   Infusion 360 with UC ONCOLOGY INFUSION, UC 12 ATC   [facility] ([facility])    [address]  [address]  [facility] [address] [phone]              Who to contact     If you have questions or need follow up information about today's clinic visit or your schedule please contact [facility] directly at [phone].  Normal or non-critical lab and imaging results will be communicated to you by MyChart, letter or phone within 4 business days after the clinic has received the results. If you do not hear from us within 7 days, please contact the clinic through MyChart or phone. If you have a critical or abnormal lab result, we will notify you by phone as soon as possible.  Submit refill requests through  "[employer] or call your pharmacy and they will forward the refill request to us. Please allow 3 business days for your refill to be completed.          Additional Information About Your Visit        MyChart Information     [employer] gives you secure access to your electronic health record. If you see a primary care provider, you can also send messages to your care team and make appointments. If you have questions, please call your primary care clinic.  If you do not have a primary care provider, please call [phone] and they will assist you.        Care EveryWhere ID     This is your Care EveryWhere ID. This could be used by other organizations to access your [facility] medical records  [id number]        Your Vitals Were     Pulse Temperature Respirations Height Pulse Oximetry BMI (Body Mass Index)    64 97.6  F (36.4  C) (Oral) 16 1.689 m (5' 6.5\") 97% 22.85 kg/m2       Blood Pressure from Last 3 Encounters:   03/27/18 115/80   03/26/18 127/73   03/23/18 127/77    Weight from Last 3 Encounters:   03/27/18 65.2 kg (143 lb 11.2 oz)   03/26/18 63.6 kg (140 lb 4.8 oz)   03/23/18 64.5 kg (142 lb 1.6 oz)              Today, you had the following     No orders found for display         Today's Medication Changes          These changes are accurate as of 3/27/18  2:58 PM.  If you have any questions, ask your nurse or doctor.               Start taking these medicines.        Dose/Directions    magnesium oxide 400 (241.3 MG) MG tablet   Commonly known as:  MAG-OX   Used for:  Malignant neoplasm of vulva (H), Hypomagnesemia   Started by:  Karin Scott APRN CNP        Dose:  400 mg   Take 1 tablet (400 mg) by mouth daily   Quantity:  60 tablet   Refills:  0       potassium chloride SA 20 MEQ CR tablet   Commonly known as:  KLOR-CON   Used for:  Malignant neoplasm of vulva (H), Hypokalemia   Started by:  Karin Scott APRN CNP        Dose:  20 mEq   Take 1 tablet (20 mEq) by mouth daily   Quantity:  60 tablet "   Refills:  0            Where to get your medicines      These medications were sent to [facility] [place], [place] - [address]  [address], [facility] [address]    Hours:  TRANSPLANT PHONE NUMBER [phone] Phone:  [phone]     magnesium oxide 400 (241.3 MG) MG tablet    potassium chloride SA 20 MEQ CR tablet                Primary Care Provider Office Phone # Fax #    Tvoa Montesinos, [phone]       [facility] [address] [facility] [address]        Equal Access to Services     CAREY COSME : Hadii aad ku hadasho Soomaali, waaxda luqadaha, qaybta kaalmada adeegyada, talha cárdenas . So [facility] [phone].    ATENCIÓN: Si habla español, tiene a elizabeth disposición servicios gratuitos de asistencia lingüística. [facility] [phone].    We comply with applicable federal civil rights laws and [place] laws. We do not discriminate on the basis of race, color, national origin, age, disability, sex, sexual orientation, or gender identity.            Thank you!     Thank you for choosing [facility]  for your care. Our goal is always to provide you with excellent care. Hearing back from our patients is one way we can continue to improve our services. Please take a few minutes to complete the written survey that you may receive in the mail after your visit with us. Thank you!             Your Updated Medication List - Protect others around you: Learn how to safely use, store and throw away your medicines at www.disposemymeds.org.          This list is accurate as of 3/27/18  2:58 PM.  Always use your most recent med list.                   Brand Name Dispense Instructions for use Diagnosis    acetaminophen 325 MG tablet    TYLENOL    30 tablet    Take 2 tablets (650 mg) by mouth every 6 hours    H/O lymph node excision       LORazepam 1 MG tablet    ATIVAN    30 tablet    Take 1  tablet (1 mg) by mouth every 6 hours as needed (nausea/vomiting, anxiety or sleep )    Encounter for long-term (current) use of medications, Malignant neoplasm of vulva (H)       magnesium oxide 400 (241.3 MG) MG tablet    MAG-OX    60 tablet    Take 1 tablet (400 mg) by mouth daily    Malignant neoplasm of vulva (H), Hypomagnesemia       Multi-vitamin Tabs tablet      Take 1 tablet by mouth daily        oxyCODONE IR 5 MG tablet    ROXICODONE    20 tablet    Take 1 tablet (5 mg) by mouth every 4 hours as needed for pain    H/O lymph node excision       potassium chloride SA 20 MEQ CR tablet    KLOR-CON    60 tablet    Take 1 tablet (20 mEq) by mouth daily    Malignant neoplasm of vulva (H), Hypokalemia       prochlorperazine 10 MG tablet    COMPAZINE    30 tablet    Take 1 tablet (10 mg) by mouth every 6 hours as needed (nausea/vomiting)    Encounter for long-term (current) use of medications, Malignant neoplasm of vulva (H)       sennosides 8.6 MG tablet    SENOKOT    60 tablet    Take 1 tablet by mouth 2 times daily as needed for constipation    H/O lymph node excision

## 2018-03-28 ENCOUNTER — RADIANT APPOINTMENT (OUTPATIENT)
Dept: ULTRASOUND IMAGING | Facility: CLINIC | Age: 63
End: 2018-03-28
Attending: INTERNAL MEDICINE
Payer: COMMERCIAL

## 2018-03-28 ENCOUNTER — OFFICE VISIT (OUTPATIENT)
Dept: RADIATION ONCOLOGY | Facility: CLINIC | Age: 63
End: 2018-03-28
Attending: RADIOLOGY
Payer: COMMERCIAL

## 2018-03-28 VITALS — BODY MASS INDEX: 22.59 KG/M2 | WEIGHT: 142.1 LBS

## 2018-03-28 DIAGNOSIS — B19.20 HEPATITIS C VIRUS INFECTION, UNSPECIFIED CHRONICITY: ICD-10-CM

## 2018-03-28 DIAGNOSIS — C51.9 VULVAR CANCER (H): Primary | ICD-10-CM

## 2018-03-28 LAB — HCV GENTYP SERPL NAA+PROBE: NORMAL

## 2018-03-28 PROCEDURE — 77386 ZZH IMRT TREATMENT DELIVERY, COMPLEX: CPT | Performed by: RADIOLOGY

## 2018-03-28 NOTE — LETTER
Date:March 29, 2018      Provider requested that no letter be sent. Do not send.       [facility] Health Information

## 2018-03-28 NOTE — LETTER
3/28/2018       RE: Julius Gilliam  PO   [facility] [address]     Dear Colleague,    Thank you for referring your patient, Julius Gilliam, to the RADIATION ONCOLOGY CLINIC. Please see a copy of my visit note below.    [facility] PHYSICIANS  SPECIALIZING IN BREAKTHROUGHS  Radiation Oncology    On Treatment Visit Note      Julius Gilliam      Date: 3/28/2018   MRN: [id number]   : 1955  Diagnosis: Vulvar cancer      Reason for Visit:  On Radiation Treatment Visit     Treatment Summary to Date  Treatment Site: Vulva Current Dose: 2275/6300cGy cGy Fractions: fx      Chemotherapy  Chemo concurrent with radx?: Yes  Oncologist: Dr. Vera  Drug Name/Frequency 1: Cisplatin weekly    Subjective:   Julius is doing well this week.  She does not any complaints.  Take 2 to 3 tablets of imodium a day for loose stools.  Using Proshield for skin care.     Nursing ROS:   Nutrition Alteration  Diet Type: Patient's Preference  Skin  Skin Reaction: 0 - No changes  Skin Note: has proshield uses occasionally           Gastrointestinal  Diarrhea: 2 - Three to five soft or liquid bowel movements per day  GI Note: taking immodium 2 today, 3 yesterday  Genitourinary  Dysuria: 0 - None  Psychosocial  Pyschosocial Note: feeling well         Objective:   Wt 64.5 kg (142 lb 1.6 oz)  BMI 22.59 kg/m2  Gen: Appears well, in no acute distress  Skin: Dusky erythema of the vulva, perineum, bilateral groin, but no skin breakdown.  Gross tumor appears less indurated.      Labs:  CBC RESULTS:   Recent Labs   Lab Test  1813   WBC  4.2   RBC  4.00   HGB  13.6   HCT  39.9   MCV  100   MCH  34.0*   MCHC  34.1   RDW  12.6   PLT  137*     ELECTROLYTES:  Recent Labs   Lab Test  18   NA  129*   POTASSIUM  3.2*   CHLORIDE  92*   BERHANE  8.2*   CO2  30   BUN  9   CR  0.80   GLC  101*       Assessment:    Tolerating radiation therapy well.  All questions and concerns  addressed.    Toxicities:  Diarrhea: Grade 1: Increase of <4 stools per day over baseline; mild increase in ostomy output compared to baseline  Dermatitis: Grade 2: Moderate to brisk erythema; patchy moist desquamation, mostly confined to skin folds and creases; moderate erythema    Plan:   1. Continue current therapy.        Mosaiq chart and setup information reviewed  MVCT/IGRT images checked    Medication Review  Med list reviewed with patient?: Yes    Educational Topic Discussed  Education Instructions: reviewed        Yvon Leal MD/PhD  [phone] clinic  Pager [phone]    Please do not send letter to referring physician.      Again, thank you for allowing me to participate in the care of your patient.      Sincerely,    Yvon Leal MD

## 2018-03-28 NOTE — MR AVS SNAPSHOT
After Visit Summary   3/28/2018    Julius Gilliam    MRN: [id number]           Patient Information     Date Of Birth          1955        Visit Information        Provider Department      3/28/2018 3:00 PM Yvon Leal MD Radiation Oncology Clinic        Today's Diagnoses     Vulvar cancer (H)    -  1       Follow-ups after your visit        Your next 10 appointments already scheduled     Mar 29, 2018  2:30 PM CDT   EXTERNAL RADIATION TREATMENT with [facility] RAD ONC MARIE   Radiation Oncology Clinic ([facility])    [facility]  1st Floor  [address] [address]   [phone]            Mar 30, 2018 10:30 AM CDT   Procedure 4.5 hour with U2A ROOM 11   [address] [facility] [place] ([facility] [place], [facility])    [address] [address]               Mar 30, 2018 12:00 PM CDT   (Arrive by 11:45 AM)   IR LYMPH NODE BIOPSY with UUIR6   [facility] [place], Interventional Radiology ([facility] [place], [facility])    [address] [address]   [phone]           1. Laboratory test are to be obtained by your doctor prior to the exam (CBCP, INR and PTT) 2. Someone will need to drive you to and from the hospital. 3. If you are or may be pregnant, contact your doctor or a Radiology nurse prior to the day of the exam. 4. If you have diabetes, check with your doctor or a Radiology nurse to see if your insulin needs to be adjusted for the exam. 5. If you are taking Coumadin (to thin you blood) please contact your doctor or a Radiology nurse at least 3 days before the exam for special instructions. 6. The day before your exam you may eat your regular diet and are encouraged to drink at least 2 quarts of clear liquids. Drink no alcoholic beverages for 24 hours prior to the exam. 7. Do not eat any solid food or milk products for 6 hours prior to the exam. You may drink  clear liquids until 2 hours prior to the exam. Clear liquids include the following: water, Jell-O, clear broth, apple juice or any noncarbonated drink that you can see through (no pop!) 8. The morning of the exam you may brush your teeth and take medications as directed with a sip of water. 9. Tell the Radiology nurse if you have any allergies. 10. You will be asked to empty your bladder before the exam begins. 11. You may resume your normal activities the day after the exam. Do not do any strenuous exercises or lifting for 48 hours. 12. If a drainage tube is to remain in place, your doctor s office will need to make arrangements for you to learn how to care for this tube. Ask them about this before the date of the exam. You may need to obtain supplies from your local pharmacy. Please make an appointment before leaving your current appointment. You should understand the plan for your care prior to leaving this appointment            Mar 30, 2018  2:30 PM CDT   EXTERNAL RADIATION TREATMENT with [facility] RAD ONC MARIE   Radiation Oncology Clinic ([facility])    [facility]  1st Floor  [address] [address]   [phone]            Apr 02, 2018  6:30 AM CDT   [facility] Draw with  [facility] DRAW   [facility] Lab Draw ([facility])    [address]  [address]  [place] [address]   [phone]            Apr 02, 2018  7:00 AM CDT   Infusion 360 with UC ONCOLOGY INFUSION, UC 12 ATC   [facility] ([facility])    [address]  [address]  [place] [address]   [phone]            Apr 02, 2018  2:15 PM CDT   EXTERNAL RADIATION TREATMENT with [facility] RAD ONC MARIE   Radiation Oncology Clinic ([facility])    [facility]  1st Floor  [address] [address]   [phone]            Apr 03, 2018  2:30 PM CDT   EXTERNAL  RADIATION TREATMENT with [facility] RAD ONC MARIE   Radiation Oncology Clinic ([facility])    [facility]  1st Floor  [address] [address] [phone]            Apr 04, 2018  2:30 PM CDT   EXTERNAL RADIATION TREATMENT with [facility] RAD ONC MARIE   Radiation Oncology Clinic ([facility])    [facility]  1st Floor  [address] [address]   [phone]              Who to contact     Please call your clinic at [phone] to:    Ask questions about your health    Make or cancel appointments    Discuss your medicines    Learn about your test results    Speak to your doctor            Additional Information About Your Visit        Populis Information     Pop[employer]is gives you secure access to your electronic health record. If you see a primary care provider, you can also send messages to your care team and make appointments. If you have questions, please call your primary care clinic.  If you do not have a primary care provider, please call [phone] and they will assist you.      Populis is an electronic gateway that provides easy, online access to your medical records. With [employer], you can request a clinic appointment, read your test results, renew a prescription or communicate with your care team.     To access your existing account, please contact your [facility] or call [phone] for assistance.        Care EveryWhere ID     This is your Care EveryWhere ID. This could be used by other organizations to access your [place] medical records  [id number]        Your Vitals Were     BMI (Body Mass Index)                   22.59 kg/m2            Blood Pressure from Last 3 Encounters:   03/27/18 115/80   03/26/18 127/73   03/23/18 127/77    Weight from Last 3 Encounters:   03/28/18 64.5 kg (142 lb 1.6 oz)   03/27/18 65.2 kg (143 lb 11.2 oz)   03/26/18 63.6 kg (140 lb 4.8 oz)               Today, you had the following     No orders found for display       Primary Care Provider Office Phone # Fax #    Tova Montesinos [phone]       [facility] [address] [facility] [address]        Equal Access to Services     VIVIANAANTOINE BA : Hadii aad ku hadjaneeno Soomaali, waaxda luqadaha, qaybta kaalmada adeegyada, waxay sharon haybahmann adeaguila pinaeliecerrosalino jones. So [facility] [phone].    ATENCIÓN: Si habla español, tiene a elizabeth disposición servicios gratuitos de asistencia lingüística. Llame al [phone].    We comply with applicable federal civil rights laws and [place] laws. We do not discriminate on the basis of race, color, national origin, age, disability, sex, sexual orientation, or gender identity.            Thank you!     Thank you for choosing RADIATION ONCOLOGY CLINIC  for your care. Our goal is always to provide you with excellent care. Hearing back from our patients is one way we can continue to improve our services. Please take a few minutes to complete the written survey that you may receive in the mail after your visit with us. Thank you!             Your Updated Medication List - Protect others around you: Learn how to safely use, store and throw away your medicines at www.disposemymeds.org.          This list is accurate as of 3/28/18  5:00 PM.  Always use your most recent med list.                   Brand Name Dispense Instructions for use Diagnosis    acetaminophen 325 MG tablet    TYLENOL    30 tablet    Take 2 tablets (650 mg) by mouth every 6 hours    H/O lymph node excision       LORazepam 1 MG tablet    ATIVAN    30 tablet    Take 1 tablet (1 mg) by mouth every 6 hours as needed (nausea/vomiting, anxiety or sleep )    Encounter for long-term (current) use of medications, Malignant neoplasm of vulva (H)       magnesium oxide 400 (241.3 MG) MG tablet    MAG-OX    60 tablet    Take 1 tablet (400 mg) by mouth daily    Malignant neoplasm of vulva (H), Hypomagnesemia        Multi-vitamin Tabs tablet      Take 1 tablet by mouth daily        oxyCODONE IR 5 MG tablet    ROXICODONE    20 tablet    Take 1 tablet (5 mg) by mouth every 4 hours as needed for pain    H/O lymph node excision       potassium chloride SA 20 MEQ CR tablet    KLOR-CON    60 tablet    Take 1 tablet (20 mEq) by mouth daily    Malignant neoplasm of vulva (H), Hypokalemia       prochlorperazine 10 MG tablet    COMPAZINE    30 tablet    Take 1 tablet (10 mg) by mouth every 6 hours as needed (nausea/vomiting)    Encounter for long-term (current) use of medications, Malignant neoplasm of vulva (H)       sennosides 8.6 MG tablet    SENOKOT    60 tablet    Take 1 tablet by mouth 2 times daily as needed for constipation    H/O lymph node excision

## 2018-03-28 NOTE — PROGRESS NOTES
[facility] PHYSICIANS  SPECIALIZING IN BREAKTHROUGHS  Radiation Oncology    On Treatment Visit Note      Julius Gilliam      Date: 3/28/2018   MRN: [id number]   : 1955  Diagnosis: Vulvar cancer      Reason for Visit:  On Radiation Treatment Visit     Treatment Summary to Date  Treatment Site: Vulva Current Dose: 2275/6300cGy cGy Fractions: 13/36fx      Chemotherapy  Chemo concurrent with radx?: Yes  Oncologist: Dr. Vera  Drug Name/Frequency 1: Cisplatin weekly    Subjective:   Julius is doing well this week.  She does not any complaints.  Take 2 to 3 tablets of imodium a day for loose stools.  Using Proshield for skin care.     Nursing ROS:   Nutrition Alteration  Diet Type: Patient's Preference  Skin  Skin Reaction: 0 - No changes  Skin Note: has proshield uses occasionally           Gastrointestinal  Diarrhea: 2 - Three to five soft or liquid bowel movements per day  GI Note: taking immodium 2 today, 3 yesterday  Genitourinary  Dysuria: 0 - None  Psychosocial  Pyschosocial Note: feeling well         Objective:   Wt 64.5 kg (142 lb 1.6 oz)  BMI 22.59 kg/m2  Gen: Appears well, in no acute distress  Skin: Dusky erythema of the vulva, perineum, bilateral groin, but no skin breakdown.  Gross tumor appears less indurated.      Labs:  CBC RESULTS:   Recent Labs   Lab Test  18   0913   WBC  4.2   RBC  4.00   HGB  13.6   HCT  39.9   MCV  100   MCH  34.0*   MCHC  34.1   RDW  12.6   PLT  137*     ELECTROLYTES:  Recent Labs   Lab Test  18   0913   NA  129*   POTASSIUM  3.2*   CHLORIDE  92*   BERHANE  8.2*   CO2  30   BUN  9   CR  0.80   GLC  101*       Assessment:    Tolerating radiation therapy well.  All questions and concerns addressed.    Toxicities:  Diarrhea: Grade 1: Increase of <4 stools per day over baseline; mild increase in ostomy output compared to baseline  Dermatitis: Grade 2: Moderate to brisk erythema; patchy moist desquamation, mostly confined to skin folds and creases;  moderate erythema    Plan:   1. Continue current therapy.        Mosaiq chart and setup information reviewed  MVCT/IGRT images checked    Medication Review  Med list reviewed with patient?: Yes    Educational Topic Discussed  Education Instructions: reviewed        Yvon Leal MD/PhD  [phone] clinic  Pager [phone]    Please do not send letter to referring physician.

## 2018-03-29 ENCOUNTER — APPOINTMENT (OUTPATIENT)
Dept: RADIATION ONCOLOGY | Facility: CLINIC | Age: 63
End: 2018-03-29
Attending: RADIOLOGY
Payer: COMMERCIAL

## 2018-03-29 PROCEDURE — 77386 ZZH IMRT TREATMENT DELIVERY, COMPLEX: CPT | Performed by: RADIOLOGY

## 2018-03-30 ENCOUNTER — APPOINTMENT (OUTPATIENT)
Dept: MEDSURG UNIT | Facility: CLINIC | Age: 63
End: 2018-03-30
Attending: RADIOLOGY
Payer: COMMERCIAL

## 2018-03-30 ENCOUNTER — APPOINTMENT (OUTPATIENT)
Dept: INTERVENTIONAL RADIOLOGY/VASCULAR | Facility: CLINIC | Age: 63
End: 2018-03-30
Attending: RADIOLOGY
Payer: COMMERCIAL

## 2018-03-30 ENCOUNTER — APPOINTMENT (OUTPATIENT)
Dept: RADIATION ONCOLOGY | Facility: CLINIC | Age: 63
End: 2018-03-30
Attending: RADIOLOGY
Payer: COMMERCIAL

## 2018-03-30 ENCOUNTER — HOSPITAL ENCOUNTER (OUTPATIENT)
Facility: CLINIC | Age: 63
Discharge: HOME | End: 2018-03-30
Attending: RADIOLOGY | Admitting: RADIOLOGY
Payer: COMMERCIAL

## 2018-03-30 VITALS
RESPIRATION RATE: 16 BRPM | BODY MASS INDEX: 22.29 KG/M2 | TEMPERATURE: 97.6 F | HEART RATE: 72 BPM | SYSTOLIC BLOOD PRESSURE: 109 MMHG | WEIGHT: 142 LBS | DIASTOLIC BLOOD PRESSURE: 71 MMHG | OXYGEN SATURATION: 96 % | HEIGHT: 67 IN

## 2018-03-30 DIAGNOSIS — C51.9 VULVAR CANCER (H): ICD-10-CM

## 2018-03-30 PROCEDURE — 88305 TISSUE EXAM BY PATHOLOGIST: CPT | Performed by: RADIOLOGY

## 2018-03-30 PROCEDURE — 76942 ECHO GUIDE FOR BIOPSY: CPT

## 2018-03-30 PROCEDURE — 88333 PATH CONSLTJ SURG CYTO XM 1: CPT | Performed by: RADIOLOGY

## 2018-03-30 PROCEDURE — 77336 RADIATION PHYSICS CONSULT: CPT | Performed by: RADIOLOGY

## 2018-03-30 PROCEDURE — 40000166 ZZH STATISTIC PP CARE STAGE 1

## 2018-03-30 PROCEDURE — 77386 ZZH IMRT TREATMENT DELIVERY, COMPLEX: CPT | Performed by: RADIOLOGY

## 2018-03-30 RX ORDER — SODIUM CHLORIDE 9 MG/ML
INJECTION, SOLUTION INTRAVENOUS CONTINUOUS
Status: DISCONTINUED | OUTPATIENT
Start: 2018-03-30 | End: 2018-03-30 | Stop reason: HOSPADM

## 2018-03-30 RX ORDER — LOPERAMIDE HCL 2 MG
2 CAPSULE ORAL 4 TIMES DAILY PRN
COMMUNITY

## 2018-03-30 RX ORDER — LIDOCAINE 40 MG/G
CREAM TOPICAL
Status: DISCONTINUED | OUTPATIENT
Start: 2018-03-30 | End: 2018-03-30 | Stop reason: HOSPADM

## 2018-03-30 NOTE — PROCEDURES
Interventional Radiology Brief Post Procedure Note    Procedure: IR LYMPH NODE BIOPSY    Proceduralist: Criss Beyer MS, PA-C    Assistant: Huey Caceres PA-C    Time Out: Prior to the start of the procedure and with procedural staff participation, I verbally confirmed the patient s identity using two indicators, relevant allergies, that the procedure was appropriate and matched the consent or emergent situation, and that the correct equipment/implants were available. Immediately prior to starting the procedure I conducted the Time Out with the procedural staff and re-confirmed the patient s name, procedure, and site/side. (The Joint Commission universal protocol was followed.)  Yes    Medications   Medication Event Details Admin User Admin Time       Sedation: None. Local Anesthestic used    Findings: Completed ultrasound guided left groin soft tissue mass biopsy. A total of 5 core handed off to pathology at bedside.     Estimated Blood Loss: Minimal    Fluoroscopy Time:  minute(s)    SPECIMENS: Core needle biopsy specimens sent for pathological analysis    Complications: 1. None     Condition: Stable    Plan: Follow up per primary team.     Comments: See dictated procedure note for full details.    Criss Beyer PA-C

## 2018-03-30 NOTE — IP AVS SNAPSHOT
[address] [address]                                       After Visit Summary   3/30/2018    Julius Gilliam    MRN: [id number]           After Visit Summary Signature Page     I have received my discharge instructions, and my questions have been answered. I have discussed any challenges I see with this plan with the nurse or doctor.    ..........................................................................................................................................  Patient/Patient Representative Signature      ..........................................................................................................................................  Patient Representative Print Name and Relationship to Patient    ..................................................               ................................................  Date                                            Time    ..........................................................................................................................................  Reviewed by Signature/Title    ...................................................              ..............................................  Date                                                            Time

## 2018-03-30 NOTE — PROGRESS NOTES
Prep and teaching complete for Lymph Node biopsy; Ezio, [phone], will be off site at [facility], will come back pick pt up after. Pt ready; awaiting consent; labs current; H and P is current.

## 2018-03-30 NOTE — DISCHARGE INSTRUCTIONS
[facility]    Interventional Radiology  Patient Instructions Following Biopsy    AFTER YOU GO HOME  ? If you were given sedation DO NOT drive or operate machinery at home or at work for at least 24 hours  ? DO relax and take it easy for 48 hours, no strenuous activity for 24 hours  ? DO drink plenty of fluids  ? DO resume your regular diet, unless otherwise instructed by your Primary Physician  ? Keep the dressing dry and in place for 24 hours.  ? DO NOT SMOKE FOR AT LEAST 24 HOURS, if you have been given any medications that were to help you relax or sedate you during your procedure  ? DO NOT drink alcoholic beverages the day of your procedure  ? DO NOT do any strenuous exercise or lifting (> 10 lbs) for at least 7 days following your procedure  ? DO NOT take a bath or shower for at least 12 hours following your procedure  ? Remove dressing after shower the next day. Replace with Band aid for 2 days.  Never leave a wet dressing in place.  ? DO NOT make any important or legal decisions for 24 hours following your procedure  ? There should be minimum drainage from the biopsy site    CALL THE PHYSICIAN IF:  ? You start bleeding from the procedure site.  If you do start to bleed from that site, lie down flat and hold pressure on the site for a minimum of 10 minutes.  Your physician will tell you if you need to return to the hospital  ? You develop nausea or vomiting  ? You have excessive swelling, redness, or tenderness at the site  ? You have drainage that looks like it is infected.  ? You experience severe pain  ? You develop hives or a rash or unexplained itching  ? You develop shortness of breath  ? You develop a temperature of 101 degrees F or greater      ADDITIONAL INSTRUCTIONS  If you are taking Coumadin, restart tonight.  Follow up with your Coumadin Clinic or Primary Care MD to have your INR rechecked.    [facility] INTERVENTIONAL RADIOLOGY DEPARTMENT  Procedure Physician: ADARSH Rogel                                    Date of procedure: March 30, 2018  Telephone Numbers: [phone] Monday-Friday 8:00 am to 4:30 pm  [phone] After 4:30 pm Monday-Friday, Weekends & Holidays.   Ask for the Interventional Radiologist on call.  Someone is on call 24 hrs/day  [facility] toll free number: [phone] Monday-Friday 8:00 am to 4:30 pm  [facility] Emergency Dept: [phone]

## 2018-03-30 NOTE — PROGRESS NOTES
Pt back from IR s/p lT groin lymph node biopsy.  IV sedation was not given for the procedure.  VSS.  Pt alert and oriented x4.  Pt denies any pain.  Lt groin site F/D/I.  1420-Pt tolerated ambulation in the hallway and up to the bathroom.  Pt tolerated po well.  PIV D/C'ed, catheter intact.  Pt's family at the bedside.  1427-Pt D/C'ed to home with her family.

## 2018-03-30 NOTE — PROGRESS NOTES
Interventional Radiology Intra-procedural Nursing Note    Patient Name: Julius Gilliam  Medical Record Number: [id number]  Today's Date: March 30, 2018    Procedure: left groin soft tissue mass or aspiration    Attending MD in room during timeout:  Proceduralist: Criss Beyer PA-C    Procedure Start Time: 1230    Procedure End Time:1330      Sedation medications given: none given    Report given to: Brisa Alonso  : not needed    D: Patient brought to IR room 6 at 1225. Verified Patient's ID and informed consent using two identifiers. She denied having questions or concerns regarding the procedure.  A: Patient was positioned supine and was monitored per IR protocol. Patient tolerated the procedure without apparent incident. The dressing over the left groin puncture site is clean, dry and intact. Biopsy core specimens taken to the lab by the Pathology staff.  P: Patient returned to 2A for post procedure cares.    BRISA Osorio RN

## 2018-03-30 NOTE — IP AVS SNAPSHOT
MRN:[id number]                      After Visit Summary   3/30/2018    Julius Gilliam    MRN: [id number]           Visit Information        Department      3/30/2018 10:18 AM [address] [facility]          Review of your medicines      UNREVIEWED medicines. Ask your doctor about these medicines        Dose / Directions    acetaminophen 325 MG tablet   Commonly known as:  TYLENOL   Used for:  H/O lymph node excision        Dose:  650 mg   Take 2 tablets (650 mg) by mouth every 6 hours   Quantity:  30 tablet   Refills:  0       loperamide 2 MG capsule   Commonly known as:  IMODIUM        Dose:  2 mg   Take 2 mg by mouth 4 times daily as needed for diarrhea   Refills:  0       LORazepam 1 MG tablet   Commonly known as:  ATIVAN   Used for:  Encounter for long-term (current) use of medications, Malignant neoplasm of vulva (H)        Dose:  1 mg   Take 1 tablet (1 mg) by mouth every 6 hours as needed (nausea/vomiting, anxiety or sleep )   Quantity:  30 tablet   Refills:  1       magnesium oxide 400 (241.3 MG) MG tablet   Commonly known as:  MAG-OX   Used for:  Malignant neoplasm of vulva (H), Hypomagnesemia        Dose:  400 mg   Take 1 tablet (400 mg) by mouth daily   Quantity:  60 tablet   Refills:  0       Multi-vitamin Tabs tablet        Dose:  1 tablet   Take 1 tablet by mouth daily   Refills:  0       oxyCODONE IR 5 MG tablet   Commonly known as:  ROXICODONE   Used for:  H/O lymph node excision        Dose:  5 mg   Take 1 tablet (5 mg) by mouth every 4 hours as needed for pain   Quantity:  20 tablet   Refills:  0       potassium chloride SA 20 MEQ CR tablet   Commonly known as:  KLOR-CON   Used for:  Malignant neoplasm of vulva (H), Hypokalemia        Dose:  20 mEq   Take 1 tablet (20 mEq) by mouth daily   Quantity:  60 tablet   Refills:  0       prochlorperazine 10 MG tablet   Commonly known as:  COMPAZINE   Used for:  Encounter for long-term (current) use of medications, Malignant neoplasm  of vulva (H)        Dose:  10 mg   Take 1 tablet (10 mg) by mouth every 6 hours as needed (nausea/vomiting)   Quantity:  30 tablet   Refills:  2       sennosides 8.6 MG tablet   Commonly known as:  SENOKOT   Used for:  H/O lymph node excision        Dose:  1 tablet   Take 1 tablet by mouth 2 times daily as needed for constipation   Quantity:  60 tablet   Refills:  0                Protect others around you: Learn how to safely use, store and throw away your medicines at www.disposemymeds.org.         Follow-ups after your visit        Your next 10 appointments already scheduled     Mar 30, 2018  2:30 PM CDT   EXTERNAL RADIATION TREATMENT with [facility] RAD ONC MARIE   Radiation Oncology Clinic ([facility])    [facility]  1st Floor  [address]  [facility] [address]   [phone]            Apr 02, 2018  6:30 AM CDT   [facility] Draw with  [facility] DRAW   [facility] [place])    [address]  [address]  [facility] [address]   [phone]            Apr 02, 2018  7:00 AM CDT   Infusion 360 with  ONCOLOGY INFUSION, UC 12 ATC   [facility] ([facility] [place])    [address]  [address]  [facility] [address]   [phone]            Apr 02, 2018  2:15 PM CDT   EXTERNAL RADIATION TREATMENT with [facility] RAD ONC MARIE   Radiation Oncology Clinic ([facility])    [facility]  1st Floor  [address]  [facility] [address]   [phone]            Apr 03, 2018  2:30 PM CDT   EXTERNAL RADIATION TREATMENT with [facility] RAD ONC MARIE   Radiation Oncology Clinic ([facility])    [facility]  1st Floor  [address] [facility] [address]   [phone]            Apr 04, 2018  2:30 PM CDT   EXTERNAL RADIATION TREATMENT with [facility] RAD ONC MARIE   Radiation Oncology Clinic ([facility])    [facility]  [facility]  1st Floor  [address] [facility] [address]   [phone]            Apr 05, 2018  2:30 PM CDT   EXTERNAL RADIATION TREATMENT with [facility] RAD ONC MARIE   Radiation Oncology Clinic ([facility])    [facility]  1st Floor  [address] [address]   [phone]            Apr 05, 2018  2:45 PM CDT   ON TREATMENT VISIT with Yvon Leal MD   Radiation Oncology Clinic ([facility])    [facility]  1st Floor  [address] [address]   [phone]            Apr 06, 2018  2:30 PM CDT   EXTERNAL RADIATION TREATMENT with [facility] RAD ONC MARIE   Radiation Oncology Clinic ([facility])    [facility]  1st Floor  [address] [address]   [phone]               Care Instructions        Further instructions from your care team       [facility]    Interventional Radiology  Patient Instructions Following Biopsy    AFTER YOU GO HOME  ? If you were given sedation DO NOT drive or operate machinery at home or at work for at least 24 hours  ? DO relax and take it easy for 48 hours, no strenuous activity for 24 hours  ? DO drink plenty of fluids  ? DO resume your regular diet, unless otherwise instructed by your Primary Physician  ? Keep the dressing dry and in place for 24 hours.  ? DO NOT SMOKE FOR AT LEAST 24 HOURS, if you have been given any medications that were to help you relax or sedate you during your procedure  ? DO NOT drink alcoholic beverages the day of your procedure  ? DO NOT do any strenuous exercise or lifting (> 10 lbs) for at least 7 days following your procedure  ? DO NOT take a bath or shower for at least 12 hours following your procedure  ? Remove dressing after shower the next day. Replace with Band aid for 2 days.  Never leave a wet dressing in place.  ? DO NOT make any important or legal decisions  for 24 hours following your procedure  ? There should be minimum drainage from the biopsy site    CALL THE PHYSICIAN IF:  ? You start bleeding from the procedure site.  If you do start to bleed from that site, lie down flat and hold pressure on the site for a minimum of 10 minutes.  Your physician will tell you if you need to return to the hospital  ? You develop nausea or vomiting  ? You have excessive swelling, redness, or tenderness at the site  ? You have drainage that looks like it is infected.  ? You experience severe pain  ? You develop hives or a rash or unexplained itching  ? You develop shortness of breath  ? You develop a temperature of 101 degrees F or greater      ADDITIONAL INSTRUCTIONS  If you are taking Coumadin, restart tonight.  Follow up with your Coumadin Clinic or Primary Care MD to have your INR rechecked.    [facility] INTERVENTIONAL RADIOLOGY DEPARTMENT  Procedure Physician: ADARSH Rogel                                   Date of procedure: March 30, 2018  Telephone Numbers: [phone] Monday-Friday 8:00 am to 4:30 pm  [phone] After 4:30 pm Monday-Friday, Weekends & Holidays.   Ask for the Interventional Radiologist on call.  Someone is on call 24 hrs/day  [facility] toll free number: [phone] Monday-Friday 8:00 am to 4:30 pm  [facility] Emergency Dept: [phone]           Additional Information About Your Visit        MyChart Information     [facility] gives you secure access to your electronic health record. If you see a primary care provider, you can also send messages to your care team and make appointments. If you have questions, please call your primary care clinic.  If you do not have a primary care provider, please call [phone] and they will assist you.        Care EveryWhere ID     This is your Care EveryWhere ID. This could be used by other organizations to access your [place] medical records  [id number]        Your Vitals Were     Blood Pressure Pulse Temperature Respirations  "Height Weight    89/49 (BP Location: Right arm) 72 97.6  F (36.4  C) (Oral) 16 1.689 m (5' 6.5\") 64.4 kg (142 lb)    Pulse Oximetry BMI (Body Mass Index)                96% 22.58 kg/m2           Primary Care Provider Office Phone # Fax #    Tova Montesinos [phone]      Equal Access to Services     CAREY COSME : Hadii aad ku hadasho Soomaali, waaxda luqadaha, qaybta kaalmada adeegyada, waxay brodyin haybahmann horace korinarosalino cárdenas . So [facility] [phone].    ATENCIÓN: Si habla petr, tiene a elizabeth disposición servicios gratuitos de asistencia lingüística. Llame al [phone].    We comply with applicable federal civil rights laws and [place] laws. We do not discriminate on the basis of race, color, national origin, age, disability, sex, sexual orientation, or gender identity.            Thank you!     Thank you for choosing [facility] for your care. Our goal is always to provide you with excellent care. Hearing back from our patients is one way we can continue to improve our services. Please take a few minutes to complete the written survey that you may receive in the mail after you visit with us. Thank you!             Medication List: This is a list of all your medications and when to take them. Check marks below indicate your daily home schedule. Keep this list as a reference.      Medications           Morning Afternoon Evening Bedtime As Needed    acetaminophen 325 MG tablet   Commonly known as:  TYLENOL   Take 2 tablets (650 mg) by mouth every 6 hours                                loperamide 2 MG capsule   Commonly known as:  IMODIUM   Take 2 mg by mouth 4 times daily as needed for diarrhea                                LORazepam 1 MG tablet   Commonly known as:  ATIVAN   Take 1 tablet (1 mg) by mouth every 6 hours as needed (nausea/vomiting, anxiety or sleep )                                magnesium oxide 400 (241.3 MG) MG tablet   Commonly known as:  MAG-OX   Take 1 tablet (400 mg) by " mouth daily                                Multi-vitamin Tabs tablet   Take 1 tablet by mouth daily                                oxyCODONE IR 5 MG tablet   Commonly known as:  ROXICODONE   Take 1 tablet (5 mg) by mouth every 4 hours as needed for pain                                potassium chloride SA 20 MEQ CR tablet   Commonly known as:  KLOR-CON   Take 1 tablet (20 mEq) by mouth daily                                prochlorperazine 10 MG tablet   Commonly known as:  COMPAZINE   Take 1 tablet (10 mg) by mouth every 6 hours as needed (nausea/vomiting)                                sennosides 8.6 MG tablet   Commonly known as:  SENOKOT   Take 1 tablet by mouth 2 times daily as needed for constipation

## 2018-04-02 ENCOUNTER — APPOINTMENT (OUTPATIENT)
Dept: RADIATION ONCOLOGY | Facility: CLINIC | Age: 63
End: 2018-04-02
Attending: RADIOLOGY
Payer: COMMERCIAL

## 2018-04-02 ENCOUNTER — INFUSION THERAPY VISIT (OUTPATIENT)
Dept: ONCOLOGY | Facility: CLINIC | Age: 63
End: 2018-04-02
Attending: OBSTETRICS & GYNECOLOGY
Payer: COMMERCIAL

## 2018-04-02 VITALS
WEIGHT: 137 LBS | SYSTOLIC BLOOD PRESSURE: 107 MMHG | HEART RATE: 82 BPM | RESPIRATION RATE: 18 BRPM | OXYGEN SATURATION: 94 % | DIASTOLIC BLOOD PRESSURE: 70 MMHG | BODY MASS INDEX: 21.78 KG/M2 | TEMPERATURE: 97.9 F

## 2018-04-02 VITALS
SYSTOLIC BLOOD PRESSURE: 107 MMHG | BODY MASS INDEX: 21.78 KG/M2 | HEART RATE: 78 BPM | OXYGEN SATURATION: 94 % | WEIGHT: 137 LBS | RESPIRATION RATE: 18 BRPM | DIASTOLIC BLOOD PRESSURE: 70 MMHG | TEMPERATURE: 97.9 F

## 2018-04-02 DIAGNOSIS — C51.9 MALIGNANT NEOPLASM OF VULVA (H): ICD-10-CM

## 2018-04-02 DIAGNOSIS — E83.42 HYPOMAGNESEMIA: ICD-10-CM

## 2018-04-02 DIAGNOSIS — Z79.899 ENCOUNTER FOR LONG-TERM (CURRENT) USE OF MEDICATIONS: Primary | ICD-10-CM

## 2018-04-02 DIAGNOSIS — E83.51 HYPOCALCEMIA: ICD-10-CM

## 2018-04-02 DIAGNOSIS — R19.7 DIARRHEA, UNSPECIFIED TYPE: ICD-10-CM

## 2018-04-02 LAB
ALBUMIN SERPL-MCNC: 3.2 G/DL (ref 3.4–5)
ALP SERPL-CCNC: 102 U/L (ref 40–150)
ALT SERPL W P-5'-P-CCNC: 22 U/L (ref 0–50)
ANION GAP SERPL CALCULATED.3IONS-SCNC: 12 MMOL/L (ref 3–14)
AST SERPL W P-5'-P-CCNC: 21 U/L (ref 0–45)
BASOPHILS # BLD AUTO: 0.1 10E9/L (ref 0–0.2)
BASOPHILS NFR BLD AUTO: 1.4 %
BILIRUB SERPL-MCNC: 0.8 MG/DL (ref 0.2–1.3)
BUN SERPL-MCNC: 18 MG/DL (ref 7–30)
CALCIUM SERPL-MCNC: 7 MG/DL (ref 8.5–10.1)
CHLORIDE SERPL-SCNC: 92 MMOL/L (ref 94–109)
CO2 SERPL-SCNC: 25 MMOL/L (ref 20–32)
COPATH REPORT: NORMAL
CREAT SERPL-MCNC: 1.17 MG/DL (ref 0.52–1.04)
DIFFERENTIAL METHOD BLD: ABNORMAL
EOSINOPHIL # BLD AUTO: 0.2 10E9/L (ref 0–0.7)
EOSINOPHIL NFR BLD AUTO: 5 %
ERYTHROCYTE [DISTWIDTH] IN BLOOD BY AUTOMATED COUNT: 12.5 % (ref 10–15)
GFR SERPL CREATININE-BSD FRML MDRD: 47 ML/MIN/1.7M2
GLUCOSE SERPL-MCNC: 101 MG/DL (ref 70–99)
HCT VFR BLD AUTO: 34.7 % (ref 35–47)
HGB BLD-MCNC: 12.8 G/DL (ref 11.7–15.7)
IMM GRANULOCYTES # BLD: 0 10E9/L (ref 0–0.4)
IMM GRANULOCYTES NFR BLD: 0.8 %
LYMPHOCYTES # BLD AUTO: 0.5 10E9/L (ref 0.8–5.3)
LYMPHOCYTES NFR BLD AUTO: 12.4 %
MAGNESIUM SERPL-MCNC: 0.7 MG/DL (ref 1.6–2.3)
MAGNESIUM SERPL-MCNC: 2.3 MG/DL (ref 1.6–2.3)
MCH RBC QN AUTO: 35.1 PG (ref 26.5–33)
MCHC RBC AUTO-ENTMCNC: 36.9 G/DL (ref 31.5–36.5)
MCV RBC AUTO: 95 FL (ref 78–100)
MONOCYTES # BLD AUTO: 0.4 10E9/L (ref 0–1.3)
MONOCYTES NFR BLD AUTO: 12.1 %
NEUTROPHILS # BLD AUTO: 2.5 10E9/L (ref 1.6–8.3)
NEUTROPHILS NFR BLD AUTO: 68.3 %
NRBC # BLD AUTO: 0 10*3/UL
NRBC BLD AUTO-RTO: 0 /100
PLATELET # BLD AUTO: 142 10E9/L (ref 150–450)
POTASSIUM SERPL-SCNC: 2.7 MMOL/L (ref 3.4–5.3)
PROT SERPL-MCNC: 7.1 G/DL (ref 6.8–8.8)
RBC # BLD AUTO: 3.65 10E12/L (ref 3.8–5.2)
SODIUM SERPL-SCNC: 128 MMOL/L (ref 133–144)
WBC # BLD AUTO: 3.6 10E9/L (ref 4–11)

## 2018-04-02 PROCEDURE — 83735 ASSAY OF MAGNESIUM: CPT | Performed by: OBSTETRICS & GYNECOLOGY

## 2018-04-02 PROCEDURE — 96368 THER/DIAG CONCURRENT INF: CPT

## 2018-04-02 PROCEDURE — 80053 COMPREHEN METABOLIC PANEL: CPT | Performed by: OBSTETRICS & GYNECOLOGY

## 2018-04-02 PROCEDURE — 25000128 H RX IP 250 OP 636: Mod: ZF

## 2018-04-02 PROCEDURE — 96367 TX/PROPH/DG ADDL SEQ IV INF: CPT

## 2018-04-02 PROCEDURE — 25000128 H RX IP 250 OP 636: Mod: ZF | Performed by: NURSE PRACTITIONER

## 2018-04-02 PROCEDURE — 96375 TX/PRO/DX INJ NEW DRUG ADDON: CPT

## 2018-04-02 PROCEDURE — 40000141 ZZH STATISTIC PERIPHERAL IV START W/O US GUIDANCE: Mod: ZF

## 2018-04-02 PROCEDURE — 36415 COLL VENOUS BLD VENIPUNCTURE: CPT

## 2018-04-02 PROCEDURE — 77386 ZZH IMRT TREATMENT DELIVERY, COMPLEX: CPT | Performed by: RADIOLOGY

## 2018-04-02 PROCEDURE — G0463 HOSPITAL OUTPT CLINIC VISIT: HCPCS | Mod: 25

## 2018-04-02 PROCEDURE — 96361 HYDRATE IV INFUSION ADD-ON: CPT

## 2018-04-02 PROCEDURE — 85025 COMPLETE CBC W/AUTO DIFF WBC: CPT | Performed by: OBSTETRICS & GYNECOLOGY

## 2018-04-02 PROCEDURE — 96366 THER/PROPH/DIAG IV INF ADDON: CPT

## 2018-04-02 PROCEDURE — 96413 CHEMO IV INFUSION 1 HR: CPT

## 2018-04-02 PROCEDURE — 25000128 H RX IP 250 OP 636: Mod: ZF | Performed by: OBSTETRICS & GYNECOLOGY

## 2018-04-02 PROCEDURE — 25800025 ZZH RX 258: Mod: ZF | Performed by: OBSTETRICS & GYNECOLOGY

## 2018-04-02 RX ORDER — MAGNESIUM SULFATE HEPTAHYDRATE 40 MG/ML
4 INJECTION, SOLUTION INTRAVENOUS ONCE
Status: COMPLETED | OUTPATIENT
Start: 2018-04-02 | End: 2018-04-02

## 2018-04-02 RX ORDER — POTASSIUM CHLORIDE 1500 MG/1
40 TABLET, EXTENDED RELEASE ORAL
Status: ACTIVE | OUTPATIENT
Start: 2018-04-02 | End: 2018-04-02

## 2018-04-02 RX ORDER — DEXTROSE, SODIUM CHLORIDE, AND POTASSIUM CHLORIDE 5; .45; .15 G/100ML; G/100ML; G/100ML
2000 INJECTION INTRAVENOUS ONCE
Status: COMPLETED | OUTPATIENT
Start: 2018-04-02 | End: 2018-04-02

## 2018-04-02 RX ORDER — PALONOSETRON 0.05 MG/ML
0.25 INJECTION, SOLUTION INTRAVENOUS ONCE
Status: COMPLETED | OUTPATIENT
Start: 2018-04-02 | End: 2018-04-02

## 2018-04-02 RX ADMIN — DEXAMETHASONE SODIUM PHOSPHATE 150 MG: 10 INJECTION, SOLUTION INTRAMUSCULAR; INTRAVENOUS at 09:48

## 2018-04-02 RX ADMIN — CISPLATIN 50 MG: 1 INJECTION, SOLUTION INTRAVENOUS at 11:05

## 2018-04-02 RX ADMIN — POTASSIUM CHLORIDE, DEXTROSE MONOHYDRATE AND SODIUM CHLORIDE 1000 ML: 150; 5; 450 INJECTION, SOLUTION INTRAVENOUS at 08:19

## 2018-04-02 RX ADMIN — PALONOSETRON HYDROCHLORIDE 0.25 MG: 0.25 INJECTION INTRAVENOUS at 09:46

## 2018-04-02 RX ADMIN — DEXTROSE MONOHYDRATE AND SODIUM CHLORIDE: 5; .9 INJECTION, SOLUTION INTRAVENOUS at 11:49

## 2018-04-02 RX ADMIN — MAGNESIUM SULFATE IN WATER 4 G: 40 INJECTION, SOLUTION INTRAVENOUS at 09:01

## 2018-04-02 RX ADMIN — CALCIUM GLUCONATE 1 G: 94 INJECTION, SOLUTION INTRAVENOUS at 12:07

## 2018-04-02 RX ADMIN — DEXTROSE MONOHYDRATE AND SODIUM CHLORIDE: 5; .9 INJECTION, SOLUTION INTRAVENOUS at 09:01

## 2018-04-02 RX ADMIN — POTASSIUM CHLORIDE: 149 INJECTION, SOLUTION, CONCENTRATE INTRAVENOUS at 13:36

## 2018-04-02 ASSESSMENT — PAIN SCALES - GENERAL
PAINLEVEL: NO PAIN (0)
PAINLEVEL: NO PAIN (0)

## 2018-04-02 NOTE — PATIENT INSTRUCTIONS
Contact Numbers    [facility] Main Line: [phone]  [facility] Triage:  [phone]    Call triage with chills and/or temperature greater than or equal to 100.4, uncontrolled nausea/vomiting, diarrhea, constipation, dizziness, shortness of breath, chest pain, bleeding, unexplained bruising, or any new/concerning symptoms, questions/concerns.     If you are having any concerning symptoms or wish to speak to a provider before your next infusion visit, please call your care coordinator or triage to notify them so we can adequately serve you.             April 2018 Sunday Monday Tuesday Wednesday Thursday Friday Saturday   1     2     [facility] DRAW    6:30 AM   (15 min.)    [facility] DRAW    [facility] Draw     [facility] ONC INFUSION 360    7:00 AM   (360 min.)   [facility] ONCOLOGY INFUSION   [facility]     [facility] EXTERNAL RADIATION TREATMT    2:15 PM   (30 min.)   P RAD ONC MARIE   Radiation Oncology Clinic 3     [facility] EXTERNAL RADIATION TREATMT    2:30 PM   (30 min.)   P RAD ONC MARIE   Radiation Oncology Clinic 4     [facility] EXTERNAL RADIATION TREATMT    2:30 PM   (30 min.)   P RAD ONC MARIE   Radiation Oncology Clinic 5     [facility] EXTERNAL RADIATION TREATMT    2:30 PM   (30 min.)   P RAD ONC MARIE   Radiation Oncology Clinic     P ON TREATMENT VISIT    2:45 PM   (15 min.)   Yvon Leal MD   Radiation Oncology Clinic 6     [facility] EXTERNAL RADIATION TREATMT    2:30 PM   (30 min.)   P RAD ONC MARIE   Radiation Oncology Clinic 7       8     9     [facility] EXTERNAL RADIATION TREATMT    2:15 PM   (30 min.)   P RAD ONC MARIE   Radiation Oncology Clinic 10     [facility] DRAW    6:30 AM   (15 min.)   [facility] DRAW    [facility] Draw     [facility] ONC INFUSION 360    7:00 AM   (360 min.)   [facility] ONCOLOGY INFUSION   [facility]     [facility] EXTERNAL RADIATION TREATMT    2:30 PM   (30 min.)   P RAD ONC MARIE   Radiation Oncology Clinic 11     [facility] EXTERNAL RADIATION TREATMT    2:30 PM   (30  min.)   [facility] RAD ONC MARIE   Radiation Oncology Clinic 12     [facility] EXTERNAL RADIATION TREATMT    2:30 PM   (30 min.)   [facility] RAD ONC MARIE   Radiation Oncology Clinic 13     [facility] EXTERNAL RADIATION TREATMT    2:30 PM   (30 min.)   [facility] RAD ONC MARIE   Radiation Oncology Clinic 14       15     16     [facility] DRAW    7:30 AM   (15 min.)    [facility] LAB DRAW   [facility] Lab Draw     P ONC INFUSION 360    8:00 AM   (360 min.)   [facility] ONCOLOGY INFUSION   [facility]     [facility] EXTERNAL RADIATION TREATMT    2:30 PM   (30 min.)   [facility] RAD ONC MARIE   Radiation Oncology Clinic 17     [facility] EXTERNAL RADIATION TREATMT    2:30 PM   (30 min.)   [facility] RAD ONC MARIE   Radiation Oncology Clinic 18     [facility] EXTERNAL RADIATION TREATMT   12:30 PM   (30 min.)   P RAD ONC MARIE   Radiation Oncology Clinic 19     [facility] EXTERNAL RADIATION TREATMT    2:30 PM   (30 min.)   P RAD ONC MARIE   Radiation Oncology Clinic 20     [facility] EXTERNAL RADIATION TREATMT    2:30 PM   (30 min.)   P RAD ONC MARIE   Radiation Oncology Clinic 21       22     23     [facility] EXTERNAL RADIATION TREATMT    2:15 PM   (30 min.)   [facility] RAD ONC MARIE   Radiation Oncology Clinic 24     [facility] EXTERNAL RADIATION TREATMT    2:30 PM   (30 min.)   [facility] RAD ONC MARIE   Radiation Oncology Clinic 25     [facility] EXTERNAL RADIATION TREATMT    2:30 PM   (30 min.)   P RAD ONC MARIE   Radiation Oncology Clinic 26     [facility] EXTERNAL RADIATION TREATMT    2:30 PM   (30 min.)   P RAD ONC MARIE   Radiation Oncology Clinic 27     [facility] EXTERNAL RADIATION TREATMT    2:30 PM   (30 min.)   P RAD ONC MARIE   Radiation Oncology Clinic 28       29     30     UMP EXTERNAL RADIATION TREATMT    2:30 PM   (30 min.)   P RAD ONC MARIE   Radiation Oncology Clinic                                     May 2018   Chris Monday Tuesday Wednesday Thursday Friday Saturday             1     2     3     4     5       6     7     8     9     10     11     12       13     14     15     16     17     18     LAB  WITH HB CLINIC    7:00 AM   (15 min.)    LAB    Health Lab     [facility] RETURN GENERAL LIVER    7:45 AM   (30 min.)   Malgorzata Madden MD   [facility] Hepatology 19       20     21     22     23     24     25     26       27     28     29     30     31                            Lab Results:  Recent Results (from the past 12 hour(s))   CBC with platelets differential    Collection Time: 04/02/18  6:58 AM   Result Value Ref Range    WBC 3.6 (L) 4.0 - 11.0 10e9/L    RBC Count 3.65 (L) 3.8 - 5.2 10e12/L    Hemoglobin 12.8 11.7 - 15.7 g/dL    Hematocrit 34.7 (L) 35.0 - 47.0 %    MCV 95 78 - 100 fl    MCH 35.1 (H) 26.5 - 33.0 pg    MCHC 36.9 (H) 31.5 - 36.5 g/dL    RDW 12.5 10.0 - 15.0 %    Platelet Count 142 (L) 150 - 450 10e9/L    Diff Method Automated Method     % Neutrophils 68.3 %    % Lymphocytes 12.4 %    % Monocytes 12.1 %    % Eosinophils 5.0 %    % Basophils 1.4 %    % Immature Granulocytes 0.8 %    Nucleated RBCs 0 0 /100    Absolute Neutrophil 2.5 1.6 - 8.3 10e9/L    Absolute Lymphocytes 0.5 (L) 0.8 - 5.3 10e9/L    Absolute Monocytes 0.4 0.0 - 1.3 10e9/L    Absolute Eosinophils 0.2 0.0 - 0.7 10e9/L    Absolute Basophils 0.1 0.0 - 0.2 10e9/L    Abs Immature Granulocytes 0.0 0 - 0.4 10e9/L    Absolute Nucleated RBC 0.0    Comprehensive metabolic panel    Collection Time: 04/02/18  6:58 AM   Result Value Ref Range    Sodium 128 (L) 133 - 144 mmol/L    Potassium 2.7 (L) 3.4 - 5.3 mmol/L    Chloride 92 (L) 94 - 109 mmol/L    Carbon Dioxide 25 20 - 32 mmol/L    Anion Gap 12 3 - 14 mmol/L    Glucose 101 (H) 70 - 99 mg/dL    Urea Nitrogen 18 7 - 30 mg/dL    Creatinine 1.17 (H) 0.52 - 1.04 mg/dL    GFR Estimate 47 (L) >60 mL/min/1.7m2    GFR Estimate If Black 57 (L) >60 mL/min/1.7m2    Calcium 7.0 (L) 8.5 - 10.1 mg/dL    Bilirubin Total 0.8 0.2 - 1.3 mg/dL    Albumin 3.2 (L) 3.4 - 5.0 g/dL    Protein Total 7.1 6.8 - 8.8 g/dL    Alkaline Phosphatase 102 40 - 150 U/L    ALT 22 0 - 50 U/L    AST 21 0 - 45 U/L    Magnesium    Collection Time: 04/02/18  6:58 AM   Result Value Ref Range    Magnesium 0.7 (L) 1.6 - 2.3 mg/dL   Magnesium    Collection Time: 04/02/18 12:10 PM   Result Value Ref Range    Magnesium 2.3 1.6 - 2.3 mg/dL

## 2018-04-02 NOTE — NURSING NOTE
Chief Complaint   Patient presents with     Blood Draw     Labs only     Vitals done, labs drawn by venipuncture.  See doc flow sheets for details.  Lamar Rae CMA

## 2018-04-02 NOTE — PROGRESS NOTES
Infusion Nursing Note:  Julius Gilliam presents today for Day 22 Cycle 1 Cisplatin    Patient seen by provider today: No   present during visit today: Not Applicable.    Note: Julius has many abnormal electrolytes today .  K+ 2.7, Mag, 0.7, Ca++ 7.0. Cr 1.17 and .  Pt said last week she was not eating well and did have loose stools. Marla ALTAMIRANO informed of lab results.  Will continue with Cisplatin treatment today, give KCL, Mag, and Ca++ IV. Will change IVF to NS for low NA and also check a stool for C.Diff  Julius also with cough.  Said it was a dry cough last week and now it is loose.  States sputum is clear and has not had fevers, Marla Sanderson also made aware of cough.  No stools while at infusion today - will get a take home kit from lab. To be scheduled for lab check/IVF later this week -pt is aware to watch for this appt on My Chart.     TOBR: Marla ALTAMIRANO / Poly Martinez RN: Give a total of 60 meq IV KCL, Mag 4 mg IV over 2 hours then recheck level and Calcium [id number] IV.  Change IVF fluids to D5NS+20meq/L                                                                                            Intravenous Access:  Peripheral IV placed.    Treatment Conditions:  Lab Results   Component Value Date    HGB 12.8 04/02/2018     Lab Results   Component Value Date    WBC 3.6 04/02/2018      Lab Results   Component Value Date    ANEU 2.5 04/02/2018     Lab Results   Component Value Date     04/02/2018      Lab Results   Component Value Date     04/02/2018                   Lab Results   Component Value Date    POTASSIUM 2.7 04/02/2018           Lab Results   Component Value Date    MAG 0.7 04/02/2018            Lab Results   Component Value Date    CR 1.17 04/02/2018                   Lab Results   Component Value Date    BERHANE 7.0 04/02/2018                Lab Results   Component Value Date    BILITOTAL 0.8 04/02/2018           Lab Results   Component Value Date     ALBUMIN 3.2 04/02/2018                    Lab Results   Component Value Date    ALT 22 04/02/2018           Lab Results   Component Value Date    AST 21 04/02/2018       Results reviewed, labs MET treatment parameters, ok to proceed with treatment.      Post Infusion Assessment:  Patient tolerated infusion without incident.  Blood return noted pre and post infusion.  Site patent and intact, free from redness, edema or discomfort.  No evidence of extravasations.  Access discontinued per protocol.    Discharge Plan:   Patient declined prescription refills.  KCL and Mag doses increased to bid -pt instructed on this  Discharge instructions reviewed with: Patient.  Patient and/or family verbalized understanding of discharge instructions and all questions answered.  Copy of AVS reviewed with patient and/or family.  Patient will return 4/3 forXRT next appointment.  Patient discharged in stable condition accompanied by: self.  Departure Mode: Ambulatory.    Tatiana Martinez RN

## 2018-04-02 NOTE — MR AVS SNAPSHOT
After Visit Summary   4/2/2018    Julius Gilliam    MRN: [id number]           Patient Information     Date Of Birth          1955        Visit Information        Provider Department      4/2/2018 7:00 AM  12 ATC; [facility] ONCOLOGY INFUSION [facility]        Today's Diagnoses     Encounter for long-term (current) use of medications    -  1    Malignant neoplasm of vulva (H)        Hypomagnesemia        Diarrhea, unspecified type        Hypocalcemia          Care Instructions    Contact Numbers    [facility] Main Line: [phone]  [facility] Triage:  [phone]    Call triage with chills and/or temperature greater than or equal to 100.4, uncontrolled nausea/vomiting, diarrhea, constipation, dizziness, shortness of breath, chest pain, bleeding, unexplained bruising, or any new/concerning symptoms, questions/concerns.     If you are having any concerning symptoms or wish to speak to a provider before your next infusion visit, please call your care coordinator or triage to notify them so we can adequately serve you.             April 2018 Sunday Monday Tuesday Wednesday Thursday Friday Saturday   1     2     [facility] LAB DRAW    6:30 AM   (15 min.)    [facility] DRAW   [employer] Lab Draw     P ONC INFUSION 360    7:00 AM   (360 min.)    [facility]    2:15 PM   (30 min.)   P RAD ONC MARIE   [facility] 3     P EXTERNAL RADIATION TREATMT    2:30 PM   (30 min.)   P RAD ONC MARIE   [facility] Oncology Clinic 4     P EXTERNAL RADIATION TREATMT    2:30 PM   (30 min.)   P RAD ONC MARIE   [facility] Clinic 5     P EXTERNAL RADIATION TREATMT    2:30 PM   (30 min.)   P RAD ONC MARIE   Radiation Oncology Clinic     [facility] ON TREATMENT VISIT    2:45 PM   (15 min.)   Yvon Leal MD   Radiation Oncology Clinic 6     P EXTERNAL RADIATION TREATMT    2:30 PM   (30 min.)   P RAD ONC  MARIE   [facility]cology Clinic 7       8     9     [facility] EXTERNAL RADIATION TREATMT    2:15 PM   (30 min.)   [facility] RAD ONC MARIE   [facility] 10     [facility] DRAW    6:30 AM   (15 min.)   [facility] DRAW   [facility] Lab Draw     [facility] ONC INFUSION 360    7:00 AM   (360 min.)   [facility] ONCOLOGY INFUSION   [facility]     [facility] EXTERNAL RADIATION TREATMT    2:30 PM   (30 min.)   [facility] RAD ONC MARIE   [facility] Clinic 11     [facility] EXTERNAL RADIATION TREATMT    2:30 PM   (30 min.)   [facility] RAD ONC MARIE   [facility] 12     [facility] EXTERNAL RADIATION TREATMT    2:30 PM   (30 min.)   [facility] RAD ONC MARIE   [facility] 13     [facility] EXTERNAL RADIATION TREATMT    2:30 PM   (30 min.)   [facility] RAD ONC MARIE   [facility] On[facility] Clinic 14       15     16     [facility] DRAW    7:30 AM   (15 min.)   [facility] DRAW   [facility] Lab Draw     [facility] ONC INFUSION 360    8:00 AM   (360 min.)   [facility] ONCOLOGY INFUSION   [facility]     [facility] EXTERNAL RADIATION TREATMT    2:30 PM   (30 min.)   [facility] RAD ONC MARIE   [facility] Clinic 17     [facility] EXTERNAL RADIATION TREATMT    2:30 PM   (30 min.)   [facility] RAD ONC MARIE   [facility] 18     [facility] EXTERNAL RADIATION TREATMT   12:30 PM   (30 min.)   [facility] RAD ONC MARIE   [facility] 19     [facility] EXTERNAL RADIATION TREATMT    2:30 PM   (30 min.)   [facility] RAD ONC MARIE   [facility] 20     [facility] EXTERNAL RADIATION TREATMT    2:30 PM   (30 min.)   [facility] RAD ONC MARIE   [facility]cology Clinic 21       22     23     [facility] EXTERNAL RADIATION TREATMT    2:15 PM   (30 min.)   [facility] RAD ONC MARIE   [facility] 24     [facility] EXTERNAL RADIATION TREATMT    2:30 PM   (30 min.)   [facility] RAD ONC MARIE   [facility] Clinic 25     [facility] EXTERNAL RADIATION TREATMT    2:30 PM   (30 min.)   [facility] RAD ONC MARIE   [facility] 26     [facility] EXTERNAL RADIATION TREATMT    2:30 PM   (30  min.)   [facility] RAD ONC MARIE   Radiation Oncology Clinic 27     [facility] EXTERNAL RADIATION TREATMT    2:30 PM   (30 min.)   [facility] RAD ONC MARIE   Radiation Oncology Clinic 28       29     30     [facility] EXTERNAL RADIATION TREATMT    2:30 PM   (30 min.)   [facility] RAD ONC MARIE   Radiation Oncology Clinic                                     May 2018   Chris Monday Tuesday Wednesday Thursday Friday Saturday             1     2     3     4     5       6     7     8     9     10     11     12       13     14     15     16     17     18     LAB WITH HB CLINIC    7:00 AM   (15 min.)    LAB    Health Lab     [facility] RETURN GENERAL LIVER    7:45 AM   (30 min.)   Malgorzata Madden MD   [facility] Hepatology 19       20     21     22     23     24     25     26       27     28     29     30     31                            Lab Results:  Recent Results (from the past 12 hour(s))   CBC with platelets differential    Collection Time: 04/02/18  6:58 AM   Result Value Ref Range    WBC 3.6 (L) 4.0 - 11.0 10e9/L    RBC Count 3.65 (L) 3.8 - 5.2 10e12/L    Hemoglobin 12.8 11.7 - 15.7 g/dL    Hematocrit 34.7 (L) 35.0 - 47.0 %    MCV 95 78 - 100 fl    MCH 35.1 (H) 26.5 - 33.0 pg    MCHC 36.9 (H) 31.5 - 36.5 g/dL    RDW 12.5 10.0 - 15.0 %    Platelet Count 142 (L) 150 - 450 10e9/L    Diff Method Automated Method     % Neutrophils 68.3 %    % Lymphocytes 12.4 %    % Monocytes 12.1 %    % Eosinophils 5.0 %    % Basophils 1.4 %    % Immature Granulocytes 0.8 %    Nucleated RBCs 0 0 /100    Absolute Neutrophil 2.5 1.6 - 8.3 10e9/L    Absolute Lymphocytes 0.5 (L) 0.8 - 5.3 10e9/L    Absolute Monocytes 0.4 0.0 - 1.3 10e9/L    Absolute Eosinophils 0.2 0.0 - 0.7 10e9/L    Absolute Basophils 0.1 0.0 - 0.2 10e9/L    Abs Immature Granulocytes 0.0 0 - 0.4 10e9/L    Absolute Nucleated RBC 0.0    Comprehensive metabolic panel    Collection Time: 04/02/18  6:58 AM   Result Value Ref Range    Sodium 128 (L) 133 - 144 mmol/L    Potassium 2.7 (L) 3.4 - 5.3 mmol/L     Chloride 92 (L) 94 - 109 mmol/L    Carbon Dioxide 25 20 - 32 mmol/L    Anion Gap 12 3 - 14 mmol/L    Glucose 101 (H) 70 - 99 mg/dL    Urea Nitrogen 18 7 - 30 mg/dL    Creatinine 1.17 (H) 0.52 - 1.04 mg/dL    GFR Estimate 47 (L) >60 mL/min/1.7m2    GFR Estimate If Black 57 (L) >60 mL/min/1.7m2    Calcium 7.0 (L) 8.5 - 10.1 mg/dL    Bilirubin Total 0.8 0.2 - 1.3 mg/dL    Albumin 3.2 (L) 3.4 - 5.0 g/dL    Protein Total 7.1 6.8 - 8.8 g/dL    Alkaline Phosphatase 102 40 - 150 U/L    ALT 22 0 - 50 U/L    AST 21 0 - 45 U/L   Magnesium    Collection Time: 04/02/18  6:58 AM   Result Value Ref Range    Magnesium 0.7 (L) 1.6 - 2.3 mg/dL   Magnesium    Collection Time: 04/02/18 12:10 PM   Result Value Ref Range    Magnesium 2.3 1.6 - 2.3 mg/dL             Follow-ups after your visit        Your next 10 appointments already scheduled     Apr 03, 2018  2:30 PM CDT   EXTERNAL RADIATION TREATMENT with [facility] RAD ONC MARIE   Radiation Oncology Clinic ([facility])    [facility]  1st Floor  [address] [address]   [phone]            Apr 04, 2018  2:30 PM CDT   EXTERNAL RADIATION TREATMENT with [facility] RAD ONC MARIE   Radiation Oncology Clinic ([facility])    [facility]  1st Floor  [address] [address]   [phone]            Apr 05, 2018  2:30 PM CDT   EXTERNAL RADIATION TREATMENT with [facility] RAD ONC MARIE   Radiation Oncology Clinic ([facility])    [facility]  1st Floor  [address] [address]   [phone]            Apr 05, 2018  2:45 PM CDT   ON TREATMENT VISIT with Yvon Leal MD   Radiation Oncology Clinic ([facility])    [facility]  1st Floor  [address] [address]   [phone]            Apr 06, 2018  2:30 PM CDT   EXTERNAL RADIATION TREATMENT with [facility] RAD ONC MARIE   Radiation Oncology Clinic ([facility])    [facility]  1st Floor  [address]  [place] [address]   [phone]            Apr 09, 2018  2:15 PM CDT   EXTERNAL RADIATION TREATMENT with [facility] RAD ONC MARIE   Radiation Oncology Clinic ([facility])    [facility]  1st Floor  [address] [address]   [phone]            Apr 10, 2018  6:30 AM CDT   [facility] Draw with [facility] DRAW   [facility] Lab Draw ([facility])    [address]  [address]  [place] [address]   [phone]            Apr 10, 2018  7:00 AM CDT   Infusion 360 with UC ONCOLOGY INFUSION, UC 11 ATC   [facility] ([facility])    [address]  [address]  [place] [address]   [phone]            Apr 10, 2018  2:30 PM CDT   EXTERNAL RADIATION TREATMENT with [facility] RAD ONC MARIE   Radiation Oncology Clinic ([facility])    [facility]  1st Floor  [address] [address]   [phone]              Future tests that were ordered for you today     Open Future Orders        Priority Expected Expires Ordered    C difficile toxin Routine  4/2/2019 4/2/2018            Who to contact     If you have questions or need follow up information about today's clinic visit or your schedule please contact [facility] directly at [phone].  Normal or non-critical lab and imaging results will be communicated to you by MyChart, letter or phone within 4 business days after the clinic has received the results. If you do not hear from us within 7 days, please contact the clinic through MyChart or phone. If you have a critical or abnormal lab result, we will notify you by phone as soon as possible.  Submit refill requests through [employer] or call your pharmacy and they will forward the refill request to us. Please allow 3  business days for your refill to be completed.          Additional Information About Your Visit        "[employer]hart Information     Zoe Majeste gives you secure access to your electronic health record. If you see a primary care provider, you can also send messages to your care team and make appointments. If you have questions, please call your primary care clinic.  If you do not have a primary care provider, please call [phone] and they will assist you.        Care EveryWhere ID     This is your Care EveryWhere ID. This could be used by other organizations to access your [facility] medical records  [id number]        Your Vitals Were     Pulse Temperature Respirations Pulse Oximetry BMI (Body Mass Index)       82 97.9  F (36.6  C) (Oral) 18 94% 21.78 kg/m2        Blood Pressure from Last 3 Encounters:   04/02/18 107/70   04/02/18 107/70   03/30/18 109/71    Weight from Last 3 Encounters:   04/02/18 62.1 kg (137 lb)   04/02/18 62.1 kg (137 lb)   03/30/18 64.4 kg (142 lb)              We Performed the Following     CBC with platelets differential     Comprehensive metabolic panel     Magnesium     Magnesium          Today's Medication Changes          These changes are accurate as of 4/2/18  3:07 PM.  If you have any questions, ask your nurse or doctor.               These medicines have changed or have updated prescriptions.        Dose/Directions    magnesium oxide 400 (241.3 MG) MG tablet   Commonly known as:  MAG-OX   This may have changed:  when to take this   Used for:  Malignant neoplasm of vulva (H), Hypomagnesemia        Dose:  400 mg   Take 1 tablet (400 mg) by mouth 2 times daily   Quantity:  60 tablet   Refills:  3            Where to get your medicines      These medications were sent to [facility] [place], [place] - [address]  [address] [facility] [address]    Hours:  TRANSPLANT PHONE NUMBER [phone] Phone:  [phone]     magnesium oxide 400  (241.3 MG) MG tablet                Primary Care Provider Office Phone # Fax #    Tova Montesinos [phone]       [facility] [address] [facility] [address]        Equal Access to Services     CAREY COSME : Hadii melania blue hadjaneeno Soomaali, waaxda luqadaha, qaybta kaalmada adeegyada, talha sharon haybahmann horace korinarosalino jones. So [facility] [phone].    ATENCIÓN: Si habla español, tiene a elizabeth disposición servicios gratuitos de asistencia lingüística. Llame al [phone].    We comply with applicable federal civil rights laws and [place] laws. We do not discriminate on the basis of race, color, national origin, age, disability, sex, sexual orientation, or gender identity.            Thank you!     Thank you for choosing [facility] CAN[facility]  for your care. Our goal is always to provide you with excellent care. Hearing back from our patients is one way we can continue to improve our services. Please take a few minutes to complete the written survey that you may receive in the mail after your visit with us. Thank you!             Your Updated Medication List - Protect others around you: Learn how to safely use, store and throw away your medicines at www.disposemymeds.org.          This list is accurate as of 4/2/18  3:07 PM.  Always use your most recent med list.                   Brand Name Dispense Instructions for use Diagnosis    acetaminophen 325 MG tablet    TYLENOL    30 tablet    Take 2 tablets (650 mg) by mouth every 6 hours    H/O lymph node excision       loperamide 2 MG capsule    IMODIUM     Take 2 mg by mouth 4 times daily as needed for diarrhea        LORazepam 1 MG tablet    ATIVAN    30 tablet    Take 1 tablet (1 mg) by mouth every 6 hours as needed (nausea/vomiting, anxiety or sleep )    Encounter for long-term (current) use of medications, Malignant neoplasm of vulva (H)       magnesium oxide 400 (241.3 MG) MG tablet    MAG-OX    60 tablet    Take 1 tablet  (400 mg) by mouth 2 times daily    Malignant neoplasm of vulva (H), Hypomagnesemia       Multi-vitamin Tabs tablet      Take 1 tablet by mouth daily        oxyCODONE IR 5 MG tablet    ROXICODONE    20 tablet    Take 1 tablet (5 mg) by mouth every 4 hours as needed for pain    H/O lymph node excision       potassium chloride SA 20 MEQ CR tablet    KLOR-CON    60 tablet    Take 1 tablet (20 mEq) by mouth daily    Malignant neoplasm of vulva (H), Hypokalemia       prochlorperazine 10 MG tablet    COMPAZINE    30 tablet    Take 1 tablet (10 mg) by mouth every 6 hours as needed (nausea/vomiting)    Encounter for long-term (current) use of medications, Malignant neoplasm of vulva (H)       sennosides 8.6 MG tablet    SENOKOT    60 tablet    Take 1 tablet by mouth 2 times daily as needed for constipation    H/O lymph node excision

## 2018-04-03 ENCOUNTER — TELEPHONE (OUTPATIENT)
Dept: ONCOLOGY | Facility: CLINIC | Age: 63
End: 2018-04-03

## 2018-04-03 ENCOUNTER — APPOINTMENT (OUTPATIENT)
Dept: RADIATION ONCOLOGY | Facility: CLINIC | Age: 63
End: 2018-04-03
Attending: RADIOLOGY
Payer: COMMERCIAL

## 2018-04-03 ENCOUNTER — CARE COORDINATION (OUTPATIENT)
Dept: ONCOLOGY | Facility: CLINIC | Age: 63
End: 2018-04-03

## 2018-04-03 DIAGNOSIS — Z79.899 ENCOUNTER FOR LONG-TERM (CURRENT) USE OF MEDICATIONS: Primary | ICD-10-CM

## 2018-04-03 DIAGNOSIS — C51.9 MALIGNANT NEOPLASM OF VULVA (H): ICD-10-CM

## 2018-04-03 PROCEDURE — 77386 ZZH IMRT TREATMENT DELIVERY, COMPLEX: CPT | Performed by: RADIOLOGY

## 2018-04-03 NOTE — PROGRESS NOTES
Care Coordinator Note  Left message for patient with date, time and directions regarding appointment scheduled for Friday IV hydration at [facility].  Patient returned my call and we discussed directions to [facility].  Patient understands she will have labs drawn and IV replacement/hydration Friday, also labs and IV replacement/hydration have been scheduled for the following week.  Patient states she is currently taking 1 magnesium and 2 potassium tabs/day.  Messages sent to  and  to contact patient per her request.        Heide Perez MSN, RN  Care Coordinator  Gynecologic Cancer   Office:  [phone]  Pager: [phone] #[phone]

## 2018-04-03 NOTE — TELEPHONE ENCOUNTER
Social Work Note: Telephone Call  Oncology Clinic    Data/Intervention:  Patient Name:  Julius Gilliam  /Age:  1955 (63 year old)    Call From:  SW to pt  Reason for Call:  Insurance questions    Assessment:  Received request from RNCC-Heide to contact pt re: insurance questions.  Message also sent to financial counselors.  Spoke with pt (via cell phone) whom is currently staying at the [facility].  Pt states wondering how many visits she has had at the [facility] since the beginning of the year.  Pt states that her insurance told her that she is allotted 30 visits in 6 months (-July), but does not include radiation.  SW informed pt of SW's limited view of appts, specifically re: billing and encouraged pt to speak with financial counselor when they call as they are able to determine more details for pt.  Pt stated understanding.  Pt states no other questions at this time.     Plan:  SW available as necessary.  Previously provided pt SW's contact information and availability in clinic.         GYPSY Vanegas, MSW   - [facility]  Phone: [phone]  Pager: [phone]  4/3/2018

## 2018-04-04 ENCOUNTER — APPOINTMENT (OUTPATIENT)
Dept: RADIATION ONCOLOGY | Facility: CLINIC | Age: 63
End: 2018-04-04
Attending: RADIOLOGY
Payer: COMMERCIAL

## 2018-04-04 DIAGNOSIS — E87.6 HYPOKALEMIA: ICD-10-CM

## 2018-04-04 DIAGNOSIS — C51.9 MALIGNANT NEOPLASM OF VULVA (H): ICD-10-CM

## 2018-04-04 PROCEDURE — 77386 ZZH IMRT TREATMENT DELIVERY, COMPLEX: CPT | Performed by: RADIOLOGY

## 2018-04-04 RX ORDER — POTASSIUM CHLORIDE 1500 MG/1
20 TABLET, EXTENDED RELEASE ORAL 2 TIMES DAILY
Qty: 60 TABLET | Refills: 0 | Status: SHIPPED | OUTPATIENT
Start: 2018-04-04

## 2018-04-05 ENCOUNTER — APPOINTMENT (OUTPATIENT)
Dept: RADIATION ONCOLOGY | Facility: CLINIC | Age: 63
End: 2018-04-05
Attending: RADIOLOGY
Payer: COMMERCIAL

## 2018-04-05 PROCEDURE — 77386 ZZH IMRT TREATMENT DELIVERY, COMPLEX: CPT | Performed by: RADIOLOGY

## 2018-04-06 ENCOUNTER — HOSPITAL ENCOUNTER (OUTPATIENT)
Facility: CLINIC | Age: 63
Setting detail: OBSERVATION
Discharge: HOME | End: 2018-04-07
Attending: EMERGENCY MEDICINE | Admitting: EMERGENCY MEDICINE
Payer: COMMERCIAL

## 2018-04-06 ENCOUNTER — OFFICE VISIT (OUTPATIENT)
Dept: RADIATION ONCOLOGY | Facility: CLINIC | Age: 63
End: 2018-04-06
Attending: RADIOLOGY
Payer: COMMERCIAL

## 2018-04-06 ENCOUNTER — APPOINTMENT (OUTPATIENT)
Dept: CT IMAGING | Facility: CLINIC | Age: 63
End: 2018-04-06
Attending: EMERGENCY MEDICINE
Payer: COMMERCIAL

## 2018-04-06 ENCOUNTER — INFUSION THERAPY VISIT (OUTPATIENT)
Dept: INFUSION THERAPY | Facility: CLINIC | Age: 63
End: 2018-04-06
Attending: NURSE PRACTITIONER
Payer: COMMERCIAL

## 2018-04-06 DIAGNOSIS — R06.00 DYSPNEA, UNSPECIFIED TYPE: ICD-10-CM

## 2018-04-06 DIAGNOSIS — N30.00 ACUTE CYSTITIS WITHOUT HEMATURIA: Primary | ICD-10-CM

## 2018-04-06 DIAGNOSIS — E83.51 HYPOCALCEMIA: Primary | ICD-10-CM

## 2018-04-06 DIAGNOSIS — R07.9 CHEST PAIN, UNSPECIFIED TYPE: ICD-10-CM

## 2018-04-06 DIAGNOSIS — R91.8 PULMONARY NODULES: ICD-10-CM

## 2018-04-06 DIAGNOSIS — R05.9 COUGH: ICD-10-CM

## 2018-04-06 DIAGNOSIS — C51.9 MALIGNANT NEOPLASM OF VULVA (H): Primary | ICD-10-CM

## 2018-04-06 DIAGNOSIS — C51.9 MALIGNANT NEOPLASM OF VULVA (H): ICD-10-CM

## 2018-04-06 LAB
ALBUMIN SERPL-MCNC: 2.7 G/DL (ref 3.4–5)
ALBUMIN UR-MCNC: 30 MG/DL
ALP SERPL-CCNC: 91 U/L (ref 40–150)
ALT SERPL W P-5'-P-CCNC: 11 U/L (ref 0–50)
ANION GAP SERPL CALCULATED.3IONS-SCNC: 6 MMOL/L (ref 3–14)
APPEARANCE UR: ABNORMAL
AST SERPL W P-5'-P-CCNC: 12 U/L (ref 0–45)
BACTERIA #/AREA URNS HPF: ABNORMAL /HPF
BASOPHILS # BLD AUTO: 0 10E9/L (ref 0–0.2)
BASOPHILS NFR BLD AUTO: 0.4 %
BILIRUB SERPL-MCNC: 0.5 MG/DL (ref 0.2–1.3)
BILIRUB UR QL STRIP: NEGATIVE
BUN SERPL-MCNC: 16 MG/DL (ref 7–30)
CALCIUM SERPL-MCNC: 6.7 MG/DL (ref 8.5–10.1)
CHLORIDE SERPL-SCNC: 99 MMOL/L (ref 94–109)
CO2 SERPL-SCNC: 29 MMOL/L (ref 20–32)
COLOR UR AUTO: YELLOW
CREAT BLD-MCNC: 0.7 MG/DL (ref 0.52–1.04)
CREAT SERPL-MCNC: 1.07 MG/DL (ref 0.52–1.04)
DIFFERENTIAL METHOD BLD: ABNORMAL
EOSINOPHIL # BLD AUTO: 0 10E9/L (ref 0–0.7)
EOSINOPHIL NFR BLD AUTO: 1.5 %
ERYTHROCYTE [DISTWIDTH] IN BLOOD BY AUTOMATED COUNT: 13.1 % (ref 10–15)
GFR SERPL CREATININE-BSD FRML MDRD: 52 ML/MIN/1.7M2
GFR SERPL CREATININE-BSD FRML MDRD: 85 ML/MIN/1.7M2
GLUCOSE SERPL-MCNC: 93 MG/DL (ref 70–99)
GLUCOSE UR STRIP-MCNC: NEGATIVE MG/DL
HCT VFR BLD AUTO: 31.9 % (ref 35–47)
HGB BLD-MCNC: 10.8 G/DL (ref 11.7–15.7)
HGB UR QL STRIP: NEGATIVE
IMM GRANULOCYTES # BLD: 0 10E9/L (ref 0–0.4)
IMM GRANULOCYTES NFR BLD: 0 %
INTERPRETATION ECG - MUSE: NORMAL
KETONES UR STRIP-MCNC: NEGATIVE MG/DL
LACTATE BLD-SCNC: 0.6 MMOL/L (ref 0.7–2)
LEUKOCYTE ESTERASE UR QL STRIP: ABNORMAL
LIPASE SERPL-CCNC: 91 U/L (ref 73–393)
LYMPHOCYTES # BLD AUTO: 0.3 10E9/L (ref 0.8–5.3)
LYMPHOCYTES NFR BLD AUTO: 10.8 %
MCH RBC QN AUTO: 33.6 PG (ref 26.5–33)
MCHC RBC AUTO-ENTMCNC: 33.9 G/DL (ref 31.5–36.5)
MCV RBC AUTO: 99 FL (ref 78–100)
MONOCYTES # BLD AUTO: 0.2 10E9/L (ref 0–1.3)
MONOCYTES NFR BLD AUTO: 7.5 %
MUCOUS THREADS #/AREA URNS LPF: PRESENT /LPF
NEUTROPHILS # BLD AUTO: 2.1 10E9/L (ref 1.6–8.3)
NEUTROPHILS NFR BLD AUTO: 79.8 %
NITRATE UR QL: NEGATIVE
NRBC # BLD AUTO: 0 10*3/UL
NRBC BLD AUTO-RTO: 0 /100
PH UR STRIP: 5 PH (ref 5–7)
PLATELET # BLD AUTO: 121 10E9/L (ref 150–450)
POTASSIUM SERPL-SCNC: 4 MMOL/L (ref 3.4–5.3)
PROT SERPL-MCNC: 6 G/DL (ref 6.8–8.8)
RBC # BLD AUTO: 3.21 10E12/L (ref 3.8–5.2)
RBC #/AREA URNS AUTO: 7 /HPF (ref 0–2)
SODIUM SERPL-SCNC: 134 MMOL/L (ref 133–144)
SOURCE: ABNORMAL
SP GR UR STRIP: 1.03 (ref 1–1.03)
SQUAMOUS #/AREA URNS AUTO: <1 /HPF (ref 0–1)
TROPONIN I SERPL-MCNC: <0.015 UG/L (ref 0–0.04)
UROBILINOGEN UR STRIP-MCNC: NORMAL MG/DL (ref 0–2)
WBC # BLD AUTO: 2.7 10E9/L (ref 4–11)
WBC #/AREA URNS AUTO: 118 /HPF (ref 0–5)

## 2018-04-06 PROCEDURE — 87088 URINE BACTERIA CULTURE: CPT | Performed by: EMERGENCY MEDICINE

## 2018-04-06 PROCEDURE — 87186 SC STD MICRODIL/AGAR DIL: CPT | Performed by: EMERGENCY MEDICINE

## 2018-04-06 PROCEDURE — 85025 COMPLETE CBC W/AUTO DIFF WBC: CPT | Performed by: EMERGENCY MEDICINE

## 2018-04-06 PROCEDURE — 25000128 H RX IP 250 OP 636: Performed by: NURSE PRACTITIONER

## 2018-04-06 PROCEDURE — 82565 ASSAY OF CREATININE: CPT

## 2018-04-06 PROCEDURE — 93005 ELECTROCARDIOGRAM TRACING: CPT | Performed by: EMERGENCY MEDICINE

## 2018-04-06 PROCEDURE — 84484 ASSAY OF TROPONIN QUANT: CPT | Mod: 91 | Performed by: NURSE PRACTITIONER

## 2018-04-06 PROCEDURE — 71260 CT THORAX DX C+: CPT

## 2018-04-06 PROCEDURE — 77336 RADIATION PHYSICS CONSULT: CPT | Performed by: RADIOLOGY

## 2018-04-06 PROCEDURE — 36415 COLL VENOUS BLD VENIPUNCTURE: CPT | Performed by: NURSE PRACTITIONER

## 2018-04-06 PROCEDURE — 84484 ASSAY OF TROPONIN QUANT: CPT | Performed by: EMERGENCY MEDICINE

## 2018-04-06 PROCEDURE — 96360 HYDRATION IV INFUSION INIT: CPT | Performed by: EMERGENCY MEDICINE

## 2018-04-06 PROCEDURE — 99285 EMERGENCY DEPT VISIT HI MDM: CPT | Mod: 25 | Performed by: EMERGENCY MEDICINE

## 2018-04-06 PROCEDURE — G0378 HOSPITAL OBSERVATION PER HR: HCPCS

## 2018-04-06 PROCEDURE — 25000128 H RX IP 250 OP 636: Performed by: EMERGENCY MEDICINE

## 2018-04-06 PROCEDURE — 25000132 ZZH RX MED GY IP 250 OP 250 PS 637: Performed by: NURSE PRACTITIONER

## 2018-04-06 PROCEDURE — 25000125 ZZHC RX 250: Performed by: EMERGENCY MEDICINE

## 2018-04-06 PROCEDURE — 96365 THER/PROPH/DIAG IV INF INIT: CPT

## 2018-04-06 PROCEDURE — 96361 HYDRATE IV INFUSION ADD-ON: CPT | Performed by: EMERGENCY MEDICINE

## 2018-04-06 PROCEDURE — 36415 COLL VENOUS BLD VENIPUNCTURE: CPT | Performed by: PHYSICIAN ASSISTANT

## 2018-04-06 PROCEDURE — 81001 URINALYSIS AUTO W/SCOPE: CPT | Performed by: EMERGENCY MEDICINE

## 2018-04-06 PROCEDURE — 83690 ASSAY OF LIPASE: CPT | Performed by: EMERGENCY MEDICINE

## 2018-04-06 PROCEDURE — 80053 COMPREHEN METABOLIC PANEL: CPT | Performed by: EMERGENCY MEDICINE

## 2018-04-06 PROCEDURE — 83605 ASSAY OF LACTIC ACID: CPT | Performed by: EMERGENCY MEDICINE

## 2018-04-06 PROCEDURE — 87086 URINE CULTURE/COLONY COUNT: CPT | Performed by: EMERGENCY MEDICINE

## 2018-04-06 PROCEDURE — 25000132 ZZH RX MED GY IP 250 OP 250 PS 637: Performed by: PHYSICIAN ASSISTANT

## 2018-04-06 PROCEDURE — 77386 ZZH IMRT TREATMENT DELIVERY, COMPLEX: CPT | Performed by: RADIOLOGY

## 2018-04-06 PROCEDURE — 84484 ASSAY OF TROPONIN QUANT: CPT | Performed by: PHYSICIAN ASSISTANT

## 2018-04-06 PROCEDURE — 93010 ELECTROCARDIOGRAM REPORT: CPT | Mod: Z6 | Performed by: EMERGENCY MEDICINE

## 2018-04-06 RX ORDER — IOPAMIDOL 755 MG/ML
100 INJECTION, SOLUTION INTRAVASCULAR ONCE
Status: COMPLETED | OUTPATIENT
Start: 2018-04-06 | End: 2018-04-06

## 2018-04-06 RX ORDER — NALOXONE HYDROCHLORIDE 0.4 MG/ML
.1-.4 INJECTION, SOLUTION INTRAMUSCULAR; INTRAVENOUS; SUBCUTANEOUS
Status: DISCONTINUED | OUTPATIENT
Start: 2018-04-06 | End: 2018-04-07 | Stop reason: HOSPADM

## 2018-04-06 RX ORDER — LOPERAMIDE HCL 2 MG
2 CAPSULE ORAL 4 TIMES DAILY PRN
Status: DISCONTINUED | OUTPATIENT
Start: 2018-04-06 | End: 2018-04-07 | Stop reason: HOSPADM

## 2018-04-06 RX ORDER — CODEINE PHOSPHATE AND GUAIFENESIN 10; 100 MG/5ML; MG/5ML
5-10 SOLUTION ORAL EVERY 4 HOURS PRN
Status: DISCONTINUED | OUTPATIENT
Start: 2018-04-06 | End: 2018-04-07 | Stop reason: HOSPADM

## 2018-04-06 RX ORDER — ALBUTEROL SULFATE 0.83 MG/ML
2.5 SOLUTION RESPIRATORY (INHALATION)
Status: DISCONTINUED | OUTPATIENT
Start: 2018-04-06 | End: 2018-04-06

## 2018-04-06 RX ORDER — NITROGLYCERIN 0.4 MG/1
0.4 TABLET SUBLINGUAL EVERY 5 MIN PRN
Status: DISCONTINUED | OUTPATIENT
Start: 2018-04-06 | End: 2018-04-07 | Stop reason: HOSPADM

## 2018-04-06 RX ORDER — ACETAMINOPHEN 650 MG/1
650 SUPPOSITORY RECTAL EVERY 4 HOURS PRN
Status: DISCONTINUED | OUTPATIENT
Start: 2018-04-06 | End: 2018-04-07 | Stop reason: HOSPADM

## 2018-04-06 RX ORDER — LIDOCAINE 40 MG/G
CREAM TOPICAL
Status: DISCONTINUED | OUTPATIENT
Start: 2018-04-06 | End: 2018-04-07 | Stop reason: HOSPADM

## 2018-04-06 RX ORDER — ASPIRIN 81 MG/1
162 TABLET, CHEWABLE ORAL ONCE
Status: COMPLETED | OUTPATIENT
Start: 2018-04-06 | End: 2018-04-06

## 2018-04-06 RX ORDER — PROCHLORPERAZINE MALEATE 5 MG
10 TABLET ORAL EVERY 6 HOURS PRN
Status: DISCONTINUED | OUTPATIENT
Start: 2018-04-06 | End: 2018-04-07 | Stop reason: HOSPADM

## 2018-04-06 RX ORDER — ASPIRIN 81 MG/1
81 TABLET ORAL DAILY
Status: DISCONTINUED | OUTPATIENT
Start: 2018-04-07 | End: 2018-04-07 | Stop reason: HOSPADM

## 2018-04-06 RX ORDER — ACETAMINOPHEN 325 MG/1
650 TABLET ORAL EVERY 6 HOURS
Status: DISCONTINUED | OUTPATIENT
Start: 2018-04-06 | End: 2018-04-07 | Stop reason: HOSPADM

## 2018-04-06 RX ORDER — ACETAMINOPHEN 500 MG
1000 TABLET ORAL EVERY 6 HOURS PRN
Status: DISCONTINUED | OUTPATIENT
Start: 2018-04-06 | End: 2018-04-07 | Stop reason: HOSPADM

## 2018-04-06 RX ORDER — POTASSIUM CHLORIDE 750 MG/1
20 TABLET, EXTENDED RELEASE ORAL 2 TIMES DAILY
Status: DISCONTINUED | OUTPATIENT
Start: 2018-04-06 | End: 2018-04-07 | Stop reason: HOSPADM

## 2018-04-06 RX ORDER — CEFTRIAXONE 1 G/1
1 INJECTION, POWDER, FOR SOLUTION INTRAMUSCULAR; INTRAVENOUS EVERY 24 HOURS
Status: DISCONTINUED | OUTPATIENT
Start: 2018-04-06 | End: 2018-04-07 | Stop reason: HOSPADM

## 2018-04-06 RX ORDER — ALUMINA, MAGNESIA, AND SIMETHICONE 2400; 2400; 240 MG/30ML; MG/30ML; MG/30ML
30 SUSPENSION ORAL EVERY 4 HOURS PRN
Status: DISCONTINUED | OUTPATIENT
Start: 2018-04-06 | End: 2018-04-07 | Stop reason: HOSPADM

## 2018-04-06 RX ORDER — MAGNESIUM OXIDE 400 MG/1
400 TABLET ORAL 2 TIMES DAILY
Status: DISCONTINUED | OUTPATIENT
Start: 2018-04-06 | End: 2018-04-07 | Stop reason: HOSPADM

## 2018-04-06 RX ORDER — ALBUTEROL SULFATE 0.83 MG/ML
2.5 SOLUTION RESPIRATORY (INHALATION)
Status: DISCONTINUED | OUTPATIENT
Start: 2018-04-06 | End: 2018-04-07 | Stop reason: HOSPADM

## 2018-04-06 RX ORDER — OXYCODONE HYDROCHLORIDE 5 MG/1
5 TABLET ORAL EVERY 4 HOURS PRN
Status: DISCONTINUED | OUTPATIENT
Start: 2018-04-06 | End: 2018-04-07 | Stop reason: HOSPADM

## 2018-04-06 RX ORDER — LORAZEPAM 1 MG/1
1 TABLET ORAL EVERY 6 HOURS PRN
Status: DISCONTINUED | OUTPATIENT
Start: 2018-04-06 | End: 2018-04-07 | Stop reason: HOSPADM

## 2018-04-06 RX ADMIN — CEFTRIAXONE 1 G: 1 INJECTION, POWDER, FOR SOLUTION INTRAMUSCULAR; INTRAVENOUS at 17:32

## 2018-04-06 RX ADMIN — ACETAMINOPHEN 650 MG: 325 TABLET, FILM COATED ORAL at 23:14

## 2018-04-06 RX ADMIN — ACETAMINOPHEN 650 MG: 325 TABLET, FILM COATED ORAL at 17:37

## 2018-04-06 RX ADMIN — ASPIRIN 81 MG CHEWABLE TABLET 162 MG: 81 TABLET CHEWABLE at 22:13

## 2018-04-06 RX ADMIN — SODIUM CHLORIDE 1000 ML: 9 INJECTION, SOLUTION INTRAVENOUS at 10:03

## 2018-04-06 RX ADMIN — OXYCODONE HYDROCHLORIDE 5 MG: 5 TABLET ORAL at 17:38

## 2018-04-06 RX ADMIN — IOPAMIDOL 54 ML: 755 INJECTION, SOLUTION INTRAVENOUS at 11:02

## 2018-04-06 RX ADMIN — MAGNESIUM OXIDE TAB 400 MG (241.3 MG ELEMENTAL MG) 400 MG: 400 (241.3 MG) TAB at 22:13

## 2018-04-06 RX ADMIN — GUAIFENESIN AND CODEINE PHOSPHATE 5 ML: 10; 100 LIQUID ORAL at 22:23

## 2018-04-06 RX ADMIN — SODIUM CHLORIDE 75 ML: 9 INJECTION, SOLUTION INTRAVENOUS at 11:07

## 2018-04-06 RX ADMIN — POTASSIUM CHLORIDE 20 MEQ: 750 TABLET, EXTENDED RELEASE ORAL at 22:13

## 2018-04-06 ASSESSMENT — ENCOUNTER SYMPTOMS
MUSCULOSKELETAL NEGATIVE: 1
VOMITING: 0
NAUSEA: 0
EYES NEGATIVE: 1
FLANK PAIN: 0
HEADACHES: 0
COUGH: 1
SHORTNESS OF BREATH: 1
FEVER: 0
PSYCHIATRIC NEGATIVE: 1
ABDOMINAL PAIN: 0

## 2018-04-06 ASSESSMENT — PAIN DESCRIPTION - DESCRIPTORS: DESCRIPTORS: ACHING

## 2018-04-06 NOTE — PROGRESS NOTES
VSS. Pt tolerated dinner. Oxycodone and tylenol given for right sided chest pain (when pt coughs) and also some neck/back pain. Troponins negative x2. MARTINEZ currently working on the rest of pt's admission orders.

## 2018-04-06 NOTE — ED NOTES
Spoke with radiation technician, aware of patient coming to them via ambulance then to be admitted to station 6 D when treatment is completed.

## 2018-04-06 NOTE — PROGRESS NOTES
The patient is admitted to ED observation unit for chest pain.  She has hx of vulvar cancer 3c and is currently being treated with chemo and radiation.  She tells the ED MD that she has been having a cough for the past few weeks and has pain with coughing.  Her troponin in the ED was normal.  She is being admitted for chest pain rule out.  Chest CT in ED shows a new nodule in the RLL.   We will admit for serial troponins and stress test.  We will consult gyn/onc given new nodule.

## 2018-04-06 NOTE — MR AVS SNAPSHOT
After Visit Summary   4/6/2018    Julius Gilliam    MRN: [id number]           Patient Information     Date Of Birth          1955        Visit Information        Provider Department      4/6/2018 2:45 PM Yvon Leal MD Radiation Oncology Clinic        Today's Diagnoses     Malignant neoplasm of vulva (H)    -  1       Follow-ups after your visit        Your next 10 appointments already scheduled     Apr 09, 2018  2:15 PM CDT   EXTERNAL RADIATION TREATMENT with [facility] RAD ONC MARIE   Radiation Oncology Clinic ([facility])    [facility]  1st Floor  [address] [facility] [address]   [phone]            Apr 10, 2018  6:30 AM CDT   [facility] Lab Draw with [facility] LAB DRAW   [facility]    [address]  [address]  [place] [place] [facility] [address]   [phone]            Apr 10, 2018  7:00 AM CDT   Infusion 360 with UC ONCOLOGY INFUSION, UC 11 ATC   [facility] ([facility])    [address]  [address]  [place] [facility] [address]   [phone]            Apr 10, 2018  7:30 AM CDT   (Arrive by 7:15 AM)   Return Visit with CAROLE Perez CNP   [facility] ([facility])    [address]  [address]  [place] [address]   [phone]            Apr 10, 2018  2:30 PM CDT   EXTERNAL RADIATION TREATMENT with [facility] RAD ONC MARIE   Radiation Oncology Clinic ([facility])    [facility]  1st Floor  [address] [address]   [phone]            Apr 11, 2018  2:30 PM CDT   EXTERNAL RADIATION TREATMENT with P RAD ONC MAIRE   Radiation Oncology Clinic ([facility])    [facility]  1st Floor  [address] [facility] [address]   [phone]            Apr 12, 2018  2:15 PM CDT   [facility] Lab Draw  with  [employer] DRAW   [facility] ([facility])    [address]  [address]  [facility] [id number] [phone]            Apr 12, 2018  2:30 PM CDT   EXTERNAL RADIATION TREATMENT with [facility] RAD ONC MARIE   Radiation Oncology Clinic ([facility])    [facility]  1st Floor  [address]  [facility] [address] [phone]            Apr 12, 2018  3:00 PM CDT   Infusion 120 with  ONCOLOGY INFUSION   [facility] ([facility])    [address]  [address]  [place] [address] [phone]              Who to contact     Please call your clinic at [phone] to:    Ask questions about your health    Make or cancel appointments    Discuss your medicines    Learn about your test results    Speak to your doctor            Additional Information About Your Visit        [employer] Information     [employer] gives you secure access to your electronic health record. If you see a primary care provider, you can also send messages to your care team and make appointments. If you have questions, please call your primary care clinic.  If you do not have a primary care provider, please call [phone] and they will assist you.      [employer] is an electronic gateway that provides easy, online access to your medical records. With [employer], you can request a clinic appointment, read your test results, renew a prescription or communicate with your care team.     To access your existing account, please contact your [facility] or call [phone] for assistance.        Care EveryWhere ID     This is your Care EveryWhere ID. This could be used by other organizations to access your [facility] medical records  [id number]         Blood Pressure from Last 3 Encounters:   04/07/18 119/61   04/02/18 107/70   04/02/18 107/70    Weight from Last 3 Encounters:   04/06/18  63.4 kg (139 lb 12.8 oz)   04/02/18 62.1 kg (137 lb)   04/02/18 62.1 kg (137 lb)              Today, you had the following     No orders found for display         Today's Medication Changes      Notice     This visit is during an admission. Changes to the med list made in this visit will be reflected in the After Visit Summary of the admission.             Primary Care Provider Office Phone # Fax #    Tova Montesinos, [phone]       [facility] [address] [facility] [address]        Equal Access to Services     [facility]: Hadii melania bach Sosandro, waaxda luqadaha, qaybta kaalmamar eddy, talha cárdenas . So [facility] [phone].    ATENCIÓN: Si habla español, tiene a elizabeth disposición servicios gratuitos de asistencia lingüística. [facility] [phone].    We comply with applicable federal civil rights laws and [place] laws. We do not discriminate on the basis of race, color, national origin, age, disability, sex, sexual orientation, or gender identity.            Thank you!     Thank you for choosing [facility]  for your care. Our goal is always to provide you with excellent care. Hearing back from our patients is one way we can continue to improve our services. Please take a few minutes to complete the written survey that you may receive in the mail after your visit with us. Thank you!             Your Updated Medication List - Protect others around you: Learn how to safely use, store and throw away your medicines at www.disposemymeds.org.      Notice     This visit is during an admission. Changes to the med list made in this visit will be reflected in the After Visit Summary of the admission.

## 2018-04-06 NOTE — IP AVS SNAPSHOT
Unit [address]D Observation [address] [address]    Phone:  [phone]    Fax:  [phone]                                       After Visit Summary   4/6/2018    Julius Gilliam    MRN: [id number]           After Visit Summary Signature Page     I have received my discharge instructions, and my questions have been answered. I have discussed any challenges I see with this plan with the nurse or doctor.    ..........................................................................................................................................  Patient/Patient Representative Signature      ..........................................................................................................................................  Patient Representative Print Name and Relationship to Patient    ..................................................               ................................................  Date                                            Time    ..........................................................................................................................................  Reviewed by Signature/Title    ...................................................              ..............................................  Date                                                            Time

## 2018-04-06 NOTE — PROGRESS NOTES
Pt here today for possible electrolyte replacement and hydration.   Pt stated that she had really bad chest pain last night so bad she thought she should go to the ER. Pain continues today but less intense with back pain, pulse is irregular and patient looks very anxious.  Patti RN in ED notified that patient will be arriving for work up.  Aiden at [facility] nurse triage line given the message above to notify the practitioner Tana Sanderson cnp.  Afibrile, bp 127 67.

## 2018-04-06 NOTE — ED NOTES
Bed: ED02  Expected date:   Expected time:   Means of arrival:   Comments:  Pt from infusion center with irregular rhythm, chest and back pain

## 2018-04-06 NOTE — IP AVS SNAPSHOT
MRN:[id number]                      After Visit Summary   4/6/2018    Julius Gilliam    MRN: [id number]           Thank you!     Thank you for choosing [facility] for your care. Our goal is always to provide you with excellent care. Hearing back from our patients is one way we can continue to improve our services. Please take a few minutes to complete the written survey that you may receive in the mail after you visit with us. Thank you!        Patient Information     Date Of Birth          1955        Designated Caregiver       Most Recent Value    Caregiver    Will someone help with your care after discharge? yes    Name of designated caregiver Ezio Lucero    Phone number of caregiver same as patient    Caregiver address same as patient      About your hospital stay     You were admitted on:  April 6, 2018 You last received care in the:  Unit 6D Observation [facility]    You were discharged on:  April 7, 2018        Reason for your hospital stay       You were admitted to the observation unit for evaluation of your chest pain.  You had no EKG changes, labs checking for heart damage were negative, your chest CT was negative for blood clot, but did show 0.3cm non calcified nodule in the right lower lobe, not present on prior PET scan, please follow up and discuss this with your oncologist.  You underwent a stress test and this was normal, it did show a mildly dilated aorta measuring 3.7 cm, please follow up with your PCP in regards to this.                  Who to Call     For medical emergencies, please call 911.  For non-urgent questions about your medical care, please call your primary care provider or clinic, [phone]          Attending Provider     Provider Specialty    Eleni Morales MD Emergency Medicine    Adry Groves MD Emergency Medicine       Primary Care Provider Office Phone # Fax #    Tova Montesinos [phone]       When to contact your care  team       Return to the ER if you have new or worsening chest pain, shortness of breath, jaw or arm pain, or fainting.                  After Care Instructions     Activity       Your activity upon discharge: activity as tolerated            Diet       Follow this diet upon discharge: Orders Placed This Encounter      Regular Diet Adult                  Follow-up Appointments     Adult [facility] Follow-up and recommended labs and tests       Follow up with primary care provider, Tova Montesinos, within 7 days for hospital follow- up.      Appointments on [facility] and/or [facility] (with [facility] or [facility] provider or service). Call [phone] if you haven't heard regarding these appointments within 7 days of discharge.                  Your next 10 appointments already scheduled     Apr 09, 2018  2:15 PM CDT   EXTERNAL RADIATION TREATMENT with [facility] RAD ONC MARIE   Radiation Oncology Clinic ([facility])    [facility]  1st Floor  [address] [address]   [phone]            Apr 10, 2018  6:30 AM CDT   [facility] Draw with  [facility] DRAW   [facility])    [address] [place] [address]  [address]  [facility] [address]   [phone]            Apr 10, 2018  7:00 AM CDT   Infusion 360 with UC ONCOLOGY INFUSION, UC 11 ATC   [facility] ([facility])    [address]  [address]  [facility] [address]   [phone]            Apr 10, 2018  7:30 AM CDT   (Arrive by 7:15 AM)   Return Visit with CAROLE Perez CNP   [facility] ([facility])    [address] [place] [address]  [address]  [facility] [address]   [phone]            Apr 10, 2018  2:30 PM CDT   EXTERNAL RADIATION TREATMENT with [facility] RAD ONC MARIE   Radiation Oncology Clinic ([facility])    [facility]  1st Floor  [address]  [facility] [address]   [phone]            Apr 11, 2018  2:30 PM CDT   EXTERNAL RADIATION TREATMENT with [facility] RAD ONC MARIE   Radiation Oncology Clinic ([facility])    [facility]  1st Floor  [address]  [facility] [address]   [phone]            Apr 12, 2018  2:15 PM CDT   [facility] Lab Draw with  [facility] LAB DRAW   [facility] Lab Draw ([facility])    [address]  [address]  [facility] [address]   [phone]            Apr 12, 2018  2:30 PM CDT   EXTERNAL RADIATION TREATMENT with [facility] RAD ONC MARIE   Radiation Oncology Clinic ([facility])    [facility]  1st Floor  [address] [facility] [address]   [phone]            Apr 12, 2018  3:00 PM CDT   Infusion 120 with UC ONCOLOGY INFUSION   [facility] Cancer [facility] ([facility])    [address]  [address]  [facility] [address]   [phone]              Pending Results     Date and Time Order Name Status Description    4/6/2018 1215 Urine Culture Aerobic Bacterial Preliminary             Statement of Approval     Ordered          04/07/18 1330  I have reviewed and agree with all the recommendations and orders detailed in this document.  EFFECTIVE NOW     Approved and electronically signed by:  Chastity Villanueva APRN CNP             Admission Information     Date & Time Provider Department Dept. Phone    4/6/2018 Adry Groves MD Unit 6D Observation [facility] [phone]      Your Vitals Were     Blood Pressure Pulse Temperature Respirations Weight Pulse Oximetry    119/61 (BP Location: Left arm) 65 98.3  F (36.8  C) (Oral) 16 63.4 kg (139 lb 12.8 oz) 91%    BMI (Body Mass Index)                   22.23 kg/m2           MyChart Information     [employer] gives you secure access to your electronic health record. If you see a primary care provider, you can also  send messages to your care team and make appointments. If you have questions, please call your primary care clinic.  If you do not have a primary care provider, please call [phone] and they will assist you.        Care EveryWhere ID     This is your Care EveryWhere ID. This could be used by other organizations to access your [facility] medical records  [id number]        Equal Access to Services     CAREY COSME : Hadii melania blue hadasho Sojoannali, waaxda luqadaha, qaybta kaalmada adeaguilayada, talha pinaeliecerrosalino cárdenas . So [facility] [phone].    ATENCIÓN: Si habla español, tiene a elizabeth disposición servicios gratuitos de asistencia lingüística. Teodora al [phone].    We comply with applicable federal civil rights laws and [place] laws. We do not discriminate on the basis of race, color, national origin, age, disability, sex, sexual orientation, or gender identity.               Review of your medicines      START taking        Dose / Directions    ciprofloxacin 500 MG tablet   Commonly known as:  CIPRO        Dose:  500 mg   Take 1 tablet (500 mg) by mouth 2 times daily for 5 days   Quantity:  10 tablet   Refills:  0       guaiFENesin-codeine 100-10 MG/5ML Soln solution   Commonly known as:  ROBITUSSIN AC        Dose:  5-10 mL   Take 5-10 mLs by mouth every 4 hours as needed for cough   Quantity:  420 mL   Refills:  0         CONTINUE these medicines which have NOT CHANGED        Dose / Directions    acetaminophen 325 MG tablet   Commonly known as:  TYLENOL   Used for:  H/O lymph node excision        Dose:  650 mg   Take 2 tablets (650 mg) by mouth every 6 hours   Quantity:  30 tablet   Refills:  0       loperamide 2 MG capsule   Commonly known as:  IMODIUM        Dose:  2 mg   Take 2 mg by mouth 4 times daily as needed for diarrhea   Refills:  0       LORazepam 1 MG tablet   Commonly known as:  ATIVAN   Used for:  Encounter for long-term (current) use of medications, Malignant neoplasm of vulva (H)         Dose:  1 mg   Take 1 tablet (1 mg) by mouth every 6 hours as needed (nausea/vomiting, anxiety or sleep )   Quantity:  30 tablet   Refills:  1       magnesium oxide 400 (241.3 MG) MG tablet   Commonly known as:  MAG-OX   Used for:  Malignant neoplasm of vulva (H), Hypomagnesemia        Dose:  400 mg   Take 1 tablet (400 mg) by mouth 2 times daily   Quantity:  60 tablet   Refills:  3       Multi-vitamin Tabs tablet        Dose:  1 tablet   Take 1 tablet by mouth daily   Refills:  0       oxyCODONE IR 5 MG tablet   Commonly known as:  ROXICODONE   Used for:  H/O lymph node excision        Dose:  5 mg   Take 1 tablet (5 mg) by mouth every 4 hours as needed for pain   Quantity:  20 tablet   Refills:  0       potassium chloride SA 20 MEQ CR tablet   Commonly known as:  KLOR-CON   Used for:  Malignant neoplasm of vulva (H), Hypokalemia        Dose:  20 mEq   Take 1 tablet (20 mEq) by mouth 2 times daily   Quantity:  60 tablet   Refills:  0       prochlorperazine 10 MG tablet   Commonly known as:  COMPAZINE   Used for:  Encounter for long-term (current) use of medications, Malignant neoplasm of vulva (H)        Dose:  10 mg   Take 1 tablet (10 mg) by mouth every 6 hours as needed (nausea/vomiting)   Quantity:  30 tablet   Refills:  2            Where to get your medicines      These medications were sent to [facility] [place], [place] - [address] [address]     Phone:  [phone]     ciprofloxacin 500 MG tablet         Some of these will need a paper prescription and others can be bought over the counter. Ask your nurse if you have questions.     Bring a paper prescription for each of these medications     guaiFENesin-codeine 100-10 MG/5ML Soln solution                Protect others around you: Learn how to safely use, store and throw away your medicines at www.disposemymeds.org.        ANTIBIOTIC INSTRUCTION     You've Been Prescribed an Antibiotic - Now  What?  Your healthcare team thinks that you or your loved one might have an infection. Some infections can be treated with antibiotics, which are powerful, life-saving drugs. Like all medications, antibiotics have side effects and should only be used when necessary. There are some important things you should know about your antibiotic treatment.      Your healthcare team may run tests before you start taking an antibiotic.    Your team may take samples (e.g., from your blood, urine or other areas) to run tests to look for bacteria. These test can be important to determine if you need an antibiotic at all and, if you do, which antibiotic will work best.      Within a few days, your healthcare team might change or even stop your antibiotic.    Your team may start you on an antibiotic while they are working to find out what is making you sick.    Your team might change your antibiotic because test results show that a different antibiotic would be better to treat your infection.    In some cases, once your team has more information, they learn that you do not need an antibiotic at all. They may find out that you don't have an infection, or that the antibiotic you're taking won't work against your infection. For example, an infection caused by a virus can't be treated with antibiotics. Staying on an antibiotic when you don't need it is more likely to be harmful than helpful.      You may experience side effects from your antibiotic.    Like all medications, antibiotics have side effects. Some of these can be serious.    Let you healthcare team know if you have any known allergies when you are admitted to the hospital.    One significant side effect of nearly all antibiotics is the risk of severe and sometimes deadly diarrhea caused by Clostridium difficile (C. Difficile). This occurs when a person takes antibiotics because some good germs are destroyed. Antibiotic use allows C. diificile to take over, putting patients at  high risk for this serious infection.    As a patient or caregiver, it is important to understand your or your loved one's antibiotic treatment. It is especially important for caregivers to speak up when patients can't speak for themselves. Here are some important questions to ask your healthcare team.    What infection is this antibiotic treating and how do you know I have that infection?    What side effects might occur from this antibiotic?    How long will I need to take this antibiotic?    Is it safe to take this antibiotic with other medications or supplements (e.g., vitamins) that I am taking?     Are there any special directions I need to know about taking this antibiotic? For example, should I take it with food?    How will I be monitored to know whether my infection is responding to the antibiotic?    What tests may help to make sure the right antibiotic is prescribed for me?      Information provided by:  www.cdc.gov/getsmart  U.S. Department of Health and Human Services  Centers for disease Control and Prevention  National Center for Emerging and Zoonotic Infectious Diseases  Division of Healthcare Quality Promotion             Medication List: This is a list of all your medications and when to take them. Check marks below indicate your daily home schedule. Keep this list as a reference.      Medications           Morning Afternoon Evening Bedtime As Needed    acetaminophen 325 MG tablet   Commonly known as:  TYLENOL   Take 2 tablets (650 mg) by mouth every 6 hours   Last time this was given:  650 mg on 4/7/2018  6:08 AM                            Okay to take as needed.  Please take as prescribed.       ciprofloxacin 500 MG tablet   Commonly known as:  CIPRO   Take 1 tablet (500 mg) by mouth 2 times daily for 5 days                       Start taking this evening.  Take twice daily until all tablets are gone.               guaiFENesin-codeine 100-10 MG/5ML Soln solution   Commonly known as:  ROBITUSSIN  AC   Take 5-10 mLs by mouth every 4 hours as needed for cough   Last time this was given:  5 mLs on 4/6/2018 10:23 PM                            Okay to take as needed.  Please take as prescribed.        loperamide 2 MG capsule   Commonly known as:  IMODIUM   Take 2 mg by mouth 4 times daily as needed for diarrhea                            Okay to take as needed.  Please take as prescribed.       LORazepam 1 MG tablet   Commonly known as:  ATIVAN   Take 1 tablet (1 mg) by mouth every 6 hours as needed (nausea/vomiting, anxiety or sleep )                            Okay to take as needed.  Please take as prescribed.       magnesium oxide 400 (241.3 MG) MG tablet   Commonly known as:  MAG-OX   Take 1 tablet (400 mg) by mouth 2 times daily   Last time this was given:  400 mg on 4/7/2018  9:03 AM                       Next dose due this evening.               Multi-vitamin Tabs tablet   Take 1 tablet by mouth daily            Next dose due Sunday morning.                        oxyCODONE IR 5 MG tablet   Commonly known as:  ROXICODONE   Take 1 tablet (5 mg) by mouth every 4 hours as needed for pain   Last time this was given:  5 mg on 4/7/2018  6:08 AM                            Okay to take as needed.  Please take as prescribed.       potassium chloride SA 20 MEQ CR tablet   Commonly known as:  KLOR-CON   Take 1 tablet (20 mEq) by mouth 2 times daily   Last time this was given:  20 mEq on 4/7/2018  9:03 AM                       Next dose due this evening.               prochlorperazine 10 MG tablet   Commonly known as:  COMPAZINE   Take 1 tablet (10 mg) by mouth every 6 hours as needed (nausea/vomiting)                            Okay to take as needed.  Please take as prescribed.                 More Information        *CHEST PAIN, NONCARDIAC    Based on your visit today, the exact cause of your chest pain is not certain. Your condition does not seem serious and your pain does not appear to be coming from your heart.  However, sometimes the signs of a serious problem take more time to appear. Therefore, please watch for the warning signs listed below.  HOME CARE:  1. Rest today and avoid strenuous activity.  2. Take any prescribed medicine as directed.  FOLLOW UP with your doctor in 1-3 days.   GET PROMPT MEDICAL ATTENTION if any of the following occur:    A change in the type of pain: if it feels different, becomes more severe, lasts longer, or begins to spread into your shoulder, arm, neck, jaw or back    Shortness of breath or increased pain with breathing    Cough with blood or dark colored sputum (phlegm)    Weakness, dizziness, or fainting    Fever over 101  F (38.3  C)    Swelling, pain or redness in one leg    8543-3094 [employer]. [address] [place] [address], [place] [address]. All rights reserved. This information is not intended as a substitute for professional medical care. Always follow your healthcare professional's instructions.  This information has been modified by your health care provider with permission from the publisher.            Understanding Urinary Tract Infections (UTIs)  Most UTIs are caused by bacteria, although they may also be caused by viruses or fungi. Bacteria from the bowel are the most common source of infection. The infection may start because of any of the following:    Sexual activity. During sex, bacteria can travel from the penis, vagina, or rectum into the urethra.     Bacteria on the skin outside the rectum may travel into the urethra. This is more common in women since the rectum and urethra are closer to each other than in men. Wiping from front to back after using the toilet and keeping the area clean can help prevent germs from getting to the urethra.    Blockage of urine flow through the urinary tract. If urine sits too long, germs may start to grow out of control.      Parts of the urinary tract  The infection can occur in any part of the urinary tract.    The kidneys  collect and store urine.    The ureters carry urine from the kidneys to the bladder.    The bladder holds urine until you are ready to let it out.    The urethra carries urine from the bladder out of the body. It is shorter in women, so bacteria can move through it more easily. The urethra is longer in men, so a UTI is less likely to reach the bladder or kidneys in men.  Date Last Reviewed: 1/1/2017 2000-2017 [employer]. [address] [place] [address], [place] [id number]. All rights reserved. This information is not intended as a substitute for professional medical care. Always follow your healthcare professional's instructions.

## 2018-04-06 NOTE — LETTER
2018       RE: Julius Gilliam  [address]   [place] [facility] [address]     Dear Colleague,    Thank you for referring your patient, Julius Gilliam, to the RADIATION ONCOLOGY CLINIC. Please see a copy of my visit note below.    RADIATION ONCOLOGY WEEKLY ON TREATMENT VISIT   Encounter Date: 2018    Patient Name: Julius Gilliam  MRN: [id number]  : 1955     Treatment Summary to Date  Treatment Site: Vulva Current Dose: 3500/6300cGy cGy Fractions: 20/36fx      Chemotherapy  Chemo concurrent with radx?: Yes  Oncologist: Dr. Vera  Drug Name/Frequency 1: Cisplatin weekly    Subjective: Ms. Gilliam presents to clinic today for her weekly on-treatment visit. She is tolerating her radiation therapy reasonably well, with some increasing radiation dermatitis on exam.  However, she was seen in the ED today for right chest wall pain, and is undergoing testing with serial troponins. CT of the chest shows a 3mm nodule in the RLL not visualized on PET/CT, however is small enough to be indeterminate.    ROS:   Nutrition Alteration  Diet Type: Patient's Preference  Nutrition Note: low appetite  Skin  Skin Reaction:  (Skin seen on txm table per MD's)  Skin Note: has proshield uses occasionally        Cardiovascular  Cardio/Resp Note: Pt reports a painful cough, seen in ED for evaluation  Gastrointestinal  Diarrhea: 0 - None (has been on imodium, none today)  Genitourinary  Dysuria: 0 - None   Note: Using proshield and trying clean the area well  Psychosocial  Pyschosocial Note: fatigue setting in  Pain Assessment  0-10 Pain Scale: 0  Pain Note: discomfort in the vulva area    Objective:   There were no vitals taken for this visit.  Gen: Appears well, in no acute distress  Skin: Dusky erythema of the bilateral groin with a small area of desquamation in the left groin.  Similar dermatitis over the vulva and perineum. The vulva mass has regressed but still grossly visible.      Treatment-related toxicities  (CTCAE v4.0):  1. Pain: Grade 1: Mild pain  2. Dermatitis: Grade 2: Moderate to brisk erythema; patchy moist desquamation, mostly confined to skin folds and creases; moderate erythema    Assessment:    Ms. Gilliam is tolerating treatment well without vulvar pain at this time. She is being admitted today for MI rule out workup.     Plan:   1. Continue radiotherapy as planned.   2. Will discontinue skin bolus  3. She will not need to be treated over the weekend as an inpatient.      Mosaiq chart and setup information reviewed  MVCT images reviewed    Medication Review  Med list reviewed with patient?: Yes    Educational Topic Discussed  Education Instructions: reviewed    Pt was seen and discussed with staff, Dr. Leal.    Kirt Olson MD PGY-4  Radiation Oncology Resident, [facility]  Phone: [phone]        I saw and examined the patient with the resident.  I have reviewed and agree with the resident's note and plan of care.      Yvon Leal MD

## 2018-04-06 NOTE — ED NOTES
[facility], [place]   ED Nurse to Floor Handoff     Julius Gilliam is a 63 year old female who speaks English and lives with others, at [facility]  They arrived in the ED by car from transfusion clinic    ED Chief Complaint: Chest Wall Pain (Chest wall pain started a couple of weeks ago, pt states has pain with coughing. The pain incresed last night)    ED Dx;   Final diagnoses:   Chest pain, unspecified type   Cough   Dyspnea, unspecified type   Pulmonary nodules   Malignant neoplasm of vulva (H)         Needed?: No    Allergies: No Known Allergies.  Past Medical Hx:   Past Medical History:   Diagnosis Date     Deafness in right ear      Hypertension      Multiple facial bone fractures (H)     also left ankle and left wrist     Vertigo      Vulvar cancer (H) 12/2017      Baseline Mental status: WDL  Current Mental Status changes: at basesline    Infection present or suspected this encounter: no  Sepsis suspected: No  Isolation type: No active isolations     Activity level - Baseline/Home:  Independent  Activity Level - Current:   Independent    Bariatric equipment needed?: No    In the ED these meds were given:   Medications   0.9% sodium chloride BOLUS (0 mLs Intravenous Stopped 4/6/18 1053)   iopamidol (ISOVUE-370) solution 100 mL (54 mLs Intravenous Given 4/6/18 1102)   sodium chloride 0.9 % bag 500mL for CT scan flush use (75 mLs As instructed Given 4/6/18 1107)       Drips running?  No    Home pump  No    Current LDAs  Peripheral IV 04/06/18 Left Lower forearm (Active)   Number of days:0       Peripheral IV 04/06/18 Left (Active)   Number of days:0       Closed/Suction Drain 1 Left Groin Bulb  (Active)   Number of days:56       Closed/Suction Drain 2 Right Groin Bulb  (Active)   Number of days:56       Incision/Surgical Site 02/09/18 Left Groin (Active)   Number of days:56       Incision/Surgical Site 02/09/18 Right Groin (Active)   Number of days:56       Labs  results:   Labs Ordered and Resulted from Time of ED Arrival Up to the Time of Departure from the ED   CBC WITH PLATELETS DIFFERENTIAL - Abnormal; Notable for the following:        Result Value    WBC 2.7 (*)     RBC Count 3.21 (*)     Hemoglobin 10.8 (*)     Hematocrit 31.9 (*)     MCH 33.6 (*)     Platelet Count 121 (*)     Absolute Lymphocytes 0.3 (*)     All other components within normal limits   COMPREHENSIVE METABOLIC PANEL - Abnormal; Notable for the following:     Creatinine 1.07 (*)     GFR Estimate 52 (*)     Calcium 6.7 (*)     Albumin 2.7 (*)     Protein Total 6.0 (*)     All other components within normal limits   LACTIC ACID WHOLE BLOOD - Abnormal; Notable for the following:     Lactic Acid 0.6 (*)     All other components within normal limits   LIPASE   TROPONIN I   UA MACROSCOPIC WITH REFLEX TO MICRO AND CULTURE   CREATININE POCT   ISTAT CREATININE NURSING POCT       Imaging Studies:   Recent Results (from the past 24 hour(s))   CT Chest Pulmonary Embolism w Contrast    Narrative    CT CHEST WITH CONTRAST  4/6/2018 11:07 AM    HISTORY: Dyspnea. Evaluate for pulmonary embolism.    COMPARISON: Images from a PET/CT on 12/19/2017 from an outside  facility. A dictated report of that examination is not available.    TECHNIQUE: Routine transverse CT imaging of the chest was performed  following the uneventful intravenous administration of 54 mL Isovue  370 contrast. A pulmonary embolism protocol was utilized. Radiation  dose for this scan was reduced using automated exposure control,  adjustment of the mA and/or kV according to patient size, or iterative  reconstruction technique.    FINDINGS: The heart size is normal. There are a few calcified lymph  nodes in the mediastinum and hilar regions. No other enlarged lymph  node or abnormal mediastinal mass is seen. There is calcification of  the thoracic aorta. There is very good opacification of the pulmonary  arteries with contrast. No pulmonary embolism is  seen. There is a 0.7  cm calcified granuloma in the posterior aspect of the right lower lobe  of the lung. There is also a 0.3 cm noncalcified nodule in the lateral  aspect of the right lower lobe on series 5 image 95. There is mild  atelectasis elsewhere in the lungs. The lungs are otherwise clear. No  pneumothorax is demonstrated. No pleural effusion is identified. There  are degenerative changes in the spine. No chest wall pathology is  seen. The visualized upper abdomen is unremarkable.      Impression    IMPRESSION:   1. No acute abnormality is seen. Specifically, no pulmonary embolism  is identified.  2. There is a 0.3 cm noncalcified nodule in the right lower lobe of  the lung. This was not seen previously and is therefore an  indeterminate lesion.    LAQUITA ORTEGA MD       Recent vital signs:   /61  Pulse 65  Temp 97.6  F (36.4  C) (Oral)  Resp 19  Wt 63.4 kg (139 lb 12.8 oz)  SpO2 97%  BMI 22.23 kg/m2    Cardiac Rhythm: Other , SR with PACs  Pt needs tele? Yes  Skin/wound Issues: None    Code Status: Full Code    Pain control: Pain when coughing    Nausea control: pt had none    Abnormal labs/tests/findings requiring intervention: -    Family present during ED course? Yes , Significant Other  Family Comments/Social Situation comments: Currently staying at [employer]    Tasks needing completion: None    Paz Gomez RN  [facility]-- [phone] [place] [facility]  [phone] [facility]

## 2018-04-06 NOTE — MR AVS SNAPSHOT
After Visit Summary   4/6/2018    Julius Gilliam    MRN: [id number]           Patient Information     Date Of Birth          1955        Visit Information        Provider Department      4/6/2018 8:30 AM UR [facility] 05 [facility], [place], Infusion Services        Today's Diagnoses     Hypocalcemia    -  1    Malignant neoplasm of vulva (H)           Follow-ups after your visit        Your next 10 appointments already scheduled     Apr 09, 2018  2:15 PM CDT   EXTERNAL RADIATION TREATMENT with [facility] RAD ONC MARIE   Radiation Oncology Clinic ([facility])    [facility]  1st Floor  [address] [address]   [phone]            Apr 10, 2018  6:30 AM CDT   [facility] Lab Draw with  [facility] LAB DRAW   [facility] Lab Draw ([facility])    [address]  [address]  [facility] [address]   [phone]            Apr 10, 2018  7:00 AM CDT   Infusion 360 with [facility] ONCOLOGY INFUSION, UC 11 ATC   [facility] ([facility])    [address]  [address]  [facility] [address]   [phone]            Apr 10, 2018  7:30 AM CDT   (Arrive by 7:15 AM)   Return Visit with CAROLE Perez CNP   [facility] ([facility])    [address]  [address]  [facility] [address]   [phone]            Apr 10, 2018  2:30 PM CDT   EXTERNAL RADIATION TREATMENT with [facility] RAD ONC MARIE   Radiation Oncology Clinic ([facility])    [facility]  1st Floor  [address] [address]   [phone]            Apr 11, 2018  2:30 PM CDT   EXTERNAL RADIATION TREATMENT with [facility] RAD ONC MARIE   Radiation Oncology Clinic ([facility])    [facility]  1st Floor  [address] [address]   [phone]            Apr 12, 2018  2:15 PM CDT    [facility] Lab Draw with  [facility] LAB DRAW   [facility] Lab Draw ([facility])    [address]  [address]  [facility] [address] [phone]            Apr 12, 2018  2:30 PM CDT   EXTERNAL RADIATION TREATMENT with [facility] RAD ONC MARIE   Radiation Oncology Clinic ([facility])    [facility]  1st Floor  [address]  [facility] [address]   [phone]            Apr 12, 2018  3:00 PM CDT   Infusion 120 with  ONCOLOGY INFUSION   [facility] Cancer [facility] ([facility])    [address]  [address]  [facility] [facility] [address] [phone]              Who to contact     If you have questions or need follow up information about today's clinic visit or your schedule please contact [facility] directly at [phone].  Normal or non-critical lab and imaging results will be communicated to you by MyChart, letter or phone within 4 business days after the clinic has received the results. If you do not hear from us within 7 days, please contact the clinic through [employer] or phone. If you have a critical or abnormal lab result, we will notify you by phone as soon as possible.  Submit refill requests through "[employer] or call your pharmacy and they will forward the refill request to us. Please allow 3 business days for your refill to be completed.          Additional Information About Your Visit        [employer] Information     "[employer] gives you secure access to your electronic health record. If you see a primary care provider, you can also send messages to your care team and make appointments. If you have questions, please call your primary care clinic.  If you do not have a primary care provider, please call [phone] and they will assist you.        Care EveryWhere ID     This is your Care EveryWhere ID. This could be used by other organizations to access your [facility]  records  [id number]         Blood Pressure from Last 3 Encounters:   04/07/18 119/61   04/02/18 107/70   04/02/18 107/70    Weight from Last 3 Encounters:   04/06/18 63.4 kg (139 lb 12.8 oz)   04/02/18 62.1 kg (137 lb)   04/02/18 62.1 kg (137 lb)              Today, you had the following     No orders found for display       Primary Care Provider Office Phone # Fax #    Tova Montesinos, [phone]       [facility] [address] [facility] [address]        Equal Access to Services     CAREY COSME : Hadii melania Miguel, waaxda lutreeadaha, qaybta kaalmada surjit, talha cárdenas . So [facility] [phone].    ATENCIÓN: Si habla español, tiene a elizabeth disposición servicios gratuitos de asistencia lingüística. Llame al [phone].    We comply with applicable federal civil rights laws and [place] laws. We do not discriminate on the basis of race, color, national origin, age, disability, sex, sexual orientation, or gender identity.            Thank you!     Thank you for choosing [facility]  for your care. Our goal is always to provide you with excellent care. Hearing back from our patients is one way we can continue to improve our services. Please take a few minutes to complete the written survey that you may receive in the mail after your visit with us. Thank you!             Your Updated Medication List - Protect others around you: Learn how to safely use, store and throw away your medicines at www.disposemymeds.org.          This list is accurate as of 4/6/18  9:10 AM.  Always use your most recent med list.                   Brand Name Dispense Instructions for use Diagnosis    acetaminophen 325 MG tablet    TYLENOL    30 tablet    Take 2 tablets (650 mg) by mouth every 6 hours    H/O lymph node excision       ciprofloxacin 500 MG tablet    CIPRO    10 tablet    Take 1 tablet (500 mg) by mouth 2 times daily for 5 days    Acute  cystitis without hematuria       guaiFENesin-codeine 100-10 MG/5ML Soln solution    ROBITUSSIN AC    420 mL    Take 5-10 mLs by mouth every 4 hours as needed for cough    Cough       loperamide 2 MG capsule    IMODIUM     Take 2 mg by mouth 4 times daily as needed for diarrhea        LORazepam 1 MG tablet    ATIVAN    30 tablet    Take 1 tablet (1 mg) by mouth every 6 hours as needed (nausea/vomiting, anxiety or sleep )    Encounter for long-term (current) use of medications, Malignant neoplasm of vulva (H)       magnesium oxide 400 (241.3 MG) MG tablet    MAG-OX    60 tablet    Take 1 tablet (400 mg) by mouth 2 times daily    Malignant neoplasm of vulva (H), Hypomagnesemia       Multi-vitamin Tabs tablet      Take 1 tablet by mouth daily        oxyCODONE IR 5 MG tablet    ROXICODONE    20 tablet    Take 1 tablet (5 mg) by mouth every 4 hours as needed for pain    H/O lymph node excision       potassium chloride SA 20 MEQ CR tablet    KLOR-CON    60 tablet    Take 1 tablet (20 mEq) by mouth 2 times daily    Malignant neoplasm of vulva (H), Hypokalemia       prochlorperazine 10 MG tablet    COMPAZINE    30 tablet    Take 1 tablet (10 mg) by mouth every 6 hours as needed (nausea/vomiting)    Encounter for long-term (current) use of medications, Malignant neoplasm of vulva (H)       sennosides 8.6 MG tablet    SENOKOT    60 tablet    Take 1 tablet by mouth 2 times daily as needed for constipation    H/O lymph node excision

## 2018-04-06 NOTE — PROGRESS NOTES
RADIATION ONCOLOGY WEEKLY ON TREATMENT VISIT   Encounter Date: 2018    Patient Name: Julius Gilliam  MRN: [id number]  : 1955     Treatment Summary to Date  Treatment Site: Vulva Current Dose: 3500/6300cGy cGy Fractions: 20/36fx      Chemotherapy  Chemo concurrent with radx?: Yes  Oncologist: Dr. Vera  Drug Name/Frequency 1: Cisplatin weekly    Subjective: Ms. Gilliam presents to clinic today for her weekly on-treatment visit. She is tolerating her radiation therapy reasonably well, with some increasing radiation dermatitis on exam.  However, she was seen in the ED today for right chest wall pain, and is undergoing testing with serial troponins. CT of the chest shows a 3mm nodule in the RLL not visualized on PET/CT, however is small enough to be indeterminate.    ROS:   Nutrition Alteration  Diet Type: Patient's Preference  Nutrition Note: low appetite  Skin  Skin Reaction:  (Skin seen on txm table per MD's)  Skin Note: has proshield uses occasionally        Cardiovascular  Cardio/Resp Note: Pt reports a painful cough, seen in ED for evaluation  Gastrointestinal  Diarrhea: 0 - None (has been on imodium, none today)  Genitourinary  Dysuria: 0 - None   Note: Using proshield and trying clean the area well  Psychosocial  Pyschosocial Note: fatigue setting in  Pain Assessment  0-10 Pain Scale: 0  Pain Note: discomfort in the vulva area    Objective:   There were no vitals taken for this visit.  Gen: Appears well, in no acute distress  Skin: Dusky erythema of the bilateral groin with a small area of desquamation in the left groin.  Similar dermatitis over the vulva and perineum. The vulva mass has regressed but still grossly visible.      Treatment-related toxicities (CTCAE v4.0):  1. Pain: Grade 1: Mild pain  2. Dermatitis: Grade 2: Moderate to brisk erythema; patchy moist desquamation, mostly confined to skin folds and creases; moderate erythema    Assessment:    Ms. Gilliam is tolerating  treatment well without vulvar pain at this time. She is being admitted today for MI rule out workup.     Plan:   1. Continue radiotherapy as planned.   2. Will discontinue skin bolus  3. She will not need to be treated over the weekend as an inpatient.      Mosaiq chart and setup information reviewed  MVCT images reviewed    Medication Review  Med list reviewed with patient?: Yes    Educational Topic Discussed  Education Instructions: reviewed    Pt was seen and discussed with staff, Dr. Leal.    Kirt Olson MD PGY-4  Radiation Oncology Resident, [facility]  Phone: [phone]        I saw and examined the patient with the resident.  I have reviewed and agree with the resident's note and plan of care.      Yvon Leal MD

## 2018-04-06 NOTE — H&P
[facility] ED Observation Admission Note    Chief Complaint   Patient presents with     Chest Wall Pain     Chest wall pain started a couple of weeks ago, pt states has pain with coughing. The pain incresed last night       Assessment/Plan:  1. Atypical Chest Pain: right sided chest pain started last night with cough. Has had a URI for the last two weeks and the chest pain is with coughing spells usually and with inspiration. Pain is located on the right lateral chest. Denies any radiation or associated symptoms. Denies any history of heart disease. Cough is productive - clear to yellow; no hemoptysis. Denies any recent travel, h/o DVT/PE, LE edema, heartburn, reflux, constipation, strenuous activity, fever, chills. In ED, HR 59-99, -134/61-80, RR 15-28, SaO2 % on RA, Temp 98. Labs show CMP shows BUN/Creat 16/1.07 (baseline 0.5-0.8), Ca 6.7, albumin 2.7, TP 6. Lactic acid 0.6. Troponin I negative x 1. CBC with WBC 2.7, H/H 10.8/31.9, platelet 121, abs lymphs 0.3. CT Chest reports a 0.3cm non calcified nodule in the RLL not previously present on PET scan in 12/2017; no PE.  EKG with NSR and PAC's (unchanged). Risk Factors include Tobacco Use (30 years, 1/2 ppd), postmenopausal female, obese, family h/o CAD (father with MI in 50's). Cholesterol levels unknown. Chest wall pain on palpation and associated also with cough. Likely MSK.  - Serial troponins q4h x 2 more  - Continuous telemetry  - Exercise Stress Test in the morning  - Nitro PRN  - ASA 81mg daily  - Clear liquid diet after midnight  - Lipid panel in AM  - Check electrolytes in AM with history of low K and Mag  - Guaifenesin with codeine q4h prn for cough    2. H/o Hypomagnesemia/Hypokalemia: K 4.0. Mag not checked  - Check Mag and K and Phos in AM  - Continue home Mag and KCl supplements    3. YAS/Dehydration: BUN/Creat 16/1.07 (baseline 0.5-0.8). Given 1L NS in infusion clinic  - NS at 125ml/hr  - Repeat BMP in AM    4. UTI:  CBC with WBC 2.7, H/H  10.8/31.9, platelet 121, abs lymphs 0.3. UA with 30 TP, large leukocyte esterase, , RBC 7. Urine culture sent and pending. Given Rocephin 1gm IV x 1.  - Continue Rocephin 1gm IV q24h   - F/u UCx    5. Incidental New Lung Mass: CT Chest reports a 0.3cm non calcified nodule in the RLL not previously present on PET scan in 2017; no PE.   - Follow up with Gyn Onc as outpatient    6. Tobacco Use: 30 year smoking history with 1/2ppd but cut back recently to 4cigs/day and none in the last few days. Declines Nicotine patch.    7. Chronic Alcohol Abuse: - [facility] protocol    HPI:    Julius Gilliam is a 63 year old female with a history of HTN, vulvar cancer on chemotherapy and radiation who presented to the ED with right sided chest pain. She went to the infusion clinic today for IV hydration and electrolyte check and reported to the nurse she was having chest pain that was really bad last night with cough. Also it is reported that an irregular heart rhythm was noted. She was picked up by EMS and taken to radiation therapy and then to the ED. She states she has had an URI for the last two weeks and the chest pain is with coughing spells usually and with inspiration. Pain is located on the right lateral chest. Denies any radiation or associated symptoms. Denies any history of heart disease. Reports cough is productive now clear to yellow; no hemoptysis. She admits to ongoing tobacco use but has cut down to 4 cigs a day and none for the last few days. Reports her father had an MI in his 50's but did well and  in his 90's. Otherwise no other family history of CAD. Otherwise she has a 30 year smoking history with 1/2 ppd. She denies any illicit drug use. Continues to drink ~ 8x per week. Denies any withdrawals. Denies any recent travel, h/o DVT/PE, LE edema, heartburn, reflux, constipation, strenuous activity, fever, chills. Reports loose stools and nausea with ongoing chemo and takes immodium prn for diarrhea.      In the ED, HR 59-99, -134/61-80, RR 15-28, SaO2 % on RA, Temp 98. Labs show CMP shows BUN/Creat 16/1.07 (baseline 0.5-0.8), Ca 6.7, albumin 2.7, TP 6. Lactic acid 0.6. Troponin I negative x 1. CBC with WBC 2.7, H/H 10.8/31.9, platelet 121, abs lymphs 0.3. UA with 30 TP, large leukocyte esterase, , RBC 7. Urine culture sent and pending. EKG with NSR and PAC's (unchanged). CT Chest reports a 0.3cm non calcified nodule in the RLL not previously present on PET scan in 12/2017; no PE.  She was taken to her XRT after leaving the ED and then brought to the Observation Unit for ACS r/o     On admission to the observation unit the patient was stable. She was c/o right chest pain after a coughing spell.    History:    Past Medical History:   Diagnosis Date     Deafness in right ear      Hypertension      Multiple facial bone fractures (H)     also left ankle and left wrist     Vertigo      Vulvar cancer (H) 12/2017       Past Surgical History:   Procedure Laterality Date     DISSECT LYMPH NODE INGUINAL N/A 2/9/2018    Procedure: DISSECT LYMPH NODE INGUINAL;  Bilateral Inguinal Lymph Node Dissection;  Surgeon: Santosh Vera MD;  Location: [facility] OR     ENDOSCOPIC STRIPPING VEIN(S)       FACIAL RECONSTRUCTION SURGERY      post horse trauma     repair of left arm fracture         Family History   Problem Relation Age of Onset     CANCER Mother 81     Breast ca       Social History     Social History     Marital status:      Spouse name: N/A     Number of children: 2     Years of education: N/A     Occupational History     Not on file.     Social History Main Topics     Smoking status: Current Every Day Smoker     Types: Cigarettes     Smokeless tobacco: Never Used      Comment: pack a week     Alcohol use Yes      Comment: 0-3 drinks per day     Drug use: No     Sexual activity: Not on file     Other Topics Concern     Not on file     Social History Narrative         Current  Facility-Administered Medications on File Prior to Encounter:  0.9% sodium chloride BOLUS   INFUSION HYPERSENSITIVITY     Current Outpatient Prescriptions on File Prior to Encounter:  potassium chloride SA (KLOR-CON) 20 MEQ CR tablet Take 1 tablet (20 mEq) by mouth 2 times daily   magnesium oxide (MAG-OX) 400 (241.3 MG) MG tablet Take 1 tablet (400 mg) by mouth 2 times daily   LORazepam (ATIVAN) 1 MG tablet Take 1 tablet (1 mg) by mouth every 6 hours as needed (nausea/vomiting, anxiety or sleep )   acetaminophen (TYLENOL) 325 MG tablet Take 2 tablets (650 mg) by mouth every 6 hours   loperamide (IMODIUM) 2 MG capsule Take 2 mg by mouth 4 times daily as needed for diarrhea   prochlorperazine (COMPAZINE) 10 MG tablet Take 1 tablet (10 mg) by mouth every 6 hours as needed (nausea/vomiting)   oxyCODONE IR (ROXICODONE) 5 MG tablet Take 1 tablet (5 mg) by mouth every 4 hours as needed for pain (Patient not taking: Reported on 3/27/2018)   sennosides (SENOKOT) 8.6 MG tablet Take 1 tablet by mouth 2 times daily as needed for constipation (Patient not taking: Reported on 3/28/2018)   multivitamin, therapeutic with minerals (MULTI-VITAMIN) TABS tablet Take 1 tablet by mouth daily       Data:    Results for orders placed or performed during the hospital encounter of 04/06/18   CT Chest Pulmonary Embolism w Contrast    Narrative    CT CHEST WITH CONTRAST  4/6/2018 11:07 AM    HISTORY: Dyspnea. Evaluate for pulmonary embolism.    COMPARISON: Images from a PET/CT on 12/19/2017 from an outside  facility. A dictated report of that examination is not available.    TECHNIQUE: Routine transverse CT imaging of the chest was performed  following the uneventful intravenous administration of 54 mL Isovue  370 contrast. A pulmonary embolism protocol was utilized. Radiation  dose for this scan was reduced using automated exposure control,  adjustment of the mA and/or kV according to patient size, or iterative  reconstruction  technique.    FINDINGS: The heart size is normal. There are a few calcified lymph  nodes in the mediastinum and hilar regions. No other enlarged lymph  node or abnormal mediastinal mass is seen. There is calcification of  the thoracic aorta. There is very good opacification of the pulmonary  arteries with contrast. No pulmonary embolism is seen. There is a 0.7  cm calcified granuloma in the posterior aspect of the right lower lobe  of the lung. There is also a 0.3 cm noncalcified nodule in the lateral  aspect of the right lower lobe on series 5 image 95. There is mild  atelectasis elsewhere in the lungs. The lungs are otherwise clear. No  pneumothorax is demonstrated. No pleural effusion is identified. There  are degenerative changes in the spine. No chest wall pathology is  seen. The visualized upper abdomen is unremarkable.      Impression    IMPRESSION:   1. No acute abnormality is seen. Specifically, no pulmonary embolism  is identified.  2. There is a 0.3 cm noncalcified nodule in the right lower lobe of  the lung. This was not seen previously and is therefore an  indeterminate lesion.    LAQUITA ORTEGA MD   CBC with platelets differential   Result Value Ref Range    WBC 2.7 (L) 4.0 - 11.0 10e9/L    RBC Count 3.21 (L) 3.8 - 5.2 10e12/L    Hemoglobin 10.8 (L) 11.7 - 15.7 g/dL    Hematocrit 31.9 (L) 35.0 - 47.0 %    MCV 99 78 - 100 fl    MCH 33.6 (H) 26.5 - 33.0 pg    MCHC 33.9 31.5 - 36.5 g/dL    RDW 13.1 10.0 - 15.0 %    Platelet Count 121 (L) 150 - 450 10e9/L    Diff Method Automated Method     % Neutrophils 79.8 %    % Lymphocytes 10.8 %    % Monocytes 7.5 %    % Eosinophils 1.5 %    % Basophils 0.4 %    % Immature Granulocytes 0.0 %    Nucleated RBCs 0 0 /100    Absolute Neutrophil 2.1 1.6 - 8.3 10e9/L    Absolute Lymphocytes 0.3 (L) 0.8 - 5.3 10e9/L    Absolute Monocytes 0.2 0.0 - 1.3 10e9/L    Absolute Eosinophils 0.0 0.0 - 0.7 10e9/L    Absolute Basophils 0.0 0.0 - 0.2 10e9/L    Abs Immature  Granulocytes 0.0 0 - 0.4 10e9/L    Absolute Nucleated RBC 0.0    Comprehensive metabolic panel   Result Value Ref Range    Sodium 134 133 - 144 mmol/L    Potassium 4.0 3.4 - 5.3 mmol/L    Chloride 99 94 - 109 mmol/L    Carbon Dioxide 29 20 - 32 mmol/L    Anion Gap 6 3 - 14 mmol/L    Glucose 93 70 - 99 mg/dL    Urea Nitrogen 16 7 - 30 mg/dL    Creatinine 1.07 (H) 0.52 - 1.04 mg/dL    GFR Estimate 52 (L) >60 mL/min/1.7m2    GFR Estimate If Black 63 >60 mL/min/1.7m2    Calcium 6.7 (L) 8.5 - 10.1 mg/dL    Bilirubin Total 0.5 0.2 - 1.3 mg/dL    Albumin 2.7 (L) 3.4 - 5.0 g/dL    Protein Total 6.0 (L) 6.8 - 8.8 g/dL    Alkaline Phosphatase 91 40 - 150 U/L    ALT 11 0 - 50 U/L    AST 12 0 - 45 U/L   Lipase   Result Value Ref Range    Lipase 91 73 - 393 U/L   Lactic acid whole blood   Result Value Ref Range    Lactic Acid 0.6 (L) 0.7 - 2.0 mmol/L   Troponin I   Result Value Ref Range    Troponin I ES <0.015 0.000 - 0.045 ug/L   UA reflex to Microscopic and Culture   Result Value Ref Range    Color Urine Yellow     Appearance Urine Slightly Cloudy     Glucose Urine Negative NEG^Negative mg/dL    Bilirubin Urine Negative NEG^Negative    Ketones Urine Negative NEG^Negative mg/dL    Specific Gravity Urine 1.028 1.003 - 1.035    Blood Urine Negative NEG^Negative    pH Urine 5.0 5.0 - 7.0 pH    Protein Albumin Urine 30 (A) NEG^Negative mg/dL    Urobilinogen mg/dL Normal 0.0 - 2.0 mg/dL    Nitrite Urine Negative NEG^Negative    Leukocyte Esterase Urine Large (A) NEG^Negative    Source Unspecified Urine     RBC Urine 7 (H) 0 - 2 /HPF    WBC Urine 118 (H) 0 - 5 /HPF    Bacteria Urine Few (A) NEG^Negative /HPF    Squamous Epithelial /HPF Urine <1 0 - 1 /HPF    Mucous Urine Present (A) NEG^Negative /LPF   Creatinine POCT   Result Value Ref Range    Creatinine 0.7 0.52 - 1.04 mg/dL    GFR Estimate 85 >60 mL/min/1.7m2    GFR Estimate If Black >90 >60 mL/min/1.7m2   Troponin I   Result Value Ref Range    Troponin I ES <0.015 0.000 - 0.045  ug/L   EKG 12-lead, tracing only   Result Value Ref Range    Interpretation ECG Click View Image link to view waveform and result              EKG Interpretation:      EKG Number: 1  Interpreted by Nessa Lyon  Symptoms at time of EKG: chest pain   Rhythm: Normal sinus   Rate: Normal  Axis: Normal  Ectopy: Premature atrial contraction  Conduction: Normal  ST Segments/ T Waves: No ST-T wave changes and No acute ischemic changes  Q Waves: None  Comparison to prior: Unchanged    Clinical Impression: no acute changes and non-specific EKG    ROS:    Review Of Systems  Skin: negative  Eyes: negative  Ears/Nose/Throat: negative  Respiratory: Cough- clear to yellow, No shortness of breath, No dyspnea on exertion and No hemoptysis  Cardiovascular: positive for chest pain, negative for, palpitations, tachycardia, exertional chest pain or pressure, dyspnea on exertion, lower extremity edema and syncope or near-syncope  Gastrointestinal: positive for poor appetite, nausea and diarrhea, negative for, vomiting, heartburn, reflux, abdominal pain, excessive gas or bloating, melena and hematochezia  Genitourinary: negative  Musculoskeletal: generalized weakness  Neurologic: negative  Psychiatric: negative  Hematologic/Lymphatic/Immunologic: negative for, chills and fever  Endocrine: negative  PCP: Dr. Montesinos  CARDIAC RISK: Tobacco Use, postmenopausal female, obese, family h/o CAD    10 point ROS negative other than the symptoms noted above.      Exam:    Vitals:  B/P: 129/77, T: 98, P: 65, R: 20    Constitutional: alert, no distress, cooperative and over weight  Head: Normocephalic. No masses, lesions, tenderness or abnormalities  Neck: Neck supple. No adenopathy. Thyroid symmetric, normal size,, Carotids without bruits.  ENT: ENT exam normal, no neck nodes or sinus tenderness  Cardiovascular: negative, PMI normal. No lifts, heaves, or thrills. RRR. No murmurs, clicks gallops or rub. Right chest wall tenderness  Respiratory:  negative, Percussion normal. Good diaphragmatic excursion. Lungs clear  Gastrointestinal: Abdomen soft, non-tender. BS normal. No masses, organomegaly  : Deferred  Musculoskeletal: extremities normal- no gross deformities noted, gait normal and normal muscle tone  Skin: no suspicious lesions or rashes  Neurologic: Gait normal. Reflexes normal and symmetric. Sensation grossly WNL.  Psychiatric: mentation appears normal and affect normal/bright  Hematologic/Lymphatic/Immunologic: normal ant/post cervical, axillary, supraclavicular and inguinal nodes    Consults: none  FEN: regular diet until midnight; CLD after midnight  DVT prophylaxis: ASA; encourage ambulation  Code Status: full code  Disposition: home once ACS w/u negative    Signed:  Melita Jarvis PA-C   April 6, 2018 at 5:10 PM    Physician Attestation   I, Adry Groves, saw and evaluated Julius Gilliam as part of a shared visit.  I have reviewed and discussed with the advanced practice provider their history, physical and plan.  Please see my separate note in epic.   Adry Groves  Date of Service (when I saw the patient): 4/6/18

## 2018-04-06 NOTE — ED PROVIDER NOTES
History     Chief Complaint   Patient presents with     Chest Wall Pain     Chest wall pain started a couple of weeks ago, pt states has pain with coughing. The pain incresed last night     HPI  Julius Gilliam is a 63 year old female with a history of vulvar cancer on chemotherapy and radiation and hypertension who presents to the Emergency Department for evaluation of chest pain. Patient reports that earlier today she was at the infusion clinic for IV fluids due to chemotherapy and radiation treatments.  She came to the [facility] infusion center because they were unable to fit her in [facility].  She told the nurses that she began having worsening chest pain last night.  The chest pain became quite severe last night and was associated with shortness of breath.  She also notes that she has had a cough for the last 2 weeks.  The nurses at the infusion center noted that her heartbeat was irregular, prompting them to bring her to the ED for further evaluation. Patient states that the pain is present when she coughs and is located on the right side of her chest. She has had a cough for the past few weeks. She notes that the chest pain causes her shortness of breath. Her last chemotherapy treatment was on Monday (4/2). She usually goes to the infusion clinic on Mondays after treatment  Patient denies fevers, nausea, or vomiting.      Social: Patient presents here with her boyfriend. Smokes tobacco    No current facility-administered medications for this encounter.      Current Outpatient Prescriptions   Medication     potassium chloride SA (KLOR-CON) 20 MEQ CR tablet     magnesium oxide (MAG-OX) 400 (241.3 MG) MG tablet     LORazepam (ATIVAN) 1 MG tablet     acetaminophen (TYLENOL) 325 MG tablet     loperamide (IMODIUM) 2 MG capsule     prochlorperazine (COMPAZINE) 10 MG tablet     oxyCODONE IR (ROXICODONE) 5 MG tablet     sennosides (SENOKOT) 8.6 MG tablet     multivitamin, therapeutic with minerals  (MULTI-VITAMIN) TABS tablet     Facility-Administered Medications Ordered in Other Encounters   Medication     0.9% sodium chloride BOLUS     INFUSION HYPERSENSITIVITY     Past Medical History:   Diagnosis Date     Deafness in right ear      Hypertension      Multiple facial bone fractures (H)     also left ankle and left wrist     Vertigo      Vulvar cancer (H) 12/2017       Past Surgical History:   Procedure Laterality Date     DISSECT LYMPH NODE INGUINAL N/A 2/9/2018    Procedure: DISSECT LYMPH NODE INGUINAL;  Bilateral Inguinal Lymph Node Dissection;  Surgeon: Santosh Vera MD;  Location: [facility] OR     ENDOSCOPIC STRIPPING VEIN(S)       FACIAL RECONSTRUCTION SURGERY      post horse trauma     repair of left arm fracture         Family History   Problem Relation Age of Onset     CANCER Mother 81     Breast ca       Social History   Substance Use Topics     Smoking status: Current Every Day Smoker     Types: Cigarettes     Smokeless tobacco: Never Used      Comment: pack a week     Alcohol use Yes      Comment: 0-3 drinks per day     No Known Allergies    I have reviewed the Medications, Allergies, Past Medical and Surgical History, and Social History in the Epic system.    Review of Systems   Constitutional: Negative for fever.   Eyes: Negative.    Respiratory: Positive for cough and shortness of breath.    Cardiovascular: Positive for chest pain (right-sided).   Gastrointestinal: Negative for abdominal pain, nausea and vomiting.   Genitourinary: Negative for flank pain.   Musculoskeletal: Negative.    Neurological: Negative for headaches.   Psychiatric/Behavioral: Negative.    All other systems reviewed and are negative.      Physical Exam   BP: 130/80  Pulse: 65  Heart Rate: 62  Temp: 97.6  F (36.4  C)  Resp: 16  Weight: 63.4 kg (139 lb 12.8 oz)  SpO2: 97 %      Physical Exam   Pulmonary/Chest:         Physical Exam   Constitutional:   well nourished, well developed, appears uncomfortable, frequent deep  cough  HENT:   Head: Normocephalic and atraumatic.   Eyes: Conjunctivae are normal. Pupils are equal, round, and reactive to light.    pharynx has no erythema or exudate, mucous membranes are dry  Neck:   no adenopathy, no bony tenderness  Cardiovascular: regular rate and rhythm without murmurs or gallops  Chest; tender to palpation left chest wall  Pulmonary/Chest: Clear to auscultation bilaterally, with no wheezes or retractions. No respiratory distress.  GI: Soft with good bowel sounds.  Non-tender, non-distended, with no guarding, no rebound, no peritoneal signs.   Back:  No bony or CVA tenderness   Musculoskeletal:  no edema or clubbing   Skin: Skin is warm and dry. No rash noted.   Neurological: alert and oriented to person, place, and time. Nonfocal exam  Psychiatric:  normal mood and affect.    ED Course   9:24 AM  The patient was seen and examined by Dr. Morales in Room 3.   ED Course     Procedures             EKG Interpretation:      Interpreted by Eleni Morales  Time reviewed: 0935 am  Symptoms at time of EKG: see hpi   Rhythm: normal sinus   Rate: Normal  Axis: Normal  Ectopy: premature atrial contraction  Conduction: normal  ST Segments/ T Waves: No acute ischemic changes  Q Waves: none  Comparison to prior: Unchanged from 2/9/2018    Clinical Impression: Sinus rhythm, rate of 72 bpm with PACs and no acute ischemic changes                Critical Care time:  none            Results for orders placed or performed during the hospital encounter of 04/06/18 (from the past 24 hour(s))   EKG 12-lead, tracing only   Result Value Ref Range    Interpretation ECG Click View Image link to view waveform and result    Creatinine POCT   Result Value Ref Range    Creatinine 0.7 0.52 - 1.04 mg/dL    GFR Estimate 85 >60 mL/min/1.7m2    GFR Estimate If Black >90 >60 mL/min/1.7m2   CBC with platelets differential   Result Value Ref Range    WBC 2.7 (L) 4.0 - 11.0 10e9/L    RBC Count 3.21 (L) 3.8 - 5.2 10e12/L     Hemoglobin 10.8 (L) 11.7 - 15.7 g/dL    Hematocrit 31.9 (L) 35.0 - 47.0 %    MCV 99 78 - 100 fl    MCH 33.6 (H) 26.5 - 33.0 pg    MCHC 33.9 31.5 - 36.5 g/dL    RDW 13.1 10.0 - 15.0 %    Platelet Count 121 (L) 150 - 450 10e9/L    Diff Method Automated Method     % Neutrophils 79.8 %    % Lymphocytes 10.8 %    % Monocytes 7.5 %    % Eosinophils 1.5 %    % Basophils 0.4 %    % Immature Granulocytes 0.0 %    Nucleated RBCs 0 0 /100    Absolute Neutrophil 2.1 1.6 - 8.3 10e9/L    Absolute Lymphocytes 0.3 (L) 0.8 - 5.3 10e9/L    Absolute Monocytes 0.2 0.0 - 1.3 10e9/L    Absolute Eosinophils 0.0 0.0 - 0.7 10e9/L    Absolute Basophils 0.0 0.0 - 0.2 10e9/L    Abs Immature Granulocytes 0.0 0 - 0.4 10e9/L    Absolute Nucleated RBC 0.0    Comprehensive metabolic panel   Result Value Ref Range    Sodium 134 133 - 144 mmol/L    Potassium 4.0 3.4 - 5.3 mmol/L    Chloride 99 94 - 109 mmol/L    Carbon Dioxide 29 20 - 32 mmol/L    Anion Gap 6 3 - 14 mmol/L    Glucose 93 70 - 99 mg/dL    Urea Nitrogen 16 7 - 30 mg/dL    Creatinine 1.07 (H) 0.52 - 1.04 mg/dL    GFR Estimate 52 (L) >60 mL/min/1.7m2    GFR Estimate If Black 63 >60 mL/min/1.7m2    Calcium 6.7 (L) 8.5 - 10.1 mg/dL    Bilirubin Total 0.5 0.2 - 1.3 mg/dL    Albumin 2.7 (L) 3.4 - 5.0 g/dL    Protein Total 6.0 (L) 6.8 - 8.8 g/dL    Alkaline Phosphatase 91 40 - 150 U/L    ALT 11 0 - 50 U/L    AST 12 0 - 45 U/L   Lipase   Result Value Ref Range    Lipase 91 73 - 393 U/L   Lactic acid whole blood   Result Value Ref Range    Lactic Acid 0.6 (L) 0.7 - 2.0 mmol/L   Troponin I   Result Value Ref Range    Troponin I ES <0.015 0.000 - 0.045 ug/L   CT Chest Pulmonary Embolism w Contrast    Narrative    CT CHEST WITH CONTRAST  4/6/2018 11:07 AM    HISTORY: Dyspnea. Evaluate for pulmonary embolism.    COMPARISON: Images from a PET/CT on 12/19/2017 from an outside  facility. A dictated report of that examination is not available.    TECHNIQUE: Routine transverse CT imaging of the chest was  performed  following the uneventful intravenous administration of 54 mL Isovue  370 contrast. A pulmonary embolism protocol was utilized. Radiation  dose for this scan was reduced using automated exposure control,  adjustment of the mA and/or kV according to patient size, or iterative  reconstruction technique.    FINDINGS: The heart size is normal. There are a few calcified lymph  nodes in the mediastinum and hilar regions. No other enlarged lymph  node or abnormal mediastinal mass is seen. There is calcification of  the thoracic aorta. There is very good opacification of the pulmonary  arteries with contrast. No pulmonary embolism is seen. There is a 0.7  cm calcified granuloma in the posterior aspect of the right lower lobe  of the lung. There is also a 0.3 cm noncalcified nodule in the lateral  aspect of the right lower lobe on series 5 image 95. There is mild  atelectasis elsewhere in the lungs. The lungs are otherwise clear. No  pneumothorax is demonstrated. No pleural effusion is identified. There  are degenerative changes in the spine. No chest wall pathology is  seen. The visualized upper abdomen is unremarkable.      Impression    IMPRESSION:   1. No acute abnormality is seen. Specifically, no pulmonary embolism  is identified.  2. There is a 0.3 cm noncalcified nodule in the right lower lobe of  the lung. This was not seen previously and is therefore an  indeterminate lesion.    LAQUITA ORTEGA MD   UA reflex to Microscopic and Culture   Result Value Ref Range    Color Urine Yellow     Appearance Urine Slightly Cloudy     Glucose Urine Negative NEG^Negative mg/dL    Bilirubin Urine Negative NEG^Negative    Ketones Urine Negative NEG^Negative mg/dL    Specific Gravity Urine 1.028 1.003 - 1.035    Blood Urine Negative NEG^Negative    pH Urine 5.0 5.0 - 7.0 pH    Protein Albumin Urine 30 (A) NEG^Negative mg/dL    Urobilinogen mg/dL Normal 0.0 - 2.0 mg/dL    Nitrite Urine Negative NEG^Negative     Leukocyte Esterase Urine Large (A) NEG^Negative    Source Unspecified Urine     RBC Urine 7 (H) 0 - 2 /HPF    WBC Urine 118 (H) 0 - 5 /HPF    Bacteria Urine Few (A) NEG^Negative /HPF    Squamous Epithelial /HPF Urine <1 0 - 1 /HPF    Mucous Urine Present (A) NEG^Negative /LPF      Results for orders placed or performed during the hospital encounter of 04/06/18 (from the past 24 hour(s))   EKG 12-lead, tracing only   Result Value Ref Range    Interpretation ECG Click View Image link to view waveform and result    Creatinine POCT   Result Value Ref Range    Creatinine 0.7 0.52 - 1.04 mg/dL    GFR Estimate 85 >60 mL/min/1.7m2    GFR Estimate If Black >90 >60 mL/min/1.7m2   CBC with platelets differential   Result Value Ref Range    WBC 2.7 (L) 4.0 - 11.0 10e9/L    RBC Count 3.21 (L) 3.8 - 5.2 10e12/L    Hemoglobin 10.8 (L) 11.7 - 15.7 g/dL    Hematocrit 31.9 (L) 35.0 - 47.0 %    MCV 99 78 - 100 fl    MCH 33.6 (H) 26.5 - 33.0 pg    MCHC 33.9 31.5 - 36.5 g/dL    RDW 13.1 10.0 - 15.0 %    Platelet Count 121 (L) 150 - 450 10e9/L    Diff Method Automated Method     % Neutrophils 79.8 %    % Lymphocytes 10.8 %    % Monocytes 7.5 %    % Eosinophils 1.5 %    % Basophils 0.4 %    % Immature Granulocytes 0.0 %    Nucleated RBCs 0 0 /100    Absolute Neutrophil 2.1 1.6 - 8.3 10e9/L    Absolute Lymphocytes 0.3 (L) 0.8 - 5.3 10e9/L    Absolute Monocytes 0.2 0.0 - 1.3 10e9/L    Absolute Eosinophils 0.0 0.0 - 0.7 10e9/L    Absolute Basophils 0.0 0.0 - 0.2 10e9/L    Abs Immature Granulocytes 0.0 0 - 0.4 10e9/L    Absolute Nucleated RBC 0.0    Comprehensive metabolic panel   Result Value Ref Range    Sodium 134 133 - 144 mmol/L    Potassium 4.0 3.4 - 5.3 mmol/L    Chloride 99 94 - 109 mmol/L    Carbon Dioxide 29 20 - 32 mmol/L    Anion Gap 6 3 - 14 mmol/L    Glucose 93 70 - 99 mg/dL    Urea Nitrogen 16 7 - 30 mg/dL    Creatinine 1.07 (H) 0.52 - 1.04 mg/dL    GFR Estimate 52 (L) >60 mL/min/1.7m2    GFR Estimate If Black 63 >60  mL/min/1.7m2    Calcium 6.7 (L) 8.5 - 10.1 mg/dL    Bilirubin Total 0.5 0.2 - 1.3 mg/dL    Albumin 2.7 (L) 3.4 - 5.0 g/dL    Protein Total 6.0 (L) 6.8 - 8.8 g/dL    Alkaline Phosphatase 91 40 - 150 U/L    ALT 11 0 - 50 U/L    AST 12 0 - 45 U/L   Lipase   Result Value Ref Range    Lipase 91 73 - 393 U/L   Lactic acid whole blood   Result Value Ref Range    Lactic Acid 0.6 (L) 0.7 - 2.0 mmol/L   Troponin I   Result Value Ref Range    Troponin I ES <0.015 0.000 - 0.045 ug/L   CT Chest Pulmonary Embolism w Contrast    Narrative    CT CHEST WITH CONTRAST  4/6/2018 11:07 AM    HISTORY: Dyspnea. Evaluate for pulmonary embolism.    COMPARISON: Images from a PET/CT on 12/19/2017 from an outside  facility. A dictated report of that examination is not available.    TECHNIQUE: Routine transverse CT imaging of the chest was performed  following the uneventful intravenous administration of 54 mL Isovue  370 contrast. A pulmonary embolism protocol was utilized. Radiation  dose for this scan was reduced using automated exposure control,  adjustment of the mA and/or kV according to patient size, or iterative  reconstruction technique.    FINDINGS: The heart size is normal. There are a few calcified lymph  nodes in the mediastinum and hilar regions. No other enlarged lymph  node or abnormal mediastinal mass is seen. There is calcification of  the thoracic aorta. There is very good opacification of the pulmonary  arteries with contrast. No pulmonary embolism is seen. There is a 0.7  cm calcified granuloma in the posterior aspect of the right lower lobe  of the lung. There is also a 0.3 cm noncalcified nodule in the lateral  aspect of the right lower lobe on series 5 image 95. There is mild  atelectasis elsewhere in the lungs. The lungs are otherwise clear. No  pneumothorax is demonstrated. No pleural effusion is identified. There  are degenerative changes in the spine. No chest wall pathology is  seen. The visualized upper abdomen is  unremarkable.      Impression    IMPRESSION:   1. No acute abnormality is seen. Specifically, no pulmonary embolism  is identified.  2. There is a 0.3 cm noncalcified nodule in the right lower lobe of  the lung. This was not seen previously and is therefore an  indeterminate lesion.    LAQUITA ORTEGA MD   UA reflex to Microscopic and Culture   Result Value Ref Range    Color Urine Yellow     Appearance Urine Slightly Cloudy     Glucose Urine Negative NEG^Negative mg/dL    Bilirubin Urine Negative NEG^Negative    Ketones Urine Negative NEG^Negative mg/dL    Specific Gravity Urine 1.028 1.003 - 1.035    Blood Urine Negative NEG^Negative    pH Urine 5.0 5.0 - 7.0 pH    Protein Albumin Urine 30 (A) NEG^Negative mg/dL    Urobilinogen mg/dL Normal 0.0 - 2.0 mg/dL    Nitrite Urine Negative NEG^Negative    Leukocyte Esterase Urine Large (A) NEG^Negative    Source Unspecified Urine     RBC Urine 7 (H) 0 - 2 /HPF    WBC Urine 118 (H) 0 - 5 /HPF    Bacteria Urine Few (A) NEG^Negative /HPF    Squamous Epithelial /HPF Urine <1 0 - 1 /HPF    Mucous Urine Present (A) NEG^Negative /LPF        Labs Ordered and Resulted from Time of ED Arrival Up to the Time of Departure from the ED   CBC WITH PLATELETS DIFFERENTIAL - Abnormal; Notable for the following:        Result Value    WBC 2.7 (*)     RBC Count 3.21 (*)     Hemoglobin 10.8 (*)     Hematocrit 31.9 (*)     MCH 33.6 (*)     Platelet Count 121 (*)     Absolute Lymphocytes 0.3 (*)     All other components within normal limits   COMPREHENSIVE METABOLIC PANEL - Abnormal; Notable for the following:     Creatinine 1.07 (*)     GFR Estimate 52 (*)     Calcium 6.7 (*)     Albumin 2.7 (*)     Protein Total 6.0 (*)     All other components within normal limits   LACTIC ACID WHOLE BLOOD - Abnormal; Notable for the following:     Lactic Acid 0.6 (*)     All other components within normal limits   UA MACROSCOPIC WITH REFLEX TO MICRO AND CULTURE - Abnormal; Notable for the following:      Protein Albumin Urine 30 (*)     Leukocyte Esterase Urine Large (*)     RBC Urine 7 (*)     WBC Urine 118 (*)     Bacteria Urine Few (*)     Mucous Urine Present (*)     All other components within normal limits   LIPASE   TROPONIN I   CREATININE POCT   ISTAT CREATININE NURSING POCT   URINE CULTURE AEROBIC BACTERIAL            Assessments & Plan (with Medical Decision Making)       I have reviewed the nursing notes.  Emergency Department course:  The patient was seen and examined at 0924 am. EKG shows sinus rhythm, rate of 72 bpm with PACs and no acute ischemic changes.  Laboratory studies show pancytopenia, with a WBC of 2.7, hemoglobin of 10.8, and platelet count of 121,000. ANC is 2.1.  Comprehensive metabolic panel shows an elevated creatinine of 1.07 and low GFR 52.  Lactate is low at 0.6.  Troponin I is less than 0.015.    Chest CT shows IMPRESSION:   1. No acute abnormality is seen. Specifically, no pulmonary embolism  is identified.  2. There is a 0.3 cm noncalcified nodule in the right lower lobe of  the lung. This was not seen previously and is therefore an  indeterminate lesion.    I consulted gyn/oncology regarding the patient.  They do not to chest pain rule outs on the gyn/onc service and recommended admitting the patient to Medicine.   I spoke with the 6D observation unit, who will accept the patient for a cardiac rule out and further risk stratification.     The patient is a 63-year-old female with vulvar cancer on chemotherapy and radiation who presents with chest pain and shortness of breath, as well as cough.  Her chest CT does not show pulmonary embolus or other cause for her symptoms.  She does have a nodule in her right lung and I discussed this with the patient.  She will be admitted to the 6D observation unit for cardiac rule out and further risk stratification.  She is admitted under the care of Dr. Groves.  The patient is due for radiation therapy at the [facility] at 1430 pm.  I spoke with  gyn/oncology to try to facilitate the patients receiving this treatment prior to her arrival at the  observation unit.  I also spoke with the radiation oncology technician regarding this patient.  The patient will be transferred by ambulance to radiation therapy at the [facility] and then be admitted to .   She was transferred by ambulance to the [facility] at approximately 1355 pm.  I have reviewed the findings, diagnosis, plan and need for follow up with the patient.    New Prescriptions    No medications on file       Final diagnoses:   Chest pain, unspecified type   Cough   Dyspnea, unspecified type   Pulmonary nodules   Malignant neoplasm of vulva (H)   I, Yoselyn Zavaleta, am serving as a trained medical scribe to document services personally performed by Eleni Morales MD, based on the provider's statements to me.   I, Eleni Morales MD, was physically present and have reviewed and verified the accuracy of this note documented by Yoselyn Zavaleta.  This note was created in part by the use of Dragon voice recognition dictation system. Inadvertent grammatical errors and typographical errors may still exist.  Eleni Morales MD      4/6/2018   [facility], [place], EMERGENCY DEPARTMENT     Eleni Morales MD  04/06/18 1355

## 2018-04-06 NOTE — PROGRESS NOTES
Admitted to unit following ER visit for chest discomfort. Stating hurts only with coughing. Productive cough. Is a smoker.  Alert and orient. No complaint of nausea. Respiratory status stable. Vital signs within normal limits.

## 2018-04-07 ENCOUNTER — APPOINTMENT (OUTPATIENT)
Dept: CARDIOLOGY | Facility: CLINIC | Age: 63
End: 2018-04-07
Payer: COMMERCIAL

## 2018-04-07 ENCOUNTER — APPOINTMENT (OUTPATIENT)
Dept: CARDIOLOGY | Facility: CLINIC | Age: 63
End: 2018-04-07
Attending: NURSE PRACTITIONER
Payer: COMMERCIAL

## 2018-04-07 VITALS
TEMPERATURE: 98.3 F | DIASTOLIC BLOOD PRESSURE: 61 MMHG | HEART RATE: 65 BPM | RESPIRATION RATE: 16 BRPM | WEIGHT: 139.8 LBS | BODY MASS INDEX: 22.23 KG/M2 | SYSTOLIC BLOOD PRESSURE: 119 MMHG | OXYGEN SATURATION: 91 %

## 2018-04-07 PROBLEM — R05.9 COUGH: Status: ACTIVE | Noted: 2018-04-07

## 2018-04-07 PROBLEM — R91.8 PULMONARY NODULES: Status: ACTIVE | Noted: 2018-04-07

## 2018-04-07 PROBLEM — N30.00 ACUTE CYSTITIS WITHOUT HEMATURIA: Status: ACTIVE | Noted: 2018-04-07

## 2018-04-07 LAB
ALBUMIN SERPL-MCNC: 2.3 G/DL (ref 3.4–5)
ALP SERPL-CCNC: 86 U/L (ref 40–150)
ALT SERPL W P-5'-P-CCNC: 15 U/L (ref 0–50)
ANION GAP SERPL CALCULATED.3IONS-SCNC: 12 MMOL/L (ref 3–14)
AST SERPL W P-5'-P-CCNC: 11 U/L (ref 0–45)
BACTERIA SPEC CULT: ABNORMAL
BASOPHILS # BLD AUTO: 0 10E9/L (ref 0–0.2)
BASOPHILS NFR BLD AUTO: 1.2 %
BILIRUB SERPL-MCNC: 0.3 MG/DL (ref 0.2–1.3)
BUN SERPL-MCNC: 15 MG/DL (ref 7–30)
CALCIUM SERPL-MCNC: 6.3 MG/DL (ref 8.5–10.1)
CHLORIDE SERPL-SCNC: 99 MMOL/L (ref 94–109)
CHOLEST SERPL-MCNC: 159 MG/DL
CO2 SERPL-SCNC: 22 MMOL/L (ref 20–32)
CREAT SERPL-MCNC: 0.95 MG/DL (ref 0.52–1.04)
DIFFERENTIAL METHOD BLD: ABNORMAL
EOSINOPHIL # BLD AUTO: 0.1 10E9/L (ref 0–0.7)
EOSINOPHIL NFR BLD AUTO: 3.1 %
ERYTHROCYTE [DISTWIDTH] IN BLOOD BY AUTOMATED COUNT: 13.7 % (ref 10–15)
GFR SERPL CREATININE-BSD FRML MDRD: 60 ML/MIN/1.7M2
GLUCOSE SERPL-MCNC: 92 MG/DL (ref 70–99)
HCT VFR BLD AUTO: 30.9 % (ref 35–47)
HDLC SERPL-MCNC: 45 MG/DL
HGB BLD-MCNC: 10.5 G/DL (ref 11.7–15.7)
IMM GRANULOCYTES # BLD: 0 10E9/L (ref 0–0.4)
IMM GRANULOCYTES NFR BLD: 0 %
LDLC SERPL CALC-MCNC: 90 MG/DL
LYMPHOCYTES # BLD AUTO: 0.2 10E9/L (ref 0.8–5.3)
LYMPHOCYTES NFR BLD AUTO: 14.3 %
MAGNESIUM SERPL-MCNC: 0.7 MG/DL (ref 1.6–2.3)
MCH RBC QN AUTO: 34 PG (ref 26.5–33)
MCHC RBC AUTO-ENTMCNC: 34 G/DL (ref 31.5–36.5)
MCV RBC AUTO: 100 FL (ref 78–100)
MONOCYTES # BLD AUTO: 0.2 10E9/L (ref 0–1.3)
MONOCYTES NFR BLD AUTO: 14.3 %
NEUTROPHILS # BLD AUTO: 1.1 10E9/L (ref 1.6–8.3)
NEUTROPHILS NFR BLD AUTO: 67.1 %
NONHDLC SERPL-MCNC: 114 MG/DL
NRBC # BLD AUTO: 0 10*3/UL
NRBC BLD AUTO-RTO: 0 /100
PHOSPHATE SERPL-MCNC: 3.9 MG/DL (ref 2.5–4.5)
PLATELET # BLD AUTO: 101 10E9/L (ref 150–450)
POTASSIUM SERPL-SCNC: 3.8 MMOL/L (ref 3.4–5.3)
PROT SERPL-MCNC: 5.4 G/DL (ref 6.8–8.8)
RBC # BLD AUTO: 3.09 10E12/L (ref 3.8–5.2)
SODIUM SERPL-SCNC: 133 MMOL/L (ref 133–144)
SPECIMEN SOURCE: ABNORMAL
TRIGL SERPL-MCNC: 121 MG/DL
TROPONIN I SERPL-MCNC: <0.015 UG/L (ref 0–0.04)
WBC # BLD AUTO: 1.6 10E9/L (ref 4–11)

## 2018-04-07 PROCEDURE — 96361 HYDRATE IV INFUSION ADD-ON: CPT

## 2018-04-07 PROCEDURE — 25000132 ZZH RX MED GY IP 250 OP 250 PS 637: Performed by: NURSE PRACTITIONER

## 2018-04-07 PROCEDURE — 25000132 ZZH RX MED GY IP 250 OP 250 PS 637: Performed by: PHYSICIAN ASSISTANT

## 2018-04-07 PROCEDURE — 25500064 ZZH RX 255 OP 636: Performed by: INTERNAL MEDICINE

## 2018-04-07 PROCEDURE — 85025 COMPLETE CBC W/AUTO DIFF WBC: CPT | Performed by: PHYSICIAN ASSISTANT

## 2018-04-07 PROCEDURE — 84484 ASSAY OF TROPONIN QUANT: CPT | Performed by: PHYSICIAN ASSISTANT

## 2018-04-07 PROCEDURE — 25000128 H RX IP 250 OP 636: Performed by: INTERNAL MEDICINE

## 2018-04-07 PROCEDURE — 96375 TX/PRO/DX INJ NEW DRUG ADDON: CPT

## 2018-04-07 PROCEDURE — 93325 DOPPLER ECHO COLOR FLOW MAPG: CPT | Mod: 26 | Performed by: INTERNAL MEDICINE

## 2018-04-07 PROCEDURE — G0378 HOSPITAL OBSERVATION PER HR: HCPCS

## 2018-04-07 PROCEDURE — 93018 CV STRESS TEST I&R ONLY: CPT | Performed by: INTERNAL MEDICINE

## 2018-04-07 PROCEDURE — 25000128 H RX IP 250 OP 636: Performed by: PHYSICIAN ASSISTANT

## 2018-04-07 PROCEDURE — 25000125 ZZHC RX 250: Performed by: INTERNAL MEDICINE

## 2018-04-07 PROCEDURE — 84100 ASSAY OF PHOSPHORUS: CPT | Performed by: PHYSICIAN ASSISTANT

## 2018-04-07 PROCEDURE — 25000128 H RX IP 250 OP 636: Performed by: NURSE PRACTITIONER

## 2018-04-07 PROCEDURE — 80053 COMPREHEN METABOLIC PANEL: CPT | Performed by: PHYSICIAN ASSISTANT

## 2018-04-07 PROCEDURE — 93350 STRESS TTE ONLY: CPT | Mod: 26 | Performed by: INTERNAL MEDICINE

## 2018-04-07 PROCEDURE — 93016 CV STRESS TEST SUPVJ ONLY: CPT | Performed by: INTERNAL MEDICINE

## 2018-04-07 PROCEDURE — 93321 DOPPLER ECHO F-UP/LMTD STD: CPT | Mod: 26 | Performed by: INTERNAL MEDICINE

## 2018-04-07 PROCEDURE — 36415 COLL VENOUS BLD VENIPUNCTURE: CPT | Performed by: PHYSICIAN ASSISTANT

## 2018-04-07 PROCEDURE — 83735 ASSAY OF MAGNESIUM: CPT | Performed by: PHYSICIAN ASSISTANT

## 2018-04-07 PROCEDURE — 80061 LIPID PANEL: CPT | Performed by: PHYSICIAN ASSISTANT

## 2018-04-07 RX ORDER — DOBUTAMINE HYDROCHLORIDE 200 MG/100ML
5-50 INJECTION INTRAVENOUS CONTINUOUS
Status: DISCONTINUED | OUTPATIENT
Start: 2018-04-07 | End: 2018-04-07 | Stop reason: HOSPADM

## 2018-04-07 RX ORDER — SODIUM CHLORIDE 9 MG/ML
INJECTION, SOLUTION INTRAVENOUS CONTINUOUS
Status: DISCONTINUED | OUTPATIENT
Start: 2018-04-07 | End: 2018-04-07 | Stop reason: HOSPADM

## 2018-04-07 RX ORDER — CIPROFLOXACIN 500 MG/1
500 TABLET, FILM COATED ORAL 2 TIMES DAILY
Qty: 10 TABLET | Refills: 0 | Status: SHIPPED | OUTPATIENT
Start: 2018-04-07 | End: 2018-04-12

## 2018-04-07 RX ORDER — CODEINE PHOSPHATE AND GUAIFENESIN 10; 100 MG/5ML; MG/5ML
5-10 SOLUTION ORAL EVERY 4 HOURS PRN
Qty: 420 ML | Refills: 0 | Status: SHIPPED | OUTPATIENT
Start: 2018-04-07 | End: 2018-05-21

## 2018-04-07 RX ORDER — METOPROLOL TARTRATE 1 MG/ML
5-15 INJECTION, SOLUTION INTRAVENOUS ONCE
Status: COMPLETED | OUTPATIENT
Start: 2018-04-07 | End: 2018-04-07

## 2018-04-07 RX ORDER — MAGNESIUM SULFATE HEPTAHYDRATE 40 MG/ML
4 INJECTION, SOLUTION INTRAVENOUS EVERY 4 HOURS PRN
Status: DISCONTINUED | OUTPATIENT
Start: 2018-04-07 | End: 2018-04-07 | Stop reason: HOSPADM

## 2018-04-07 RX ORDER — ATROPINE SULFATE 0.4 MG/ML
2 INJECTION, SOLUTION ENDOTRACHEAL; INTRAMEDULLARY; INTRAMUSCULAR; INTRAVENOUS; SUBCUTANEOUS ONCE
Status: DISCONTINUED | OUTPATIENT
Start: 2018-04-07 | End: 2018-04-07 | Stop reason: ALTCHOICE

## 2018-04-07 RX ORDER — LORAZEPAM 1 MG/1
1-4 TABLET ORAL EVERY 30 MIN PRN
Status: DISCONTINUED | OUTPATIENT
Start: 2018-04-07 | End: 2018-04-07 | Stop reason: HOSPADM

## 2018-04-07 RX ADMIN — METOROPROLOL TARTRATE 4 MG: 5 INJECTION, SOLUTION INTRAVENOUS at 11:46

## 2018-04-07 RX ADMIN — MAGNESIUM OXIDE TAB 400 MG (241.3 MG ELEMENTAL MG) 400 MG: 400 (241.3 MG) TAB at 09:03

## 2018-04-07 RX ADMIN — PERFLUTREN 13 ML: 6.52 INJECTION, SUSPENSION INTRAVENOUS at 11:14

## 2018-04-07 RX ADMIN — POTASSIUM CHLORIDE 20 MEQ: 750 TABLET, EXTENDED RELEASE ORAL at 09:03

## 2018-04-07 RX ADMIN — SODIUM CHLORIDE: 9 INJECTION, SOLUTION INTRAVENOUS at 06:04

## 2018-04-07 RX ADMIN — DOBUTAMINE IN DEXTROSE 30 MCG/KG/MIN: 200 INJECTION, SOLUTION INTRAVENOUS at 11:35

## 2018-04-07 RX ADMIN — MAGNESIUM SULFATE IN WATER 4 G: 40 INJECTION, SOLUTION INTRAVENOUS at 12:51

## 2018-04-07 RX ADMIN — ASPIRIN 81 MG: 81 TABLET, COATED ORAL at 09:03

## 2018-04-07 RX ADMIN — ACETAMINOPHEN 650 MG: 325 TABLET, FILM COATED ORAL at 06:08

## 2018-04-07 RX ADMIN — ATROPINE SULFATE 0.2 MG: 0.4 INJECTION, SOLUTION INTRAMUSCULAR; INTRAVENOUS; SUBCUTANEOUS at 11:29

## 2018-04-07 RX ADMIN — OXYCODONE HYDROCHLORIDE 5 MG: 5 TABLET ORAL at 06:08

## 2018-04-07 NOTE — PLAN OF CARE
Problem: Patient Care Overview  Goal: Plan of Care/Patient Progress Review  Outpatient/Observation goals to be met before discharge home:  - Serial troponins complete: No , Tropx2, negative   - Stress test complete: No, scheduled for AM   - Seen and cleared by consultant if applicable : No  - Adequate pain control on oral analgesia : Pain level improved per pt   - Vital signs normal or at patient baseline : Yes  - Safe disposition plan has been identified : To prior living conditions once improved

## 2018-04-07 NOTE — PLAN OF CARE
Problem: Patient Care Overview  Goal: Plan of Care/Patient Progress Review  Outpatient/Observation goals to be met before discharge home:  - Serial troponins complete: Yes, Trop x3 negative  - Stress test complete: Yes   - Seen and cleared by consultant if applicable : No   - Adequate pain control on oral analgesia : Denies pain  - Vital signs normal or at patient baseline :Yes   - Safe disposition plan has been identified: To prior living conditions once improved    Clear liquid diet pending test, S rhythm on tele,HR 55-90. Denies chest pain, non radiating. Up ad igor. Will continue to monitor.   /54 (BP Location: Left arm)  Pulse 65  Temp 98.3  F (36.8  C) (Oral)  Resp 16  Wt 63.4 kg (139 lb 12.8 oz)  SpO2 91%  BMI 22.23 kg/m2

## 2018-04-07 NOTE — PLAN OF CARE
Problem: Patient Care Overview  Goal: Plan of Care/Patient Progress Review  Outcome: No Change  - Serial troponins complete: Yes, Trop x3 negative  - Stress test complete: In process  - Seen and cleared by consultant if applicable : N/A  - Adequate pain control on oral analgesia : Denies pain  - Vital signs normal or at patient baseline :Yes   - Safe disposition plan has been identified: To prior living conditions once improved

## 2018-04-07 NOTE — PROGRESS NOTES
Pt here for exercise stress test, but pt unable to exercise at adequate level for test. Changed to dobutamine stress. Test, meds and side effects reviewed with patient. Achieved target HR at 40 mcg Dobutamine and a total of 0.2mg IV atropine. Gave a total of 4mg IV Metoprolol to bring HR back to baseline. Post monitoring complete and VSS. Pt transferred back to  via wheelchair and ekg staff.

## 2018-04-07 NOTE — DISCHARGE SUMMARY
Discharge Summary    Julius Gilliam MRN# [id number]   YOB: 1955 Age: 63 year old     Date of Admission:  4/6/2018  Date of Discharge:  4/7/2018  Admitting Physician:  Adry Groves MD  Discharge Physician:  Adry Groves MD   Discharging Service:  Emergency Medicine     Primary Provider: Tova Montesinos          Discharge Diagnosis:     Chest pain, unspecified type    Cough    Pulmonary nodules    Acute cystitis without hematuria    * No resolved hospital problems. *               Discharge Disposition:   Discharged to home           Condition on Discharge:   Discharge condition: Stable   Code status on discharge: Full Code           Procedures:   No procedures performed during this admission          Discharge Medications:     Current Discharge Medication List      START taking these medications    Details   guaiFENesin-codeine (ROBITUSSIN AC) 100-10 MG/5ML SOLN solution Take 5-10 mLs by mouth every 4 hours as needed for cough  Qty: 420 mL, Refills: 0    Associated Diagnoses: Cough      ciprofloxacin (CIPRO) 500 MG tablet Take 1 tablet (500 mg) by mouth 2 times daily for 5 days  Qty: 10 tablet, Refills: 0    Associated Diagnoses: Acute cystitis without hematuria         CONTINUE these medications which have NOT CHANGED    Details   potassium chloride SA (KLOR-CON) 20 MEQ CR tablet Take 1 tablet (20 mEq) by mouth 2 times daily  Qty: 60 tablet, Refills: 0    Associated Diagnoses: Malignant neoplasm of vulva (H); Hypokalemia      magnesium oxide (MAG-OX) 400 (241.3 MG) MG tablet Take 1 tablet (400 mg) by mouth 2 times daily  Qty: 60 tablet, Refills: 3    Associated Diagnoses: Malignant neoplasm of vulva (H); Hypomagnesemia      LORazepam (ATIVAN) 1 MG tablet Take 1 tablet (1 mg) by mouth every 6 hours as needed (nausea/vomiting, anxiety or sleep )  Qty: 30 tablet, Refills: 1    Associated Diagnoses: Encounter for long-term (current) use of medications; Malignant neoplasm of vulva (H)       acetaminophen (TYLENOL) 325 MG tablet Take 2 tablets (650 mg) by mouth every 6 hours  Qty: 30 tablet, Refills: 0    Associated Diagnoses: H/O lymph node excision      loperamide (IMODIUM) 2 MG capsule Take 2 mg by mouth 4 times daily as needed for diarrhea      prochlorperazine (COMPAZINE) 10 MG tablet Take 1 tablet (10 mg) by mouth every 6 hours as needed (nausea/vomiting)  Qty: 30 tablet, Refills: 2    Associated Diagnoses: Encounter for long-term (current) use of medications; Malignant neoplasm of vulva (H)      oxyCODONE IR (ROXICODONE) 5 MG tablet Take 1 tablet (5 mg) by mouth every 4 hours as needed for pain  Qty: 20 tablet, Refills: 0    Associated Diagnoses: H/O lymph node excision      multivitamin, therapeutic with minerals (MULTI-VITAMIN) TABS tablet Take 1 tablet by mouth daily                   Consultations:   No consultations were requested during this admission             Brief History of Illness:   Please see detailed H&P from 4/6/18, in brief: Julius Gilliam is a 63 year old female with a history of HTN, vulvar cancer on chemotherapy and radiation who presented to the ED with right sided chest pain. She went to the infusion clinic today for IV hydration and electrolyte check and reported to the nurse she was having chest pain that was really bad last night with cough.           Hospital Course:   1. Atypical Chest Pain: right sided chest pain started last night with cough. Has had a URI for the last two weeks and the chest pain is with coughing spells usually and with inspiration. Pain is located on the right lateral chest. Denies any radiation or associated symptoms. Denies any history of heart disease. Cough is productive - clear to yellow; no hemoptysis. Denies any recent travel, h/o DVT/PE, LE edema, heartburn, reflux, constipation, strenuous activity, fever, chills. In ED, HR 59-99, -134/61-80, RR 15-28, SaO2 % on RA, Temp 98. Labs show CMP shows BUN/Creat 16/1.07 (baseline  0.5-0.8), Ca 6.7, albumin 2.7, TP 6. Lactic acid 0.6. Troponin I negative x 1. CBC with WBC 2.7, H/H 10.8/31.9, platelet 121, abs lymphs 0.3. CT Chest reports a 0.3cm non calcified nodule in the RLL not previously present on PET scan in 12/2017; no PE.  EKG with NSR and PAC's (unchanged). Risk Factors include Tobacco Use (30 years, 1/2 ppd), postmenopausal female, obese, family h/o CAD (father with MI in 50's). Cholesterol levels unknown. Chest wall pain on palpation and associated also with cough. Likely MSK.  While in the observation unit the patient's vital signs remained stable, afebrile.  Serial troponins negative X 3, no ectopy on telemetry.  The patient underwent a stress test and this was normal.  Her ascending aorta was slightly dilated at 3.7 cm, patient informed, will also route to PCP so she can be monitored in the future.   -d/c to home  -follow up with PCP in one week, discuss lung nodule and monitoring or ascending aorta diameter  -Return to the ER if she has new or worsening chest pain, shortness of breath, jaw or arm pain, or fainting.  - Guaifenesin with codeine q4h prn for cough     2. H/o Hypomagnesemia/Hypokalemia: K 4.0. Mag 0.7, replaced IV and orally as scheduled.    -follow up with PCP  - Continue home Mag and KCl supplements     3. YAS/Dehydration: BUN/Creat 16/1.07 (baseline 0.5-0.8). Given 1L NS in infusion clinic, NS at 125ml/hr while in the obs unit, recheck creatinine 0.95.   - resolved     4. UTI:  CBC with WBC 2.7, H/H 10.8/31.9, platelet 121, abs lymphs 0.3. UA with 30 TP, large leukocyte esterase, , RBC 7. Urine culture with >100K e.coli, sensitivities pending, denies any dysuria, flank pain, fever, chills. Urinating well. Given Rocephin 1gm IV while in the obs unit, discharged on ciprofloxacin 500 mg po BID X 5 days, sensitivities pending.   -follow up with PCP       5. Incidental New Lung Mass: CT Chest reports a 0.3cm non calcified nodule in the RLL not previously present  on PET scan in 12/2017; no PE.   - Follow up with Gyn Onc as outpatient, will route note to them     6. Tobacco Use: 30 year smoking history with 1/2ppd but cut back recently to 4cigs/day and none in the last few days. Declines Nicotine patch.     7. Chronic Alcohol Abuse: -follow up with PCP            Final Day of Progress before Discharge:       Physical Exam:  Blood pressure 119/61, pulse 65, temperature 98.3  F (36.8  C), temperature source Oral, resp. rate 16, weight 63.4 kg (139 lb 12.8 oz), SpO2 91 %, not currently breastfeeding.    EXAM:  Exam:  Constitutional: healthy, alert and no distress  Head: Normocephalic. No masses, lesions, tenderness or abnormalities  Cardiovascular: No lifts, heaves, or thrills. RRR. No murmurs, clicks gallops or rub  Respiratory: Good diaphragmatic excursion. Lungs clear  Gastrointestinal: Abdomen soft, non-tender. BS normal. No masses, organomegaly  Musculoskeletal: extremities normal- no gross deformities noted, gait normal and normal muscle tone  Skin: no suspicious lesions or rashes  Neurologic: Gait normal. Alert and oriented.   Psychiatric: mentation appears normal and affect normal/bright    /61 (BP Location: Left arm)  Pulse 65  Temp 98.3  F (36.8  C) (Oral)  Resp 16  Wt 63.4 kg (139 lb 12.8 oz)  SpO2 91%  BMI 22.23 kg/m2             Data:  All laboratory data reviewed             Significant Results:     Results for orders placed or performed during the hospital encounter of 04/06/18   CT Chest Pulmonary Embolism w Contrast    Narrative    CT CHEST WITH CONTRAST  4/6/2018 11:07 AM    HISTORY: Dyspnea. Evaluate for pulmonary embolism.    COMPARISON: Images from a PET/CT on 12/19/2017 from an outside  facility. A dictated report of that examination is not available.    TECHNIQUE: Routine transverse CT imaging of the chest was performed  following the uneventful intravenous administration of 54 mL Isovue  370 contrast. A pulmonary embolism protocol was  utilized. Radiation  dose for this scan was reduced using automated exposure control,  adjustment of the mA and/or kV according to patient size, or iterative  reconstruction technique.    FINDINGS: The heart size is normal. There are a few calcified lymph  nodes in the mediastinum and hilar regions. No other enlarged lymph  node or abnormal mediastinal mass is seen. There is calcification of  the thoracic aorta. There is very good opacification of the pulmonary  arteries with contrast. No pulmonary embolism is seen. There is a 0.7  cm calcified granuloma in the posterior aspect of the right lower lobe  of the lung. There is also a 0.3 cm noncalcified nodule in the lateral  aspect of the right lower lobe on series 5 image 95. There is mild  atelectasis elsewhere in the lungs. The lungs are otherwise clear. No  pneumothorax is demonstrated. No pleural effusion is identified. There  are degenerative changes in the spine. No chest wall pathology is  seen. The visualized upper abdomen is unremarkable.      Impression    IMPRESSION:   1. No acute abnormality is seen. Specifically, no pulmonary embolism  is identified.  2. There is a 0.3 cm noncalcified nodule in the right lower lobe of  the lung. This was not seen previously and is therefore an  indeterminate lesion.    LAQUITA ORTEGA MD   CBC with platelets differential   Result Value Ref Range    WBC 2.7 (L) 4.0 - 11.0 10e9/L    RBC Count 3.21 (L) 3.8 - 5.2 10e12/L    Hemoglobin 10.8 (L) 11.7 - 15.7 g/dL    Hematocrit 31.9 (L) 35.0 - 47.0 %    MCV 99 78 - 100 fl    MCH 33.6 (H) 26.5 - 33.0 pg    MCHC 33.9 31.5 - 36.5 g/dL    RDW 13.1 10.0 - 15.0 %    Platelet Count 121 (L) 150 - 450 10e9/L    Diff Method Automated Method     % Neutrophils 79.8 %    % Lymphocytes 10.8 %    % Monocytes 7.5 %    % Eosinophils 1.5 %    % Basophils 0.4 %    % Immature Granulocytes 0.0 %    Nucleated RBCs 0 0 /100    Absolute Neutrophil 2.1 1.6 - 8.3 10e9/L    Absolute Lymphocytes 0.3  (L) 0.8 - 5.3 10e9/L    Absolute Monocytes 0.2 0.0 - 1.3 10e9/L    Absolute Eosinophils 0.0 0.0 - 0.7 10e9/L    Absolute Basophils 0.0 0.0 - 0.2 10e9/L    Abs Immature Granulocytes 0.0 0 - 0.4 10e9/L    Absolute Nucleated RBC 0.0    Comprehensive metabolic panel   Result Value Ref Range    Sodium 134 133 - 144 mmol/L    Potassium 4.0 3.4 - 5.3 mmol/L    Chloride 99 94 - 109 mmol/L    Carbon Dioxide 29 20 - 32 mmol/L    Anion Gap 6 3 - 14 mmol/L    Glucose 93 70 - 99 mg/dL    Urea Nitrogen 16 7 - 30 mg/dL    Creatinine 1.07 (H) 0.52 - 1.04 mg/dL    GFR Estimate 52 (L) >60 mL/min/1.7m2    GFR Estimate If Black 63 >60 mL/min/1.7m2    Calcium 6.7 (L) 8.5 - 10.1 mg/dL    Bilirubin Total 0.5 0.2 - 1.3 mg/dL    Albumin 2.7 (L) 3.4 - 5.0 g/dL    Protein Total 6.0 (L) 6.8 - 8.8 g/dL    Alkaline Phosphatase 91 40 - 150 U/L    ALT 11 0 - 50 U/L    AST 12 0 - 45 U/L   Lipase   Result Value Ref Range    Lipase 91 73 - 393 U/L   Lactic acid whole blood   Result Value Ref Range    Lactic Acid 0.6 (L) 0.7 - 2.0 mmol/L   Troponin I   Result Value Ref Range    Troponin I ES <0.015 0.000 - 0.045 ug/L   UA reflex to Microscopic and Culture   Result Value Ref Range    Color Urine Yellow     Appearance Urine Slightly Cloudy     Glucose Urine Negative NEG^Negative mg/dL    Bilirubin Urine Negative NEG^Negative    Ketones Urine Negative NEG^Negative mg/dL    Specific Gravity Urine 1.028 1.003 - 1.035    Blood Urine Negative NEG^Negative    pH Urine 5.0 5.0 - 7.0 pH    Protein Albumin Urine 30 (A) NEG^Negative mg/dL    Urobilinogen mg/dL Normal 0.0 - 2.0 mg/dL    Nitrite Urine Negative NEG^Negative    Leukocyte Esterase Urine Large (A) NEG^Negative    Source Unspecified Urine     RBC Urine 7 (H) 0 - 2 /HPF    WBC Urine 118 (H) 0 - 5 /HPF    Bacteria Urine Few (A) NEG^Negative /HPF    Squamous Epithelial /HPF Urine <1 0 - 1 /HPF    Mucous Urine Present (A) NEG^Negative /LPF   Creatinine POCT   Result Value Ref Range    Creatinine 0.7 0.52 -  1.04 mg/dL    GFR Estimate 85 >60 mL/min/1.7m2    GFR Estimate If Black >90 >60 mL/min/1.7m2   Troponin I   Result Value Ref Range    Troponin I ES <0.015 0.000 - 0.045 ug/L   Troponin I - Now then in 4 hours x 2    Result Value Ref Range    Troponin I ES <0.015 0.000 - 0.045 ug/L   Troponin I - Now then in 4 hours x 2    Result Value Ref Range    Troponin I ES <0.015 0.000 - 0.045 ug/L   Lipid panel reflex to direct LDL   Result Value Ref Range    Cholesterol 159 <200 mg/dL    Triglycerides 121 <150 mg/dL    HDL Cholesterol 45 (L) >49 mg/dL    LDL Cholesterol Calculated 90 <100 mg/dL    Non HDL Cholesterol 114 <130 mg/dL   Comprehensive metabolic panel   Result Value Ref Range    Sodium 133 133 - 144 mmol/L    Potassium 3.8 3.4 - 5.3 mmol/L    Chloride 99 94 - 109 mmol/L    Carbon Dioxide 22 20 - 32 mmol/L    Anion Gap 12 3 - 14 mmol/L    Glucose 92 70 - 99 mg/dL    Urea Nitrogen 15 7 - 30 mg/dL    Creatinine 0.95 0.52 - 1.04 mg/dL    GFR Estimate 60 (L) >60 mL/min/1.7m2    GFR Estimate If Black 72 >60 mL/min/1.7m2    Calcium 6.3 (L) 8.5 - 10.1 mg/dL    Bilirubin Total 0.3 0.2 - 1.3 mg/dL    Albumin 2.3 (L) 3.4 - 5.0 g/dL    Protein Total 5.4 (L) 6.8 - 8.8 g/dL    Alkaline Phosphatase 86 40 - 150 U/L    ALT 15 0 - 50 U/L    AST 11 0 - 45 U/L   CBC with platelets differential   Result Value Ref Range    WBC 1.6 (L) 4.0 - 11.0 10e9/L    RBC Count 3.09 (L) 3.8 - 5.2 10e12/L    Hemoglobin 10.5 (L) 11.7 - 15.7 g/dL    Hematocrit 30.9 (L) 35.0 - 47.0 %     78 - 100 fl    MCH 34.0 (H) 26.5 - 33.0 pg    MCHC 34.0 31.5 - 36.5 g/dL    RDW 13.7 10.0 - 15.0 %    Platelet Count 101 (L) 150 - 450 10e9/L    Diff Method Automated Method     % Neutrophils 67.1 %    % Lymphocytes 14.3 %    % Monocytes 14.3 %    % Eosinophils 3.1 %    % Basophils 1.2 %    % Immature Granulocytes 0.0 %    Nucleated RBCs 0 0 /100    Absolute Neutrophil 1.1 (L) 1.6 - 8.3 10e9/L    Absolute Lymphocytes 0.2 (L) 0.8 - 5.3 10e9/L    Absolute  Monocytes 0.2 0.0 - 1.3 10e9/L    Absolute Eosinophils 0.1 0.0 - 0.7 10e9/L    Absolute Basophils 0.0 0.0 - 0.2 10e9/L    Abs Immature Granulocytes 0.0 0 - 0.4 10e9/L    Absolute Nucleated RBC 0.0    Magnesium   Result Value Ref Range    Magnesium 0.7 (L) 1.6 - 2.3 mg/dL   Phosphorus   Result Value Ref Range    Phosphorus 3.9 2.5 - 4.5 mg/dL   EKG 12-lead, tracing only   Result Value Ref Range    Interpretation ECG Click View Image link to view waveform and result    Echo  stress test with definity    Narrative    [id number]  [id number]  [id number]  [id number]^DANA^KENDALL^ADRIA           [facility]  [facility]  [address] [place] [id number]     Name: HELDER LOWE  MRN: [id number]  : 1955  Study Date: 2018 11:03 AM  Age: 63 yrs  Gender: Female  Patient Location: [facility] [place]  Reason For Study: Chest Pain  Ordering Physician: KENDALL CORTEZ  Performed By: JONATHAN Vila     BSA: 1.7 m2  Height: 66 in  Weight: 139 lb  HR: 66  BP: 112/80 mmHg  _____________________________________________________________________________  __     _____________________________________________________________________________  __        Interpretation Summary  Normal dobutamine stress echocardiogram without evidence of inducible  ischemia. Target heart rate was achieved.  Normal biventricular size, thickness, and global systolic function at  baseline, LVEF=55-60%.  Heart rate and blood pressure response to dobutamine were normal.  With stress, the left ventricular ejection fraction increased from 55-60% to  greater than 65% and the left ventricular size decreased appropriately.  No regional wall motion abnormalities are present at rest or with dobutamine.  No subjective symptoms to suggest ischemia.  There was no ECG evidence of ischemia.  No significant valve disease on screening doppler evaluation.  Acsending aorta is mildly dilated at 3.7 cm, indexed to BSA is  2.17 cm/m2  (normal for women is 1.6+/-0.3 cm/m2).  _____________________________________________________________________________  __     Stress  Definity (NDC #37745-821-69) given intravenously.  Patient was given 13ml mixture of 1.5ml Definity and 8.5ml saline.  7 ml wasted.  IV start location L Forearm .  Definity Expiration 2019 .  Definity Lot # [id number] .  The drug infusion was stopped due to target heart rate achieved.  The patient did not exhibit any symptoms during drug infusion.  The maximum dose of dobutamine was 40mcg/kg/min.  The maximum dose of atropine was 0.2mg.  The maximum dose of metoprolol was 4mg.     Stress Results                                       Maximum Predicted HR:   158 bpm             Target HR: 134 bpm        % Maximum Predicted HR: 93 %                           Stage DurationHeart Rate  BP   Dose                               (mm:ss)   (bpm)                      Baseline  0:00      66    112/80                        Peak    5:55      147   157/6340.00                            Stress Duration:   5:55 mm:ss *                      Maximum Stress HR: 147 bpm *     Aortic Valve  The aortic valve is not well visualized.     Procedure  Dobutamine Echo Complete.     _____________________________________________________________________________  __  MMode/2D Measurements & Calculations  Ao root diam: 3.8 cm  asc Aorta Diam: 3.7 cm        Doppler Measurements & Calculations  MV E max jaida: 41.1 cm/sec  MV A max jaida: 54.2 cm/sec  MV E/A: 0.76     MV dec time: 0.22 sec  TR max jaida: 227.0 cm/sec  TR max P.6 mmHg           _____________________________________________________________________________  __           Report approved by: Raiza MORAN 2018 12:24 PM      Urine Culture Aerobic Bacterial   Result Value Ref Range    Specimen Description Midstream Urine     Culture Micro (A)      >100,000 colonies/mL  Escherichia coli  Susceptibility testing in progress        Recent  Results (from the past 48 hour(s))   CT Chest Pulmonary Embolism w Contrast    Narrative    CT CHEST WITH CONTRAST  4/6/2018 11:07 AM    HISTORY: Dyspnea. Evaluate for pulmonary embolism.    COMPARISON: Images from a PET/CT on 12/19/2017 from an outside  facility. A dictated report of that examination is not available.    TECHNIQUE: Routine transverse CT imaging of the chest was performed  following the uneventful intravenous administration of 54 mL Isovue  370 contrast. A pulmonary embolism protocol was utilized. Radiation  dose for this scan was reduced using automated exposure control,  adjustment of the mA and/or kV according to patient size, or iterative  reconstruction technique.    FINDINGS: The heart size is normal. There are a few calcified lymph  nodes in the mediastinum and hilar regions. No other enlarged lymph  node or abnormal mediastinal mass is seen. There is calcification of  the thoracic aorta. There is very good opacification of the pulmonary  arteries with contrast. No pulmonary embolism is seen. There is a 0.7  cm calcified granuloma in the posterior aspect of the right lower lobe  of the lung. There is also a 0.3 cm noncalcified nodule in the lateral  aspect of the right lower lobe on series 5 image 95. There is mild  atelectasis elsewhere in the lungs. The lungs are otherwise clear. No  pneumothorax is demonstrated. No pleural effusion is identified. There  are degenerative changes in the spine. No chest wall pathology is  seen. The visualized upper abdomen is unremarkable.      Impression    IMPRESSION:   1. No acute abnormality is seen. Specifically, no pulmonary embolism  is identified.  2. There is a 0.3 cm noncalcified nodule in the right lower lobe of  the lung. This was not seen previously and is therefore an  indeterminate lesion.    LAQUITA ORTEGA MD                Pending Results:   Unresulted Labs Ordered in the Past 30 Days of this Admission     Date and Time Order Name Status  Description    4/6/2018 1215 Urine Culture Aerobic Bacterial Preliminary                   Discharge Instructions and Follow-Up:     Discharge Procedure Orders  Reason for your hospital stay   Order Comments: You were admitted to the observation unit for evaluation of your chest pain.  You had no EKG changes, labs checking for heart damage were negative, your chest CT was negative for blood clot, but did show 0.3cm non calcified nodule in the right lower lobe, not present on prior PET scan, please follow up and discuss this with your oncologist.  You underwent a stress test and this was normal, it did show a mildly dilated aorta measuring 3.7 cm, please follow up with your PCP in regards to this.     Adult [facility] Follow-up and recommended labs and tests   Order Comments: Follow up with primary care provider, Tova Montesinos, within 7 days for hospital follow- up.      Appointments on [place] and/or [facility] (with [facility] or [facility] provider or service). Call [phone] if you haven't heard regarding these appointments within 7 days of discharge.     Activity   Order Comments: Your activity upon discharge: activity as tolerated   Order Specific Question Answer Comments   Is discharge order? Yes      When to contact your care team   Order Comments: Return to the ER if you have new or worsening chest pain, shortness of breath, jaw or arm pain, or fainting.     Diet   Order Comments: Follow this diet upon discharge: Orders Placed This Encounter     Regular Diet Adult   Order Specific Question Answer Comments   Is discharge order? Yes             Attestation:  Chastity Villanueva.  APRN, CNP          Physician Attestation   I, Adry Groves, saw and evaluated Julius Gilliam as part of a shared visit.  I have reviewed and discussed with the advanced practice provider their history, physical and plan.    I personally reviewed the vital signs, medications, labs and imaging.    My key history or physical exam  findings: chest pain with coughing for past few weeks.  Current receiving chemo and radiation for 3c vulvar cancer.      Key management decisions made by me: stress test done and shows no ischemic concerns.  She has a slightly dilated ascending aorta.  Chest CT shows new small pulmonary nodule. She will f/u with her oncologist within 1 week for new pulmonary nodule.     Adry Groves  Date of Service (when I saw the patient): 04/07/18

## 2018-04-07 NOTE — PLAN OF CARE
Problem: Patient Care Overview  Goal: Plan of Care/Patient Progress Review  Outpatient/Observation goals to be met before discharge home:  - Serial troponins complete: Yes, Tropx3, negative   - Stress test complete: No, scheduled for AM   - Seen and cleared by consultant if applicable : No  - Adequate pain control on oral analgesia : Yes  - Vital signs normal or at patient baseline : Yes  - Safe disposition plan has been identified : To prior living conditions once improved  Clear liquid diet pending test, S rhythm on tele. C/o of R sided chest pain, non radiating.Oxycodone given for pain with relief. Up ad igor. Will continue to monitor.   /54 (BP Location: Left arm)  Pulse 65  Temp 98.3  F (36.8  C) (Oral)  Resp 16  Wt 63.4 kg (139 lb 12.8 oz)  SpO2 91%  BMI 22.23 kg/m2

## 2018-04-07 NOTE — PROGRESS NOTES
Unable to do exercise stress test so switched to dobutamine stress test.  Awaiting results of this test.  She says she feels well.  Dispo based on this result.

## 2018-04-07 NOTE — PLAN OF CARE
Problem: Patient Care Overview  Goal: Plan of Care/Patient Progress Review  Outcome: No Change  - Serial troponins complete: Yes, Trop x3 negative  - Stress test complete: No, scheduled for 1030  - Seen and cleared by consultant if applicable : NA  - Adequate pain control on oral analgesia : Denies pain  - Vital signs normal or at patient baseline :Yes   - Safe disposition plan has been identified: To prior living conditions once improved

## 2018-04-07 NOTE — PLAN OF CARE
Problem: Patient Care Overview  Goal: Plan of Care/Patient Progress Review  Outcome: Improving  VSS, A&O X4.  Stress test completed.  Magnesium replaced, no recheck needed per NP.  Discussed AVS details with patient.  She was able to state understanding of med details, abx course, when to follow up, when to seek medical care.  Discharged home with significant other.

## 2018-04-07 NOTE — PLAN OF CARE
Problem: Patient Care Overview  Goal: Plan of Care/Patient Progress Review  Outpatient/Observation goals to be met before discharge home:  - Serial troponins complete: Yes, Tropx3, negative   - Stress test complete: No, scheduled for AM   - Seen and cleared by consultant if applicable : No  - Adequate pain control on oral analgesia : Yes  - Vital signs normal or at patient baseline : Yes  - Safe disposition plan has been identified : To prior living conditions once improved

## 2018-04-09 ENCOUNTER — APPOINTMENT (OUTPATIENT)
Dept: RADIATION ONCOLOGY | Facility: CLINIC | Age: 63
End: 2018-04-09
Attending: RADIOLOGY
Payer: COMMERCIAL

## 2018-04-09 PROCEDURE — 77386 ZZH IMRT TREATMENT DELIVERY, COMPLEX: CPT | Performed by: RADIOLOGY

## 2018-04-10 ENCOUNTER — INFUSION THERAPY VISIT (OUTPATIENT)
Dept: ONCOLOGY | Facility: CLINIC | Age: 63
End: 2018-04-10
Attending: OBSTETRICS & GYNECOLOGY
Payer: COMMERCIAL

## 2018-04-10 ENCOUNTER — APPOINTMENT (OUTPATIENT)
Dept: RADIATION ONCOLOGY | Facility: CLINIC | Age: 63
End: 2018-04-10
Attending: RADIOLOGY
Payer: COMMERCIAL

## 2018-04-10 ENCOUNTER — ONCOLOGY VISIT (OUTPATIENT)
Dept: ONCOLOGY | Facility: CLINIC | Age: 63
End: 2018-04-10
Attending: NURSE PRACTITIONER
Payer: COMMERCIAL

## 2018-04-10 VITALS
RESPIRATION RATE: 16 BRPM | BODY MASS INDEX: 22.08 KG/M2 | SYSTOLIC BLOOD PRESSURE: 132 MMHG | WEIGHT: 138.9 LBS | TEMPERATURE: 97.7 F | HEART RATE: 70 BPM | OXYGEN SATURATION: 93 % | DIASTOLIC BLOOD PRESSURE: 76 MMHG

## 2018-04-10 DIAGNOSIS — E87.6 HYPOKALEMIA: ICD-10-CM

## 2018-04-10 DIAGNOSIS — E83.51 HYPOCALCEMIA: ICD-10-CM

## 2018-04-10 DIAGNOSIS — E83.42 HYPOMAGNESEMIA: ICD-10-CM

## 2018-04-10 DIAGNOSIS — Z79.899 ENCOUNTER FOR LONG-TERM (CURRENT) USE OF MEDICATIONS: Primary | ICD-10-CM

## 2018-04-10 DIAGNOSIS — C51.9 MALIGNANT NEOPLASM OF VULVA (H): ICD-10-CM

## 2018-04-10 DIAGNOSIS — C51.9 MALIGNANT NEOPLASM OF VULVA (H): Primary | ICD-10-CM

## 2018-04-10 DIAGNOSIS — Z79.899 ENCOUNTER FOR LONG-TERM (CURRENT) USE OF MEDICATIONS: ICD-10-CM

## 2018-04-10 LAB
ALBUMIN SERPL-MCNC: 3.1 G/DL (ref 3.4–5)
ALP SERPL-CCNC: 114 U/L (ref 40–150)
ALT SERPL W P-5'-P-CCNC: 17 U/L (ref 0–50)
ANION GAP SERPL CALCULATED.3IONS-SCNC: 10 MMOL/L (ref 3–14)
AST SERPL W P-5'-P-CCNC: 19 U/L (ref 0–45)
BASOPHILS # BLD AUTO: 0 10E9/L (ref 0–0.2)
BASOPHILS NFR BLD AUTO: 0.9 %
BILIRUB SERPL-MCNC: 0.6 MG/DL (ref 0.2–1.3)
BUN SERPL-MCNC: 14 MG/DL (ref 7–30)
CALCIUM SERPL-MCNC: 7.3 MG/DL (ref 8.5–10.1)
CHLORIDE SERPL-SCNC: 94 MMOL/L (ref 94–109)
CO2 SERPL-SCNC: 26 MMOL/L (ref 20–32)
CREAT SERPL-MCNC: 1.22 MG/DL (ref 0.52–1.04)
DIFFERENTIAL METHOD BLD: ABNORMAL
EOSINOPHIL # BLD AUTO: 0.1 10E9/L (ref 0–0.7)
EOSINOPHIL NFR BLD AUTO: 2.8 %
ERYTHROCYTE [DISTWIDTH] IN BLOOD BY AUTOMATED COUNT: 13.2 % (ref 10–15)
GFR SERPL CREATININE-BSD FRML MDRD: 45 ML/MIN/1.7M2
GLUCOSE SERPL-MCNC: 86 MG/DL (ref 70–99)
HCT VFR BLD AUTO: 31.6 % (ref 35–47)
HGB BLD-MCNC: 11 G/DL (ref 11.7–15.7)
IMM GRANULOCYTES # BLD: 0 10E9/L (ref 0–0.4)
IMM GRANULOCYTES NFR BLD: 0.5 %
LYMPHOCYTES # BLD AUTO: 0.2 10E9/L (ref 0.8–5.3)
LYMPHOCYTES NFR BLD AUTO: 10.7 %
MAGNESIUM SERPL-MCNC: 1 MG/DL (ref 1.6–2.3)
MCH RBC QN AUTO: 34.5 PG (ref 26.5–33)
MCHC RBC AUTO-ENTMCNC: 34.8 G/DL (ref 31.5–36.5)
MCV RBC AUTO: 99 FL (ref 78–100)
MONOCYTES # BLD AUTO: 0.2 10E9/L (ref 0–1.3)
MONOCYTES NFR BLD AUTO: 8.4 %
NEUTROPHILS # BLD AUTO: 1.6 10E9/L (ref 1.6–8.3)
NEUTROPHILS NFR BLD AUTO: 76.7 %
NRBC # BLD AUTO: 0 10*3/UL
NRBC BLD AUTO-RTO: 0 /100
PLATELET # BLD AUTO: 148 10E9/L (ref 150–450)
PLATELET # BLD EST: ABNORMAL 10*3/UL
POTASSIUM SERPL-SCNC: 3.4 MMOL/L (ref 3.4–5.3)
PROT SERPL-MCNC: 6.8 G/DL (ref 6.8–8.8)
RBC # BLD AUTO: 3.19 10E12/L (ref 3.8–5.2)
SODIUM SERPL-SCNC: 130 MMOL/L (ref 133–144)
WBC # BLD AUTO: 2.1 10E9/L (ref 4–11)

## 2018-04-10 PROCEDURE — 25800025 ZZH RX 258: Mod: ZF | Performed by: OBSTETRICS & GYNECOLOGY

## 2018-04-10 PROCEDURE — 96366 THER/PROPH/DIAG IV INF ADDON: CPT

## 2018-04-10 PROCEDURE — 96367 TX/PROPH/DG ADDL SEQ IV INF: CPT

## 2018-04-10 PROCEDURE — 96413 CHEMO IV INFUSION 1 HR: CPT

## 2018-04-10 PROCEDURE — 25000128 H RX IP 250 OP 636: Mod: ZF | Performed by: OBSTETRICS & GYNECOLOGY

## 2018-04-10 PROCEDURE — 77386 ZZH IMRT TREATMENT DELIVERY, COMPLEX: CPT | Performed by: RADIOLOGY

## 2018-04-10 PROCEDURE — 25000128 H RX IP 250 OP 636: Mod: ZF | Performed by: NURSE PRACTITIONER

## 2018-04-10 PROCEDURE — 96375 TX/PRO/DX INJ NEW DRUG ADDON: CPT

## 2018-04-10 PROCEDURE — 99214 OFFICE O/P EST MOD 30 MIN: CPT | Mod: ZP | Performed by: NURSE PRACTITIONER

## 2018-04-10 PROCEDURE — 36415 COLL VENOUS BLD VENIPUNCTURE: CPT

## 2018-04-10 PROCEDURE — 80053 COMPREHEN METABOLIC PANEL: CPT | Performed by: OBSTETRICS & GYNECOLOGY

## 2018-04-10 PROCEDURE — 83735 ASSAY OF MAGNESIUM: CPT | Performed by: OBSTETRICS & GYNECOLOGY

## 2018-04-10 PROCEDURE — 85025 COMPLETE CBC W/AUTO DIFF WBC: CPT | Performed by: OBSTETRICS & GYNECOLOGY

## 2018-04-10 RX ORDER — DEXTROSE, SODIUM CHLORIDE, AND POTASSIUM CHLORIDE 5; .45; .15 G/100ML; G/100ML; G/100ML
2000 INJECTION INTRAVENOUS ONCE
Status: COMPLETED | OUTPATIENT
Start: 2018-04-10 | End: 2018-04-10

## 2018-04-10 RX ORDER — PALONOSETRON 0.05 MG/ML
0.25 INJECTION, SOLUTION INTRAVENOUS ONCE
Status: COMPLETED | OUTPATIENT
Start: 2018-04-10 | End: 2018-04-10

## 2018-04-10 RX ADMIN — DEXAMETHASONE SODIUM PHOSPHATE 150 MG: 10 INJECTION, SOLUTION INTRAMUSCULAR; INTRAVENOUS at 08:02

## 2018-04-10 RX ADMIN — MAGNESIUM SULFATE HEPTAHYDRATE: 500 INJECTION, SOLUTION INTRAMUSCULAR; INTRAVENOUS at 10:08

## 2018-04-10 RX ADMIN — PALONOSETRON HYDROCHLORIDE 0.25 MG: 0.25 INJECTION INTRAVENOUS at 07:59

## 2018-04-10 RX ADMIN — POTASSIUM CHLORIDE, DEXTROSE MONOHYDRATE AND SODIUM CHLORIDE 1000 ML: 150; 5; 450 INJECTION, SOLUTION INTRAVENOUS at 07:28

## 2018-04-10 RX ADMIN — CISPLATIN 50 MG: 1 INJECTION, SOLUTION INTRAVENOUS at 09:39

## 2018-04-10 ASSESSMENT — PAIN SCALES - GENERAL: PAINLEVEL: NO PAIN (0)

## 2018-04-10 NOTE — PROGRESS NOTES
Follow Up Notes on Referred Patient    Date: 4/10/2018        RE: Julius Gilliam  : 1955  EJ: 4/10/2018      Julius Gilliam is a 63 year old woman with a diagnosis of vulvar cancer.   She is here today for follow up and disease management.     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 Lymph nodes, left inguinal:   - Metastatic squamous cell carcinoma to three of seven lymph nodes examined (3/7)   - The largest metastatic focus is 2.0 cm in greatest dimension with extranodal extension     B. Lymph nodes, right inguinal:   - Two reactive lymph nodes, negative for malignancy (0/2)     3/12/18: Cisplatin + radiation    18: ED for chest pain; testing negative; 3 mm RLL nodule identified        Today she denies any urinary symptoms or chest discomfort/pain. She is taking her Cipro as directed and has one day left. She continues to smoke but has decreased from 1 ppd to about 5 cigarettes/day (as she states she is staying at [facility] and it is more difficult to smoke there). She is having some diarrhea from her radiation and is taking Imodium which is helping but she still has some diarrhea. She is taking 4 Imodium/day. She denies any issues with eating or drinking (reports drinking about 6 bottles of water a day and occasional coffee), is taking her Mg once a day and is taking her K BID; she will   more K and Mg today and start taking both BID tomorrow. She does not need any medication refills and feels ok to continue with her chemotherapy today.           Review of Systems:    Systemic           no weight changes; no fever; no chills; no night sweats; no appetite changes  Skin           no rashes, or lesions  Eye           no irritation; no changes in vision  Usha-Laryngeal           no dysphagia; no hoarseness   Pulmonary    no cough; no shortness of breath  Cardiovascular    no chest pain; no palpitations  Gastrointestinal    + diarrhea; no constipation; no abdominal pain; no changes in bowel habits; no blood in stool  Genitourinary   no urinary frequency; no urinary urgency; no dysuria; no pain; no abnormal vaginal discharge; no abnormal vaginal bleeding  Breast    no breast discharge; no breast changes; no breast pain  Musculoskeletal    no myalgias; no arthralgias; no back pain  Psychiatric           no depressed mood; no anxiety    Hematologic               no tender lymph nodes; no noticeable swellings or lumps   Endocrine    no hot flashes; no heat/cold intolerance         Neurological   no tremor; no numbness and tingling; no headaches; no difficulty sleeping      Past Medical History:    Past Medical History:   Diagnosis Date     Deafness in right ear      Hypertension      Multiple facial bone fractures (H)     also left ankle and left wrist     Vertigo      Vulvar cancer (H) 12/2017         Past Surgical History:    Past Surgical History:   Procedure Laterality Date     DISSECT LYMPH NODE INGUINAL N/A 2/9/2018    Procedure: DISSECT LYMPH NODE INGUINAL;  Bilateral Inguinal Lymph Node Dissection;  Surgeon: Santosh Vera MD;  Location: [facility] OR     ENDOSCOPIC STRIPPING VEIN(S)       FACIAL RECONSTRUCTION SURGERY      post horse trauma     repair of left arm fracture           Health Maintenance Due   Topic Date Due     TETANUS IMMUNIZATION (SYSTEM ASSIGNED)  03/12/1974     PAP SCREENING Q3 YR  (SYSTEM ASSIGNED)  1977     ADVANCE DIRECTIVE PLANNING Q5 YRS  2011       Current Medications:     Current Outpatient Prescriptions   Medication Sig Dispense Refill     guaiFENesin-codeine (ROBITUSSIN AC) 100-10 MG/5ML SOLN solution Take 5-10 mLs by mouth every 4 hours as needed for cough 420 mL 0     ciprofloxacin (CIPRO) 500 MG tablet Take 1 tablet (500 mg) by mouth 2 times daily for 5 days 10 tablet 0     potassium chloride SA (KLOR-CON) 20 MEQ CR tablet Take 1 tablet (20 mEq) by mouth 2 times daily 60 tablet 0     magnesium oxide (MAG-OX) 400 (241.3 MG) MG tablet Take 1 tablet (400 mg) by mouth 2 times daily 60 tablet 3     loperamide (IMODIUM) 2 MG capsule Take 2 mg by mouth 4 times daily as needed for diarrhea       LORazepam (ATIVAN) 1 MG tablet Take 1 tablet (1 mg) by mouth every 6 hours as needed (nausea/vomiting, anxiety or sleep ) 30 tablet 1     prochlorperazine (COMPAZINE) 10 MG tablet Take 1 tablet (10 mg) by mouth every 6 hours as needed (nausea/vomiting) 30 tablet 2     acetaminophen (TYLENOL) 325 MG tablet Take 2 tablets (650 mg) by mouth every 6 hours 30 tablet 0     oxyCODONE IR (ROXICODONE) 5 MG tablet Take 1 tablet (5 mg) by mouth every 4 hours as needed for pain 20 tablet 0     multivitamin, therapeutic with minerals (MULTI-VITAMIN) TABS tablet Take 1 tablet by mouth daily           Allergies:      No Known Allergies     Social History:     Social History   Substance Use Topics     Smoking status: Current Every Day Smoker     Packs/day: 0.50     Types: Cigarettes     Smokeless tobacco: Never Used      Comment: as of  has been smoking for 30 years. currently cut back to 4 cigs a day     Alcohol use Yes      Comment: 8 x per week       History   Drug Use No         Family History:       Family History   Problem Relation Age of Onset     CANCER Mother 81     Breast ca     Myocardial Infarction Father 55      at 92         Physical Exam:     /76 (BP Location: Right arm,  Cuff Size: Adult Small)  Pulse 70  Temp 97.7  F (36.5  C) (Oral)  Resp 16  Wt 63 kg (138 lb 14.4 oz)  SpO2 93%  BMI 22.08 kg/m2  Body mass index is 22.08 kg/(m^2).    General Appearance: healthy and alert, no distress     HEENT: no thyromegaly, no palpable nodules or masses        Cardiovascular: regular rate and rhythm, no gallops, rubs or murmurs     Respiratory: lungs clear, no rales, rhonchi or wheezes, normal diaphragmatic excursion    Musculoskeletal: extremities non tender and without edema    Skin: no lesions or rashes     Neurological: normal gait, no gross defects     Psychiatric: appropriate mood and affect                               Hematological: normal cervical, supraclavicular lymph nodes     Gastrointestinal:       abdomen soft, non-tender, non-distended, no organomegaly or masses    Genitourinary: Deferred        Assessment:    Julius Gilliam is a 63 year old woman with a diagnosis of vulvar cancer.   She is here today for follow up and disease management.      25 minutes were spent with this patient, over 50% of that time was spent in symptom management, treatment planning and in counseling and coordination of care.      Plan:     1.)        Ok to continue with current regimen. Will have Heide reach out to her regarding follow up plan/imaging. Discussed typically we image about 3 months after completion of radiation; this imaging will also follow up on 3 mm RLL nodule found on most recent imaging. Continue with Cipro until completion of Rx. Encouraged to discuss diarrhea with rad onc when. Reviewed signs and symptoms for when she should contact the clinic or seek additional care. Patient to contact the clinic with any questions or concerns in the interim.     2.) Hypomagnesemia: reviewed she should be taking this BID; she will start this tomorrow. To have Mg replaced in infusion today. Continue to monitor and replace as needed.     3.) Labs and/or tests ordered include:  Chemo labs.       4.) Health maintenance issues addressed today include annual health maintenance and non-gynecologic issues with PCP.    5.)        Tobacco: encouraged to continue to decrease/taper down and off.     6.)        Hypokalemia: to be replaced in infusion today; to take BID and continue to monitor and replace as needed.     7.)        Continue to monitor Ca and albumin; encouraged to eat smaller more frequent meals with lean proteins as well as add in a nutritional supplement like Boost or Ensure.     8.)         She verbalized understanding of the above.     CAROLE Gillette, WHNP-BC, ANP-BC  Women's Health Nurse Practitioner  Adult Nurse Pracitioner  Division of Gynecologic Oncology          CC  Patient Care Team:  Tova Montesinos NP as PCP - General (Family Practice)  Lisa Perez, RN as Continuity Care Coordinator (Gyn-Onc)  SELF, REFERRED

## 2018-04-10 NOTE — MR AVS SNAPSHOT
After Visit Summary   4/10/2018    Julius Gilliam    MRN: [id number]           Patient Information     Date Of Birth          1955        Visit Information        Provider Department      4/10/2018 7:30 AM Karin Scott APRN CNP [facility]        Today's Diagnoses     Malignant neoplasm of vulva (H)    -  1    Encounter for long-term (current) use of medications        Hypokalemia        Hypomagnesemia        Hypocalcemia           Follow-ups after your visit        Follow-up notes from your care team     Return if symptoms worsen or fail to improve.      Your next 10 appointments already scheduled     Apr 10, 2018  2:30 PM CDT   EXTERNAL RADIATION TREATMENT with P RAD ONC MARIE   Radiation Oncology Clinic ([facility])    [facility]  1st Floor  [address]  [facility] [address]   [phone]            Apr 11, 2018  2:30 PM CDT   EXTERNAL RADIATION TREATMENT with P RAD ONC MARIE   Radiation Oncology Clinic ([facility])    [facility]  1st Floor  [address]  [place] [address]   [phone]            Apr 12, 2018  2:15 PM CDT   [facility] Lab Draw with  [facility] LAB DRAW   [facility] ([facility])    [address]  [address]  [place] [address]   [phone]            Apr 12, 2018  2:30 PM CDT   EXTERNAL RADIATION TREATMENT with P RAD ONC MARIE   Radiation Oncology Clinic ([facility])    [facility]  1st Floor  [address]  [place] [address]   [phone]            Apr 12, 2018  3:00 PM CDT   Infusion 120 with UC ONCOLOGY INFUSION, UC 23 ATC   [facility] ([facility])    [address]  [address]  [place] [facility] [address]   [phone]            Apr 13, 2018  2:30 PM CDT   EXTERNAL RADIATION TREATMENT with P RAD ONC  [facility]   Radiation Oncology Clinic ([facility])    [facility]  1st Floor  [address]  [facility] [address]   [phone]            Apr 16, 2018  7:30 AM CDT   [facility] Lab Draw with UC [facility] LAB DRAW   [facility] ([facility])    [address]  [address]  [facility] [id number] [phone]            Apr 16, 2018  8:00 AM CDT   Infusion 360 with UC ONCOLOGY INFUSION, UC 14 ATC   [facility] ([facility])    [address]  [address]  [facility] [id number] [phone]              Who to contact     If you have questions or need follow up information about today's clinic visit or your schedule please contact [facility] directly at [phone].  Normal or non-critical lab and imaging results will be communicated to you by MyChart, letter or phone within 4 business days after the clinic has received the results. If you do not hear from us within 7 days, please contact the clinic through [employer] or phone. If you have a critical or abnormal lab result, we will notify you by phone as soon as possible.  Submit refill requests through [employer] or call your pharmacy and they will forward the refill request to us. Please allow 3 business days for your refill to be completed.          Additional Information About Your Visit        MyChart Information     [employer] gives you secure access to your electronic health record. If you see a primary care provider, you can also send messages to your care team and make appointments. If you have questions, please call your primary care clinic.  If you do not have a primary care provider, please call [phone] and they will assist you.        Care EveryWhere ID     This is your Care EveryWhere ID. This could be used by other organizations to access your [facility] medical records  [id number]        Your Vitals Were      Pulse Temperature Respirations Pulse Oximetry BMI (Body Mass Index)       70 97.7  F (36.5  C) (Oral) 16 93% 22.08 kg/m2        Blood Pressure from Last 3 Encounters:   04/10/18 132/76   04/07/18 119/61   04/02/18 107/70    Weight from Last 3 Encounters:   04/10/18 63 kg (138 lb 14.4 oz)   04/06/18 63.4 kg (139 lb 12.8 oz)   04/02/18 62.1 kg (137 lb)              Today, you had the following     No orders found for display       Primary Care Provider Office Phone # Fax #    Tova Montesinos, [phone]       [facility] [address] [facility] [address]        Equal Access to Services     CAREY COSME : Baylee bach Sosandro, waaxda luqadaha, qaybta kaalmada adeaguilayamar, talha cárdenas . So [facility] [phone].    ATENCIÓN: Si habla español, tiene a elizabeth disposición servicios gratuitos de asistencia lingüística. Llame al [phone].    We comply with applicable federal civil rights laws and [place] laws. We do not discriminate on the basis of race, color, national origin, age, disability, sex, sexual orientation, or gender identity.            Thank you!     Thank you for choosing [facility]  for your care. Our goal is always to provide you with excellent care. Hearing back from our patients is one way we can continue to improve our services. Please take a few minutes to complete the written survey that you may receive in the mail after your visit with us. Thank you!             Your Updated Medication List - Protect others around you: Learn how to safely use, store and throw away your medicines at www.disposemymeds.org.          This list is accurate as of 4/10/18  8:20 AM.  Always use your most recent med list.                   Brand Name Dispense Instructions for use Diagnosis    acetaminophen 325 MG tablet    TYLENOL    30 tablet    Take 2 tablets (650 mg) by mouth every 6 hours    H/O lymph node excision       ciprofloxacin 500  MG tablet    CIPRO    10 tablet    Take 1 tablet (500 mg) by mouth 2 times daily for 5 days    Acute cystitis without hematuria       guaiFENesin-codeine 100-10 MG/5ML Soln solution    ROBITUSSIN AC    420 mL    Take 5-10 mLs by mouth every 4 hours as needed for cough    Cough       loperamide 2 MG capsule    IMODIUM     Take 2 mg by mouth 4 times daily as needed for diarrhea        LORazepam 1 MG tablet    ATIVAN    30 tablet    Take 1 tablet (1 mg) by mouth every 6 hours as needed (nausea/vomiting, anxiety or sleep )    Encounter for long-term (current) use of medications, Malignant neoplasm of vulva (H)       magnesium oxide 400 (241.3 MG) MG tablet    MAG-OX    60 tablet    Take 1 tablet (400 mg) by mouth 2 times daily    Malignant neoplasm of vulva (H), Hypomagnesemia       Multi-vitamin Tabs tablet      Take 1 tablet by mouth daily        oxyCODONE IR 5 MG tablet    ROXICODONE    20 tablet    Take 1 tablet (5 mg) by mouth every 4 hours as needed for pain    H/O lymph node excision       potassium chloride SA 20 MEQ CR tablet    KLOR-CON    60 tablet    Take 1 tablet (20 mEq) by mouth 2 times daily    Malignant neoplasm of vulva (H), Hypokalemia       prochlorperazine 10 MG tablet    COMPAZINE    30 tablet    Take 1 tablet (10 mg) by mouth every 6 hours as needed (nausea/vomiting)    Encounter for long-term (current) use of medications, Malignant neoplasm of vulva (H)

## 2018-04-10 NOTE — PATIENT INSTRUCTIONS
Contact Numbers    [facility] [place]: [phone]  [facility] T[place]:  [phone]    Call triage with chills and/or temperature greater than or equal to 100.5, uncontrolled nausea/vomiting, diarrhea, constipation, dizziness, shortness of breath, chest pain, bleeding, unexplained bruising, or any new/concerning symptoms, questions/concerns.     If you are having any concerning symptoms or wish to speak to a provider before your next infusion visit, please call your care coordinator or triage to notify them so we can adequately serve you.       After Hours: [phone]    If after hours, weekends, or holidays, call main hospital  and ask for Oncology doctor on call.           April 2018 Sunday Monday Tuesday Wednesday Thursday Friday Saturday   1     2     [facility] LAB DRAW    6:30 AM   (15 min.)    [facility] LAB DRAW   [facility] Lab Draw     [facility] ONC INFUSION 360    7:00 AM   (360 min.)    ONCOLOGY INFUSION   [facility] Cancer [facility] EXTERNAL RADIATION TREATMT    2:15 PM   (30 min.)   [facility] RAD ONC MARIE   Radiation Oncology Clinic 3     [facility] EXTERNAL RADIATION TREATMT    2:30 PM   (30 min.)   [facility] RAD ONC MARIE   Radiation Oncology Clinic 4     [facility] EXTERNAL RADIATION TREATMT    2:30 PM   (30 min.)   [facility] RAD ONC MARIE   Radiation Oncology Clinic 5     [facility] EXTERNAL RADIATION TREATMT    2:30 PM   (30 min.)   [facility] RAD ONC MARIE   Radiation Oncology Clinic 6     LEVEL 4    8:30 AM   (240 min.)   UR CH 05   [facility] [place], Infusion Services     Admission    9:10 AM   Adry Groves MD   Unit 6D Observation [facility]   (Discharge: 4/7/2018)     CT CHEST PULMONARY EMBOLISM W   10:30 AM   (30 min.)   [id number]   [facility], Radiology     [facility] EXTERNAL RADIATION TREATMT    2:30 PM   (30 min.)   [facility] RAD ONC MARIE   Radiation Oncology Clinic     [facility] ON TREATMENT VISIT    2:45 PM   (15 min.)   Yvon Leal MD   [facility] Oncology Clinic 7     ECH STRESS TEST   11:05 AM   (60 min.)   [id number]   [facility],  [place],  Echocardiography     ECH DOBUTAMINE STRESS TEST   11:25 AM   (60 min.)   [email or web address]   [facility], [place],  Echocardiography   8     9     [facility] EXTERNAL RADIATION TREATMT    2:15 PM   (30 min.)   [facility] RAD ONC MARIE   Radiation Oncology Clinic 10     [facility] DRAW    6:30 AM   (15 min.)   [facility]   [facility] Lab Draw     [facility] ONC INFUSION 360    7:00 AM   (360 min.)   [facility] ONCOLOGY INFUSION   [facility]     [facility] RETURN    7:15 AM   (30 min.)   Karin Scott APRN CNP   [facility]     [facility] EXTERNAL RADIATION TREATMT    2:30 PM   (30 min.)   P RAD ONC MARIE   [facility]cology [facility] 11     [facility] EXTERNAL RADIATION TREATMT    2:30 PM   (30 min.)   P RAD ONC MARIE   [facility] Oncology Clinic 12     [facility] DRAW    2:15 PM   (15 min.)   [facility] DRAW   [facility] Lab Draw     [facility] EXTERNAL RADIATION TREATMT    2:30 PM   (30 min.)   P RAD ONC MARIE   Radiation Oncology [facility]     [facility] ONC INFUSION 120    3:00 PM   (120 min.)   [facility] ONCOLOGY INFUSION   [facility] 13     [facility] EXTERNAL RADIATION TREATMT    2:30 PM   (30 min.)   P RAD ONC MARIE   Rad[facility] Oncology Clinic 14       15     16     [facility] DRAW    7:30 AM   (15 min.)   [facility] DRAW   [facility] Lab Draw     [facility] ONC INFUSION 360    8:00 AM   (360 min.)   [facility] ONCOLOGY INFUSION   [facility]     [facility] EXTERNAL RADIATION TREATMT    2:30 PM   (30 min.)   P RAD ONC MARIE   Radiation Oncology Clinic 17     [facility] EXTERNAL RADIATION TREATMT    2:30 PM   (30 min.)   P RAD ONC MARIE   Radiation Oncology Clinic 18     [facility] EXTERNAL RADIATION TREATMT   12:30 PM   (30 min.)   P RAD ONC MARIE   Radiation Oncology Clinic 19     [facility] EXTERNAL RADIATION TREATMT    2:30 PM   (30 min.)   P RAD ONC MARIE   Radiation Oncology Clinic 20     [facility] EXTERNAL RADIATION TREATMT    2:30 PM   (30 min.)   P RAD ONC MARIE   Radiation Oncology [facility]  21       22     23     [facility] EXTERNAL RADIATION TREATMT    2:15 PM   (30 min.)   [facility] RAD ONC MARIE   Radiation Oncology Clinic 24     [facility] EXTERNAL RADIATION TREATMT    2:30 PM   (30 min.)   [facility] RAD ONC MARIE   Radiation Oncology Clinic 25     [facility] EXTERNAL RADIATION TREATMT    2:30 PM   (30 min.)   [facility] RAD ONC MARIE   Rad[facility] Oncology Clinic 26     [facility] EXTERNAL RADIATION TREATMT    2:30 PM   (30 min.)   [facility] RAD ONC MARIE   Radiation Oncology Clinic 27     [facility] EXTERNAL RADIATION TREATMT    2:30 PM   (30 min.)   [facility] RAD ONC MARIE   Radiation Oncology Clinic 28       29     30     [facility] EXTERNAL RADIATION TREATMT    2:30 PM   (30 min.)   [facility] RAD ONC MARIE   Radiation Oncology Clinic                                     May 2018   Chris Monday Tuesday Wednesday Thursday Friday Saturday             1     2     3     4     5       6     7     8     9     10     11     12       13     14     15     16     17     18     LAB WITH HB CLINIC    7:00 AM   (15 min.)    LAB    Health Lab     [facility] RETURN GENERAL LIVER    7:45 AM   (30 min.)   Malgorzata Madden MD   [facility] Hepatology 19       20     21     22     23     24     25     26       27     28     29     30     31                            Lab Results:  Recent Results (from the past 12 hour(s))   CBC with platelets differential    Collection Time: 04/10/18  6:35 AM   Result Value Ref Range    WBC 2.1 (L) 4.0 - 11.0 10e9/L    RBC Count 3.19 (L) 3.8 - 5.2 10e12/L    Hemoglobin 11.0 (L) 11.7 - 15.7 g/dL    Hematocrit 31.6 (L) 35.0 - 47.0 %    MCV 99 78 - 100 fl    MCH 34.5 (H) 26.5 - 33.0 pg    MCHC 34.8 31.5 - 36.5 g/dL    RDW 13.2 10.0 - 15.0 %    Platelet Count 148 (L) 150 - 450 10e9/L    Diff Method Automated Method     % Neutrophils 76.7 %    % Lymphocytes 10.7 %    % Monocytes 8.4 %    % Eosinophils 2.8 %    % Basophils 0.9 %    % Immature Granulocytes 0.5 %    Nucleated RBCs 0 0 /100    Absolute Neutrophil 1.6 1.6 - 8.3 10e9/L    Absolute Lymphocytes 0.2 (L)  0.8 - 5.3 10e9/L    Absolute Monocytes 0.2 0.0 - 1.3 10e9/L    Absolute Eosinophils 0.1 0.0 - 0.7 10e9/L    Absolute Basophils 0.0 0.0 - 0.2 10e9/L    Abs Immature Granulocytes 0.0 0 - 0.4 10e9/L    Absolute Nucleated RBC 0.0     Platelet Estimate Confirming automated cell count    Comprehensive metabolic panel    Collection Time: 04/10/18  6:35 AM   Result Value Ref Range    Sodium 130 (L) 133 - 144 mmol/L    Potassium 3.4 3.4 - 5.3 mmol/L    Chloride 94 94 - 109 mmol/L    Carbon Dioxide 26 20 - 32 mmol/L    Anion Gap 10 3 - 14 mmol/L    Glucose 86 70 - 99 mg/dL    Urea Nitrogen 14 7 - 30 mg/dL    Creatinine 1.22 (H) 0.52 - 1.04 mg/dL    GFR Estimate 45 (L) >60 mL/min/1.7m2    GFR Estimate If Black 54 (L) >60 mL/min/1.7m2    Calcium 7.3 (L) 8.5 - 10.1 mg/dL    Bilirubin Total 0.6 0.2 - 1.3 mg/dL    Albumin 3.1 (L) 3.4 - 5.0 g/dL    Protein Total 6.8 6.8 - 8.8 g/dL    Alkaline Phosphatase 114 40 - 150 U/L    ALT 17 0 - 50 U/L    AST 19 0 - 45 U/L   Magnesium    Collection Time: 04/10/18  6:35 AM   Result Value Ref Range    Magnesium 1.0 (L) 1.6 - 2.3 mg/dL

## 2018-04-10 NOTE — PROGRESS NOTES
Infusion Nursing Note:  Julius Gilliam presents today for Cisplatin Cycle 1 Day 29.  Patient was up to void before starting the Cisplatin. Esthela was aware of calcium level, and increased creatine today and no intervention ordered.   Patient seen by provider today: Yes: CAROLE Jolly CNP      Note: Magnesium low today, replacement given. Patient reported feeling OK, no complaints today.    Intravenous Access:  Peripheral IV placed.    Treatment Conditions:  Lab Results   Component Value Date    HGB 11.0 04/10/2018     Lab Results   Component Value Date    WBC 2.1 04/10/2018      Lab Results   Component Value Date    ANEU 1.6 04/10/2018     Lab Results   Component Value Date     04/10/2018      Lab Results   Component Value Date     04/10/2018                   Lab Results   Component Value Date    POTASSIUM 3.4 04/10/2018           Lab Results   Component Value Date    MAG 1.0 04/10/2018            Lab Results   Component Value Date    CR 1.22 04/10/2018                   Lab Results   Component Value Date    BERHANE 7.3 04/10/2018                Lab Results   Component Value Date    BILITOTAL 0.6 04/10/2018           Lab Results   Component Value Date    ALBUMIN 3.1 04/10/2018                    Lab Results   Component Value Date    ALT 17 04/10/2018           Lab Results   Component Value Date    AST 19 04/10/2018       Results reviewed, labs MET treatment parameters, ok to proceed with treatment.      Post Infusion Assessment:  Patient tolerated infusion without incident.  Blood return noted pre and post infusion.  Site patent and intact, free from redness, edema or discomfort.  No evidence of extravasations.  Access discontinued per protocol.    Discharge Plan:   Prescription refills given for Magnesium and potassium.  Discharge instructions reviewed with: Patient.  Patient and/or family verbalized understanding of discharge instructions and all questions answered.  Copy of AVS reviewed with  patient and/or family.  Patient will return 4/12/2018 for next appointment.  Patient discharged in stable condition accompanied by: self.  Departure Mode: Ambulatory.  Face to Face time: 0.    Feroz Smith RN

## 2018-04-10 NOTE — NURSING NOTE
Chief Complaint   Patient presents with     Blood Draw     Labs drawn from vpt by RN. Vs taken and pt checked in for appt     Labs collected from venipuncture by RN. Vitals taken. Checked in for appointment(s).    Moni Levy RN

## 2018-04-10 NOTE — MR AVS SNAPSHOT
After Visit Summary   4/10/2018    Julius Gilliam    MRN: [id number]           Patient Information     Date Of Birth          1955        Visit Information        Provider Department      4/10/2018 7:00 AM  11 ATC; UC ONCOLOGY INFUSION [facility]        Today's Diagnoses     Encounter for long-term (current) use of medications    -  1    Malignant neoplasm of vulva (H)          Care Instructions    Contact Numbers    [facility] Main Line: [phone]  [facility] Triage:  [phone]    Call triage with chills and/or temperature greater than or equal to 100.5, uncontrolled nausea/vomiting, diarrhea, constipation, dizziness, shortness of breath, chest pain, bleeding, unexplained bruising, or any new/concerning symptoms, questions/concerns.     If you are having any concerning symptoms or wish to speak to a provider before your next infusion visit, please call your care coordinator or triage to notify them so we can adequately serve you.       After Hours: [phone]    If after hours, weekends, or holidays, call main hospital  and ask for Oncology doctor on call.           April 2018 Sunday Monday Tuesday Wednesday Thursday Friday Saturday   1     2     [facility] LAB DRAW    6:30 AM   (15 min.)    [facility] LAB DRAW   [facility] Lab Draw     [facility] ONC INFUSION 360    7:00 AM   (360 min.)    [facility]COL[facility]Y INFUSION   [facility]    2:15 PM   (30 min.)   P RAD ONC MARIE   Radiation Oncology Clinic 3     P EXTERNAL RADIATION TREATMT    2:30 PM   (30 min.)   P RAD ONC MARIE   Radiation Oncology Clinic 4     P EXTERNAL RADIATION TREATMT    2:30 PM   (30 min.)   [facility] RAD ONC MARIE   Radiation Oncology Clinic 5     P EXTERNAL RADIATION TREATMT    2:30 PM   (30 min.)   [facility] RAD ONC MARIE   Radiation Oncology Clinic 6     LEVEL 4    8:30 AM   (240 min.)   UR CH 05   [facility] [place], Infusion Services     Admission    9:10  Adry Braswell MD   Unit 6D Observation [facility]   (Discharge: 4/7/2018)     CT CHEST PULMONARY EMBOLISM W   10:30 AM   (30 min.)   URCT1   [facility], Radiology     [facility] EXTERNAL RADIATION TREATMT    2:30 PM   (30 min.)   [facility] RAD ONC MARIE   Radiation Oncology [facility] ON TREATMENT VISIT    2:45 PM   (15 min.)   Yvon Leal MD   Radiation Oncology Clinic 7     ECH STRESS TEST   11:05 AM   (60 min.)   [id number]   [facility],  Echocardiography     ECH DOBUTAMINE STRESS TEST   11:25 AM   (60 min.)   [id number]   [facility],  Echocardiography   8     9     P EXTERNAL RADIATION TREATMT    2:15 PM   (30 min.)   [facility] RAD ONC MARIE   Radiation Oncology [facility] 10     [facility] LAB DRAW    6:30 AM   (15 min.)    [facility]IC LAB DRAW   [facility] [employer] Lab Draw     P ONC INFUSION 360    7:00 AM   (360 min.)   UC ONCOLOGY INFUSION   [facility]     UMP RETURN    7:15 AM   (30 min.)   Karin Scott APRN CNP   [facility] EXTERNAL RADIATION TREATMT    2:30 PM   (30 min.)   [facility] RAD ONC MARIE   Radiation Oncology [facility] 11     P EXTERNAL RADIATION TREATMT    2:30 PM   (30 min.)   [facility] RAD ONC MARIE   Radiation Oncology [facility] 12     [facility] MAS[facility]IC LAB DRAW    2:15 PM   (15 min.)    [facility] LAB DRAW   [facility] Masonic Lab Draw     [facility] EXTERNAL RADIATION TREATMT    2:30 PM   (30 min.)   [facility] RAD ONC MARIE   [facility]    3:00 PM   (120 min.)   UC ONCOLOGY INFUSION   [facility] 13     P EXTERNAL RADIATION TREATMT    2:30 PM   (30 min.)   [facility] RAD ONC MARIE   Radiation Oncology [facility] 14       15     16     P [facility] LAB DRAW    7:30 AM   (15 min.)   UC [facility] LAB DRAW   [facility] Mason[facility] Lab Draw     P ONC INFUSION 360    8:00 AM   (360 min.)   UC ONCOLOGY INFUSION   [facility] EXTERNAL RADIATION TREATMT    2:30 PM   (30 min.)   [facility] RAD ONC MARIE   Radiation Oncology  Clinic 17     UMP EXTERNAL RADIATION TREATMT    2:30 PM   (30 min.)   UMP RAD ONC MARIE   Radiation Oncology Clinic 18     UMP EXTERNAL RADIATION TREATMT   12:30 PM   (30 min.)   UMP RAD ONC MARIE   Radiation Oncology Clinic 19     UMP EXTERNAL RADIATION TREATMT    2:30 PM   (30 min.)   UMP RAD ONC MARIE   Radiation Oncology Clinic 20     UMP EXTERNAL RADIATION TREATMT    2:30 PM   (30 min.)   UMP RAD ONC MARIE   Radiation Oncology Clinic 21       22     23     UMP EXTERNAL RADIATION TREATMT    2:15 PM   (30 min.)   UMP RAD ONC MARIE   Radiation Oncology Clinic 24     UMP EXTERNAL RADIATION TREATMT    2:30 PM   (30 min.)   UMP RAD ONC MARIE   Radiation Oncology Clinic 25     UMP EXTERNAL RADIATION TREATMT    2:30 PM   (30 min.)   UMP RAD ONC MARIE   Radiation Oncology Clinic 26     UMP EXTERNAL RADIATION TREATMT    2:30 PM   (30 min.)   UMP RAD ONC MARIE   Radiation Oncology Clinic 27     UMP EXTERNAL RADIATION TREATMT    2:30 PM   (30 min.)   P RAD ONC MARIE   Radiation Oncology Clinic 28       29     30     UMP EXTERNAL RADIATION TREATMT    2:30 PM   (30 min.)   P RAD ONC MARIE   Radiation Oncology Clinic                                     May 2018   Chris Monday Tuesday Wednesday Thursday Friday Saturday             1     2     3     4     5       6     7     8     9     10     11     12       13     14     15     16     17     18     LAB WITH HB CLINIC    7:00 AM   (15 min.)    LAB    Health Lab     UMP RETURN GENERAL LIVER    7:45 AM   (30 min.)   Malgorzata Madden MD   [facility] Hepatology 19       20     21     22     23     24     25     26       27     28     29     30     31                            Lab Results:  Recent Results (from the past 12 hour(s))   CBC with platelets differential    Collection Time: 04/10/18  6:35 AM   Result Value Ref Range    WBC 2.1 (L) 4.0 - 11.0 10e9/L    RBC Count 3.19 (L) 3.8 - 5.2 10e12/L    Hemoglobin 11.0 (L) 11.7 - 15.7 g/dL    Hematocrit 31.6 (L) 35.0 - 47.0  %    MCV 99 78 - 100 fl    MCH 34.5 (H) 26.5 - 33.0 pg    MCHC 34.8 31.5 - 36.5 g/dL    RDW 13.2 10.0 - 15.0 %    Platelet Count 148 (L) 150 - 450 10e9/L    Diff Method Automated Method     % Neutrophils 76.7 %    % Lymphocytes 10.7 %    % Monocytes 8.4 %    % Eosinophils 2.8 %    % Basophils 0.9 %    % Immature Granulocytes 0.5 %    Nucleated RBCs 0 0 /100    Absolute Neutrophil 1.6 1.6 - 8.3 10e9/L    Absolute Lymphocytes 0.2 (L) 0.8 - 5.3 10e9/L    Absolute Monocytes 0.2 0.0 - 1.3 10e9/L    Absolute Eosinophils 0.1 0.0 - 0.7 10e9/L    Absolute Basophils 0.0 0.0 - 0.2 10e9/L    Abs Immature Granulocytes 0.0 0 - 0.4 10e9/L    Absolute Nucleated RBC 0.0     Platelet Estimate Confirming automated cell count    Comprehensive metabolic panel    Collection Time: 04/10/18  6:35 AM   Result Value Ref Range    Sodium 130 (L) 133 - 144 mmol/L    Potassium 3.4 3.4 - 5.3 mmol/L    Chloride 94 94 - 109 mmol/L    Carbon Dioxide 26 20 - 32 mmol/L    Anion Gap 10 3 - 14 mmol/L    Glucose 86 70 - 99 mg/dL    Urea Nitrogen 14 7 - 30 mg/dL    Creatinine 1.22 (H) 0.52 - 1.04 mg/dL    GFR Estimate 45 (L) >60 mL/min/1.7m2    GFR Estimate If Black 54 (L) >60 mL/min/1.7m2    Calcium 7.3 (L) 8.5 - 10.1 mg/dL    Bilirubin Total 0.6 0.2 - 1.3 mg/dL    Albumin 3.1 (L) 3.4 - 5.0 g/dL    Protein Total 6.8 6.8 - 8.8 g/dL    Alkaline Phosphatase 114 40 - 150 U/L    ALT 17 0 - 50 U/L    AST 19 0 - 45 U/L   Magnesium    Collection Time: 04/10/18  6:35 AM   Result Value Ref Range    Magnesium 1.0 (L) 1.6 - 2.3 mg/dL             Follow-ups after your visit        Your next 10 appointments already scheduled     Apr 10, 2018  2:30 PM CDT   EXTERNAL RADIATION TREATMENT with [facility] RAD ONC MARIE   Radiation Oncology Clinic ([facility])    [facility]  1st Floor  [address] [address]   [phone]            Apr 11, 2018  2:30 PM CDT   EXTERNAL RADIATION TREATMENT with [facility] RAD ONC MARIE   Radiation  Oncology Clinic ([facility])    [facility]  1st Floor  [address]  [facility] [address]   [phone]            Apr 12, 2018  2:15 PM CDT   [facility] Lab Draw with UC [facility] LAB DRAW   [facility] Mason[facility] Lab Draw ([facility])    [address]  [address]  [place] [address]   [phone]            Apr 12, 2018  2:30 PM CDT   EXTERNAL RADIATION TREATMENT with [facility] RAD ONC MARIE   Radiation Oncology Clinic ([facility])    [facility]  1st Floor  [address]  [place] [facility] [address]   [phone]            Apr 12, 2018  3:00 PM CDT   Infusion 120 with UC ONCOLOGY INFUSION, UC 23 ATC   [facility] ([facility])    [address]  [address]  [place] [facility] [address]   [phone]            Apr 13, 2018  2:30 PM CDT   EXTERNAL RADIATION TREATMENT with [facility] RAD ONC MARIE   Radiation Oncology Clinic ([facility])    [facility]  1st Floor  [address]  [place] [facility] [address]   [phone]            Apr 16, 2018  7:30 AM CDT   [facility] Lab Draw with UC [facility]ON[facility] LAB DRAW   [facility] Mason[facility] Lab Draw ([facility])    [address]  [address]  [place] [facility] [place] [address]   [phone]            Apr 16, 2018  8:00 AM CDT   Infusion 360 with UC ONCOLOGY INFUSION, UC 14 ATC   [facility] ([facility])    [address]  [address]  [place] [facility] [address] [phone]              Who to contact     If you have questions or need follow up information about today's clinic visit or your schedule please contact [facility] directly at [phone].  Normal or non-critical lab and imaging results will be communicated to you by MyChart, letter or phone within 4 business days after the clinic has received the results. If you  do not hear from us within 7 days, please contact the clinic through [email or web address] or phone. If you have a critical or abnormal lab result, we will notify you by phone as soon as possible.  Submit refill requests through [email or web address] or call your pharmacy and they will forward the refill request to us. Please allow 3 business days for your refill to be completed.          Additional Information About Your Visit        [employer] Information     [email or web address] gives you secure access to your electronic health record. If you see a primary care provider, you can also send messages to your care team and make appointments. If you have questions, please call your primary care clinic.  If you do not have a primary care provider, please call [phone] and they will assist you.        Care EveryWhere ID     This is your Care EveryWhere ID. This could be used by other organizations to access your [place] medical records  [id number]         Blood Pressure from Last 3 Encounters:   04/10/18 132/76   04/07/18 119/61   04/02/18 107/70    Weight from Last 3 Encounters:   04/10/18 63 kg (138 lb 14.4 oz)   04/06/18 63.4 kg (139 lb 12.8 oz)   04/02/18 62.1 kg (137 lb)              We Performed the Following     CBC with platelets differential     Comprehensive metabolic panel     Magnesium        Primary Care Provider Office Phone # Fax #    Tova SerenamiADE dumont [phone]       [facility] [address] [facility] [address]        Equal Access to Services     CAREY COSME : Hadii aad ku hadasho Sojoannali, waaxda luqadaha, qaybta kaalmada adeegyada, talha cárdenas . So [facility] [phone].    ATENCIÓN: Si habla español, tiene a elizabeth disposición servicios gratuitos de asistencia lingüística. Llame al [phone].    We comply with applicable federal civil rights laws and [place] laws. We do not discriminate on the basis of race, color, national origin, age, disability, sex, sexual orientation, or  gender identity.            Thank you!     Thank you for choosing [facility] CANCER CLINIC  for your care. Our goal is always to provide you with excellent care. Hearing back from our patients is one way we can continue to improve our services. Please take a few minutes to complete the written survey that you may receive in the mail after your visit with us. Thank you!             Your Updated Medication List - Protect others around you: Learn how to safely use, store and throw away your medicines at www.disposemymeds.org.          This list is accurate as of 4/10/18 10:53 AM.  Always use your most recent med list.                   Brand Name Dispense Instructions for use Diagnosis    acetaminophen 325 MG tablet    TYLENOL    30 tablet    Take 2 tablets (650 mg) by mouth every 6 hours    H/O lymph node excision       ciprofloxacin 500 MG tablet    CIPRO    10 tablet    Take 1 tablet (500 mg) by mouth 2 times daily for 5 days    Acute cystitis without hematuria       guaiFENesin-codeine 100-10 MG/5ML Soln solution    ROBITUSSIN AC    420 mL    Take 5-10 mLs by mouth every 4 hours as needed for cough    Cough       loperamide 2 MG capsule    IMODIUM     Take 2 mg by mouth 4 times daily as needed for diarrhea        LORazepam 1 MG tablet    ATIVAN    30 tablet    Take 1 tablet (1 mg) by mouth every 6 hours as needed (nausea/vomiting, anxiety or sleep )    Encounter for long-term (current) use of medications, Malignant neoplasm of vulva (H)       magnesium oxide 400 (241.3 MG) MG tablet    MAG-OX    60 tablet    Take 1 tablet (400 mg) by mouth 2 times daily    Malignant neoplasm of vulva (H), Hypomagnesemia       Multi-vitamin Tabs tablet      Take 1 tablet by mouth daily        oxyCODONE IR 5 MG tablet    ROXICODONE    20 tablet    Take 1 tablet (5 mg) by mouth every 4 hours as needed for pain    H/O lymph node excision       potassium chloride SA 20 MEQ CR tablet    KLOR-CON    60 tablet    Take 1 tablet (20  mEq) by mouth 2 times daily    Malignant neoplasm of vulva (H), Hypokalemia       prochlorperazine 10 MG tablet    COMPAZINE    30 tablet    Take 1 tablet (10 mg) by mouth every 6 hours as needed (nausea/vomiting)    Encounter for long-term (current) use of medications, Malignant neoplasm of vulva (H)

## 2018-04-10 NOTE — LETTER
4/10/2018       RE: Julius Gilliam  PO   [facility] [address]     Dear Colleague,    Thank you for referring your patient, Julius Gilliam, to the [facility]. Please see a copy of my visit note below.                Follow Up Notes on Referred Patient    Date: 4/10/2018        RE: Julius Gilliam  : 1955  EJ: 4/10/2018      Julius Gilliam is a 63 year old woman with a diagnosis of vulvar cancer.   She is here today for follow up and disease management.     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 Lymph nodes, left inguinal:   - Metastatic squamous cell carcinoma to three of seven lymph nodes examined (3/7)   - The largest metastatic focus is 2.0 cm in greatest dimension with extranodal extension     B. Lymph nodes, right inguinal:   - Two reactive lymph nodes, negative for malignancy (0/2)     3/12/18: Cisplatin + radiation    18: ED for chest pain; testing negative; 3 mm RLL nodule identified        Today she denies any urinary symptoms or chest discomfort/pain. She is taking her Cipro as directed and has one day left. She continues to smoke but has decreased from 1 ppd to about 5 cigarettes/day (as she states she is staying at [facility] and it is more difficult to smoke there). She is having some diarrhea from her radiation and is taking Imodium which is helping but she still has  some diarrhea. She is taking 4 Imodium/day. She denies any issues with eating or drinking (reports drinking about 6 bottles of water a day and occasional coffee), is taking her Mg once a day and is taking her K BID; she will  more K and Mg today and start taking both BID tomorrow. She does not need any medication refills and feels ok to continue with her chemotherapy today.           Review of Systems:    Systemic           no weight changes; no fever; no chills; no night sweats; no appetite changes  Skin           no rashes, or lesions  Eye           no irritation; no changes in vision  Usha-Laryngeal           no dysphagia; no hoarseness   Pulmonary    no cough; no shortness of breath  Cardiovascular    no chest pain; no palpitations  Gastrointestinal    + diarrhea; no constipation; no abdominal pain; no changes in bowel habits; no blood in stool  Genitourinary   no urinary frequency; no urinary urgency; no dysuria; no pain; no abnormal vaginal discharge; no abnormal vaginal bleeding  Breast    no breast discharge; no breast changes; no breast pain  Musculoskeletal    no myalgias; no arthralgias; no back pain  Psychiatric           no depressed mood; no anxiety    Hematologic               no tender lymph nodes; no noticeable swellings or lumps   Endocrine    no hot flashes; no heat/cold intolerance         Neurological   no tremor; no numbness and tingling; no headaches; no difficulty sleeping      Past Medical History:    Past Medical History:   Diagnosis Date     Deafness in right ear      Hypertension      Multiple facial bone fractures (H)     also left ankle and left wrist     Vertigo      Vulvar cancer (H) 12/2017         Past Surgical History:    Past Surgical History:   Procedure Laterality Date     DISSECT LYMPH NODE INGUINAL N/A 2/9/2018    Procedure: DISSECT LYMPH NODE INGUINAL;  Bilateral Inguinal Lymph Node Dissection;  Surgeon: Santosh Vera MD;  Location: [facility] OR     ENDOSCOPIC  STRIPPING VEIN(S)       FACIAL RECONSTRUCTION SURGERY      post horse trauma     repair of left arm fracture           Health Maintenance Due   Topic Date Due     TETANUS IMMUNIZATION (SYSTEM ASSIGNED)  03/12/1974     PAP SCREENING Q3 YR (SYSTEM ASSIGNED)  03/12/1977     ADVANCE DIRECTIVE PLANNING Q5 YRS  03/12/2011       Current Medications:     Current Outpatient Prescriptions   Medication Sig Dispense Refill     guaiFENesin-codeine (ROBITUSSIN AC) 100-10 MG/5ML SOLN solution Take 5-10 mLs by mouth every 4 hours as needed for cough 420 mL 0     ciprofloxacin (CIPRO) 500 MG tablet Take 1 tablet (500 mg) by mouth 2 times daily for 5 days 10 tablet 0     potassium chloride SA (KLOR-CON) 20 MEQ CR tablet Take 1 tablet (20 mEq) by mouth 2 times daily 60 tablet 0     magnesium oxide (MAG-OX) 400 (241.3 MG) MG tablet Take 1 tablet (400 mg) by mouth 2 times daily 60 tablet 3     loperamide (IMODIUM) 2 MG capsule Take 2 mg by mouth 4 times daily as needed for diarrhea       LORazepam (ATIVAN) 1 MG tablet Take 1 tablet (1 mg) by mouth every 6 hours as needed (nausea/vomiting, anxiety or sleep ) 30 tablet 1     prochlorperazine (COMPAZINE) 10 MG tablet Take 1 tablet (10 mg) by mouth every 6 hours as needed (nausea/vomiting) 30 tablet 2     acetaminophen (TYLENOL) 325 MG tablet Take 2 tablets (650 mg) by mouth every 6 hours 30 tablet 0     oxyCODONE IR (ROXICODONE) 5 MG tablet Take 1 tablet (5 mg) by mouth every 4 hours as needed for pain 20 tablet 0     multivitamin, therapeutic with minerals (MULTI-VITAMIN) TABS tablet Take 1 tablet by mouth daily           Allergies:      No Known Allergies     Social History:     Social History   Substance Use Topics     Smoking status: Current Every Day Smoker     Packs/day: 0.50     Types: Cigarettes     Smokeless tobacco: Never Used      Comment: as of 2018 has been smoking for 30 years. currently cut back to 4 cigs a day     Alcohol use Yes      Comment: 8 x per week       History    Drug Use No         Family History:       Family History   Problem Relation Age of Onset     CANCER Mother 81     Breast ca     Myocardial Infarction Father 55      at 92         Physical Exam:     /76 (BP Location: Right arm, Cuff Size: Adult Small)  Pulse 70  Temp 97.7  F (36.5  C) (Oral)  Resp 16  Wt 63 kg (138 lb 14.4 oz)  SpO2 93%  BMI 22.08 kg/m2  Body mass index is 22.08 kg/(m^2).    General Appearance: healthy and alert, no distress     HEENT: no thyromegaly, no palpable nodules or masses        Cardiovascular: regular rate and rhythm, no gallops, rubs or murmurs     Respiratory: lungs clear, no rales, rhonchi or wheezes, normal diaphragmatic excursion    Musculoskeletal: extremities non tender and without edema    Skin: no lesions or rashes     Neurological: normal gait, no gross defects     Psychiatric: appropriate mood and affect                               Hematological: normal cervical, supraclavicular lymph nodes     Gastrointestinal:       abdomen soft, non-tender, non-distended, no organomegaly or masses    Genitourinary: Deferred        Assessment:    Julius Gilliam is a 63 year old woman with a diagnosis of vulvar cancer.   She is here today for follow up and disease management.      25 minutes were spent with this patient, over 50% of that time was spent in symptom management, treatment planning and in counseling and coordination of care.      Plan:     1.)        Ok to continue with current regimen. Will have [facility] reach out to her regarding follow up plan/imaging. Discussed typically we image about 3 months after completion of radiation; this imaging will also follow up on 3 mm RLL nodule found on most recent imaging. Continue with Cipro until completion of Rx. Encouraged to discuss diarrhea with rad onc when. Reviewed signs and symptoms for when she should contact the clinic or seek additional care. Patient to contact the clinic with any questions or concerns in the  interim.     2.) Hypomagnesemia: reviewed she should be taking this BID; she will start this tomorrow. To have Mg replaced in infusion today. Continue to monitor and replace as needed.     3.) Labs and/or tests ordered include:  Chemo labs.      4.) Health maintenance issues addressed today include annual health maintenance and non-gynecologic issues with PCP.    5.)        Tobacco: encouraged to continue to decrease/taper down and off.     6.)        Hypokalemia: to be replaced in infusion today; to take BID and continue to monitor and replace as needed.     7.)        Continue to monitor Ca and albumin; encouraged to eat smaller more frequent meals with lean proteins as well as add in a nutritional supplement like Boost or Ensure.     8.)         She verbalized understanding of the above.     CAROLE Gillette, WHNP-BC, ANP-BC  Women's Health Nurse Practitioner  Adult Nurse Pracitioner  Division of Gynecologic Oncology      CC  Patient Care Team:  Tova Montesinos NP as PCP - General (Family Practice)  Lisa Perez, RN as Continuity Care Coordinator (Gyn-Onc)

## 2018-04-11 ENCOUNTER — APPOINTMENT (OUTPATIENT)
Dept: RADIATION ONCOLOGY | Facility: CLINIC | Age: 63
End: 2018-04-11
Attending: RADIOLOGY
Payer: COMMERCIAL

## 2018-04-11 PROCEDURE — 77386 ZZH IMRT TREATMENT DELIVERY, COMPLEX: CPT | Performed by: RADIOLOGY

## 2018-04-12 ENCOUNTER — APPOINTMENT (OUTPATIENT)
Dept: LAB | Facility: CLINIC | Age: 63
End: 2018-04-12
Attending: NURSE PRACTITIONER
Payer: COMMERCIAL

## 2018-04-12 ENCOUNTER — INFUSION THERAPY VISIT (OUTPATIENT)
Dept: ONCOLOGY | Facility: CLINIC | Age: 63
End: 2018-04-12
Attending: NURSE PRACTITIONER
Payer: COMMERCIAL

## 2018-04-12 ENCOUNTER — APPOINTMENT (OUTPATIENT)
Dept: RADIATION ONCOLOGY | Facility: CLINIC | Age: 63
End: 2018-04-12
Attending: RADIOLOGY
Payer: COMMERCIAL

## 2018-04-12 VITALS
SYSTOLIC BLOOD PRESSURE: 111 MMHG | BODY MASS INDEX: 23.23 KG/M2 | RESPIRATION RATE: 16 BRPM | OXYGEN SATURATION: 96 % | TEMPERATURE: 97.9 F | WEIGHT: 146.1 LBS | HEART RATE: 70 BPM | DIASTOLIC BLOOD PRESSURE: 74 MMHG

## 2018-04-12 DIAGNOSIS — C51.9 MALIGNANT NEOPLASM OF VULVA (H): ICD-10-CM

## 2018-04-12 DIAGNOSIS — Z79.899 ENCOUNTER FOR LONG-TERM (CURRENT) USE OF MEDICATIONS: ICD-10-CM

## 2018-04-12 LAB
ALBUMIN SERPL-MCNC: 3 G/DL (ref 3.4–5)
ALP SERPL-CCNC: 106 U/L (ref 40–150)
ALT SERPL W P-5'-P-CCNC: 14 U/L (ref 0–50)
ANION GAP SERPL CALCULATED.3IONS-SCNC: 10 MMOL/L (ref 3–14)
AST SERPL W P-5'-P-CCNC: 21 U/L (ref 0–45)
BASOPHILS # BLD AUTO: 0 10E9/L (ref 0–0.2)
BASOPHILS NFR BLD AUTO: 0.5 %
BILIRUB SERPL-MCNC: 0.4 MG/DL (ref 0.2–1.3)
BUN SERPL-MCNC: 10 MG/DL (ref 7–30)
CALCIUM SERPL-MCNC: 7.3 MG/DL (ref 8.5–10.1)
CHLORIDE SERPL-SCNC: 99 MMOL/L (ref 94–109)
CO2 SERPL-SCNC: 21 MMOL/L (ref 20–32)
CREAT SERPL-MCNC: 1.16 MG/DL (ref 0.52–1.04)
DIFFERENTIAL METHOD BLD: ABNORMAL
EOSINOPHIL # BLD AUTO: 0 10E9/L (ref 0–0.7)
EOSINOPHIL NFR BLD AUTO: 1.4 %
ERYTHROCYTE [DISTWIDTH] IN BLOOD BY AUTOMATED COUNT: 13.6 % (ref 10–15)
GFR SERPL CREATININE-BSD FRML MDRD: 47 ML/MIN/1.7M2
GLUCOSE SERPL-MCNC: 93 MG/DL (ref 70–99)
HCT VFR BLD AUTO: 32.2 % (ref 35–47)
HGB BLD-MCNC: 10.6 G/DL (ref 11.7–15.7)
IMM GRANULOCYTES # BLD: 0 10E9/L (ref 0–0.4)
IMM GRANULOCYTES NFR BLD: 0.5 %
LYMPHOCYTES # BLD AUTO: 0.2 10E9/L (ref 0.8–5.3)
LYMPHOCYTES NFR BLD AUTO: 7.7 %
MAGNESIUM SERPL-MCNC: 1 MG/DL (ref 1.6–2.3)
MCH RBC QN AUTO: 34.4 PG (ref 26.5–33)
MCHC RBC AUTO-ENTMCNC: 32.9 G/DL (ref 31.5–36.5)
MCV RBC AUTO: 105 FL (ref 78–100)
MONOCYTES # BLD AUTO: 0.3 10E9/L (ref 0–1.3)
MONOCYTES NFR BLD AUTO: 12.7 %
NEUTROPHILS # BLD AUTO: 1.7 10E9/L (ref 1.6–8.3)
NEUTROPHILS NFR BLD AUTO: 77.2 %
NRBC # BLD AUTO: 0 10*3/UL
NRBC BLD AUTO-RTO: 0 /100
PLATELET # BLD AUTO: 150 10E9/L (ref 150–450)
POTASSIUM SERPL-SCNC: 4.5 MMOL/L (ref 3.4–5.3)
PROT SERPL-MCNC: 6.3 G/DL (ref 6.8–8.8)
RBC # BLD AUTO: 3.08 10E12/L (ref 3.8–5.2)
SODIUM SERPL-SCNC: 130 MMOL/L (ref 133–144)
WBC # BLD AUTO: 2.2 10E9/L (ref 4–11)

## 2018-04-12 PROCEDURE — 80053 COMPREHEN METABOLIC PANEL: CPT | Performed by: NURSE PRACTITIONER

## 2018-04-12 PROCEDURE — 40000141 ZZH STATISTIC PERIPHERAL IV START W/O US GUIDANCE: Mod: ZF

## 2018-04-12 PROCEDURE — 83735 ASSAY OF MAGNESIUM: CPT | Performed by: NURSE PRACTITIONER

## 2018-04-12 PROCEDURE — 77386 ZZH IMRT TREATMENT DELIVERY, COMPLEX: CPT | Performed by: RADIOLOGY

## 2018-04-12 PROCEDURE — 85025 COMPLETE CBC W/AUTO DIFF WBC: CPT | Performed by: NURSE PRACTITIONER

## 2018-04-12 PROCEDURE — 25000128 H RX IP 250 OP 636: Mod: ZF | Performed by: NURSE PRACTITIONER

## 2018-04-12 PROCEDURE — 96365 THER/PROPH/DIAG IV INF INIT: CPT

## 2018-04-12 PROCEDURE — 96366 THER/PROPH/DIAG IV INF ADDON: CPT

## 2018-04-12 RX ADMIN — MAGNESIUM SULFATE HEPTAHYDRATE: 500 INJECTION, SOLUTION INTRAMUSCULAR; INTRAVENOUS at 15:53

## 2018-04-12 ASSESSMENT — PAIN SCALES - GENERAL: PAINLEVEL: NO PAIN (0)

## 2018-04-12 NOTE — MR AVS SNAPSHOT
After Visit Summary   4/12/2018    Julius Gilliam    MRN: [id number]           Patient Information     Date Of Birth          1955        Visit Information        Provider Department      4/12/2018 3:00 PM  23 ATC;  ONCOLOGY INFUSION [facility]        Today's Diagnoses     Encounter for long-term (current) use of medications        Malignant neoplasm of vulva (H)          Care Instructions    Contact Numbers  [facility] Nurse Triage: [phone]  After Hours Nurse Line:  [phone]    Please call the [facility] Nurse Triage line or after hours number if you experience a temperature greater than or equal to 100.5, shaking chills, have uncontrolled nausea, vomiting and/or diarrhea, dizziness, shortness of breath, chest pain, bleeding, unexplained bruising, or if you have any other new/concerning symptoms, questions or concerns.     If you are having any concerning symptoms or wish to speak to a provider before your next infusion visit, please call your care coordinator or triage to notify them so we can adequately serve you.     If you need a refill on a narcotic prescription or other medication, please call triage before your infusion appointment.           April 2018 Sunday Monday Tuesday Wednesday Thursday Friday Saturday   1     2     [facility]    6[facility]30 [facility]   (15 min.)   [facility]   [facility] Lab Draw     [facility] ONC INFUSION 360    7:00 AM   (360 min.)    [facility]    2:15 PM   (30 min.)   P RAD ONC MARIE   Radiation Oncology Clinic 3     P EXTERNAL RADIATION TREATMT    2:30 PM   (30 min.)   P RAD ONC MARIE   Radiation Oncology Clinic 4     P EXTERNAL RADIATION TREATMT    2:30 PM   (30 min.)   P RAD ONC MARIE   Radiation Oncology Clinic 5     P EXTERNAL RADIATION TREATMT    2:30 PM   (30 min.)   [facility] RAD ONC MARIE   Radiation Oncology Clinic 6      LEVEL 4    8:30 AM   (240 min.)   UR CH 05   [facility], Infusion Services     Admission    9:10 AM   Adry Groves MD   Unit 6D [facility]ation [facility]   (Discharge: 4/7/2018)     CT CHEST PULMONARY EMBOLISM W   10:30 AM   (30 min.)   URCT1   [facility], Radiology     UM EXTERNAL RADIATION TREATMT    2:30 PM   (30 min.)   [facility] RAD ONC MARIE   Radiation Oncology [facility] ON TREATMENT VISIT    2:45 PM   (15 min.)   Yvon Leal MD   Radiation Oncology Clinic 7     ECH STRESS TEST   11:05 AM   (60 min.)   [id number]   [facility],  Echocardiography     ECH DOBUTAMINE STRESS TEST   11:25 AM   (60 min.)   [id number]   [facility],  Echocardiography   8     9     [facility] EXTERNAL RADIATION TREATMT    2:15 PM   (30 min.)   [facility] RAD ONC MARIE   Radiation Oncology [facility] 10     [facility] LAB DRAW    6:30 AM   (15 min.)    [facility] LAB DRAW   [facility] [employer] Lab Draw     [facility] ONC INFUSION 360    7:00 AM   (360 min.)   [facility] ONCOLOGY INFUSION   [facility]     [facility] RETURN    7:15 AM   (30 min.)   Karin Scott APRN CNP   [facility] EXTERNAL RADIATION TREATMT    2:30 PM   (30 min.)   [facility] RAD ONC MARIE   Radiation Oncology [facility] 11     P EXTERNAL RADIATION TREATMT    2:30 PM   (30 min.)   [facility] RAD ONC MARIE   Radiation Oncology [facility] 12     [facility] LAB DRAW    2:15 PM   (15 min.)    [facility] LAB DRAW   [facility] Lab Draw     [facility] EXTERNAL RADIATION TREATMT    2:30 PM   (30 min.)   [facility] RAD ONC MARIE   Radiation Oncology [facility] ONC INFUSION 120    3:00 PM   (120 min.)   UC ONCOLOGY INFUSION   [facility] 13     P EXTERNAL RADIATION TREATMT    2:30 PM   (30 min.)   [facility] RAD ONC MARIE   Radiation Oncology [facility] 14       15     16     P [facility] LAB DRAW    7:30 AM   (15 min.)   [facility] DRAW   [facility] [employer] Lab Draw     P ONC INFUSION 360    8:00 AM   (360 min.)   UC ONCOLOGY INFUSION   [facility]  Clinic     [facility] EXTERNAL RADIATION TREATMT    2:30 PM   (30 min.)   [facility] RAD ONC MARIE   Radiation Oncology Clinic 17     [facility] EXTERNAL RADIATION TREATMT    2:30 PM   (30 min.)   [facility] RAD ONC MARIE   [facility]cology Clinic 18     [facility] EXTERNAL RADIATION TREATMT   12:30 PM   (30 min.)   [facility] RAD ONC MARIE   [facility] Oncology Clinic 19     [facility] EXTERNAL RADIATION TREATMT    2:30 PM   (30 min.)   [facility] RAD ONC MARIE   [facility] Oncology Clinic 20     [facility] EXTERNAL RADIATION TREATMT    2:30 PM   (30 min.)   [facility] RAD ONC MARIE   [facility] Oncology Clinic 21       22     23     [facility] EXTERNAL RADIATION TREATMT    2:15 PM   (30 min.)   [facility] RAD ONC MARIE   Radiation Oncology Clinic 24     [facility] EXTERNAL RADIATION TREATMT    2:30 PM   (30 min.)   [facility] RAD ONC MARIE   Radiation Oncology Clinic 25     [facility] EXTERNAL RADIATION TREATMT    2:30 PM   (30 min.)   [facility] RAD ONC MARIE   Rad[facility] Oncology Clinic 26     [facility] EXTERNAL RADIATION TREATMT    2:30 PM   (30 min.)   [facility] RAD ONC MARIE   Radiation Oncology Clinic 27     [facility] EXTERNAL RADIATION TREATMT    2:30 PM   (30 min.)   [facility] RAD ONC MARIE   Radiation Oncology Clinic 28       29     30     [facility] EXTERNAL RADIATION TREATMT    2:30 PM   (30 min.)   P RAD ONC MARIE   Radiation Oncology Clinic                                     May 2018   Chris Monday Tuesday Wednesday Thursday Friday Saturday             1     2     3     4     5       6     7     8     9     10     11     12       13     14     15     16     17     18     LAB WITH HB CLINIC    7:00 AM   (15 min.)    LAB    Health Lab     [facility] RETURN GENERAL LIVER    7:45 AM   (30 min.)   Malgorzata Madden MD   [facility] Hepatology 19       20     21     22     23     24     25     26       27     28     29     30     31                           Recent Results (from the past 24 hour(s))   Magnesium    Collection Time: 04/12/18  2:14 PM   Result Value Ref Range    Magnesium 1.0 (L) 1.6 - 2.3 mg/dL   Comprehensive metabolic panel     Collection Time: 04/12/18  2:14 PM   Result Value Ref Range    Sodium 130 (L) 133 - 144 mmol/L    Potassium 4.5 3.4 - 5.3 mmol/L    Chloride 99 94 - 109 mmol/L    Carbon Dioxide 21 20 - 32 mmol/L    Anion Gap 10 3 - 14 mmol/L    Glucose 93 70 - 99 mg/dL    Urea Nitrogen 10 7 - 30 mg/dL    Creatinine 1.16 (H) 0.52 - 1.04 mg/dL    GFR Estimate 47 (L) >60 mL/min/1.7m2    GFR Estimate If Black 57 (L) >60 mL/min/1.7m2    Calcium 7.3 (L) 8.5 - 10.1 mg/dL    Bilirubin Total 0.4 0.2 - 1.3 mg/dL    Albumin 3.0 (L) 3.4 - 5.0 g/dL    Protein Total 6.3 (L) 6.8 - 8.8 g/dL    Alkaline Phosphatase 106 40 - 150 U/L    ALT 14 0 - 50 U/L    AST 21 0 - 45 U/L   CBC with platelets differential    Collection Time: 04/12/18  2:14 PM   Result Value Ref Range    WBC 2.2 (L) 4.0 - 11.0 10e9/L    RBC Count 3.08 (L) 3.8 - 5.2 10e12/L    Hemoglobin 10.6 (L) 11.7 - 15.7 g/dL    Hematocrit 32.2 (L) 35.0 - 47.0 %     (H) 78 - 100 fl    MCH 34.4 (H) 26.5 - 33.0 pg    MCHC 32.9 31.5 - 36.5 g/dL    RDW 13.6 10.0 - 15.0 %    Platelet Count 150 150 - 450 10e9/L    Diff Method Automated Method     % Neutrophils 77.2 %    % Lymphocytes 7.7 %    % Monocytes 12.7 %    % Eosinophils 1.4 %    % Basophils 0.5 %    % Immature Granulocytes 0.5 %    Nucleated RBCs 0 0 /100    Absolute Neutrophil 1.7 1.6 - 8.3 10e9/L    Absolute Lymphocytes 0.2 (L) 0.8 - 5.3 10e9/L    Absolute Monocytes 0.3 0.0 - 1.3 10e9/L    Absolute Eosinophils 0.0 0.0 - 0.7 10e9/L    Absolute Basophils 0.0 0.0 - 0.2 10e9/L    Abs Immature Granulocytes 0.0 0 - 0.4 10e9/L    Absolute Nucleated RBC 0.0                  Follow-ups after your visit        Your next 10 appointments already scheduled     Apr 13, 2018  2:30 PM CDT   EXTERNAL RADIATION TREATMENT with [facility] RAD ONC MARIE   Radiation Oncology Clinic ([facility])    [facility]  1st Floor  [address] [address]   [phone]            Apr 16, 2018  7:30 AM CDT   [facility] Lab  Draw with [facility] DRAW   [facility] Lab Draw ([facility])    [address]  [address]  [facility] [address]   [phone]            Apr 16, 2018  8:00 AM CDT   Infusion 360 with UC ONCOLOGY INFUSION, UC 14 ATC   [facility] ([facility])    [address]  [address]  [facility] [address]   [phone]            Apr 16, 2018  2:30 PM CDT   EXTERNAL RADIATION TREATMENT with [facility] RAD ONC MARIE   Radiation Oncology Clinic ([facility])    [facility]  1st Floor  [address]  [facility] [address]   [phone]            Apr 17, 2018  2:30 PM CDT   EXTERNAL RADIATION TREATMENT with [facility] RAD ONC MARIE   Radiation Oncology Clinic ([facility])    [facility]  1st Floor  [address] [facility] [address]   [phone]            Apr 18, 2018 12:30 PM CDT   EXTERNAL RADIATION TREATMENT with [facility] RAD ONC MARIE   Radiation Oncology Clinic ([facility])    [facility]  1st Floor  [address] [facility] [address]   [phone]            Apr 19, 2018  2:30 PM CDT   EXTERNAL RADIATION TREATMENT with [facility] RAD ONC MARIE   Radiation Oncology Clinic ([facility])    [facility]  1st Floor  [address]  [facility] [address]   [phone]            Apr 20, 2018  2:30 PM CDT   EXTERNAL RADIATION TREATMENT with [facility] RAD ONC MARIE   Radiation Oncology Clinic ([facility])    [facility]  1st Floor  [address] [facility] [address]   [phone]            Apr 23, 2018  2:15 PM CDT   EXTERNAL RADIATION TREATMENT with [facility] RAD ONC MARIE   Radiation Oncology Clinic ([facility])    [facility]  1st Floor  [address] [facility] [address]   [phone]              Who to contact      If you have questions or need follow up information about today's clinic visit or your schedule please contact [facility] CANCER [facility] directly at [phone].  Normal or non-critical lab and imaging results will be communicated to you by MyChart, letter or phone within 4 business days after the clinic has received the results. If you do not hear from us within 7 days, please contact the clinic through gifteehart or phone. If you have a critical or abnormal lab result, we will notify you by phone as soon as possible.  Submit refill requests through [employer] or call your pharmacy and they will forward the refill request to us. Please allow 3 business days for your refill to be completed.          Additional Information About Your Visit        [email or web address] Information     [employer] gives you secure access to your electronic health record. If you see a primary care provider, you can also send messages to your care team and make appointments. If you have questions, please call your primary care clinic.  If you do not have a primary care provider, please call [phone] and they will assist you.        Care EveryWhere ID     This is your Care EveryWhere ID. This could be used by other organizations to access your [place] medical records  [id number]        Your Vitals Were     Pulse Temperature Respirations Pulse Oximetry BMI (Body Mass Index)       70 97.9  F (36.6  C) 16 96% 23.23 kg/m2        Blood Pressure from Last 3 Encounters:   04/12/18 111/74   04/10/18 132/76   04/07/18 119/61    Weight from Last 3 Encounters:   04/12/18 66.3 kg (146 lb 1.6 oz)   04/10/18 63 kg (138 lb 14.4 oz)   04/06/18 63.4 kg (139 lb 12.8 oz)              We Performed the Following     CBC with platelets differential     Comprehensive metabolic panel     Magnesium        Primary Care Provider Office Phone # Fax #    Tova Montesinos [phone]       [facility] [address] [facility] [address]        Equal  Access to Services     [facility]: Hadii aad ku hadjaneencecilio Juancarlosali, waroseannda luqadaha, qaybta kazeinatalha kirk. So [facility] [phone].    ATENCIÓN: Si allyla petr, tiene a elizabeth disposición servicios gratuitos de asistencia lingüística. Llame al [phone].    We comply with applicable federal civil rights laws and [place] laws. We do not discriminate on the basis of race, color, national origin, age, disability, sex, sexual orientation, or gender identity.            Thank you!     Thank you for choosing [facility]  for your care. Our goal is always to provide you with excellent care. Hearing back from our patients is one way we can continue to improve our services. Please take a few minutes to complete the written survey that you may receive in the mail after your visit with us. Thank you!             Your Updated Medication List - Protect others around you: Learn how to safely use, store and throw away your medicines at www.disposemymeds.org.          This list is accurate as of 4/12/18  6:13 PM.  Always use your most recent med list.                   Brand Name Dispense Instructions for use Diagnosis    acetaminophen 325 MG tablet    TYLENOL    30 tablet    Take 2 tablets (650 mg) by mouth every 6 hours    H/O lymph node excision       ciprofloxacin 500 MG tablet    CIPRO    10 tablet    Take 1 tablet (500 mg) by mouth 2 times daily for 5 days    Acute cystitis without hematuria       guaiFENesin-codeine 100-10 MG/5ML Soln solution    ROBITUSSIN AC    420 mL    Take 5-10 mLs by mouth every 4 hours as needed for cough    Cough       loperamide 2 MG capsule    IMODIUM     Take 2 mg by mouth 4 times daily as needed for diarrhea        LORazepam 1 MG tablet    ATIVAN    30 tablet    Take 1 tablet (1 mg) by mouth every 6 hours as needed (nausea/vomiting, anxiety or sleep )    Encounter for long-term (current) use of medications, Malignant neoplasm of  vulva (H)       magnesium oxide 400 (241.3 Mg) MG tablet    MAG-OX    60 tablet    Take 1 tablet (400 mg) by mouth 2 times daily    Malignant neoplasm of vulva (H), Hypomagnesemia       Multi-vitamin Tabs tablet      Take 1 tablet by mouth daily        oxyCODONE IR 5 MG tablet    ROXICODONE    20 tablet    Take 1 tablet (5 mg) by mouth every 4 hours as needed for pain    H/O lymph node excision       potassium chloride SA 20 MEQ CR tablet    KLOR-CON    60 tablet    Take 1 tablet (20 mEq) by mouth 2 times daily    Malignant neoplasm of vulva (H), Hypokalemia       prochlorperazine 10 MG tablet    COMPAZINE    30 tablet    Take 1 tablet (10 mg) by mouth every 6 hours as needed (nausea/vomiting)    Encounter for long-term (current) use of medications, Malignant neoplasm of vulva (H)

## 2018-04-12 NOTE — PROGRESS NOTES
Infusion Nursing Note:  Julius Gilliam presents today for IV fluids and Magnesium Replacement.    Patient seen by provider today: No    Intravenous Access:  Patient's PIV that was placed in lab today had infiltrated upon arrival.  A second PIV was placed by vascular access nurse.    Treatment Conditions:  Lab Results   Component Value Date    HGB 10.6 04/12/2018     Lab Results   Component Value Date    WBC 2.2 04/12/2018      Lab Results   Component Value Date    ANEU 1.7 04/12/2018     Lab Results   Component Value Date     04/12/2018      Lab Results   Component Value Date     04/12/2018                   Lab Results   Component Value Date    POTASSIUM 4.5 04/12/2018           Lab Results   Component Value Date    MAG 1.0 04/12/2018            Lab Results   Component Value Date    CR 1.16 04/12/2018                   Lab Results   Component Value Date    BERHANE 7.3 04/12/2018                Lab Results   Component Value Date    BILITOTAL 0.4 04/12/2018           Lab Results   Component Value Date    ALBUMIN 3.0 04/12/2018                    Lab Results   Component Value Date    ALT 14 04/12/2018           Lab Results   Component Value Date    AST 21 04/12/2018     Patient's corrected Calcium 8.1 today.  DID NOT MEET parameters for calcium gluconate replacement.  Magnesium replaced per protocol.    Post Infusion Assessment:  Patient tolerated infusion without incident.  Blood return noted pre and post infusion.  Access discontinued per protocol.    Discharge Plan:   Patient declined needing any refills today.  Discharge instructions reviewed with: Patient.  Patient and/or family verbalized understanding of discharge instructions and all questions answered.  AVS to patient via [employer].  Patient will return 4/16/18 for next appointment.   Patient discharged in stable condition accompanied by: self.  Departure Mode: Ambulatory.    SIMONA TEE RN

## 2018-04-12 NOTE — PATIENT INSTRUCTIONS
Contact Numbers  [facility] [place] Nurse Triage: [phone]  After Hours Nurse Line:  [phone]    Please call the [facility] Nurse Triage line or after hours number if you experience a temperature greater than or equal to 100.5, shaking chills, have uncontrolled nausea, vomiting and/or diarrhea, dizziness, shortness of breath, chest pain, bleeding, unexplained bruising, or if you have any other new/concerning symptoms, questions or concerns.     If you are having any concerning symptoms or wish to speak to a provider before your next infusion visit, please call your care coordinator or triage to notify them so we can adequately serve you.     If you need a refill on a narcotic prescription or other medication, please call triage before your infusion appointment.           April 2018 Sunday Monday Tuesday Wednesday Thursday Friday Saturday   1     2     [facility]    6:30 AM   (15 min.)    [facility] DRAW   [facility] Lab Draw     [facility] ONC INFUSION 360    7:00 AM   (360 min.)    ONCOLOGY INFUSION   [facility] EXTERNAL RADIATION TREATMT    2:15 PM   (30 min.)   [facility] RAD ONC MARIE   Radiation Oncology Clinic 3     [facility] EXTERNAL RADIATION TREATMT    2:30 PM   (30 min.)   [facility] RAD ONC MARIE   Radiation Oncology Clinic 4     [facility] EXTERNAL RADIATION TREATMT    2:30 PM   (30 min.)   [facility] RAD ONC MARIE   Radiation Oncology Clinic 5     [facility] EXTERNAL RADIATION TREATMT    2:30 PM   (30 min.)   [facility] RAD ONC MARIE   Radiation Oncology Clinic 6     LEVEL 4    8:30 AM   (240 min.)   UR CH 05   [facility] [place], Infusion Services     Admission    9:10 AM   Adry Groves MD   Unit 6D Observation [facility]   (Discharge: 4/7/2018)     CT CHEST PULMONARY EMBOLISM W   10:30 AM   (30 min.)   URCT1   [facility] [place], Radiology     [facility] EXTERNAL RADIATION TREATMT    2:30 PM   (30 min.)   [facility] RAD ONC MARIE   Radiation Oncology Clinic     [facility] ON TREATMENT VISIT    2:45 PM   (15 min.)   Elvis  MD Yvon   Radiation Oncology Clinic 7     ECH STRESS TEST   11:05 AM   (60 min.)   [id number]   [facility] [place],  Echocardiography     ECH DOBUTAMINE STRESS TEST   11:25 AM   (60 min.)   [email or web address]   [facility],  Echocardiography   8     9     [facility] EXTERNAL RADIATION TREATMT    2:15 PM   (30 min.)   P RAD ONC MARIE   Radiation Oncology Clinic 10     [facility] LAB DRAW    6:30 AM   (15 min.)    [facility] LAB DRAW   [facility] Lab Draw     P ONC INFUSION 360    7:00 AM   (360 min.)   [facility] ONCOLOGY INFUSION   [facility]     [facility] RETURN    7:15 AM   (30 min.)   Karin Scott APRN CNP   [facility]    2:30 PM   (30 min.)   [facility] RAD ONC MARIE   Radiation Oncology Clinic 11     [facility] EXTERNAL RADIATION TREATMT    2:30 PM   (30 min.)   [facility] RAD ONC MARIE   Radiation Oncology Clinic 12     [facility] LAB DRAW    2:15 PM   (15 min.)    [facility] LAB DRAW   [facility] Lab Draw     P EXTERNAL RADIATION TREATMT    2:30 PM   (30 min.)   [facility] RAD ONC MARIE   [facility]    3:00 PM   (120 min.)   UC ONCOLOGY INFUSION   [facility] 13     [facility] EXTERNAL RADIATION TREATMT    2:30 PM   (30 min.)   P RAD ONC MARIE   Radiation Oncology Clinic 14       15     16     [facility] LAB DRAW    7:30 AM   (15 min.)   [facility] LAB DRAW   [facility] Lab Draw     P ONC INFUSION 360    8:00 AM   (360 min.)   UC ONCOLOGY INFUSION   [facility]     [facility] EXTERNAL RADIATION TREATMT    2:30 PM   (30 min.)   P RAD ONC MARIE   Radiation Oncology Clinic 17     [facility] EXTERNAL RADIATION TREATMT    2:30 PM   (30 min.)   P RAD ONC MARIE   Radiation Oncology Clinic 18     [facility] EXTERNAL RADIATION TREATMT   12:30 PM   (30 min.)   P RAD ONC MARIE   Radiation Oncology Clinic 19     [facility] EXTERNAL RADIATION TREATMT    2:30 PM   (30 min.)   P RAD ONC MARIE   Radiation Oncology Clinic 20      [facility] EXTERNAL RADIATION TREATMT    2:30 PM   (30 min.)   [facility] RAD ONC MARIE   Radiation Oncology Clinic 21       22     23     [facility] EXTERNAL RADIATION TREATMT    2:15 PM   (30 min.)   [facility] RAD ONC MARIE   Radiation Oncology Clinic 24     [facility] EXTERNAL RADIATION TREATMT    2:30 PM   (30 min.)   [facility] RAD ONC MARIE   Radiation Oncology Clinic 25     [facility] EXTERNAL RADIATION TREATMT    2:30 PM   (30 min.)   [facility] RAD ONC MARIE   Radiation Oncology Clinic 26     [facility] EXTERNAL RADIATION TREATMT    2:30 PM   (30 min.)   [facility] RAD ONC MARIE   [facility] Oncology Clinic 27     [facility] EXTERNAL RADIATION TREATMT    2:30 PM   (30 min.)   [facility] RAD ONC MARIE   Radiation Oncology Clinic 28       29     30     [facility] EXTERNAL RADIATION TREATMT    2:30 PM   (30 min.)   [facility] RAD ONC MARIE   Radiation Oncology Clinic                                     May 2018   Chris Monday Tuesday Wednesday Thursday Friday Saturday             1     2     3     4     5       6     7     8     9     10     11     12       13     14     15     16     17     18     LAB WITH  CLINIC    7:00 AM   (15 min.)    LAB    Health Lab     P RETURN GENERAL LIVER    7:45 AM   (30 min.)   Malgorzata Madden MD   [facility] Hepatology 19       20     21     22     23     24     25     26       27     28     29     30     31                           Recent Results (from the past 24 hour(s))   Magnesium    Collection Time: 04/12/18  2:14 PM   Result Value Ref Range    Magnesium 1.0 (L) 1.6 - 2.3 mg/dL   Comprehensive metabolic panel    Collection Time: 04/12/18  2:14 PM   Result Value Ref Range    Sodium 130 (L) 133 - 144 mmol/L    Potassium 4.5 3.4 - 5.3 mmol/L    Chloride 99 94 - 109 mmol/L    Carbon Dioxide 21 20 - 32 mmol/L    Anion Gap 10 3 - 14 mmol/L    Glucose 93 70 - 99 mg/dL    Urea Nitrogen 10 7 - 30 mg/dL    Creatinine 1.16 (H) 0.52 - 1.04 mg/dL    GFR Estimate 47 (L) >60 mL/min/1.7m2    GFR Estimate If Black 57 (L) >60 mL/min/1.7m2    Calcium 7.3 (L) 8.5 - 10.1  mg/dL    Bilirubin Total 0.4 0.2 - 1.3 mg/dL    Albumin 3.0 (L) 3.4 - 5.0 g/dL    Protein Total 6.3 (L) 6.8 - 8.8 g/dL    Alkaline Phosphatase 106 40 - 150 U/L    ALT 14 0 - 50 U/L    AST 21 0 - 45 U/L   CBC with platelets differential    Collection Time: 04/12/18  2:14 PM   Result Value Ref Range    WBC 2.2 (L) 4.0 - 11.0 10e9/L    RBC Count 3.08 (L) 3.8 - 5.2 10e12/L    Hemoglobin 10.6 (L) 11.7 - 15.7 g/dL    Hematocrit 32.2 (L) 35.0 - 47.0 %     (H) 78 - 100 fl    MCH 34.4 (H) 26.5 - 33.0 pg    MCHC 32.9 31.5 - 36.5 g/dL    RDW 13.6 10.0 - 15.0 %    Platelet Count 150 150 - 450 10e9/L    Diff Method Automated Method     % Neutrophils 77.2 %    % Lymphocytes 7.7 %    % Monocytes 12.7 %    % Eosinophils 1.4 %    % Basophils 0.5 %    % Immature Granulocytes 0.5 %    Nucleated RBCs 0 0 /100    Absolute Neutrophil 1.7 1.6 - 8.3 10e9/L    Absolute Lymphocytes 0.2 (L) 0.8 - 5.3 10e9/L    Absolute Monocytes 0.3 0.0 - 1.3 10e9/L    Absolute Eosinophils 0.0 0.0 - 0.7 10e9/L    Absolute Basophils 0.0 0.0 - 0.2 10e9/L    Abs Immature Granulocytes 0.0 0 - 0.4 10e9/L    Absolute Nucleated RBC 0.0

## 2018-04-13 ENCOUNTER — APPOINTMENT (OUTPATIENT)
Dept: RADIATION ONCOLOGY | Facility: CLINIC | Age: 63
End: 2018-04-13
Attending: RADIOLOGY
Payer: COMMERCIAL

## 2018-04-13 VITALS — BODY MASS INDEX: 22.93 KG/M2 | WEIGHT: 144.2 LBS

## 2018-04-13 DIAGNOSIS — L30.9 DERMATITIS: ICD-10-CM

## 2018-04-13 DIAGNOSIS — C51.9 MALIGNANT NEOPLASM OF VULVA (H): Primary | ICD-10-CM

## 2018-04-13 PROCEDURE — 77386 ZZH IMRT TREATMENT DELIVERY, COMPLEX: CPT | Performed by: RADIOLOGY

## 2018-04-13 PROCEDURE — 77300 RADIATION THERAPY DOSE PLAN: CPT | Performed by: RADIOLOGY

## 2018-04-13 PROCEDURE — 77336 RADIATION PHYSICS CONSULT: CPT | Performed by: RADIOLOGY

## 2018-04-13 RX ORDER — SILVER SULFADIAZINE 10 MG/G
CREAM TOPICAL 2 TIMES DAILY
Qty: 400 G | Refills: 0 | Status: SHIPPED | OUTPATIENT
Start: 2018-04-13 | End: 2018-05-21

## 2018-04-13 NOTE — MR AVS SNAPSHOT
After Visit Summary   4/13/2018    Julius Gilliam    MRN: [id number]           Patient Information     Date Of Birth          1955        Visit Information        Provider Department      4/13/2018 9:15 AM Yvon Leal MD Radiation Oncology Clinic        Today's Diagnoses     Malignant neoplasm of vulva (H)    -  1    Dermatitis           Follow-ups after your visit        Your next 10 appointments already scheduled     Apr 16, 2018  7:30 AM CDT   [facility] Lab Draw with [facility] LAB DRAW   [facility]    [address]  [address]  [facility] [address]   [phone]            Apr 16, 2018  8:00 AM CDT   Infusion 360 with UC ONCOLOGY INFUSION, UC 14 ATC   [facility])    [address]  [address]  [facility] [address]   [phone]            Apr 16, 2018  2:30 PM CDT   EXTERNAL RADIATION TREATMENT with [facility] RAD ONC MARIE   [facility] Clinic [facility])    [facility]  1st Floor  [address] [facility] [address]   [phone]            Apr 17, 2018  2:30 PM CDT   EXTERNAL RADIATION TREATMENT with [facility] RAD ONC MARIE   Rad[facility] On[facility] Clinic [facility])    [facility]  1st Floor  [address] [facility] [address]   [phone]            Apr 18, 2018 12:30 PM CDT   EXTERNAL RADIATION TREATMENT with [facility] RAD ONC MARIE   Radiation Oncology Clinic ([facility])    [facility]  1st Floor  [address] [facility] [address]   [phone]            Apr 19, 2018  2:30 PM CDT   EXTERNAL RADIATION TREATMENT with [facility] RAD ONC MARIE   Radiation Oncology Clinic ([facility])    [facility]  1st Floor  [address] [facility] [address]   [phone]            Apr 20, 2018  2:30 PM CDT    EXTERNAL RADIATION TREATMENT with [facility] RAD ONC MARIE   Radiation Oncology Clinic ([facility])    [facility] [place] [facility]  1st Floor  [address] [address]   [phone]            Apr 23, 2018  2:15 PM CDT   EXTERNAL RADIATION TREATMENT with [facility] RAD ONC MARIE   Radiation Oncology Clinic ([facility])    [facility]  1st Floor  [address] [facility] [address]   [phone]            Apr 24, 2018  2:30 PM CDT   EXTERNAL RADIATION TREATMENT with [facility] RAD ONC MARIE   Radiation Oncology Clinic ([facility])    [facility] [place] [facility]  1st Floor  [address] [facility] [address]   [phone]              Who to contact     Please call your clinic at [phone] to:    Ask questions about your health    Make or cancel appointments    Discuss your medicines    Learn about your test results    Speak to your doctor            Additional Information About Your Visit        [employer] Information     [employer] gives you secure access to your electronic health record. If you see a primary care provider, you can also send messages to your care team and make appointments. If you have questions, please call your primary care clinic.  If you do not have a primary care provider, please call [phone] and they will assist you.      [employer] is an electronic gateway that provides easy, online access to your medical records. With [employer], you can request a clinic appointment, read your test results, renew a prescription or communicate with your care team.     To access your existing account, please contact your [facility] or call [phone] for assistance.        Care EveryWhere ID     This is your Care EveryWhere ID. This could be used by other organizations to access your [place] medical records  [id number]        Your Vitals Were     BMI (Body Mass Index)                   22.93  kg/m2            Blood Pressure from Last 3 Encounters:   04/12/18 111/74   04/10/18 132/76   04/07/18 119/61    Weight from Last 3 Encounters:   04/13/18 65.4 kg (144 lb 3.2 oz)   04/12/18 66.3 kg (146 lb 1.6 oz)   04/10/18 63 kg (138 lb 14.4 oz)              Today, you had the following     No orders found for display         Today's Medication Changes          These changes are accurate as of 4/13/18  2:37 PM.  If you have any questions, ask your nurse or doctor.               Start taking these medicines.        Dose/Directions    silver sulfADIAZINE 1 % cream   Commonly known as:  SILVADENE   Used for:  Dermatitis   Started by:  Yvon Leal MD        Apply topically 2 times daily   Quantity:  400 g   Refills:  0            Where to get your medicines      These medications were sent to [facility] [place], [place] - [address]  [address] [address]     Phone:  [phone]     silver sulfADIAZINE 1 % cream                Primary Care Provider Office Phone # Fax #    Tova Montesinos,  [phone]       [facility] [address] [facility] [address]        Equal Access to Services     CAREY COSME AH: Hadii melania blue hadasho Soomaali, waaxda luqadaha, qaybta kaalmada adeegyada, talha jones. So [facility] [phone].    ATENCIÓN: Si habla español, tiene a elizabeth disposición servicios gratuitos de asistencia lingüística. Llame al [phone].    We comply with applicable federal civil rights laws and [place] laws. We do not discriminate on the basis of race, color, national origin, age, disability, sex, sexual orientation, or gender identity.            Thank you!     Thank you for choosing RADIATION ONCOLOGY CLINIC  for your care. Our goal is always to provide you with excellent care. Hearing back from our patients is one way we can continue to improve our services. Please take a few minutes to complete the written  survey that you may receive in the mail after your visit with us. Thank you!             Your Updated Medication List - Protect others around you: Learn how to safely use, store and throw away your medicines at www.disposemymeds.org.          This list is accurate as of 4/13/18  2:37 PM.  Always use your most recent med list.                   Brand Name Dispense Instructions for use Diagnosis    acetaminophen 325 MG tablet    TYLENOL    30 tablet    Take 2 tablets (650 mg) by mouth every 6 hours    H/O lymph node excision       guaiFENesin-codeine 100-10 MG/5ML Soln solution    ROBITUSSIN AC    420 mL    Take 5-10 mLs by mouth every 4 hours as needed for cough    Cough       loperamide 2 MG capsule    IMODIUM     Take 2 mg by mouth 4 times daily as needed for diarrhea        LORazepam 1 MG tablet    ATIVAN    30 tablet    Take 1 tablet (1 mg) by mouth every 6 hours as needed (nausea/vomiting, anxiety or sleep )    Encounter for long-term (current) use of medications, Malignant neoplasm of vulva (H)       magnesium oxide 400 (241.3 Mg) MG tablet    MAG-OX    60 tablet    Take 1 tablet (400 mg) by mouth 2 times daily    Malignant neoplasm of vulva (H), Hypomagnesemia       Multi-vitamin Tabs tablet      Take 1 tablet by mouth daily        oxyCODONE IR 5 MG tablet    ROXICODONE    20 tablet    Take 1 tablet (5 mg) by mouth every 4 hours as needed for pain    H/O lymph node excision       potassium chloride SA 20 MEQ CR tablet    KLOR-CON    60 tablet    Take 1 tablet (20 mEq) by mouth 2 times daily    Malignant neoplasm of vulva (H), Hypokalemia       prochlorperazine 10 MG tablet    COMPAZINE    30 tablet    Take 1 tablet (10 mg) by mouth every 6 hours as needed (nausea/vomiting)    Encounter for long-term (current) use of medications, Malignant neoplasm of vulva (H)       silver sulfADIAZINE 1 % cream    SILVADENE    400 g    Apply topically 2 times daily    Dermatitis

## 2018-04-13 NOTE — LETTER
2018       RE: Julius Gilliam  [address]   [facility] [address]     Dear Colleague,    Thank you for referring your patient, Julius Gilliam, to the RADIATION ONCOLOGY CLINIC. Please see a copy of my visit note below.    RADIATION ONCOLOGY WEEKLY ON TREATMENT VISIT   Encounter Date: 2018    Patient Name: Julius Gilliam  MRN: [id number]  : 1955     Treatment Summary to Date  Treatment Site: Vulva Current Dose: 4375/6300cGy cGy Fractions: 25/36fx      Chemotherapy  Chemo concurrent with radx?: Yes  Oncologist: Dr. Vera  Drug Name/Frequency 1: Cisplatin weekly    Subjective: Ms. Gilliam presents to clinic today for her weekly on-treatment visit. She continues to tolerate radiotherapy very well. She denies significant pain at this time.  She states that diarrhea is not a problem now that she takes 2 imodium a day regularly.  Uses wilberto bottle with urination.  No significant burning.  She uses Proshield for skin care.      ROS:   Nutrition Alteration  Diet Type: Patient's Preference  Nutrition Note: low appetite  Skin  Skin Reaction:  (Skin seen on txm table per MD's)  Skin Progress: Encouraged pt to get in the shower to daily to help cleanse the area and with healing  Skin Note: has proshield uses occasionally        Cardiovascular  Cardio/Resp Note: Pt reports a painful cough, seen in ED for evaluation  Gastrointestinal  Nausea: 0 - None  Diarrhea: 0 - None (has been on imodium, none today)  Genitourinary  Dysuria: 0 - None   Note: Using proshield and trying clean the area well  Psychosocial  Pyschosocial Note: fatigue setting in  Pain Assessment  0-10 Pain Scale: 0  Pain Note: No complaints of pain from pt    Objective:   Wt 65.4 kg (144 lb 3.2 oz)  BMI 22.93 kg/m2  Gen: Appears well, in no acute distress  Skin: Brisk erythema over treatment field, with moist desquamation in the inguinal creases and interlabial folds.    Treatment-related toxicities (CTCAE v4.0):  1. Fatigue:  Grade 1: Fatigue relieved by rest  2. Dermatitis: Grade 2: Moderate to brisk erythema; patchy moist desquamation, mostly confined to skin folds and creases; moderate erythema   3. Diarrhea: Grade 1    Assessment:    Ms. Gilliam is tolerating radiotherapy very well.     Plan:   1. Continue radiotherapy as planned.  2. Given her grade 2 radiation dermatitis, we will start Silvadene cream BID PRN at the areas of moist desquamation.  3. Continue with imodium for diarrhea.      Mosaiq chart and setup information reviewed  MVCT images reviewed    Medication Review  Med list reviewed with patient?: Yes    Educational Topic Discussed  Education Instructions: reviewed    Pt was seen and discussed with staff, Dr. Leal.    Kirt Olson MD PGY-4  Radiation Oncology Resident, [facility]  Phone: [phone]    I saw and examined the patient with the resident.  I have reviewed and agree with the resident's note and plan of care.      Yvon Leal MD

## 2018-04-13 NOTE — PROGRESS NOTES
RADIATION ONCOLOGY WEEKLY ON TREATMENT VISIT   Encounter Date: 2018    Patient Name: Julius Gilliam  MRN: [id number]  : 1955     Treatment Summary to Date  Treatment Site: Vulva Current Dose: 4375/6300cGy cGy Fractions: 25/36fx      Chemotherapy  Chemo concurrent with radx?: Yes  Oncologist: Dr. Vera  Drug Name/Frequency 1: Cisplatin weekly    Subjective: Ms. Gilliam presents to clinic today for her weekly on-treatment visit. She continues to tolerate radiotherapy very well. She denies significant pain at this time.  She states that diarrhea is not a problem now that she takes 2 imodium a day regularly.  Uses wilberto bottle with urination.  No significant burning.  She uses Proshield for skin care.      ROS:   Nutrition Alteration  Diet Type: Patient's Preference  Nutrition Note: low appetite  Skin  Skin Reaction:  (Skin seen on txm table per MD's)  Skin Progress: Encouraged pt to get in the shower to daily to help cleanse the area and with healing  Skin Note: has proshield uses occasionally        Cardiovascular  Cardio/Resp Note: Pt reports a painful cough, seen in ED for evaluation  Gastrointestinal  Nausea: 0 - None  Diarrhea: 0 - None (has been on imodium, none today)  Genitourinary  Dysuria: 0 - None   Note: Using proshield and trying clean the area well  Psychosocial  Pyschosocial Note: fatigue setting in  Pain Assessment  0-10 Pain Scale: 0  Pain Note: No complaints of pain from pt    Objective:   Wt 65.4 kg (144 lb 3.2 oz)  BMI 22.93 kg/m2  Gen: Appears well, in no acute distress  Skin: Brisk erythema over treatment field, with moist desquamation in the inguinal creases and interlabial folds.    Treatment-related toxicities (CTCAE v4.0):  1. Fatigue: Grade 1: Fatigue relieved by rest  2. Dermatitis: Grade 2: Moderate to brisk erythema; patchy moist desquamation, mostly confined to skin folds and creases; moderate erythema   3. Diarrhea: Grade 1    Assessment:    Ms. Gilliam is  tolerating radiotherapy very well.     Plan:   1. Continue radiotherapy as planned.  2. Given her grade 2 radiation dermatitis, we will start Silvadene cream BID PRN at the areas of moist desquamation.  3. Continue with imodium for diarrhea.      Mosaiq chart and setup information reviewed  MVCT images reviewed    Medication Review  Med list reviewed with patient?: Yes    Educational Topic Discussed  Education Instructions: reviewed    Pt was seen and discussed with staff, Dr. Leal.    Kirt Olson MD PGY-4  Radiation Oncology Resident, [facility]  Phone: [phone]    I saw and examined the patient with the resident.  I have reviewed and agree with the resident's note and plan of care.      Yvno Leal MD

## 2018-04-16 ENCOUNTER — APPOINTMENT (OUTPATIENT)
Dept: RADIATION ONCOLOGY | Facility: CLINIC | Age: 63
End: 2018-04-16
Attending: RADIOLOGY
Payer: COMMERCIAL

## 2018-04-16 ENCOUNTER — INFUSION THERAPY VISIT (OUTPATIENT)
Dept: ONCOLOGY | Facility: CLINIC | Age: 63
End: 2018-04-16
Attending: OBSTETRICS & GYNECOLOGY
Payer: COMMERCIAL

## 2018-04-16 VITALS
HEART RATE: 87 BPM | SYSTOLIC BLOOD PRESSURE: 114 MMHG | OXYGEN SATURATION: 97 % | DIASTOLIC BLOOD PRESSURE: 62 MMHG | BODY MASS INDEX: 22.4 KG/M2 | RESPIRATION RATE: 16 BRPM | TEMPERATURE: 98.3 F | WEIGHT: 140.9 LBS

## 2018-04-16 DIAGNOSIS — Z79.899 ENCOUNTER FOR LONG-TERM (CURRENT) USE OF MEDICATIONS: Primary | ICD-10-CM

## 2018-04-16 DIAGNOSIS — C51.9 MALIGNANT NEOPLASM OF VULVA (H): ICD-10-CM

## 2018-04-16 LAB
ALBUMIN SERPL-MCNC: 2.9 G/DL (ref 3.4–5)
ALP SERPL-CCNC: 108 U/L (ref 40–150)
ALT SERPL W P-5'-P-CCNC: 12 U/L (ref 0–50)
ANION GAP SERPL CALCULATED.3IONS-SCNC: 8 MMOL/L (ref 3–14)
AST SERPL W P-5'-P-CCNC: 15 U/L (ref 0–45)
BASOPHILS # BLD AUTO: 0 10E9/L (ref 0–0.2)
BASOPHILS NFR BLD AUTO: 1.3 %
BILIRUB SERPL-MCNC: 0.5 MG/DL (ref 0.2–1.3)
BUN SERPL-MCNC: 10 MG/DL (ref 7–30)
CALCIUM SERPL-MCNC: 6.6 MG/DL (ref 8.5–10.1)
CHLORIDE SERPL-SCNC: 92 MMOL/L (ref 94–109)
CO2 SERPL-SCNC: 29 MMOL/L (ref 20–32)
CREAT SERPL-MCNC: 1.17 MG/DL (ref 0.52–1.04)
DIFFERENTIAL METHOD BLD: ABNORMAL
EOSINOPHIL # BLD AUTO: 0.1 10E9/L (ref 0–0.7)
EOSINOPHIL NFR BLD AUTO: 3.8 %
ERYTHROCYTE [DISTWIDTH] IN BLOOD BY AUTOMATED COUNT: 13.8 % (ref 10–15)
GFR SERPL CREATININE-BSD FRML MDRD: 47 ML/MIN/1.7M2
GLUCOSE SERPL-MCNC: 89 MG/DL (ref 70–99)
HCT VFR BLD AUTO: 29.3 % (ref 35–47)
HGB BLD-MCNC: 10.2 G/DL (ref 11.7–15.7)
IMM GRANULOCYTES # BLD: 0 10E9/L (ref 0–0.4)
IMM GRANULOCYTES NFR BLD: 0.6 %
LYMPHOCYTES # BLD AUTO: 0.3 10E9/L (ref 0.8–5.3)
LYMPHOCYTES NFR BLD AUTO: 17.8 %
MAGNESIUM SERPL-MCNC: 0.8 MG/DL (ref 1.6–2.3)
MCH RBC QN AUTO: 34.3 PG (ref 26.5–33)
MCHC RBC AUTO-ENTMCNC: 34.8 G/DL (ref 31.5–36.5)
MCV RBC AUTO: 99 FL (ref 78–100)
MONOCYTES # BLD AUTO: 0.4 10E9/L (ref 0–1.3)
MONOCYTES NFR BLD AUTO: 22.3 %
NEUTROPHILS # BLD AUTO: 0.9 10E9/L (ref 1.6–8.3)
NEUTROPHILS NFR BLD AUTO: 54.2 %
NRBC # BLD AUTO: 0 10*3/UL
NRBC BLD AUTO-RTO: 0 /100
PLATELET # BLD AUTO: 205 10E9/L (ref 150–450)
PLATELET # BLD EST: ABNORMAL 10*3/UL
POTASSIUM SERPL-SCNC: 3.6 MMOL/L (ref 3.4–5.3)
PROT SERPL-MCNC: 6.5 G/DL (ref 6.8–8.8)
RBC # BLD AUTO: 2.97 10E12/L (ref 3.8–5.2)
SODIUM SERPL-SCNC: 129 MMOL/L (ref 133–144)
WBC # BLD AUTO: 1.6 10E9/L (ref 4–11)

## 2018-04-16 PROCEDURE — 96367 TX/PROPH/DG ADDL SEQ IV INF: CPT

## 2018-04-16 PROCEDURE — 83735 ASSAY OF MAGNESIUM: CPT | Performed by: OBSTETRICS & GYNECOLOGY

## 2018-04-16 PROCEDURE — 77386 ZZH IMRT TREATMENT DELIVERY, COMPLEX: CPT | Performed by: RADIOLOGY

## 2018-04-16 PROCEDURE — 36415 COLL VENOUS BLD VENIPUNCTURE: CPT

## 2018-04-16 PROCEDURE — 96365 THER/PROPH/DIAG IV INF INIT: CPT

## 2018-04-16 PROCEDURE — 25000128 H RX IP 250 OP 636: Mod: ZF | Performed by: NURSE PRACTITIONER

## 2018-04-16 PROCEDURE — 96366 THER/PROPH/DIAG IV INF ADDON: CPT

## 2018-04-16 PROCEDURE — 40000556 ZZH STATISTIC PERIPHERAL IV START W US GUIDANCE

## 2018-04-16 PROCEDURE — 80053 COMPREHEN METABOLIC PANEL: CPT | Performed by: OBSTETRICS & GYNECOLOGY

## 2018-04-16 PROCEDURE — 96361 HYDRATE IV INFUSION ADD-ON: CPT

## 2018-04-16 PROCEDURE — 85025 COMPLETE CBC W/AUTO DIFF WBC: CPT | Performed by: OBSTETRICS & GYNECOLOGY

## 2018-04-16 RX ORDER — MAGNESIUM SULFATE HEPTAHYDRATE 40 MG/ML
4 INJECTION, SOLUTION INTRAVENOUS ONCE
Status: COMPLETED | OUTPATIENT
Start: 2018-04-16 | End: 2018-04-16

## 2018-04-16 RX ADMIN — CALCIUM GLUCONATE 1 G: 98 INJECTION, SOLUTION INTRAVENOUS at 10:42

## 2018-04-16 RX ADMIN — MAGNESIUM SULFATE IN WATER 4 G: 40 INJECTION, SOLUTION INTRAVENOUS at 08:45

## 2018-04-16 RX ADMIN — SODIUM CHLORIDE 2000 ML: 9 INJECTION, SOLUTION INTRAVENOUS at 08:40

## 2018-04-16 ASSESSMENT — PAIN SCALES - GENERAL: PAINLEVEL: NO PAIN (0)

## 2018-04-16 NOTE — MR AVS SNAPSHOT
After Visit Summary   4/16/2018    Julius Gilliam    MRN: [id number]           Patient Information     Date Of Birth          1955        Visit Information        Provider Department      4/16/2018 8:00 AM  14 ATC;  ONCOLOGY INFUSION [facility]        Today's Diagnoses     Encounter for long-term (current) use of medications    -  1    Malignant neoplasm of vulva (H)          Care [facility] Main Line: [phone]    Call triage nurse with chills and/or temperature greater than or equal to 100.4, uncontrolled nausea/vomiting, diarrhea, constipation, dizziness, shortness of breath, chest pain, bleeding, unexplained bruising, or any new/concerning symptoms, questions/concerns.   If you are having any concerning symptoms or wish to speak to a provider before your next infusion visit, please call your care coordinator or triage to notify them so we can adequately serve you.   Triage Nurse Line: [phone]    If after hours, weekends, or holidays, call main hospital  and ask for Oncology doctor on call @ [phone]            April 2018 Sunday Monday Tuesday Wednesday Thursday Friday Saturday   1     2     [facility] MASONIC LAB DRAW    6:30 AM   (15 min.)    MASONIC LAB DRAW   [facility] Lab Draw     [facility] ONC INFUSION 360    7:00 AM   (360 min.)    ONCOLOGY INFUSION   [facility]    2:15 PM   (30 min.)   [facility] RAD ONC MARIE   Radiation Oncology Clinic 3     P EXTERNAL RADIATION TREATMT    2:30 PM   (30 min.)   [facility] RAD ONC MARIE   Radiation Oncology Clinic 4     P EXTERNAL RADIATION TREATMT    2:30 PM   (30 min.)   [facility] RAD ONC MARIE   Radiation Oncology Clinic 5     P EXTERNAL RADIATION TREATMT    2:30 PM   (30 min.)   [facility] RAD ONC MARIE   Radiation Oncology Clinic 6     LEVEL 4    8:30 AM   (240 min.)   UR CH 05   [facility], Infusion Services     Admission    9:10 AM    Adry Groves MD   Unit 6D Observation [facility]   (Discharge: 4/7/2018)     CT CHEST PULMONARY EMBOLISM W   10:30 AM   (30 min.)   URCT1   [facility], Radiology     [facility] EXTERNAL RADIATION TREATMT    2:30 PM   (30 min.)   [facility] RAD ONC MARIE   Radiation Oncology Clinic     [facility] ON TREATMENT VISIT    2:45 PM   (15 min.)   Yvon Leal MD   Radiation Oncology Clinic 7     ECH STRESS TEST   11:05 AM   (60 min.)   [id number]   [facility],  Echocardiography     ECH DOBUTAMINE STRESS TEST   11:25 AM   (60 min.)   [id number]   [facility],  Echocardiography   8     9     P EXTERNAL RADIATION TREATMT    2:15 PM   (30 min.)   [facility] RAD ONC MARIE   Radiation Oncology Clinic 10     [facility] MASONIC LAB DRAW    6:30 AM   (15 min.)    MASONIC LAB DRAW   [facility] Masonic Lab Draw     P ONC INFUSION 360    7:00 AM   (360 min.)   [facility] ONCOLOGY INFUSION   [facility]     UMP RETURN    7:15 AM   (30 min.)   Karin Scott APRN CNP   [facility] EXTERNAL RADIATION TREATMT    2:30 PM   (30 min.)   [facility] RAD ONC MARIE   Radiation Oncology Clinic 11     P EXTERNAL RADIATION TREATMT    2:30 PM   (30 min.)   [facility] RAD ONC MARIE   Radiation Oncology Clinic 12     [facility] MASONIC LAB DRAW    2:15 PM   (15 min.)    M[facility] LAB DRAW   [facility] Masonic Lab Draw     [facility] EXTERNAL RADIATION TREATMT    2:30 PM   (30 min.)   [facility] RAD ONC MARIE   Radiation Oncology Clinic     P ONC INFUSION 120    3:00 PM   (120 min.)   UC ONCOLOGY INFUSION   [facility] 13     P ON TREATMENT VISIT    9:15 AM   (15 min.)   Yvon Leal MD   Radiation Oncology Clinic     [facility] EXTERNAL RADIATION TREATMT    2:30 PM   (30 min.)   [facility] RAD ONC MARIE   Radiation Oncology Clinic 14       15     16     [facility] MASONIC LAB DRAW    7:30 AM   (15 min.)   [facility] LAB DRAW   [facility] Masonic Lab Draw     P ONC INFUSION 360    8:00 AM   (360 min.)   UC ONCOLOGY INFUSION   [facility]      UMP EXTERNAL RADIATION TREATMT    2:30 PM   (30 min.)   UMP RAD ONC MARIE   Radiation Oncology Clinic 17     UMP EXTERNAL RADIATION TREATMT    2:30 PM   (30 min.)   UMP RAD ONC MARIE   Radiation Oncology Clinic 18     UMP EXTERNAL RADIATION TREATMT   12:30 PM   (30 min.)   P RAD ONC MARIE   Radiation Oncology Clinic     [facility] MASONIC LAB DRAW    3:00 PM   (15 min.)    MASONIC LAB DRAW   [facility] Lab Draw     UMP ONC INFUSION 120    3:30 PM   (120 min.)    [facility]     UMP EXTERNAL RADIATION TREATMT    2:30 PM   (30 min.)   UMP RAD ONC MARIE   Radiation Oncology Clinic 20     UMP EXTERNAL RADIATION TREATMT    2:30 PM   (30 min.)   UMP RAD ONC MARIE   Radiation Oncology Clinic 21       22     23     UMP EXTERNAL RADIATION TREATMT    2:15 PM   (30 min.)   UMP RAD ONC MARIE   Radiation Oncology Clinic 24     UMP EXTERNAL RADIATION TREATMT    2:30 PM   (30 min.)   UMP RAD ONC MARIE   Radiation Oncology Clinic 25     UMP EXTERNAL RADIATION TREATMT    2:30 PM   (30 min.)   UMP RAD ONC MARIE   Radiation Oncology Clinic 26     UMP EXTERNAL RADIATION TREATMT    2:30 PM   (30 min.)   UMP RAD ONC MARIE   Radiation Oncology Clinic 27     UMP EXTERNAL RADIATION TREATMT    2:30 PM   (30 min.)   UMP RAD ONC MARIE   Radiation Oncology Clinic 28       29     30     UMP EXTERNAL RADIATION TREATMT    2:30 PM   (30 min.)   P RAD ONC MARIE   Radiation Oncology Clinic                                     May 2018   Chris Monday Tuesday Wednesday Thursday Friday Saturday             1     2     3     4     5       6     7     8     9     10     11     12       13     14     15     16     17     18     LAB WITH HB CLINIC    7:00 AM   (15 min.)    LAB   [facility] Lab     UMP RETURN GENERAL LIVER    7:45 AM   (30 min.)   Malgorzata Madden MD   [facility] Hepatology 19       20     21     22     23     24     25     26       27     28     29     30     31                            Lab  Results:  Recent Results (from the past 12 hour(s))   CBC with platelets differential    Collection Time: 04/16/18  7:23 AM   Result Value Ref Range    WBC 1.6 (L) 4.0 - 11.0 10e9/L    RBC Count 2.97 (L) 3.8 - 5.2 10e12/L    Hemoglobin 10.2 (L) 11.7 - 15.7 g/dL    Hematocrit 29.3 (L) 35.0 - 47.0 %    MCV 99 78 - 100 fl    MCH 34.3 (H) 26.5 - 33.0 pg    MCHC 34.8 31.5 - 36.5 g/dL    RDW 13.8 10.0 - 15.0 %    Platelet Count 205 150 - 450 10e9/L    Diff Method Automated Method     % Neutrophils 54.2 %    % Lymphocytes 17.8 %    % Monocytes 22.3 %    % Eosinophils 3.8 %    % Basophils 1.3 %    % Immature Granulocytes 0.6 %    Nucleated RBCs 0 0 /100    Absolute Neutrophil 0.9 (L) 1.6 - 8.3 10e9/L    Absolute Lymphocytes 0.3 (L) 0.8 - 5.3 10e9/L    Absolute Monocytes 0.4 0.0 - 1.3 10e9/L    Absolute Eosinophils 0.1 0.0 - 0.7 10e9/L    Absolute Basophils 0.0 0.0 - 0.2 10e9/L    Abs Immature Granulocytes 0.0 0 - 0.4 10e9/L    Absolute Nucleated RBC 0.0     Platelet Estimate Confirming automated cell count    Comprehensive metabolic panel    Collection Time: 04/16/18  7:23 AM   Result Value Ref Range    Sodium 129 (L) 133 - 144 mmol/L    Potassium 3.6 3.4 - 5.3 mmol/L    Chloride 92 (L) 94 - 109 mmol/L    Carbon Dioxide 29 20 - 32 mmol/L    Anion Gap 8 3 - 14 mmol/L    Glucose 89 70 - 99 mg/dL    Urea Nitrogen 10 7 - 30 mg/dL    Creatinine 1.17 (H) 0.52 - 1.04 mg/dL    GFR Estimate 47 (L) >60 mL/min/1.7m2    GFR Estimate If Black 57 (L) >60 mL/min/1.7m2    Calcium 6.6 (L) 8.5 - 10.1 mg/dL    Bilirubin Total 0.5 0.2 - 1.3 mg/dL    Albumin 2.9 (L) 3.4 - 5.0 g/dL    Protein Total 6.5 (L) 6.8 - 8.8 g/dL    Alkaline Phosphatase 108 40 - 150 U/L    ALT 12 0 - 50 U/L    AST 15 0 - 45 U/L   Magnesium    Collection Time: 04/16/18  7:23 AM   Result Value Ref Range    Magnesium 0.8 (L) 1.6 - 2.3 mg/dL               Follow-ups after your visit        Your next 10 appointments already scheduled     Apr 16, 2018  2:30 PM CDT   EXTERNAL  RADIATION TREATMENT with [facility] RAD ONC MARIE   Radiation Oncology Clinic ([facility])    [facility]  1st Floor  [address] [address]   [phone]            Apr 17, 2018  2:30 PM CDT   EXTERNAL RADIATION TREATMENT with [facility] RAD ONC MARIE   Radiation Oncology Clinic ([facility])    [facility]  1st Floor  [address] [address]   [phone]            Apr 18, 2018 12:30 PM CDT   EXTERNAL RADIATION TREATMENT with [facility] RAD ONC MARIE   Radiation Oncology Clinic ([facility])    [facility]  1st Floor  [address] [address]   [phone]            Apr 18, 2018  3:00 PM CDT   [facility] Lab Draw with [facility] LAB DRAW   [facility]    [address]  [address]  [facility] [address]   [phone]            Apr 18, 2018  3:30 PM CDT   Infusion 120 with UC ONCOLOGY INFUSION, [facility] 13 ATC   [facility]    [address]  [address]  [facility] [address]   [phone]            Apr 19, 2018  2:30 PM CDT   EXTERNAL RADIATION TREATMENT with [facility] RAD ONC MARIE   Radiation Oncology Clinic ([facility])    [facility]  1st Floor  [address] [address]   [phone]            Apr 20, 2018  2:30 PM CDT   EXTERNAL RADIATION TREATMENT with [facility] RAD ONC MARIE   Radiation Oncology Clinic ([facility])    [facility]  1st Floor  [address] [address]   [phone]            Apr 23, 2018  2:15 PM CDT   EXTERNAL RADIATION TREATMENT with [facility] RAD ONC MARIE   Radiation Oncology Clinic ([facility])    [facility]  1st Floor  [address] [facility] [address]   [phone]            Apr 24, 2018  2:30  PM CDT   EXTERNAL RADIATION TREATMENT with [facility] RAD ONC MARIE   Radiation Oncology Clinic ([facility]SA Clinics)    [facility]  1st Floor  [address] [address] [phone]              Who to contact     If you have questions or need follow up information about today's clinic visit or your schedule please contact [facility] directly at [phone].  Normal or non-critical lab and imaging results will be communicated to you by Last.fmhart, letter or phone within 4 business days after the clinic has received the results. If you do not hear from us within 7 days, please contact the clinic through Compliance [employer]et or phone. If you have a critical or abnormal lab result, we will notify you by phone as soon as possible.  Submit refill requests through Stringbike or call your pharmacy and they will forward the refill request to us. Please allow 3 business days for your refill to be completed.          Additional Information About Your Visit        Last.fmharAventones Information     Stringbike gives you secure access to your electronic health record. If you see a primary care provider, you can also send messages to your care team and make appointments. If you have questions, please call your primary care clinic.  If you do not have a primary care provider, please call [phone] and they will assist you.        Care EveryWhere ID     This is your Care EveryWhere ID. This could be used by other organizations to access your [place] medical records  [id number]        Your Vitals Were     Pulse Temperature Respirations Pulse Oximetry BMI (Body Mass Index)       87 98.3  F (36.8  C) (Oral) 16 97% 22.4 kg/m2        Blood Pressure from Last 3 Encounters:   04/16/18 114/62   04/12/18 111/74   04/10/18 132/76    Weight from Last 3 Encounters:   04/16/18 63.9 kg (140 lb 14.4 oz)   04/13/18 65.4 kg (144 lb 3.2 oz)   04/12/18 66.3 kg (146 lb 1.6 oz)              We Performed the Following      CBC with platelets differential     Comprehensive metabolic panel     [facility]        Primary Care Provider Office Phone # Fax #    Tova Montesinos, [phone]       [facility] [address] [facility] [address]        Equal Access to Services     CAREY COSME : Hadii melania ku hadjaneeno Soomaali, waaxda luqadaha, qaybta kaalmada adeegyada, talha brodyin haybahmann hoarce korinarosalino jones. So [facility] [phone].    ATENCIÓN: Si habla español, tiene a elizabeth disposición servicios gratuitos de asistencia lingüística. Llame al [phone].    We comply with applicable federal civil rights laws and [place] laws. We do not discriminate on the basis of race, color, national origin, age, disability, sex, sexual orientation, or gender identity.            Thank you!     Thank you for choosing [facility]  for your care. Our goal is always to provide you with excellent care. Hearing back from our patients is one way we can continue to improve our services. Please take a few minutes to complete the written survey that you may receive in the mail after your visit with us. Thank you!             Your Updated Medication List - Protect others around you: Learn how to safely use, store and throw away your medicines at www.disposemymeds.org.          This list is accurate as of 4/16/18  9:32 AM.  Always use your most recent med list.                   Brand Name Dispense Instructions for use Diagnosis    acetaminophen 325 MG tablet    TYLENOL    30 tablet    Take 2 tablets (650 mg) by mouth every 6 hours    H/O lymph node excision       guaiFENesin-codeine 100-10 MG/5ML Soln solution    ROBITUSSIN AC    420 mL    Take 5-10 mLs by mouth every 4 hours as needed for cough    Cough       loperamide 2 MG capsule    IMODIUM     Take 2 mg by mouth 4 times daily as needed for diarrhea        LORazepam 1 MG tablet    ATIVAN    30 tablet    Take 1 tablet (1 mg) by mouth every 6 hours as needed  (nausea/vomiting, anxiety or sleep )    Encounter for long-term (current) use of medications, Malignant neoplasm of vulva (H)       magnesium oxide 400 (241.3 Mg) MG tablet    MAG-OX    60 tablet    Take 1 tablet (400 mg) by mouth 2 times daily    Malignant neoplasm of vulva (H), Hypomagnesemia       Multi-vitamin Tabs tablet      Take 1 tablet by mouth daily        oxyCODONE IR 5 MG tablet    ROXICODONE    20 tablet    Take 1 tablet (5 mg) by mouth every 4 hours as needed for pain    H/O lymph node excision       potassium chloride SA 20 MEQ CR tablet    KLOR-CON    60 tablet    Take 1 tablet (20 mEq) by mouth 2 times daily    Malignant neoplasm of vulva (H), Hypokalemia       prochlorperazine 10 MG tablet    COMPAZINE    30 tablet    Take 1 tablet (10 mg) by mouth every 6 hours as needed (nausea/vomiting)    Encounter for long-term (current) use of medications, Malignant neoplasm of vulva (H)       silver sulfADIAZINE 1 % cream    SILVADENE    400 g    Apply topically 2 times daily    Dermatitis

## 2018-04-16 NOTE — NURSING NOTE
Chief Complaint   Patient presents with     Blood Draw     labs drawn with vpt by rn.  vs taken     Labs drawn with vpt by rn.  Pt tolerated well.  VS taken.  Pt checked in for next appt.    Tabatha Mcneil RN

## 2018-04-16 NOTE — PATIENT INSTRUCTIONS
[facility] Main Line: [phone]    Call triage nurse with chills and/or temperature greater than or equal to 100.4, uncontrolled nausea/vomiting, diarrhea, constipation, dizziness, shortness of breath, chest pain, bleeding, unexplained bruising, or any new/concerning symptoms, questions/concerns.   If you are having any concerning symptoms or wish to speak to a provider before your next infusion visit, please call your care coordinator or triage to notify them so we can adequately serve you.   Triage Nurse Line: [phone]    If after hours, weekends, or holidays, call main hospital  and ask for Oncology doctor on call @ [phone]            April 2018 Sunday Monday Tuesday Wednesday Thursday Friday Saturday   1     2     [facility] LAB DRAW    6:30 AM   (15 min.)    MASONIC LAB DRAW   [facility] Lab Draw     [facility] ONC INFUSION 360    7:00 AM   (360 min.)    ONCOLOGY INFUSION   [facility] EXTERNAL RADIATION TREATMT    2:15 PM   (30 min.)   [facility] RAD ONC MARIE   Radiation Oncology Clinic 3     [facility] EXTERNAL RADIATION TREATMT    2:30 PM   (30 min.)   [facility] RAD ONC MARIE   Radiation Oncology Clinic 4     [facility] EXTERNAL RADIATION TREATMT    2:30 PM   (30 min.)   [facility] RAD ONC MARIE   Radiation Oncology Clinic 5     [facility] EXTERNAL RADIATION TREATMT    2:30 PM   (30 min.)   [facility] RAD ONC MARIE   Radiation Oncology Clinic 6     LEVEL 4    8:30 AM   (240 min.)   UR CH 05   [facility], Infusion Services     Admission    9:10 AM   Adry Groves MD   Unit 6D Observation [facility]   (Discharge: 4/7/2018)     CT CHEST PULMONARY EMBOLISM W   10:30 AM   (30 min.)   URCT1   [facility], Radiology     [facility] EXTERNAL RADIATION TREATMT    2:30 PM   (30 min.)   [facility] RAD ONC MARIE   Radiation Oncology Clinic     [facility] ON TREATMENT VISIT    2:45 PM   (15 min.)   Yvon Leal MD   Radiation Oncology Clinic 7     ECH STRESS TEST   11:05 AM   (60 min.)   [id number]   [facility],   Echocardiography     ECH DOBUTAMINE STRESS TEST   11:25 AM   (60 min.)   [id number]   [facility],  Echocardiography   8     9     UMP EXTERNAL RADIATION TREATMT    2:15 PM   (30 min.)   P RAD ONC MARIE   Radiation Oncology Clinic 10     P MASONIC LAB DRAW    6:30 AM   (15 min.)    MASONIC LAB DRAW   [facility] Masonic Lab Draw     P ONC INFUSION 360    7:00 AM   (360 min.)   UC ONCOLOGY INFUSION   [facility]     UMP RETURN    7:15 AM   (30 min.)   Karin Scott APRN CNP   [facility]     UMP EXTERNAL RADIATION TREATMT    2:30 PM   (30 min.)   P RAD ONC MARIE   Radiation Oncology Clinic 11     UMP EXTERNAL RADIATION TREATMT    2:30 PM   (30 min.)   P RAD ONC MARIE   Radiation Oncology Clinic 12     P MASONIC LAB DRAW    2:15 PM   (15 min.)    MASONIC LAB DRAW   [facility] Mason[facility] Lab Draw     P EXTERNAL RADIATION TREATMT    2:30 PM   (30 min.)   P RAD ONC MARIE   Radiation Oncology Clinic     P ONC INFUSION 120    3:00 PM   (120 min.)   UC ONCOLOGY INFUSION   [facility] 13     UMP ON TREATMENT VISIT    9:15 AM   (15 min.)   Yvon Leal MD   Radiation Oncology Clinic     UMP EXTERNAL RADIATION TREATMT    2:30 PM   (30 min.)   P RAD ONC MARIE   Radiation Oncology Clinic 14       15     16     [facility] MASONIC LAB DRAW    7:30 AM   (15 min.)    MASONIC LAB DRAW   [facility] Mason[facility] Lab Draw     P ONC INFUSION 360    8:00 AM   (360 min.)   UC ONCOLOGY INFUSION   [facility]     UMP EXTERNAL RADIATION TREATMT    2:30 PM   (30 min.)   P RAD ONC MARIE   Radiation Oncology Clinic 17     UMP EXTERNAL RADIATION TREATMT    2:30 PM   (30 min.)   P RAD ONC MARIE   Radiation Oncology Clinic 18     UMP EXTERNAL RADIATION TREATMT   12:30 PM   (30 min.)   P RAD ONC MARIE   Radiation Oncology Clinic     P MASONIC LAB DRAW    3:00 PM   (15 min.)   UC MASONIC LAB DRAW   [facility] Masonic Lab Draw     P ONC INFUSION 120    3:30  PM   (120 min.)    ONCOLOGY INFUSION   [facility] 19     UMP EXTERNAL RADIATION TREATMT    2:30 PM   (30 min.)   UMP RAD ONC MARIE   Radiation Oncology Clinic 20     UMP EXTERNAL RADIATION TREATMT    2:30 PM   (30 min.)   UMP RAD ONC MARIE   Radiation Oncology Clinic 21       22     23     UMP EXTERNAL RADIATION TREATMT    2:15 PM   (30 min.)   UMP RAD ONC MARIE   Radiation Oncology Clinic 24     UMP EXTERNAL RADIATION TREATMT    2:30 PM   (30 min.)   UMP RAD ONC MARIE   Radiation Oncology Clinic 25     UMP EXTERNAL RADIATION TREATMT    2:30 PM   (30 min.)   UMP RAD ONC MARIE   Radiation Oncology Clinic 26     UMP EXTERNAL RADIATION TREATMT    2:30 PM   (30 min.)   UMP RAD ONC MARIE   Radiation Oncology Clinic 27     UMP EXTERNAL RADIATION TREATMT    2:30 PM   (30 min.)   UMP RAD ONC MARIE   Radiation Oncology Clinic 28       29     30     UMP EXTERNAL RADIATION TREATMT    2:30 PM   (30 min.)   UMP RAD ONC MARIE   Radiation Oncology Clinic                                     May 2018   Chris Monday Tuesday Wednesday Thursday Friday Saturday             1     2     3     4     5       6     7     8     9     10     11     12       13     14     15     16     17     18     LAB WITH HB CLINIC    7:00 AM   (15 min.)    LAB   [facility] Lab     UMP RETURN GENERAL LIVER    7:45 AM   (30 min.)   Malgorzata Madden MD   [facility] Hepatology 19       20     21     22     23     24     25     26       27     28     29     30     31                            Lab Results:  Recent Results (from the past 12 hour(s))   CBC with platelets differential    Collection Time: 04/16/18  7:23 AM   Result Value Ref Range    WBC 1.6 (L) 4.0 - 11.0 10e9/L    RBC Count 2.97 (L) 3.8 - 5.2 10e12/L    Hemoglobin 10.2 (L) 11.7 - 15.7 g/dL    Hematocrit 29.3 (L) 35.0 - 47.0 %    MCV 99 78 - 100 fl    MCH 34.3 (H) 26.5 - 33.0 pg    MCHC 34.8 31.5 - 36.5 g/dL    RDW 13.8 10.0 - 15.0 %    Platelet Count 205 150 - 450 10e9/L     Diff Method Automated Method     % Neutrophils 54.2 %    % Lymphocytes 17.8 %    % Monocytes 22.3 %    % Eosinophils 3.8 %    % Basophils 1.3 %    % Immature Granulocytes 0.6 %    Nucleated RBCs 0 0 /100    Absolute Neutrophil 0.9 (L) 1.6 - 8.3 10e9/L    Absolute Lymphocytes 0.3 (L) 0.8 - 5.3 10e9/L    Absolute Monocytes 0.4 0.0 - 1.3 10e9/L    Absolute Eosinophils 0.1 0.0 - 0.7 10e9/L    Absolute Basophils 0.0 0.0 - 0.2 10e9/L    Abs Immature Granulocytes 0.0 0 - 0.4 10e9/L    Absolute Nucleated RBC 0.0     Platelet Estimate Confirming automated cell count    Comprehensive metabolic panel    Collection Time: 04/16/18  7:23 AM   Result Value Ref Range    Sodium 129 (L) 133 - 144 mmol/L    Potassium 3.6 3.4 - 5.3 mmol/L    Chloride 92 (L) 94 - 109 mmol/L    Carbon Dioxide 29 20 - 32 mmol/L    Anion Gap 8 3 - 14 mmol/L    Glucose 89 70 - 99 mg/dL    Urea Nitrogen 10 7 - 30 mg/dL    Creatinine 1.17 (H) 0.52 - 1.04 mg/dL    GFR Estimate 47 (L) >60 mL/min/1.7m2    GFR Estimate If Black 57 (L) >60 mL/min/1.7m2    Calcium 6.6 (L) 8.5 - 10.1 mg/dL    Bilirubin Total 0.5 0.2 - 1.3 mg/dL    Albumin 2.9 (L) 3.4 - 5.0 g/dL    Protein Total 6.5 (L) 6.8 - 8.8 g/dL    Alkaline Phosphatase 108 40 - 150 U/L    ALT 12 0 - 50 U/L    AST 15 0 - 45 U/L   Magnesium    Collection Time: 04/16/18  7:23 AM   Result Value Ref Range    Magnesium 0.8 (L) 1.6 - 2.3 mg/dL

## 2018-04-16 NOTE — PROGRESS NOTES
Infusion Nursing Note:  Julius Gilliam presents today for Cycle1, Day 36 Cisplatin-- Cancelled.  IV fluids and Magnesium, Calcium replaced.   Patient seen by provider today: No   present during visit today: Not Applicable.    Note: Cistplatin cancelled for today due to ANC 0.9  TORB: Per Tana Sanderson NP cancel today's Cisplatin, give 2 L NS, replace Mag per protocol and instruct patient to decrease free water and increase fluids with electrolyte - like Gatorade - and pt to return Wednesday for lab draw and electrolyte replacement.   TORB: Per Marla Sanderson NP replace Calcium with 1 GM Calcium Gluconate IV.    Intravenous Access:  Peripheral IV placed.    Treatment Conditions:  Lab Results   Component Value Date    HGB 10.2 04/16/2018     Lab Results   Component Value Date    WBC 1.6 04/16/2018      Lab Results   Component Value Date    ANEU 0.9 04/16/2018     Lab Results   Component Value Date     04/16/2018      Lab Results   Component Value Date     04/16/2018                   Lab Results   Component Value Date    POTASSIUM 3.6 04/16/2018           Lab Results   Component Value Date    MAG 0.8 04/16/2018            Lab Results   Component Value Date    CR 1.17 04/16/2018                   Lab Results   Component Value Date    BERHANE 6.6 04/16/2018                Lab Results   Component Value Date    BILITOTAL 0.5 04/16/2018           Lab Results   Component Value Date    ALBUMIN 2.9 04/16/2018                    Lab Results   Component Value Date    ALT 12 04/16/2018           Lab Results   Component Value Date    AST 15 04/16/2018     Results reviewed, labs did NOT meet treatment parameters: ANC 0.9  2 L NS ordered; 4 G Magnesium, per pharmacy, over 2 hours.   Calcium Gluconate 1 GM over 1 hour.       Post Infusion Assessment:  Patient tolerated infusion without incident.  Blood return noted pre and post infusion.  Site patent and intact, free from redness, edema or  discomfort.  No evidence of extravasations.  Access discontinued per protocol.    Discharge Plan:   Patient declined prescription refills.  Discharge instructions reviewed with: Patient.  Copy of AVS reviewed with patient and/or family.  Patient will return 4/18 for labs, possible electrolyte; Heide Vargas messaged for next Dr. Vera appointment.  Patient discharged in stable condition accompanied by: self.  Departure Mode: Ambulatory.    Coreen Matthew RN

## 2018-04-17 ENCOUNTER — CARE COORDINATION (OUTPATIENT)
Dept: ONCOLOGY | Facility: CLINIC | Age: 63
End: 2018-04-17

## 2018-04-17 ENCOUNTER — APPOINTMENT (OUTPATIENT)
Dept: RADIATION ONCOLOGY | Facility: CLINIC | Age: 63
End: 2018-04-17
Attending: RADIOLOGY
Payer: COMMERCIAL

## 2018-04-17 DIAGNOSIS — C51.9 MALIGNANT NEOPLASM OF VULVA (H): Primary | ICD-10-CM

## 2018-04-17 DIAGNOSIS — E83.42 HYPOMAGNESEMIA: ICD-10-CM

## 2018-04-17 DIAGNOSIS — Z79.899 ENCOUNTER FOR LONG-TERM (CURRENT) USE OF MEDICATIONS: ICD-10-CM

## 2018-04-17 DIAGNOSIS — E87.6 HYPOKALEMIA: ICD-10-CM

## 2018-04-17 PROCEDURE — 77386 ZZH IMRT TREATMENT DELIVERY, COMPLEX: CPT | Performed by: RADIOLOGY

## 2018-04-17 NOTE — PROGRESS NOTES
Care Coordinator Note  Left message with lab and infusion appointments for 4/18/18, also clinic appointment with Dr. Vera on 5/14/18.       Heide Perez MSN, RN  Care Coordinator  Gynecologic Cancer   Office:  [phone]  Pager: [phone] #[phone]

## 2018-04-18 ENCOUNTER — INFUSION THERAPY VISIT (OUTPATIENT)
Dept: ONCOLOGY | Facility: CLINIC | Age: 63
End: 2018-04-18
Attending: NURSE PRACTITIONER
Payer: COMMERCIAL

## 2018-04-18 ENCOUNTER — APPOINTMENT (OUTPATIENT)
Dept: RADIATION ONCOLOGY | Facility: CLINIC | Age: 63
End: 2018-04-18
Attending: RADIOLOGY
Payer: COMMERCIAL

## 2018-04-18 ENCOUNTER — APPOINTMENT (OUTPATIENT)
Dept: LAB | Facility: CLINIC | Age: 63
End: 2018-04-18
Attending: NURSE PRACTITIONER
Payer: COMMERCIAL

## 2018-04-18 VITALS
TEMPERATURE: 97.3 F | OXYGEN SATURATION: 96 % | DIASTOLIC BLOOD PRESSURE: 79 MMHG | RESPIRATION RATE: 18 BRPM | HEART RATE: 74 BPM | BODY MASS INDEX: 22.96 KG/M2 | WEIGHT: 144.4 LBS | SYSTOLIC BLOOD PRESSURE: 124 MMHG

## 2018-04-18 DIAGNOSIS — E87.6 HYPOKALEMIA: ICD-10-CM

## 2018-04-18 DIAGNOSIS — E83.51 HYPOCALCEMIA: ICD-10-CM

## 2018-04-18 DIAGNOSIS — E83.42 HYPOMAGNESEMIA: ICD-10-CM

## 2018-04-18 DIAGNOSIS — Z79.899 ENCOUNTER FOR LONG-TERM (CURRENT) USE OF MEDICATIONS: ICD-10-CM

## 2018-04-18 DIAGNOSIS — C51.9 MALIGNANT NEOPLASM OF VULVA (H): Primary | ICD-10-CM

## 2018-04-18 LAB
ANION GAP SERPL CALCULATED.3IONS-SCNC: 5 MMOL/L (ref 3–14)
BUN SERPL-MCNC: 9 MG/DL (ref 7–30)
CALCIUM SERPL-MCNC: 7.2 MG/DL (ref 8.5–10.1)
CHLORIDE SERPL-SCNC: 96 MMOL/L (ref 94–109)
CO2 SERPL-SCNC: 29 MMOL/L (ref 20–32)
CREAT SERPL-MCNC: 1.04 MG/DL (ref 0.52–1.04)
GFR SERPL CREATININE-BSD FRML MDRD: 54 ML/MIN/1.7M2
GLUCOSE SERPL-MCNC: 101 MG/DL (ref 70–99)
MAGNESIUM SERPL-MCNC: 1 MG/DL (ref 1.6–2.3)
POTASSIUM SERPL-SCNC: 4 MMOL/L (ref 3.4–5.3)
SODIUM SERPL-SCNC: 130 MMOL/L (ref 133–144)

## 2018-04-18 PROCEDURE — 83735 ASSAY OF MAGNESIUM: CPT | Performed by: NURSE PRACTITIONER

## 2018-04-18 PROCEDURE — 25000128 H RX IP 250 OP 636: Mod: ZF | Performed by: NURSE PRACTITIONER

## 2018-04-18 PROCEDURE — 80048 BASIC METABOLIC PNL TOTAL CA: CPT | Performed by: NURSE PRACTITIONER

## 2018-04-18 PROCEDURE — 77331 SPECIAL RADIATION DOSIMETRY: CPT | Performed by: RADIOLOGY

## 2018-04-18 PROCEDURE — 77386 ZZH IMRT TREATMENT DELIVERY, COMPLEX: CPT | Performed by: RADIOLOGY

## 2018-04-18 PROCEDURE — 96365 THER/PROPH/DIAG IV INF INIT: CPT

## 2018-04-18 PROCEDURE — 36415 COLL VENOUS BLD VENIPUNCTURE: CPT

## 2018-04-18 PROCEDURE — 96366 THER/PROPH/DIAG IV INF ADDON: CPT

## 2018-04-18 RX ADMIN — MAGNESIUM SULFATE IN WATER: 40 INJECTION, SOLUTION INTRAVENOUS at 16:17

## 2018-04-18 RX ADMIN — SODIUM CHLORIDE 1000 ML: 9 INJECTION, SOLUTION INTRAVENOUS at 15:56

## 2018-04-18 ASSESSMENT — PAIN SCALES - GENERAL: PAINLEVEL: NO PAIN (0)

## 2018-04-18 NOTE — PATIENT INSTRUCTIONS
Contact Numbers    [facility] Main Line: [phone]  [facility] Triage:  [phone]    Call triage with chills and/or temperature greater than or equal to 100.5, uncontrolled nausea/vomiting, diarrhea, constipation, dizziness, shortness of breath, chest pain, bleeding, unexplained bruising, or any new/concerning symptoms, questions/concerns.     If you are having any concerning symptoms or wish to speak to a provider before your next infusion visit, please call your care coordinator or triage to notify them so we can adequately serve you.       After Hours: [phone]    If after hours, weekends, or holidays, call main hospital  and ask for Oncology doctor on call.         April 2018 Sunday Monday Tuesday Wednesday Thursday Friday Saturday   1     2     [facility] LAB DRAW    6:30 AM   (15 min.)    [facility] LAB DRAW   [facility] Lab Draw     [facility] ONC INFUSION 360    7:00 AM   (360 min.)    ONCOLOGY INFUSION   [facility] Cancer [facility] EXTERNAL RADIATION TREATMT    2:15 PM   (30 min.)   [facility] RAD ONC MARIE   Radiation Oncology Clinic 3     [facility] EXTERNAL RADIATION TREATMT    2:30 PM   (30 min.)   [facility] RAD ONC MARIE   Radiation Oncology Clinic 4     [facility] EXTERNAL RADIATION TREATMT    2:30 PM   (30 min.)   [facility] RAD ONC MARIE   Radiation Oncology Clinic 5     [facility] EXTERNAL RADIATION TREATMT    2:30 PM   (30 min.)   [facility] RAD ONC MARIE   Radiation Oncology Clinic 6     LEVEL 4    8:30 AM   (240 min.)   UR CH 05   [facility] [place], Infusion Services     Admission    9:10 AM   Adry Groves MD   Unit 6D Observation [facility]   (Discharge: 4/7/2018)     CT CHEST PULMONARY EMBOLISM W   10:30 AM   (30 min.)   URCT1   [facility], Radiology     [facility] EXTERNAL RADIATION TREATMT    2:30 PM   (30 min.)   [facility] RAD ONC MARIE   Radiation Oncology Clinic     [facility] ON TREATMENT VISIT    2:45 PM   (15 min.)   Yvon Leal MD   Rad[facility] Oncology Clinic 7     ECH STRESS TEST   11:05 AM   (60 min.)   [id number]   [facility],  [place],  Echocardiography     ECH DOBUTAMINE STRESS TEST   11:25 AM   (60 min.)   [id number]   [facility], [place],  Echocardiography   8     9     [facility] EXTERNAL RADIATION TREATMT    2:15 PM   (30 min.)   [facility] RAD ONC MARIE   Radiation Oncology Clinic 10     [facility] LAB DRAW    6:30 AM   (15 min.)    [facility] LAB DRAW   [facility] Lab Draw     P ONC INFUSION 360    7:00 AM   (360 min.)   [facility] ONCOLOGY INFUSION   [facility]     [facility] RETURN    7:15 AM   (30 min.)   Karin Scott APRN CNP   [facility]    2:30 PM   (30 min.)   P RAD ONC MARIE   Rad[facility] Oncology Clinic 11     [facility] EXTERNAL RADIATION TREATMT    2:30 PM   (30 min.)   [facility] RAD ONC MARIE   Radiation Oncology Clinic 12     P [facility] LAB DRAW    2:15 PM   (15 min.)    [facility] LAB DRAW   [facility] Lab Draw     P EXTERNAL RADIATION TREATMT    2:30 PM   (30 min.)   [facility] RAD ONC MARIE   Radiation Oncology Clinic     P ONC INFUSION 120    3:00 PM   (120 min.)   [facility] ONCOLOGY INFUSION   [facility] 13     [facility] ON TREATMENT VISIT    9:15 AM   (15 min.)   Yvon Leal MD   Radiation Oncology Clinic     [facility] EXTERNAL RADIATION TREATMT    2:30 PM   (30 min.)   P RAD ONC MARIE   [facility] 14       15     16     [facility] LAB DRAW    7:30 AM   (15 min.)    [facility] LAB DRAW   [facility] Lab Draw     P ONC INFUSION 360    8:00 AM   (360 min.)   [facility] ONCOLOGY INFUSION   [facility]     [facility] EXTERNAL RADIATION TREATMT    2:30 PM   (30 min.)   P RAD ONC MARIE   [facility] Clinic 17     [facility] EXTERNAL RADIATION TREATMT    2:30 PM   (30 min.)   P RAD ONC MARIE   [facility] Oncology Clinic 18     [facility] EXTERNAL RADIATION TREATMT    7:45 AM   (30 min.)   [facility] RAD ONC MARIE   Radiation Oncology Clinic     [facility] LAB DRAW    3:00 PM   (15 min.)   [facility] LAB DRAW   [facility] Lab Draw     P ONC INFUSION  120    3:30 PM   (120 min.)   [facility] ONCOLOGY INFUSION   [facility] Cancer [facility] 19     [facility] EXTERNAL RADIATION TREATMT    2:30 PM   (30 min.)   [facility] RAD ONC MARIE   [facility] 20     [facility] EXTERNAL RADIATION TREATMT    7:30 AM   (30 min.)   [facility] RAD ONC MARIE   [facility] 21     [facility] ONC INFUSION 120   10:00 AM   (120 min.)    ONCOLOGY INFUSION   [facility]   22     23     [facility] EXTERNAL RADIATION TREATMT    2:15 PM   (30 min.)   [facility] RAD ONC MARIE   [facility] 24     [facility] EXTERNAL RADIATION TREATMT    2:30 PM   (30 min.)   [facility] RAD ONC MARIE   [facility] 25     [facility] EXTERNAL RADIATION TREATMT    2:30 PM   (30 min.)   [facility] RAD ONC MARIE   [facility] 26     [facility] EXTERNAL RADIATION TREATMT    2:30 PM   (30 min.)   [facility] RAD ONC MARIE   [facility] 27     [facility] EXTERNAL RADIATION TREATMT    2:30 PM   (30 min.)   [facility] RAD ONC MARIE   [facility] Clinic 28       29     30     [facility] EXTERNAL RADIATION TREATMT    2:30 PM   (30 min.)   P RAD ONC MARIE   Radiation Oncology Clinic                                     May 2018   Chris Monday Tuesday Wednesday Thursday Friday Saturday             1     2     3     4     5       6     7     8     9     10     11     12       13     14     [facility] RETURN ACTIVE TREATMENT   10:15 AM   (30 min.)   Santosh Vera MD   [facility] Cancer [facility] 15     16     17     18     LAB WITH HB CLINIC    7:00 AM   (15 min.)    LAB   [facility] Lab     [facility] RETURN GENERAL LIVER    7:45 AM   (30 min.)   Malgorzata Madden MD   [facility] Hepatology 19       20     21     22     23     24     25     26       27     28     29     30     31                            Lab Results:  Recent Results (from the past 12 hour(s))   Magnesium    Collection Time: 04/18/18  3:08 PM   Result Value Ref Range    Magnesium 1.0 (L) 1.6 - 2.3 mg/dL   BMP - Basic Metabolic Panel    Collection Time:  04/18/18  3:08 PM   Result Value Ref Range    Sodium 130 (L) 133 - 144 mmol/L    Potassium 4.0 3.4 - 5.3 mmol/L    Chloride 96 94 - 109 mmol/L    Carbon Dioxide 29 20 - 32 mmol/L    Anion Gap 5 3 - 14 mmol/L    Glucose 101 (H) 70 - 99 mg/dL    Urea Nitrogen 9 7 - 30 mg/dL    Creatinine 1.04 0.52 - 1.04 mg/dL    GFR Estimate 54 (L) >60 mL/min/1.7m2    GFR Estimate If Black 65 >60 mL/min/1.7m2    Calcium 7.2 (L) 8.5 - 10.1 mg/dL

## 2018-04-18 NOTE — NURSING NOTE
VS done, labs collected via venipuncture by RN and PIV placed.  Hx vulva CA.     Aaliyah Mullins RN

## 2018-04-18 NOTE — MR AVS SNAPSHOT
After Visit Summary   4/18/2018    Julius Gilliam    MRN: [id number]           Patient Information     Date Of Birth          1955        Visit Information        Provider Department      4/18/2018 3:30 PM  13 ATC;  ONCOLOGY INFUSION [facility]        Today's Diagnoses     Malignant neoplasm of vulva (H)    -  1    Hypokalemia        Hypomagnesemia        Encounter for long-term (current) use of medications        Hypocalcemia          Care Instructions    Contact Numbers    [facility] [place]: [phone]  [facility] [place]:  [phone]    Call triage with chills and/or temperature greater than or equal to 100.5, uncontrolled nausea/vomiting, diarrhea, constipation, dizziness, shortness of breath, chest pain, bleeding, unexplained bruising, or any new/concerning symptoms, questions/concerns.     If you are having any concerning symptoms or wish to speak to a provider before your next infusion visit, please call your care coordinator or triage to notify them so we can adequately serve you.       After Hours: [phone]    If after hours, weekends, or holidays, call main hospital  and ask for Oncology doctor on call.         April 2018 Sunday Monday Tuesday Wednesday Thursday Friday Saturday   1     2     [facility] LAB DRAW    6:30 AM   (15 min.)    [facility] LAB DRAW   [facility] Lab Draw     P ONC INFUSION 360    7:00 AM   (360 min.)    ONCOLOGY INFUSION   [facility]     [facility] EXTERNAL RADIATION TREATMT    2:15 PM   (30 min.)   [facility] RAD ONC MARIE   [facility] 3     [facility] EXTERNAL RADIATION TREATMT    2:30 PM   (30 min.)   [facility] RAD ONC MARIE   [facility] 4     [facility] EXTERNAL RADIATION TREATMT    2:30 PM   (30 min.)   [facility] RAD ONC MARIE   [facility] 5     [facility] EXTERNAL RADIATION TREATMT    2:30 PM   (30 min.)   P RAD ONC MARIE   [facility] Oncology Clinic 6     LEVEL 4    8:30 AM   (240 min.)   UR CH 05    [facility], Infusion Services     Admission    9:10 AM   Adry Groves MD   Unit 6D Observation [facility] Friend   (Discharge: 4/7/2018)     CT CHEST PULMONARY EMBOLISM W   10:30 AM   (30 min.)   URCT1   [facility], Radiology     [facility] EXTERNAL RADIATION TREATMT    2:30 PM   (30 min.)   [facility] RAD ONC MARIE   Radiation Oncology Clinic     [facility] ON TREATMENT VISIT    2:45 PM   (15 min.)   Yvon Leal MD   [facility] 7     ECH STRESS TEST   11:05 AM   (60 min.)   [id number]   [facility],  Echocardiography     ECH DOBUTAMINE STRESS TEST   11:25 AM   (60 min.)   [id number]   [facility],  Echocardiography   8     9     [facility] EXTERNAL RADIATION TREATMT    2:15 PM   (30 min.)   [facility] RAD ONC MARIE   Radiation Oncology Clinic 10     [facility] LAB DRAW    6:30 AM   (15 min.)    [facility] LAB DRAW   [facility] Lab Draw     [facility] ONC INFUSION 360    7:00 AM   (360 min.)   [facility] ONCOLOGY INFUSION   [facility]     [facility] RETURN    7:15 AM   (30 min.)   Karin Scott APRN CNP   [facility] EXTERNAL RADIATION TREATMT    2:30 PM   (30 min.)   [facility] RAD ONC MARIE   Radiation Oncology Clinic 11     [facility] EXTERNAL RADIATION TREATMT    2:30 PM   (30 min.)   [facility] RAD ONC MARIE   Radiation Oncology Clinic 12     [facility] LAB DRAW    2:15 PM   (15 min.)   [facility] DRAW   [facility] Lab Draw     [facility] EXTERNAL RADIATION TREATMT    2:30 PM   (30 min.)   [facility] RAD ONC MARIE   Radiation Oncology Clinic     [facility] ONC INFUSION 120    3:00 PM   (120 min.)   [facility] ONCOLOGY INFUSION   [facility] 13     [facility] ON TREATMENT VISIT    9:15 AM   (15 min.)   Yvon Leal MD   Radiation Oncology Clinic     [facility] EXTERNAL RADIATION TREATMT    2:30 PM   (30 min.)   [facility] RAD ONC MARIE   [facility] Oncology Clinic 14       15     16     [facility] LAB DRAW    7:30 AM   (15 min.)   [facility] DRAW   [facility] Lab Draw     [facility] ONC INFUSION 360    8:00 AM   (360  min.)   [facility] ONCOLOGY INFUSION   [facility]     [facility] EXTERNAL RADIATION TREATMT    2:30 PM   (30 min.)   [facility] RAD ONC MARIE   [facility] Oncology Clinic 17     [facility] EXTERNAL RADIATION TREATMT    2:30 PM   (30 min.)   [facility] RAD ONC MARIE   [facility]cology Clinic 18     [facility] EXTERNAL RADIATION TREATMT    7:45 AM   (30 min.)   P RAD ONC MARIE   Radiation Oncology Clinic     [facility] LAB DRAW    3:00 PM   (15 min.)   [facility] LAB DRAW   [facility] Lab Draw     [facility] ONC INFUSION 120    3:30 PM   (120 min.)   [facility] ONCOLOGY INFUSION   [facility] 19     [facility] EXTERNAL RADIATION TREATMT    2:30 PM   (30 min.)   P RAD ONC MARIE   [facility]cology Clinic 20     [facility] EXTERNAL RADIATION TREATMT    7:30 AM   (30 min.)   [facility] RAD ONC MARIE   [facility] 21     [facility] ONC INFUSION 120   10:00 AM   (120 min.)   [facility] ONCOLOGY INFUSION   [facility]   22     23     [facility] EXTERNAL RADIATION TREATMT    2:15 PM   (30 min.)   [facility] RAD ONC MARIE   [facility] Clinic 24     [facility] EXTERNAL RADIATION TREATMT    2:30 PM   (30 min.)   [facility] RAD ONC MARIE   [facility] 25     [facility] EXTERNAL RADIATION TREATMT    2:30 PM   (30 min.)   [facility] RAD ONC MARIE   [facility] 26     [facility] EXTERNAL RADIATION TREATMT    2:30 PM   (30 min.)   [facility] RAD ONC MARIE   [facility] 27     [facility] EXTERNAL RADIATION TREATMT    2:30 PM   (30 min.)   P RAD ONC MARIE   [facility] 28       29     30     [facility] EXTERNAL RADIATION TREATMT    2:30 PM   (30 min.)   P RAD ONC MARIE   Radiation Oncology Clinic                                     May 2018   Chris Monday Tuesday Wednesday Thursday Friday Saturday             1     2     3     4     5       6     7     8     9     10     11     12       13     14     [facility] RETURN ACTIVE TREATMENT   10:15 AM   (30 min.)   Santosh Vera MD   [facility] 15     16     17     18     LAB WITH HB  CLINIC    7:00 AM   (15 min.)   UC LAB   [facility]     UM RETURN GENERAL LIVER    7:45 AM   (30 min.)   Malgorzata Madden MD   [facility] 19       20     21     22     23     24     25     26       27     28     29     30     31                            Lab Results:  Recent Results (from the past 12 hour(s))   Magnesium    Collection Time: 04/18/18  3:08 PM   Result Value Ref Range    Magnesium 1.0 (L) 1.6 - 2.3 mg/dL   BMP - Basic Metabolic Panel    Collection Time: 04/18/18  3:08 PM   Result Value Ref Range    Sodium 130 (L) 133 - 144 mmol/L    Potassium 4.0 3.4 - 5.3 mmol/L    Chloride 96 94 - 109 mmol/L    Carbon Dioxide 29 20 - 32 mmol/L    Anion Gap 5 3 - 14 mmol/L    Glucose 101 (H) 70 - 99 mg/dL    Urea Nitrogen 9 7 - 30 mg/dL    Creatinine 1.04 0.52 - 1.04 mg/dL    GFR Estimate 54 (L) >60 mL/min/1.7m2    GFR Estimate If Black 65 >60 mL/min/1.7m2    Calcium 7.2 (L) 8.5 - 10.1 mg/dL               Follow-ups after your visit        Your next 10 appointments already scheduled     Apr 19, 2018  2:30 PM CDT   EXTERNAL RADIATION TREATMENT with [facility] RAD ONC MARIE   Radiation Oncology Clinic ([facility])    [facility]  1st Floor  [address] [address]   [phone]            Apr 20, 2018  7:30 AM CDT   EXTERNAL RADIATION TREATMENT with [facility] RAD ONC MARIE   Radiation Oncology Clinic ([facility])    [facility]  1st Floor  [address] [address]   [phone]            Apr 21, 2018 10:00 AM CDT   Infusion 120 with UC ONCOLOGY INFUSION, UC 21 ATC   [facility] ([facility])    [address]  [address]  [facility] [address]   [phone]            Apr 23, 2018  2:15 PM CDT   EXTERNAL RADIATION TREATMENT with [facility] RAD ONC MARIE   Radiation Oncology Clinic ([facility])    [facility]  1st Floor  [address] [facility] [address]   [phone]            Apr 24, 2018  2:30 PM CDT   EXTERNAL RADIATION TREATMENT with [facility] RAD ONC MARIE   Radiation Oncology Clinic ([facility])    [facility]  1st Floor  [address] [address]   [phone]            Apr 25, 2018  2:30 PM CDT   EXTERNAL RADIATION TREATMENT with [facility] RAD ONC MARIE   Radiation Oncology Clinic ([facility])    [facility]  1st Floor  [address] [address]   [phone]            Apr 26, 2018  2:30 PM CDT   EXTERNAL RADIATION TREATMENT with [facility] RAD ONC MARIE   Radiation Oncology Clinic ([facility])    [facility]  1st Floor  [address] [address]   [phone]            Apr 27, 2018  2:30 PM CDT   EXTERNAL RADIATION TREATMENT with [facility] RAD ONC MARIE   Radiation Oncology Clinic ([facility])    [facility]  1st Floor  [address] [address]   [phone]            Apr 30, 2018  2:30 PM CDT   EXTERNAL RADIATION TREATMENT with [facility] RAD ONC MARIE   Radiation Oncology Clinic ([facility])    [facility]  1st Floor  [address] [address]   [phone]              Future tests that were ordered for you today     Open Future Orders        Priority Expected Expires Ordered    CT Chest/Abdomen/Pelvis w Contrast Routine 5/16/2018 4/18/2019 4/18/2018            Who to contact     If you have questions or need follow up information about today's clinic visit or your schedule please contact [facility] directly at [phone].  Normal or non-critical lab and imaging results will be communicated to you by MyChart, letter or phone within 4 business days after the clinic has received the results. If you do not hear from us within 7 days, please contact the clinic  through [employer] or phone. If you have a critical or abnormal lab result, we will notify you by phone as soon as possible.  Submit refill requests through [employer] or call your pharmacy and they will forward the refill request to us. Please allow 3 business days for your refill to be completed.          Additional Information About Your Visit        [employer] Information     [employer] gives you secure access to your electronic health record. If you see a primary care provider, you can also send messages to your care team and make appointments. If you have questions, please call your primary care clinic.  If you do not have a primary care provider, please call [phone] and they will assist you.        Care EveryWhere ID     This is your Care EveryWhere ID. This could be used by other organizations to access your [place] medical records  [id number]        Your Vitals Were     Pulse Temperature Respirations Pulse Oximetry BMI (Body Mass Index)       74 97.3  F (36.3  C) 18 96% 22.96 kg/m2        Blood Pressure from Last 3 Encounters:   04/18/18 124/79   04/16/18 114/62   04/12/18 111/74    Weight from Last 3 Encounters:   04/18/18 65.5 kg (144 lb 6.4 oz)   04/16/18 63.9 kg (140 lb 14.4 oz)   04/13/18 65.4 kg (144 lb 3.2 oz)              We Performed the Following     BMP - Basic Metabolic Panel     Magnesium        Primary Care Provider Office Phone # Fax #    Tova Montesinos [phone]       [facility] [address] [address]        Equal Access to Services     CAREY COSME : Hadii aad ku hadasho Soomaali, waaxda luqadaha, qaybta kaalmada adeegyada, talha jones. So [facility] [phone].    ATENCIÓN: Si habla español, tiene a elizabeth disposición servicios gratuitos de asistencia lingüística. Llame al [phone].    We comply with applicable federal civil rights laws and [place] laws. We do not discriminate on the basis of race, color, national origin,  age, disability, sex, sexual orientation, or gender identity.            Thank you!     Thank you for choosing [facility] CLINIC  for your care. Our goal is always to provide you with excellent care. Hearing back from our patients is one way we can continue to improve our services. Please take a few minutes to complete the written survey that you may receive in the mail after your visit with us. Thank you!             Your Updated Medication List - Protect others around you: Learn how to safely use, store and throw away your medicines at www.disposemymeds.org.          This list is accurate as of 4/18/18  5:22 PM.  Always use your most recent med list.                   Brand Name Dispense Instructions for use Diagnosis    acetaminophen 325 MG tablet    TYLENOL    30 tablet    Take 2 tablets (650 mg) by mouth every 6 hours    H/O lymph node excision       guaiFENesin-codeine 100-10 MG/5ML Soln solution    ROBITUSSIN AC    420 mL    Take 5-10 mLs by mouth every 4 hours as needed for cough    Cough       iohexol 140 MG/ML Soln solution   Start taking on:  5/16/2018    OMNIPAQUE    50 mL    Take 50 mLs by mouth once for 1 dose    Malignant neoplasm of vulva (H), Encounter for long-term (current) use of medications       loperamide 2 MG capsule    IMODIUM     Take 2 mg by mouth 4 times daily as needed for diarrhea        LORazepam 1 MG tablet    ATIVAN    30 tablet    Take 1 tablet (1 mg) by mouth every 6 hours as needed (nausea/vomiting, anxiety or sleep )    Encounter for long-term (current) use of medications, Malignant neoplasm of vulva (H)       magnesium oxide 400 (241.3 Mg) MG tablet    MAG-OX    60 tablet    Take 1 tablet (400 mg) by mouth 2 times daily    Malignant neoplasm of vulva (H), Hypomagnesemia       Multi-vitamin Tabs tablet      Take 1 tablet by mouth daily        oxyCODONE IR 5 MG tablet    ROXICODONE    20 tablet    Take 1 tablet (5 mg) by mouth every 4 hours as needed for pain    H/O  lymph node excision       potassium chloride SA 20 MEQ CR tablet    KLOR-CON    60 tablet    Take 1 tablet (20 mEq) by mouth 2 times daily    Malignant neoplasm of vulva (H), Hypokalemia       prochlorperazine 10 MG tablet    COMPAZINE    30 tablet    Take 1 tablet (10 mg) by mouth every 6 hours as needed (nausea/vomiting)    Encounter for long-term (current) use of medications, Malignant neoplasm of vulva (H)       silver sulfADIAZINE 1 % cream    SILVADENE    400 g    Apply topically 2 times daily    Dermatitis

## 2018-04-18 NOTE — PROGRESS NOTES
Infusion Nursing Note:  Julius Gilliam presents today for IVF and electrolyte replacement.    Patient seen by provider today: No    Note: Patient presents to clinic today feeling generally well.  Pt denies any n/v/d.  TORB 4/18/2018 1529 Marla Sanderson NP/MANISH Altamirano RN:  Pt does not need Ca replacement today, should return on Friday/Saturday and Monday/Tues for lab recheck and IVF/electrolyte infusion.  Pt unable to return on Saturday, requested exception for pt to be scheduled on Friday.  Requested RNCC to f/u.    Intravenous Access:  Peripheral IV placed.    Treatment Conditions:  Lab Results   Component Value Date    HGB 10.2 04/16/2018     Lab Results   Component Value Date    WBC 1.6 04/16/2018      Lab Results   Component Value Date    ANEU 0.9 04/16/2018     Lab Results   Component Value Date     04/16/2018      Lab Results   Component Value Date     04/18/2018                   Lab Results   Component Value Date    POTASSIUM 4.0 04/18/2018           Lab Results   Component Value Date    MAG 1.0 04/18/2018            Lab Results   Component Value Date    CR 1.04 04/18/2018                   Lab Results   Component Value Date    BERHANE 7.2 04/18/2018                Lab Results   Component Value Date    BILITOTAL 0.5 04/16/2018           Lab Results   Component Value Date    ALBUMIN 2.9 04/16/2018                    Lab Results   Component Value Date    ALT 12 04/16/2018           Lab Results   Component Value Date    AST 15 04/16/2018     Results reviewed, labs MET treatment parameters, ok to proceed with treatment.    Post Infusion Assessment:  Patient tolerated infusion without incident.  Blood return noted pre and post infusion.  Site patent and intact, free from redness, edema or discomfort.  No evidence of extravasations.  Access discontinued per protocol.    Discharge Plan:   Patient declined prescription refills.  Discharge instructions reviewed with: Patient.  Patient and/or  family verbalized understanding of discharge instructions and all questions answered.  Copy of AVS reviewed with patient and/or family.  Patient will return as determined by availability this Friday or Saturday for next appointment; messages sent to supervisor and RNCC.  Patient discharged in stable condition accompanied by: self.  Departure Mode: Ambulatory.  Face to Face time: 5 minutes.    Cari Altamirano RN

## 2018-04-19 ENCOUNTER — APPOINTMENT (OUTPATIENT)
Dept: RADIATION ONCOLOGY | Facility: CLINIC | Age: 63
End: 2018-04-19
Attending: RADIOLOGY
Payer: COMMERCIAL

## 2018-04-19 PROCEDURE — 77386 ZZH IMRT TREATMENT DELIVERY, COMPLEX: CPT | Performed by: RADIOLOGY

## 2018-04-20 ENCOUNTER — APPOINTMENT (OUTPATIENT)
Dept: LAB | Facility: CLINIC | Age: 63
End: 2018-04-20
Attending: OBSTETRICS & GYNECOLOGY
Payer: COMMERCIAL

## 2018-04-20 ENCOUNTER — APPOINTMENT (OUTPATIENT)
Dept: RADIATION ONCOLOGY | Facility: CLINIC | Age: 63
End: 2018-04-20
Attending: RADIOLOGY
Payer: COMMERCIAL

## 2018-04-20 ENCOUNTER — INFUSION THERAPY VISIT (OUTPATIENT)
Dept: ONCOLOGY | Facility: CLINIC | Age: 63
End: 2018-04-20
Attending: OBSTETRICS & GYNECOLOGY
Payer: COMMERCIAL

## 2018-04-20 VITALS
DIASTOLIC BLOOD PRESSURE: 59 MMHG | RESPIRATION RATE: 18 BRPM | WEIGHT: 144 LBS | BODY MASS INDEX: 22.89 KG/M2 | TEMPERATURE: 98 F | OXYGEN SATURATION: 96 % | HEART RATE: 66 BPM | SYSTOLIC BLOOD PRESSURE: 108 MMHG

## 2018-04-20 DIAGNOSIS — E83.42 HYPOMAGNESEMIA: ICD-10-CM

## 2018-04-20 DIAGNOSIS — E83.51 HYPOCALCEMIA: ICD-10-CM

## 2018-04-20 DIAGNOSIS — Z98.890 H/O LYMPH NODE EXCISION: ICD-10-CM

## 2018-04-20 DIAGNOSIS — C51.9 VULVAR CANCER, CARCINOMA (H): Primary | ICD-10-CM

## 2018-04-20 DIAGNOSIS — E87.6 HYPOKALEMIA: ICD-10-CM

## 2018-04-20 DIAGNOSIS — C51.9 MALIGNANT NEOPLASM OF VULVA (H): Primary | ICD-10-CM

## 2018-04-20 DIAGNOSIS — C51.9 MALIGNANT NEOPLASM OF VULVA (H): ICD-10-CM

## 2018-04-20 LAB
ALBUMIN SERPL-MCNC: 2.6 G/DL (ref 3.4–5)
ALP SERPL-CCNC: 106 U/L (ref 40–150)
ALT SERPL W P-5'-P-CCNC: 10 U/L (ref 0–50)
ANION GAP SERPL CALCULATED.3IONS-SCNC: 10 MMOL/L (ref 3–14)
AST SERPL W P-5'-P-CCNC: 16 U/L (ref 0–45)
BILIRUB SERPL-MCNC: 0.3 MG/DL (ref 0.2–1.3)
BUN SERPL-MCNC: 8 MG/DL (ref 7–30)
CALCIUM SERPL-MCNC: 7.4 MG/DL (ref 8.5–10.1)
CHLORIDE SERPL-SCNC: 97 MMOL/L (ref 94–109)
CO2 SERPL-SCNC: 25 MMOL/L (ref 20–32)
CREAT SERPL-MCNC: 1 MG/DL (ref 0.52–1.04)
GFR SERPL CREATININE-BSD FRML MDRD: 56 ML/MIN/1.7M2
GLUCOSE SERPL-MCNC: 114 MG/DL (ref 70–99)
MAGNESIUM SERPL-MCNC: 1 MG/DL (ref 1.6–2.3)
POTASSIUM SERPL-SCNC: 3.7 MMOL/L (ref 3.4–5.3)
PROT SERPL-MCNC: 6 G/DL (ref 6.8–8.8)
SODIUM SERPL-SCNC: 132 MMOL/L (ref 133–144)

## 2018-04-20 PROCEDURE — 77336 RADIATION PHYSICS CONSULT: CPT | Performed by: RADIOLOGY

## 2018-04-20 PROCEDURE — 77386 ZZH IMRT TREATMENT DELIVERY, COMPLEX: CPT | Performed by: RADIOLOGY

## 2018-04-20 PROCEDURE — 83735 ASSAY OF MAGNESIUM: CPT | Performed by: NURSE PRACTITIONER

## 2018-04-20 PROCEDURE — 96361 HYDRATE IV INFUSION ADD-ON: CPT

## 2018-04-20 PROCEDURE — 25000128 H RX IP 250 OP 636: Mod: ZF | Performed by: NURSE PRACTITIONER

## 2018-04-20 PROCEDURE — 80053 COMPREHEN METABOLIC PANEL: CPT | Performed by: NURSE PRACTITIONER

## 2018-04-20 PROCEDURE — 96360 HYDRATION IV INFUSION INIT: CPT

## 2018-04-20 RX ORDER — OXYCODONE HYDROCHLORIDE 5 MG/1
5 TABLET ORAL EVERY 4 HOURS PRN
Qty: 30 TABLET | Refills: 0 | Status: SHIPPED | OUTPATIENT
Start: 2018-04-20 | End: 2021-08-25

## 2018-04-20 RX ORDER — MAGNESIUM SULFATE HEPTAHYDRATE 40 MG/ML
4 INJECTION, SOLUTION INTRAVENOUS ONCE
Status: COMPLETED | OUTPATIENT
Start: 2018-04-20 | End: 2018-04-20

## 2018-04-20 RX ADMIN — SODIUM CHLORIDE 1000 ML: 9 INJECTION, SOLUTION INTRAVENOUS at 07:15

## 2018-04-20 RX ADMIN — MAGNESIUM SULFATE IN WATER 4 G: 40 INJECTION, SOLUTION INTRAVENOUS at 07:45

## 2018-04-20 ASSESSMENT — PAIN SCALES - GENERAL: PAINLEVEL: NO PAIN (0)

## 2018-04-20 NOTE — MR AVS SNAPSHOT
After Visit Summary   4/20/2018    Julius Gilliam    MRN: [id number]           Patient Information     Date Of Birth          1955        Visit Information        Provider Department      4/20/2018 10:00 AM Yvon Leal MD Radiation Oncology Clinic        Today's Diagnoses     Malignant neoplasm of vulva (H)    -  1    H/O lymph node excision           Follow-ups after your visit        Your next 10 appointments already scheduled     Apr 23, 2018 12:30 PM CDT   [facility] Lab Draw with [facility] MASONIC LAB DRAW   [facility] Lab Draw ([facility])    [address]  [address]  [place] [address]   [phone]            Apr 23, 2018  1:00 PM CDT   Infusion 120 with UC ONCOLOGY INFUSION, UC 19 ATC   [facility] ([facility])    [address]  [address]  [place] [address]   [phone]            Apr 23, 2018  2:15 PM CDT   EXTERNAL RADIATION TREATMENT with UMP RAD ONC MARIE   Radiation Oncology Clinic ([facility])    [facility]  1st Floor  [address] [address]   [phone]            Apr 24, 2018  2:30 PM CDT   EXTERNAL RADIATION TREATMENT with UMP RAD ONC MARIE   Radiation Oncology Clinic ([facility])    [facility]  1st Floor  [address] [address]   [phone]            Apr 25, 2018  2:30 PM CDT   EXTERNAL RADIATION TREATMENT with UMP RAD ONC MARIE   Radiation Oncology Clinic ([facility])    [facility]  1st Floor  [address] [address]   [phone]            Apr 26, 2018  2:30 PM CDT   EXTERNAL RADIATION TREATMENT with UMP RAD ONC MARIE   Radiation Oncology Clinic ([facility])    [facility]  1st Floor  [address] [address]   [phone]            Apr 27, 2018  2:30  PM CDT   EXTERNAL RADIATION TREATMENT with [facility] RAD ONC MARIE   Radiation Oncology Clinic ([facility])    [facility]  1st Floor  [address] [address]   [phone]            Apr 30, 2018  2:30 PM CDT   EXTERNAL RADIATION TREATMENT with [facility] RAD ONC MARIE   Radiation Oncology Clinic ([facility])    [facility]  1st Floor  [address] [address]   [phone]            May 18, 2018  7:00 AM CDT   Lab with [facility] ([facility])    [address]  1st Floor  [facility] [address] [phone]              Who to contact     Please call your clinic at [phone] to:    Ask questions about your health    Make or cancel appointments    Discuss your medicines    Learn about your test results    Speak to your doctor            Additional Information About Your Visit        [employer] Information     [employer] gives you secure access to your electronic health record. If you see a primary care provider, you can also send messages to your care team and make appointments. If you have questions, please call your primary care clinic.  If you do not have a primary care provider, please call [phone] and they will assist you.      [employer] is an electronic gateway that provides easy, online access to your medical records. With [employer], you can request a clinic appointment, read your test results, renew a prescription or communicate with your care team.     To access your existing account, please contact your [facility] or call [phone] for assistance.        Care EveryWhere ID     This is your Care EveryWhere ID. This could be used by other organizations to access your [place] medical records  [id number]         Blood Pressure from Last 3 Encounters:   04/20/18 108/59   04/18/18 124/79   04/16/18 114/62    Weight from Last 3  Encounters:   04/20/18 65.3 kg (144 lb)   04/18/18 65.5 kg (144 lb 6.4 oz)   04/16/18 63.9 kg (140 lb 14.4 oz)              Today, you had the following     No orders found for display         Where to get your medicines      Some of these will need a paper prescription and others can be bought over the counter.  Ask your nurse if you have questions.     Bring a paper prescription for each of these medications     oxyCODONE IR 5 MG tablet         Information about OPIOIDS     PRESCRIPTION OPIOIDS: WHAT YOU NEED TO KNOW   You have a prescription for an opioid (narcotic) pain medicine. Opioids can cause addiction. If you have a history of chemical dependency of any type, you are at a higher risk of becoming addicted to opioids. Only take this medicine after all other options have been tried. Take it for as short a time and as few doses as possible.     Do not:    Drive. If you drive while taking these medicines, you could be arrested for driving under the influence (DUI).    Operate heavy machinery    Do any other dangerous activities while taking these medicines.     Drink any alcohol while taking these medicines.      Take with any other medicines that contain acetaminophen. Read all labels carefully. Look for the word  acetaminophen  or  Tylenol.  Ask your pharmacist if you have questions or are unsure.    Store your pills in a secure place, locked if possible. We will not replace any lost or stolen medicine. If you don t finish your medicine, please throw away (dispose) as directed by your pharmacist. The [place] [employer] has more information about safe disposal: https://www.pca.state.[place]/living-green/managing-unwanted-medications    All opioids tend to cause constipation. Drink plenty of water and eat foods that have a lot of fiber, such as fruits, vegetables, prune juice, apple juice and high-fiber cereal. Take a laxative (Miralax, milk of magnesia, Colace, Senna) if you don t move your  bowels at least every other day.          Primary Care Provider Office Phone # Fax #    Tova Montesinos [phone]       [facility] [address] [facility] [address]        Equal Access to Services     CAREY COSME : Hadii aad ku hadjaneeno Soomaali, waaxda luqadaha, qaybta kaalmada adeegyada, waxivy brodyin haybahmann horace korinarosalino cárdenas . So [facility] [phone].    ATENCIÓN: Si habla español, tiene a elizabeth disposición servicios gratuitos de asistencia lingüística. Llame al [phone].    We comply with applicable federal civil rights laws and [place] laws. We do not discriminate on the basis of race, color, national origin, age, disability, sex, sexual orientation, or gender identity.            Thank you!     Thank you for choosing [facility] ONCOL[facility]Y CLINIC  for your care. Our goal is always to provide you with excellent care. Hearing back from our patients is one way we can continue to improve our services. Please take a few minutes to complete the written survey that you may receive in the mail after your visit with us. Thank you!             Your Updated Medication List - Protect others around you: Learn how to safely use, store and throw away your medicines at www.disposemymeds.org.          This list is accurate as of 4/20/18 10:59 AM.  Always use your most recent med list.                   Brand Name Dispense Instructions for use Diagnosis    acetaminophen 325 MG tablet    TYLENOL    30 tablet    Take 2 tablets (650 mg) by mouth every 6 hours    H/O lymph node excision       guaiFENesin-codeine 100-10 MG/5ML Soln solution    ROBITUSSIN AC    420 mL    Take 5-10 mLs by mouth every 4 hours as needed for cough    Cough       iohexol 140 MG/ML Soln solution   Start taking on:  5/16/2018    OMNIPAQUE    50 mL    Take 50 mLs by mouth once for 1 dose    Malignant neoplasm of vulva (H), Encounter for long-term (current) use of medications       loperamide 2 MG capsule    IMODIUM     Take 2  mg by mouth 4 times daily as needed for diarrhea        LORazepam 1 MG tablet    ATIVAN    30 tablet    Take 1 tablet (1 mg) by mouth every 6 hours as needed (nausea/vomiting, anxiety or sleep )    Encounter for long-term (current) use of medications, Malignant neoplasm of vulva (H)       magnesium oxide 400 (241.3 Mg) MG tablet    MAG-OX    60 tablet    Take 1 tablet (400 mg) by mouth 2 times daily    Malignant neoplasm of vulva (H), Hypomagnesemia       Multi-vitamin Tabs tablet      Take 1 tablet by mouth daily        oxyCODONE IR 5 MG tablet    ROXICODONE    30 tablet    Take 1 tablet (5 mg) by mouth every 4 hours as needed for pain    H/O lymph node excision       potassium chloride SA 20 MEQ CR tablet    KLOR-CON    60 tablet    Take 1 tablet (20 mEq) by mouth 2 times daily    Malignant neoplasm of vulva (H), Hypokalemia       prochlorperazine 10 MG tablet    COMPAZINE    30 tablet    Take 1 tablet (10 mg) by mouth every 6 hours as needed (nausea/vomiting)    Encounter for long-term (current) use of medications, Malignant neoplasm of vulva (H)       silver sulfADIAZINE 1 % cream    SILVADENE    400 g    Apply topically 2 times daily    Dermatitis

## 2018-04-20 NOTE — MR AVS SNAPSHOT
After Visit Summary   4/20/2018    Julius Gilliam    MRN: [id number]           Patient Information     Date Of Birth          1955        Visit Information        Provider Department      4/20/2018 7:00 AM UC 17 ATC; UC ONCOLOGY INFUSION [facility]        Today's Diagnoses     Vulvar cancer, carcinoma (H)    -  1    Hypokalemia        Hypomagnesemia        Hypocalcemia        Malignant neoplasm of vulva (H)          Care Instructions    Contact numbers:  Triage Main Line: [phone]  After hours: [phone]    Call with chills and/or temperature greater than or equal to 100.5 and questions or concerns.    If after hours, weekends, or holidays, call main hospital  at  [phone] and ask for Oncology doctor on call.               Follow-ups after your visit        Your next 10 appointments already scheduled     Apr 23, 2018 12:30 PM CDT   [facility] Lab Draw with UC MASONIC LAB DRAW   [facility] Lab Draw ([facility])    [address]  [address]  [facility] [address] [phone]            Apr 23, 2018  1:00 PM CDT   Infusion 120 with UC ONCOLOGY INFUSION, UC 19 ATC   [facility] ([facility])    [address]  [address]  [facility] [address] [phone]            Apr 23, 2018  2:15 PM CDT   EXTERNAL RADIATION TREATMENT with [facility] RAD ONC MARIE   Radiation Oncology Clinic ([facility])    [facility]  1st Floor  [address]  [facility] [address]   [phone]            Apr 24, 2018  2:30 PM CDT   EXTERNAL RADIATION TREATMENT with [facility] RAD ONC MARIE   Radiation Oncology Clinic ([facility])    [facility]  1st Floor  [address]  [facility] [address]   [phone]            Apr 25, 2018  2:30 PM CDT   EXTERNAL RADIATION TREATMENT with [facility] RAD ONC MARIE   Radiation Oncology Clinic  ([facility])    [facility]  1st Floor  [address] [address]   [phone]            Apr 26, 2018  2:30 PM CDT   EXTERNAL RADIATION TREATMENT with UMP RAD ONC MARIE   Radiation Oncology Clinic ([facility])    [facility]  1st Floor  [address] [address]   [phone]            Apr 27, 2018  2:30 PM CDT   EXTERNAL RADIATION TREATMENT with UMP RAD ONC MARIE   Radiation Oncology Clinic ([facility])    [facility]  1st Floor  [address] [address]   [phone]            Apr 30, 2018  2:30 PM CDT   EXTERNAL RADIATION TREATMENT with UMP RAD ONC MARIE   Radiation Oncology Clinic ([facility])    [facility]  1st Floor  [address] [address]   [phone]            May 18, 2018  7:00 AM CDT   Lab with  LAB   [facility] ([facility])    [address]  1st Floor  [facility] [address] [phone]              Who to contact     If you have questions or need follow up information about today's clinic visit or your schedule please contact [facility] directly at [phone].  Normal or non-critical lab and imaging results will be communicated to you by [employer], letter or phone within 4 business days after the clinic has received the results. If you do not hear from us within 7 days, please contact the clinic through [employer] or phone. If you have a critical or abnormal lab result, we will notify you by phone as soon as possible.  Submit refill requests through [employer] or call your pharmacy and they will forward the refill request to us. Please allow 3 business days for your refill to be completed.          Additional Information About Your Visit        [employer] Information     [employer] gives you secure access to your electronic health record.  If you see a primary care provider, you can also send messages to your care team and make appointments. If you have questions, please call your primary care clinic.  If you do not have a primary care provider, please call [phone] and they will assist you.        Care EveryWhere ID     This is your Care EveryWhere ID. This could be used by other organizations to access your [place] medical records  [id number]        Your Vitals Were     Pulse Temperature Respirations Pulse Oximetry BMI (Body Mass Index)       66 98  F (36.7  C) (Oral) 18 96% 22.89 kg/m2        Blood Pressure from Last 3 Encounters:   04/20/18 108/59   04/18/18 124/79   04/16/18 114/62    Weight from Last 3 Encounters:   04/20/18 65.3 kg (144 lb)   04/18/18 65.5 kg (144 lb 6.4 oz)   04/16/18 63.9 kg (140 lb 14.4 oz)              We Performed the Following     Comprehensive metabolic panel     Magnesium     NPET Oncology (Eyes to Thighs)        Primary Care Provider Office Phone # Fax #    Tova SerenamiADE dumont [phone]       [facility] [address] [facility] [address]        Equal Access to Services     CAREY COSME : Hadii melania ku nbao Sojoannali, waaxda luqadaha, qaybta kaalmada adeegyada, talha jones. So [facility] [phone].    ATENCIÓN: Si habla español, tiene a elizabeth disposición servicios gratuitos de asistencia lingüística. Llame al [phone].    We comply with applicable federal civil rights laws and [place] laws. We do not discriminate on the basis of race, color, national origin, age, disability, sex, sexual orientation, or gender identity.            Thank you!     Thank you for choosing [facility]  for your care. Our goal is always to provide you with excellent care. Hearing back from our patients is one way we can continue to improve our services. Please take a few minutes to complete the written survey that you may receive in the mail after your visit  with us. Thank you!             Your Updated Medication List - Protect others around you: Learn how to safely use, store and throw away your medicines at www.disposemymeds.org.          This list is accurate as of 4/20/18 10:04 AM.  Always use your most recent med list.                   Brand Name Dispense Instructions for use Diagnosis    acetaminophen 325 MG tablet    TYLENOL    30 tablet    Take 2 tablets (650 mg) by mouth every 6 hours    H/O lymph node excision       guaiFENesin-codeine 100-10 MG/5ML Soln solution    ROBITUSSIN AC    420 mL    Take 5-10 mLs by mouth every 4 hours as needed for cough    Cough       iohexol 140 MG/ML Soln solution   Start taking on:  5/16/2018    OMNIPAQUE    50 mL    Take 50 mLs by mouth once for 1 dose    Malignant neoplasm of vulva (H), Encounter for long-term (current) use of medications       loperamide 2 MG capsule    IMODIUM     Take 2 mg by mouth 4 times daily as needed for diarrhea        LORazepam 1 MG tablet    ATIVAN    30 tablet    Take 1 tablet (1 mg) by mouth every 6 hours as needed (nausea/vomiting, anxiety or sleep )    Encounter for long-term (current) use of medications, Malignant neoplasm of vulva (H)       magnesium oxide 400 (241.3 Mg) MG tablet    MAG-OX    60 tablet    Take 1 tablet (400 mg) by mouth 2 times daily    Malignant neoplasm of vulva (H), Hypomagnesemia       Multi-vitamin Tabs tablet      Take 1 tablet by mouth daily        oxyCODONE IR 5 MG tablet    ROXICODONE    20 tablet    Take 1 tablet (5 mg) by mouth every 4 hours as needed for pain    H/O lymph node excision       potassium chloride SA 20 MEQ CR tablet    KLOR-CON    60 tablet    Take 1 tablet (20 mEq) by mouth 2 times daily    Malignant neoplasm of vulva (H), Hypokalemia       prochlorperazine 10 MG tablet    COMPAZINE    30 tablet    Take 1 tablet (10 mg) by mouth every 6 hours as needed (nausea/vomiting)    Encounter for long-term (current) use of medications, Malignant neoplasm of  vulva (H)       silver sulfADIAZINE 1 % cream    SILVADENE    400 g    Apply topically 2 times daily    Dermatitis

## 2018-04-20 NOTE — PROGRESS NOTES
Infusion Nursing Note:  Julius Gilliam presents for IVF, IV magnesium    Note: pt feeling well today, no new issues or concerns to report, denies any diarrhea and confirms she is taking her magnesium supplements. Corrected calcium= 8.52, no IV calcium needed today. 4 grams IV magnesium given for a mag of 1.0    Treatment Conditions:  Lab Results   Component Value Date     04/20/2018                   Lab Results   Component Value Date    POTASSIUM 3.7 04/20/2018           Lab Results   Component Value Date    MAG 1.0 04/20/2018            Lab Results   Component Value Date    CR 1.00 04/20/2018                   Lab Results   Component Value Date    BERHANE 7.4 04/20/2018                Lab Results   Component Value Date    BILITOTAL 0.3 04/20/2018           Lab Results   Component Value Date    ALBUMIN 2.6 04/20/2018                    Lab Results   Component Value Date    ALT 10 04/20/2018           Lab Results   Component Value Date    AST 16 04/20/2018       Results reviewed, labs MET treatment parameters, ok to proceed with treatment.    Intravenous Access:  Peripheral IV placed in lab.    Post Infusion Assessment:  Patient tolerated infusion without incident.  Blood return noted pre and post infusion.  No evidence of extravasations.  Access discontinued per protocol.    Discharge Plan:   Patient declined prescription refills.  Discharge instructions reviewed with: Patient.  Patient and/or family verbalized understanding of discharge instructions and all questions answered.  AVS to patient via [employer].  Patient will return 4/23 for next appointment.   Patient discharged in stable condition accompanied by: self.    Saba Jorge RN

## 2018-04-20 NOTE — LETTER
"2018       RE: Julius Gilliam  [address]   [facility] [address]     Dear Colleague,    Thank you for referring your patient, Julius Gilliam, to the RADIATION ONCOLOGY CLINIC. Please see a copy of my visit note below.    [facility]  SPECIALIZING IN BREAKTHROUGHS  Radiation Oncology    On Treatment Visit Note      Julius Gilliam      Date: 2018   MRN: [id number]   : 1955  Diagnosis: Vulvar cancer      Reason for Visit:  On Radiation Treatment Visit     Treatment Summary to Date  Treatment Site: Vulva Current Dose: 5270/6300cGy cGy Fractions: 30/36fx      Chemotherapy  Chemo concurrent with radx?: Yes  Oncologist: Dr. Vera  Drug Name/Frequency 1: Cisplatin weekly    Subjective:   Julius states \"things are getting harder on her\" this week. \"It's sore everywhere\".  She has some leftover oxycodone, which she is now taking, whereas before, she never needed any pain medication.  Diarrhea is under control with imodium every other day.  Dysuria is managed with wilberto bottle.  She finds the silvadene soothing for the peeling in bilateral groin.  She is also using Proshield for moisturization.       Nursing ROS:   Nutrition Alteration  Diet Type: Patient's Preference  Nutrition Note: low appetite  Skin  Skin Reaction:  (Skin seen on txm table per MD's)  Skin Progress: Encouraged pt to get in the  to daily to help cleanse the area and with healing  Skin Note: has proshield uses occasionally        Cardiovascular  Cardio/Resp Note: Pt reports a painful cough, seen in ED for evaluation  Gastrointestinal  Nausea: 0 - None  Diarrhea: 0 - None (has been on imodium, none today)  Genitourinary  Dysuria: 0 - None   Note: Using proshield and trying clean the area well  Psychosocial  Pyschosocial Note: fatigue setting in  Pain Assessment  Pain Note: No complaints of pain from pt      Objective:   There were no vitals taken for this visit.  Gen: Appears well, in no acute " distress  Skin: Moist desquamation over the bilateral groin, left worse than the right.  Erythema and desquamation also seen in bilateral vulvae with regression of the tumor plaque.  No signs of infection.      Labs:  CBC RESULTS:   Recent Labs   Lab Test  04/16/18   0723   WBC  1.6*   RBC  2.97*   HGB  10.2*   HCT  29.3*   MCV  99   MCH  34.3*   MCHC  34.8   RDW  13.8   PLT  205     ELECTROLYTES:  Recent Labs   Lab Test  04/20/18   0659   NA  132*   POTASSIUM  3.7   CHLORIDE  97   BERHANE  7.4*   CO2  25   BUN  8   CR  1.00   GLC  114*       Assessment:    Tolerating radiation therapy well.  All questions and concerns addressed.    Toxicities:  Fatigue: Grade 1: Fatigue relieved by rest  Pain: Grade 2: Moderate pain; limiting instrumental ADL  Diarrhea: Grade 1: Increase of <4 stools per day over baseline; mild increase in ostomy output compared to baseline  Urinary tract pain: Grade 1: Mild pain  Dermatitis: Grade 2: Moderate to brisk erythema; patchy moist desquamation, mostly confined to skin folds and creases; moderate erythema    Plan:   1. Continue current therapy.    2. Refilled oxycodone.  She may use oxycodone more liberally for pain control.  3. Continue with proshield and silvadene for skin care.  4. Continue with imodium as needed for diarrhea.        Mosaiq chart and setup information reviewed  MVCT/IGRT images checked    Medication Review  Med list reviewed with patient?: Yes    Educational Topic Discussed  Education Instructions: reviewed        Yvon Leal MD/PhD  [phone] clinic  Pager [phone]    Please do not send letter to referring physician.      Again, thank you for allowing me to participate in the care of your patient.      Sincerely,    Yvon Leal MD

## 2018-04-20 NOTE — PATIENT INSTRUCTIONS
Contact numbers:  Triage Main Line: [phone]  After hours: [phone]    Call with chills and/or temperature greater than or equal to 100.5 and questions or concerns.    If after hours, weekends, or holidays, call main hospital  at  [phone] and ask for Oncology doctor on call.

## 2018-04-20 NOTE — NURSING NOTE
Chief Complaint   Patient presents with     Blood Draw     Labs drawn via /PIV placed by RN. VS taken.     Kimi Darden RN

## 2018-04-20 NOTE — PROGRESS NOTES
"[facility] PHYSICIANS  SPECIALIZING IN BREAKTHROUGHS  Radiation Oncology    On Treatment Visit Note      Julius Gilliam      Date: 2018   MRN: [id number]   : 1955  Diagnosis: Vulvar cancer      Reason for Visit:  On Radiation Treatment Visit     Treatment Summary to Date  Treatment Site: Vulva Current Dose: 5270/6300cGy cGy Fractions: 30/36fx      Chemotherapy  Chemo concurrent with radx?: Yes  Oncologist: Dr. Vera  Drug Name/Frequency 1: Cisplatin weekly    Subjective:   Julius states \"things are getting harder on her\" this week. \"It's sore everywhere\".  She has some leftover oxycodone, which she is now taking, whereas before, she never needed any pain medication.  Diarrhea is under control with imodium every other day.  Dysuria is managed with wilberto bottle.  She finds the silvadene soothing for the peeling in bilateral groin.  She is also using Proshield for moisturization.       Nursing ROS:   Nutrition Alteration  Diet Type: Patient's Preference  Nutrition Note: low appetite  Skin  Skin Reaction:  (Skin seen on txm table per MD's)  Skin Progress: Encouraged pt to get in the  to daily to help cleanse the area and with healing  Skin Note: has proshield uses occasionally        Cardiovascular  Cardio/Resp Note: Pt reports a painful cough, seen in ED for evaluation  Gastrointestinal  Nausea: 0 - None  Diarrhea: 0 - None (has been on imodium, none today)  Genitourinary  Dysuria: 0 - None   Note: Using proshield and trying clean the area well  Psychosocial  Pyschosocial Note: fatigue setting in  Pain Assessment  Pain Note: No complaints of pain from pt      Objective:   There were no vitals taken for this visit.  Gen: Appears well, in no acute distress  Skin: Moist desquamation over the bilateral groin, left worse than the right.  Erythema and desquamation also seen in bilateral vulvae with regression of the tumor plaque.  No signs of infection.      Labs:  CBC RESULTS:   Recent " Labs   Lab Test  04/16/18   0723   WBC  1.6*   RBC  2.97*   HGB  10.2*   HCT  29.3*   MCV  99   MCH  34.3*   MCHC  34.8   RDW  13.8   PLT  205     ELECTROLYTES:  Recent Labs   Lab Test  04/20/18   0659   NA  132*   POTASSIUM  3.7   CHLORIDE  97   BERHANE  7.4*   CO2  25   BUN  8   CR  1.00   GLC  114*       Assessment:    Tolerating radiation therapy well.  All questions and concerns addressed.    Toxicities:  Fatigue: Grade 1: Fatigue relieved by rest  Pain: Grade 2: Moderate pain; limiting instrumental ADL  Diarrhea: Grade 1: Increase of <4 stools per day over baseline; mild increase in ostomy output compared to baseline  Urinary tract pain: Grade 1: Mild pain  Dermatitis: Grade 2: Moderate to brisk erythema; patchy moist desquamation, mostly confined to skin folds and creases; moderate erythema    Plan:   1. Continue current therapy.    2. Refilled oxycodone.  She may use oxycodone more liberally for pain control.  3. Continue with proshield and silvadene for skin care.  4. Continue with imodium as needed for diarrhea.        Mosaiq chart and setup information reviewed  MVCT/IGRT images checked    Medication Review  Med list reviewed with patient?: Yes    Educational Topic Discussed  Education Instructions: reviewed        Yvon Leal MD/PhD  [phone] clinic  Pager [phone]    Please do not send letter to referring physician.

## 2018-04-20 NOTE — LETTER
Date:April 23, 2018      Provider requested that no letter be sent. Do not send.       [facility] Health Information

## 2018-04-23 ENCOUNTER — APPOINTMENT (OUTPATIENT)
Dept: RADIATION ONCOLOGY | Facility: CLINIC | Age: 63
End: 2018-04-23
Attending: RADIOLOGY
Payer: COMMERCIAL

## 2018-04-23 ENCOUNTER — INFUSION THERAPY VISIT (OUTPATIENT)
Dept: ONCOLOGY | Facility: CLINIC | Age: 63
End: 2018-04-23
Attending: OBSTETRICS & GYNECOLOGY
Payer: COMMERCIAL

## 2018-04-23 ENCOUNTER — APPOINTMENT (OUTPATIENT)
Dept: LAB | Facility: CLINIC | Age: 63
End: 2018-04-23
Attending: OBSTETRICS & GYNECOLOGY
Payer: COMMERCIAL

## 2018-04-23 VITALS
WEIGHT: 137.1 LBS | SYSTOLIC BLOOD PRESSURE: 112 MMHG | RESPIRATION RATE: 18 BRPM | OXYGEN SATURATION: 99 % | DIASTOLIC BLOOD PRESSURE: 71 MMHG | TEMPERATURE: 97.8 F | HEART RATE: 71 BPM | BODY MASS INDEX: 21.8 KG/M2

## 2018-04-23 DIAGNOSIS — E83.51 HYPOCALCEMIA: ICD-10-CM

## 2018-04-23 DIAGNOSIS — C51.9 MALIGNANT NEOPLASM OF VULVA (H): ICD-10-CM

## 2018-04-23 DIAGNOSIS — C51.9 VULVAR CANCER, CARCINOMA (H): Primary | ICD-10-CM

## 2018-04-23 DIAGNOSIS — E83.42 HYPOMAGNESEMIA: ICD-10-CM

## 2018-04-23 LAB
ANION GAP SERPL CALCULATED.3IONS-SCNC: 12 MMOL/L (ref 3–14)
BUN SERPL-MCNC: 8 MG/DL (ref 7–30)
CALCIUM SERPL-MCNC: 7.6 MG/DL (ref 8.5–10.1)
CHLORIDE SERPL-SCNC: 98 MMOL/L (ref 94–109)
CO2 SERPL-SCNC: 22 MMOL/L (ref 20–32)
CREAT SERPL-MCNC: 1.23 MG/DL (ref 0.52–1.04)
GFR SERPL CREATININE-BSD FRML MDRD: 44 ML/MIN/1.7M2
GLUCOSE SERPL-MCNC: 105 MG/DL (ref 70–99)
MAGNESIUM SERPL-MCNC: 0.9 MG/DL (ref 1.6–2.3)
POTASSIUM SERPL-SCNC: 3.5 MMOL/L (ref 3.4–5.3)
SODIUM SERPL-SCNC: 132 MMOL/L (ref 133–144)

## 2018-04-23 PROCEDURE — 25000128 H RX IP 250 OP 636: Mod: ZF | Performed by: OBSTETRICS & GYNECOLOGY

## 2018-04-23 PROCEDURE — 80048 BASIC METABOLIC PNL TOTAL CA: CPT | Performed by: NURSE PRACTITIONER

## 2018-04-23 PROCEDURE — 83735 ASSAY OF MAGNESIUM: CPT | Performed by: NURSE PRACTITIONER

## 2018-04-23 PROCEDURE — 25000128 H RX IP 250 OP 636: Mod: ZF | Performed by: NURSE PRACTITIONER

## 2018-04-23 PROCEDURE — 96366 THER/PROPH/DIAG IV INF ADDON: CPT

## 2018-04-23 PROCEDURE — 77386 ZZH IMRT TREATMENT DELIVERY, COMPLEX: CPT | Performed by: RADIOLOGY

## 2018-04-23 PROCEDURE — 96365 THER/PROPH/DIAG IV INF INIT: CPT

## 2018-04-23 PROCEDURE — 96361 HYDRATE IV INFUSION ADD-ON: CPT

## 2018-04-23 RX ORDER — MAGNESIUM SULFATE HEPTAHYDRATE 40 MG/ML
4 INJECTION, SOLUTION INTRAVENOUS ONCE
Status: COMPLETED | OUTPATIENT
Start: 2018-04-23 | End: 2018-04-23

## 2018-04-23 RX ADMIN — SODIUM CHLORIDE 1000 ML: 9 INJECTION, SOLUTION INTRAVENOUS at 13:09

## 2018-04-23 RX ADMIN — MAGNESIUM SULFATE IN WATER 4 G: 40 INJECTION, SOLUTION INTRAVENOUS at 13:55

## 2018-04-23 ASSESSMENT — PAIN SCALES - GENERAL: PAINLEVEL: NO PAIN (0)

## 2018-04-23 NOTE — MR AVS SNAPSHOT
After Visit Summary   4/23/2018    Julius Gilliam    MRN: [id number]           Patient Information     Date Of Birth          1955        Visit Information        Provider Department      4/23/2018 1:00 PM UC 19 ATC; UC ONCOLOGY INFUSION [facility]        Today's Diagnoses     Vulvar cancer, carcinoma (H)    -  1    Hypocalcemia        Malignant neoplasm of vulva (H)        Hypomagnesemia          Care Instructions    Contact Numbers    [facility] Main Line: [phone]  [facility] Triage:  [phone]    Call triage with chills and/or temperature greater than or equal to 100.5, uncontrolled nausea/vomiting, diarrhea, constipation, dizziness, shortness of breath, chest pain, bleeding, unexplained bruising, or any new/concerning symptoms, questions/concerns.     If you are having any concerning symptoms or wish to speak to a provider before your next infusion visit, please call your care coordinator or triage to notify them so we can adequately serve you.       After Hours: [phone]    If after hours, weekends, or holidays, call main hospital  and ask for Oncology doctor on call.             April 2018 Sunday Monday Tuesday Wednesday Thursday Friday Saturday   1     2     [facility] MASONIC LAB DRAW    6:30 AM   (15 min.)    MASONIC LAB DRAW   [facility] Lab Draw     [facility] ONC INFUSION 360    7:00 AM   (360 min.)    [facility]    2:15 PM   (30 min.)   P RAD ONC MARIE   Radiation Oncology Clinic 3     P EXTERNAL RADIATION TREATMT    2:30 PM   (30 min.)   P RAD ONC MARIE   Radiation Oncology Clinic 4     P EXTERNAL RADIATION TREATMT    2:30 PM   (30 min.)   P RAD ONC MARIE   Radiation Oncology Clinic 5     P EXTERNAL RADIATION TREATMT    2:30 PM   (30 min.)   P RAD ONC MARIE   Radiation Oncology Clinic 6     LEVEL 4    8:30 AM   (240 min.)   UR CH 05   [facility], Infusion Services      Admission    9:10 AM   Adry Groves MD   Unit 6D Observation [facility]   (Discharge: 4/7/2018)     CT CHEST PULMONARY EMBOLISM W   10:30 AM   (30 min.)   URCT1   [facility], Radiology     [facility] EXTERNAL RADIATION TREATMT    2:30 PM   (30 min.)   [facility] RAD ONC MARIE   Radiation Oncology Clinic     [facility] ON TREATMENT VISIT    2:45 PM   (15 min.)   Yvon Leal MD   Radiation Oncology Clinic 7     ECH STRESS TEST   11:05 AM   (60 min.)   [id number]   [facility],  Echocardiography     ECH DOBUTAMINE STRESS TEST   11:25 AM   (60 min.)   [email or web address]   [facility],  Echocardiography   8     9     P EXTERNAL RADIATION TREATMT    2:15 PM   (30 min.)   [facility] RAD ONC MARIE   Radiation Oncology Clinic 10     [facility] MASONIC LAB DRAW    6:30 AM   (15 min.)    [facility] LAB DRAW   [facility]     [facility] ONC INFUSION 360    7:00 AM   (360 min.)   UC ONCOLOGY INFUSION   [facility]     [facility] RETURN    7:15 AM   (30 min.)   Karin Scott APRN CNP   [facility] EXTERNAL RADIATION TREATMT    2:30 PM   (30 min.)   [facility] RAD ONC MARIE   Radiation Oncology Clinic 11     [facility] EXTERNAL RADIATION TREATMT    2:30 PM   (30 min.)   [facility] RAD ONC MARIE   Radiation Oncology Clinic 12     [facility] MASONIC LAB DRAW    2:15 PM   (15 min.)    M[facility] LAB DRAW   [facility] Masonic Lab Draw     [facility] EXTERNAL RADIATION TREATMT    2:30 PM   (30 min.)   [facility] RAD ONC MARIE   Radiation Oncology Clinic     [facility] ONC INFUSION 120    3:00 PM   (120 min.)   UC ONCOLOGY INFUSION   [facility] 13     [facility] ON TREATMENT VISIT    9:15 AM   (15 min.)   Yvon Leal MD   Radiation Oncology Clinic     [facility] EXTERNAL RADIATION TREATMT    2:30 PM   (30 min.)   [facility] RAD ONC MARIE   Radiation Oncology Clinic 14       15     16     [facility] MASON[facility] LAB DRAW    7:30 AM   (15 min.)    [facility] LAB DRAW   [facility] Lab Draw     [facility] ONC INFUSION 360    8:00 AM   (360 min.)   UC ONCOLOGY INFUSION   [facility]  [facility]     [facility] EXTERNAL RADIATION TREATMT    2:30 PM   (30 min.)   [facility] RAD ONC MARIE   Radiation Oncology Clinic 17     [facility] EXTERNAL RADIATION TREATMT    2:30 PM   (30 min.)   [facility] RAD ONC MARIE   Radiation Oncology Clinic 18     [facility] EXTERNAL RADIATION TREATMT    7:45 AM   (30 min.)   P RAD ONC MARIE   Radiation Oncology Clinic     [facility] MASONIC LAB DRAW    3:00 PM   (15 min.)   [facility]ONIC LAB DRAW   [facility] Masonic Lab Draw     [facility] ONC INFUSION 120    3:30 PM   (120 min.)   UC ONCOLOGY INFUSION   [facility] 19     [facility] EXTERNAL RADIATION TREATMT    2:30 PM   (30 min.)   P RAD ONC MARIE   Radiation Oncology Clinic 20     [facility] MASONIC LAB DRAW    6:30 AM   (15 min.)   [facility]ONIC LAB DRAW   [facility] [employer] Lab Draw     [facility] ONC INFUSION 120    7:00 AM   (120 min.)   [facility] ONCOLOGY INFUSION   [facility]     [facility] EXTERNAL RADIATION TREATMT    9:30 AM   (30 min.)   P RAD ONC MARIE   Radiation Oncology [facility]     [facility] ON TREATMENT VISIT   10:00 AM   (15 min.)   Yvon Leal MD   Radiation Oncology Clinic 21       22     23     [facility] MASONIC LAB DRAW   12:30 PM   (15 min.)   [facility]ON[facility] LAB DRAW   [facility] [employer] Lab Draw     [facility] ONC INFUSION 120    1:00 PM   (120 min.)   [facility] ONCOLOGY INFUSION   [facility]     [facility] EXTERNAL RADIATION TREATMT    2:15 PM   (30 min.)   P RAD ONC MARIE   Radiation Oncology Clinic 24     [facility] EXTERNAL RADIATION TREATMT    2:30 PM   (30 min.)   [facility] RAD ONC MARIE   Radiation Oncology Clinic 25     [facility] EXTERNAL RADIATION TREATMT    2:30 PM   (30 min.)   P RAD ONC MARIE   Radiation Oncology Clinic 26     [facility] EXTERNAL RADIATION TREATMT    2:30 PM   (30 min.)   P RAD ONC MARIE   Radiation Oncology Clinic 27     [facility] EXTERNAL RADIATION TREATMT    2:30 PM   (30 min.)   P RAD ONC MARIE   Radiation Oncology Clinic 28       29     30     UMP EXTERNAL RADIATION TREATMT    2:30 PM   (30 min.)   P RAD ONC MARIE   Radiation Oncology [facility]                                      May 2018   Chrsi Monday Tuesday Wednesday Thursday Friday Saturday             1     2     3     4     5       6     7     8     9     10     11     12       13     14     15     16     17     18     LAB WITH  CLINIC    7:00 AM   (15 min.)    LAB   [facility] Lab     UMP RETURN GENERAL LIVER    7:45 AM   (30 min.)   Malgorzata Madden MD   [facility] Hepatology 19       20     21     CT CHEST/ABDOMEN/PELVIS W   12:05 PM   (20 min.)   UCCT2   [facility] Imaging Center CT     UMP RETURN    2:15 PM   (30 min.)   Santosh Vera MD   [facility] Cancer Clinic 22     23     24     25     26       27     28     29     30     31                           Recent Results (from the past 24 hour(s))   Basic metabolic panel    Collection Time: 04/23/18 12:53 PM   Result Value Ref Range    Sodium 132 (L) 133 - 144 mmol/L    Potassium 3.5 3.4 - 5.3 mmol/L    Chloride 98 94 - 109 mmol/L    Carbon Dioxide 22 20 - 32 mmol/L    Anion Gap 12 3 - 14 mmol/L    Glucose 105 (H) 70 - 99 mg/dL    Urea Nitrogen 8 7 - 30 mg/dL    Creatinine 1.23 (H) 0.52 - 1.04 mg/dL    GFR Estimate 44 (L) >60 mL/min/1.7m2    GFR Estimate If Black 54 (L) >60 mL/min/1.7m2    Calcium 7.6 (L) 8.5 - 10.1 mg/dL   Magnesium    Collection Time: 04/23/18 12:53 PM   Result Value Ref Range    Magnesium 0.9 (L) 1.6 - 2.3 mg/dL                 Follow-ups after your visit        Your next 10 appointments already scheduled     Apr 24, 2018  2:30 PM CDT   EXTERNAL RADIATION TREATMENT with [facility] RAD ONC MARIE   Radiation Oncology Clinic ([facility])    [facility]  1st Floor  [address] [address] [phone]            Apr 25, 2018  2:30 PM CDT   EXTERNAL RADIATION TREATMENT with [facility] RAD ONC MARIE   Radiation Oncology Clinic ([facility])    [facility]  1st Floor  [address] [address] [phone]             Apr 26, 2018  2:30 PM CDT   EXTERNAL RADIATION TREATMENT with [facility] RAD ONC MARIE   Radiation Oncology Clinic ([facility])    [facility]  1st Floor  [address] [address]   [phone]            Apr 27, 2018  2:30 PM CDT   EXTERNAL RADIATION TREATMENT with [facility] RAD ONC MARIE   Radiation Oncology Clinic ([facility])    [facility]tr  1st Floor  [address] [address]   [phone]            Apr 30, 2018  2:30 PM CDT   EXTERNAL RADIATION TREATMENT with [facility] RAD ONC MARIE   Radiation Oncology Clinic ([facility])    [facility]  1st Floor  [address] [address]   [phone]            May 18, 2018  7:00 AM CDT   Lab with UC LAB   [facility] Lab ([facility])    [address]  [address] [facility] [address]   [phone]            May 18, 2018  8:00 AM CDT   (Arrive by 7:45 AM)   Return General Liver with Malgorzata Madden MD   [facility] Hepatology ([facility])    [address]  [address]  [facility] [address]   [phone]            May 21, 2018 12:20 PM CDT   (Arrive by 12:05 PM)   CT CHEST/ABDOMEN/PELVIS W CONTRAST with UCCT2   [facility] Imaging [place] [facility] ([facility])    [address]  [address] [facility] [address]   [phone]           Please bring any scans or X-rays taken at other hospitals, if similar tests were done. Also bring a list of your medicines, including vitamins, minerals and over-the-counter drugs. It is safest to leave personal items at home.  Be sure to tell your doctor:   If you have any allergies.   If there s any chance you are pregnant.   If you are breastfeeding.  How to prepare:   Do not eat or drink for 2 hours before your exam. If you need to take medicine, you may take it with small sips of water.  (We may ask you to take liquid medicine as well.)   Please wear loose clothing, such as a sweat suit or jogging clothes. Avoid snaps, zippers and other metal. We may ask you to undress and put on a hospital gown.  Please arrive 30 minutes early for your CT. Once in the department you might be asked to drink water 15-20 minutes prior to your exam.  If indicated you may be asked to drink an oral contrast in advance of your CT.  If this is the case, the imaging team will let you know or be in contact with you prior to your appointment  Patients over 70 or patients with diabetes or kidney problems:   If you haven t had a blood test (creatinine test) within the last 30 days, the Cardiologist/Radiologist may require you to get this test prior to your exam.  If you have diabetes:   Continue to take your metformin medication on the day of your exam  If you have any questions, please call the Imaging Department where you will have your exam.            May 21, 2018  2:30 PM CDT   (Arrive by 2:15 PM)   Return Visit with Santosh Vera MD   [facility] ([facility])    [address] [place] [address]  [address] [place] [address] [phone]              Future tests that were ordered for you today     Open Standing Orders        Priority Remaining Interval Expires Ordered    BMP - Basic Metabolic Panel Routine 6/6 twice weekly 4/23/2019 4/23/2018    Mag Routine 6/6 twice weekly 4/23/2019 4/23/2018    Albumin Routine 6/6 twice weekly 4/23/2019 4/23/2018            Who to contact     If you have questions or need follow up information about today's clinic visit or your schedule please contact [facility] directly at [phone].  Normal or non-critical lab and imaging results will be communicated to you by MyChart, letter or phone within 4 business days after the clinic has received the results. If you do not hear from us within 7 days, please contact the  clinic through [employer] or phone. If you have a critical or abnormal lab result, we will notify you by phone as soon as possible.  Submit refill requests through [employer] or call your pharmacy and they will forward the refill request to us. Please allow 3 business days for your refill to be completed.          Additional Information About Your Visit        "[employer]hart Information     [employer] gives you secure access to your electronic health record. If you see a primary care provider, you can also send messages to your care team and make appointments. If you have questions, please call your primary care clinic.  If you do not have a primary care provider, please call [phone] and they will assist you.        Care EveryWhere ID     This is your Care EveryWhere ID. This could be used by other organizations to access your [place] medical records  [id number]        Your Vitals Were     Pulse Temperature Respirations Pulse Oximetry BMI (Body Mass Index)       71 97.8  F (36.6  C) (Oral) 18 99% 21.8 kg/m2        Blood Pressure from Last 3 Encounters:   04/23/18 112/71   04/20/18 108/59   04/18/18 124/79    Weight from Last 3 Encounters:   04/23/18 62.2 kg (137 lb 1.6 oz)   04/20/18 65.3 kg (144 lb)   04/18/18 65.5 kg (144 lb 6.4 oz)              We Performed the Following     Basic metabolic panel     Magnesium        Primary Care Provider Office Phone # Fax #    Tova Montesinos [phone]       [facility] [address] [address]        Equal Access to Services     CAREY COSME : Hadii aad ku hadasho Soomaali, waaxda luqadaha, qaybta kaalmada adeegyada, talha jones. So [facility] [phone].    ATENCIÓN: Si habla español, tiene a elizabeth disposición servicios gratuitos de asistencia lingüística. Llame al [phone].    We comply with applicable federal civil rights laws and [place] laws. We do not discriminate on the basis of race, color, national origin,  age, disability, sex, sexual orientation, or gender identity.            Thank you!     Thank you for choosing [facility] CANCER CLINIC  for your care. Our goal is always to provide you with excellent care. Hearing back from our patients is one way we can continue to improve our services. Please take a few minutes to complete the written survey that you may receive in the mail after your visit with us. Thank you!             Your Updated Medication List - Protect others around you: Learn how to safely use, store and throw away your medicines at www.disposemymeds.org.          This list is accurate as of 4/23/18  4:01 PM.  Always use your most recent med list.                   Brand Name Dispense Instructions for use Diagnosis    acetaminophen 325 MG tablet    TYLENOL    30 tablet    Take 2 tablets (650 mg) by mouth every 6 hours    H/O lymph node excision       guaiFENesin-codeine 100-10 MG/5ML Soln solution    ROBITUSSIN AC    420 mL    Take 5-10 mLs by mouth every 4 hours as needed for cough    Cough       iohexol 140 MG/ML Soln solution   Start taking on:  5/16/2018    OMNIPAQUE    50 mL    Take 50 mLs by mouth once for 1 dose    Malignant neoplasm of vulva (H), Encounter for long-term (current) use of medications       loperamide 2 MG capsule    IMODIUM     Take 2 mg by mouth 4 times daily as needed for diarrhea        LORazepam 1 MG tablet    ATIVAN    30 tablet    Take 1 tablet (1 mg) by mouth every 6 hours as needed (nausea/vomiting, anxiety or sleep )    Encounter for long-term (current) use of medications, Malignant neoplasm of vulva (H)       magnesium oxide 400 (241.3 Mg) MG tablet    MAG-OX    60 tablet    Take 1 tablet (400 mg) by mouth 2 times daily    Malignant neoplasm of vulva (H), Hypomagnesemia       Multi-vitamin Tabs tablet      Take 1 tablet by mouth daily        oxyCODONE IR 5 MG tablet    ROXICODONE    30 tablet    Take 1 tablet (5 mg) by mouth every 4 hours as needed for pain    H/O  lymph node excision       potassium chloride SA 20 MEQ CR tablet    KLOR-CON    60 tablet    Take 1 tablet (20 mEq) by mouth 2 times daily    Malignant neoplasm of vulva (H), Hypokalemia       prochlorperazine 10 MG tablet    COMPAZINE    30 tablet    Take 1 tablet (10 mg) by mouth every 6 hours as needed (nausea/vomiting)    Encounter for long-term (current) use of medications, Malignant neoplasm of vulva (H)       silver sulfADIAZINE 1 % cream    SILVADENE    400 g    Apply topically 2 times daily    Dermatitis

## 2018-04-23 NOTE — PATIENT INSTRUCTIONS
Contact Numbers    [facility] Main Line: [phone]  [facility] Triage:  [phone]    Call triage with chills and/or temperature greater than or equal to 100.5, uncontrolled nausea/vomiting, diarrhea, constipation, dizziness, shortness of breath, chest pain, bleeding, unexplained bruising, or any new/concerning symptoms, questions/concerns.     If you are having any concerning symptoms or wish to speak to a provider before your next infusion visit, please call your care coordinator or triage to notify them so we can adequately serve you.       After Hours: [phone]    If after hours, weekends, or holidays, call main hospital  and ask for Oncology doctor on call.             April 2018 Sunday Monday Tuesday Wednesday Thursday Friday Saturday   1     2     [facility] MASONIC LAB DRAW    6:30 AM   (15 min.)    MASONIC LAB DRAW   [facility] Lab Draw     [facility] ONC INFUSION 360    7:00 AM   (360 min.)    ONCOLOGY INFUSION   [facility] EXTERNAL RADIATION TREATMT    2:15 PM   (30 min.)   [facility] RAD ONC MARIE   Radiation Oncology Clinic 3     [facility] EXTERNAL RADIATION TREATMT    2:30 PM   (30 min.)   [facility] RAD ONC MARIE   Radiation Oncology Clinic 4     [facility] EXTERNAL RADIATION TREATMT    2:30 PM   (30 min.)   [facility] RAD ONC MARIE   Radiation Oncology Clinic 5     [facility] EXTERNAL RADIATION TREATMT    2:30 PM   (30 min.)   [facility] RAD ONC MARIE   Radiation Oncology Clinic 6     LEVEL 4    8:30 AM   (240 min.)   UR CH 05   [facility] [place], Infusion Services     Admission    9:10 AM   Adry Groves MD   Unit 6D Observation [facility]   (Discharge: 4/7/2018)     CT CHEST PULMONARY EMBOLISM W   10:30 AM   (30 min.)   URCT1   [facility], Radiology     [facility] EXTERNAL RADIATION TREATMT    2:30 PM   (30 min.)   [facility] RAD ONC MARIE   Radiation Oncology Clinic     [facility] ON TREATMENT VISIT    2:45 PM   (15 min.)   Yvon Leal MD   Radiation Oncology Clinic 7     ECH STRESS TEST   11:05 AM   (60 min.)   [id number]   [facility],  [place],  Echocardiography     ECH DOBUTAMINE STRESS TEST   11:25 AM   (60 min.)   [id number]   [facility], [place],  Echocardiography   8     9     [facility] EXTERNAL RADIATION TREATMT    2:15 PM   (30 min.)   [facility] RAD ONC MARIE   Radiation Oncology Clinic 10     [facility] LAB DRAW    6:30 AM   (15 min.)    MASONIC LAB DRAW   [facility] Masonic Lab Draw     P ONC INFUSION 360    7:00 AM   (360 min.)   [facility] ONCOLOGY INFUSION   [facility]     UMP RETURN    7:15 AM   (30 min.)   Karin Scott APRN CNP   [facility]    2:30 PM   (30 min.)   P RAD ONC MARIE   Radiation Oncology Clinic 11     [facility] EXTERNAL RADIATION TREATMT    2:30 PM   (30 min.)   [facility] RAD ONC MARIE   Radiation Oncology Clinic 12     P MASONIC LAB DRAW    2:15 PM   (15 min.)    MASONIC LAB DRAW   [facility] Masonic Lab Draw     P EXTERNAL RADIATION TREATMT    2:30 PM   (30 min.)   [facility] RAD ONC MARIE   Radiation Oncology Clinic     P ONC INFUSION 120    3:00 PM   (120 min.)   UC ONCOLOGY INFUSION   [facility] 13     UMP ON TREATMENT VISIT    9:15 AM   (15 min.)   Yvon Lela MD   Radiation Oncology Clinic     [facility] EXTERNAL RADIATION TREATMT    2:30 PM   (30 min.)   P RAD ONC MARIE   Radiation Oncology Clinic 14       15     16     [facility] MASON[facility] LAB DRAW    7:30 AM   (15 min.)    MASONIC LAB DRAW   [facility] Masonic Lab Draw     P ONC INFUSION 360    8:00 AM   (360 min.)   UC ONCOLOGY INFUSION   [facility]     UMP EXTERNAL RADIATION TREATMT    2:30 PM   (30 min.)   P RAD ONC MARIE   Radiation Oncology Clinic 17     [facility] EXTERNAL RADIATION TREATMT    2:30 PM   (30 min.)   P RAD ONC MARIE   Radiation Oncology Clinic 18     UMP EXTERNAL RADIATION TREATMT    7:45 AM   (30 min.)   [facility] RAD ONC MARIE   Radiation Oncology Clinic     [facility] MASONIC LAB DRAW    3:00 PM   (15 min.)   UC MASONIC LAB DRAW   [facility] Masonic Lab Draw     P ONC INFUSION  120    3:30 PM   (120 min.)   UC ONCOLOGY INFUSION   [employer] [facility] 19     UMP EXTERNAL RADIATION TREATMT    2:30 PM   (30 min.)   [facility] RAD ONC MARIE   Radiation Oncology Clinic 20     [facility] MASONIC LAB DRAW    6:30 AM   (15 min.)    MASONIC LAB DRAW   [facility] Masonic Lab Draw     UMP ONC INFUSION 120    7:00 AM   (120 min.)   UC ONCOLOGY INFUSION   [facility]     UMP EXTERNAL RADIATION TREATMT    9:30 AM   (30 min.)   P RAD ONC MAREI   Radiation Oncology Clinic     UMP ON TREATMENT VISIT   10:00 AM   (15 min.)   Yvon Leal MD   Radiation Oncology Clinic 21       22     23     [facility] LAB DRAW   12:30 PM   (15 min.)    [facility] LAB DRAW   [facility]     [facility] ONC INFUSION 120    1:00 PM   (120 min.)   UC ONCOLOGY INFUSION   [facility]     UMP EXTERNAL RADIATION TREATMT    2:15 PM   (30 min.)   [facility] RAD ONC MARIE   Radiation Oncology Clinic 24     UMP EXTERNAL RADIATION TREATMT    2:30 PM   (30 min.)   UMP RAD ONC MARIE   Radiation Oncology Clinic 25     UMP EXTERNAL RADIATION TREATMT    2:30 PM   (30 min.)   [facility] RAD ONC MARIE   Radiation Oncology Clinic 26     UMP EXTERNAL RADIATION TREATMT    2:30 PM   (30 min.)   P RAD ONC MARIE   Radiation Oncology Clinic 27     UMP EXTERNAL RADIATION TREATMT    2:30 PM   (30 min.)   P RAD ONC MARIE   Radiation Oncology Clinic 28       29     30     UMP EXTERNAL RADIATION TREATMT    2:30 PM   (30 min.)   P RAD ONC MARIE   Radiation Oncology Clinic                                     May 2018   Chris Monday Tuesday Wednesday Thursday Friday Saturday             1     2     3     4     5       6     7     8     9     10     11     12       13     14     15     16     17     18     LAB WITH  CLINIC    7:00 AM   (15 min.)   UC LAB   [facility] Lab     UMP RETURN GENERAL LIVER    7:45 AM   (30 min.)   Malgorzata Madden MD   [facility] Hepatology 19       20     21     CT CHEST/ABDOMEN/PELVIS W    12:05 PM   (20 min.)   UCCT2   [facility]     UMP RETURN    2:15 PM   (30 min.)   Santosh eVra MD   [facility] 22     23     24     25     26       27     28     29     30     31                           Recent Results (from the past 24 hour(s))   Basic metabolic panel    Collection Time: 04/23/18 12:53 PM   Result Value Ref Range    Sodium 132 (L) 133 - 144 mmol/L    Potassium 3.5 3.4 - 5.3 mmol/L    Chloride 98 94 - 109 mmol/L    Carbon Dioxide 22 20 - 32 mmol/L    Anion Gap 12 3 - 14 mmol/L    Glucose 105 (H) 70 - 99 mg/dL    Urea Nitrogen 8 7 - 30 mg/dL    Creatinine 1.23 (H) 0.52 - 1.04 mg/dL    GFR Estimate 44 (L) >60 mL/min/1.7m2    GFR Estimate If Black 54 (L) >60 mL/min/1.7m2    Calcium 7.6 (L) 8.5 - 10.1 mg/dL   Magnesium    Collection Time: 04/23/18 12:53 PM   Result Value Ref Range    Magnesium 0.9 (L) 1.6 - 2.3 mg/dL

## 2018-04-23 NOTE — PROGRESS NOTES
Infusion Nursing Note:  Julius Gilliam presents today for IV fluids, Mag replacement.    Patient seen by provider today: No   present during visit today: Not Applicable.    Note:Pt arrived to infusion center and states that she had some diarrhea and vomiting over the weekend. Pt took immodium and anti-emetics with some relief. Pt ready to begin infusion at this time. Corrected calcium greater than eight, no replacement needed.     Intravenous Access:  Peripheral IV placed in lab    Treatment Conditions:  Lab Results   Component Value Date     04/23/2018                   Lab Results   Component Value Date    POTASSIUM 3.5 04/23/2018           Lab Results   Component Value Date    MAG 0.9 04/23/2018            Lab Results   Component Value Date    CR 1.23 04/23/2018                   Lab Results   Component Value Date    BERHANE 7.6 04/23/2018                Lab Results   Component Value Date    BILITOTAL 0.3 04/20/2018           Lab Results   Component Value Date    ALBUMIN 2.6 04/20/2018                    Lab Results   Component Value Date    ALT 10 04/20/2018           Lab Results   Component Value Date    AST 16 04/20/2018     Results reviewed, labs MET treatment parameters, ok to proceed with Replacement    Post Infusion Assessment:  Patient tolerated infusion without incident.  Blood return noted pre and post infusion.  Site patent and intact, free from redness, edema or discomfort.  No evidence of extravasations.  Access discontinued per protocol.    Discharge Plan:   Patient declined prescription refills.  Discharge instructions reviewed with: Patient.  Patient and/or family verbalized understanding of discharge instructions and all questions answered.  Copy of AVS reviewed with patient and/or family.  Patient will return later this week for next appointment. Spoke with jackie Burleson RNCC regarding pts appointments- she will get pt scheduled for later this week and next week for electrolyte  monitoring and call Pt to update if dates not in computer prior to d/c.  Patient discharged in stable condition accompanied by: self.  Departure Mode: Ambulatory.    KJ BAILEY RN

## 2018-04-24 ENCOUNTER — APPOINTMENT (OUTPATIENT)
Dept: RADIATION ONCOLOGY | Facility: CLINIC | Age: 63
End: 2018-04-24
Attending: RADIOLOGY
Payer: COMMERCIAL

## 2018-04-24 PROCEDURE — 77386 ZZH IMRT TREATMENT DELIVERY, COMPLEX: CPT | Performed by: RADIOLOGY

## 2018-04-25 ENCOUNTER — APPOINTMENT (OUTPATIENT)
Dept: RADIATION ONCOLOGY | Facility: CLINIC | Age: 63
End: 2018-04-25
Attending: RADIOLOGY
Payer: COMMERCIAL

## 2018-04-25 PROCEDURE — 77386 ZZH IMRT TREATMENT DELIVERY, COMPLEX: CPT | Performed by: RADIOLOGY

## 2018-04-26 ENCOUNTER — APPOINTMENT (OUTPATIENT)
Dept: RADIATION ONCOLOGY | Facility: CLINIC | Age: 63
End: 2018-04-26
Attending: RADIOLOGY
Payer: COMMERCIAL

## 2018-04-26 PROCEDURE — 77386 ZZH IMRT TREATMENT DELIVERY, COMPLEX: CPT | Performed by: RADIOLOGY

## 2018-04-27 ENCOUNTER — APPOINTMENT (OUTPATIENT)
Dept: LAB | Facility: CLINIC | Age: 63
End: 2018-04-27
Attending: OBSTETRICS & GYNECOLOGY
Payer: COMMERCIAL

## 2018-04-27 ENCOUNTER — APPOINTMENT (OUTPATIENT)
Dept: RADIATION ONCOLOGY | Facility: CLINIC | Age: 63
End: 2018-04-27
Attending: RADIOLOGY
Payer: COMMERCIAL

## 2018-04-27 ENCOUNTER — INFUSION THERAPY VISIT (OUTPATIENT)
Dept: ONCOLOGY | Facility: CLINIC | Age: 63
End: 2018-04-27
Attending: OBSTETRICS & GYNECOLOGY
Payer: COMMERCIAL

## 2018-04-27 VITALS
RESPIRATION RATE: 16 BRPM | HEART RATE: 75 BPM | TEMPERATURE: 97.6 F | SYSTOLIC BLOOD PRESSURE: 124 MMHG | OXYGEN SATURATION: 98 % | BODY MASS INDEX: 22.15 KG/M2 | WEIGHT: 139.3 LBS | DIASTOLIC BLOOD PRESSURE: 73 MMHG

## 2018-04-27 VITALS — WEIGHT: 138 LBS | BODY MASS INDEX: 21.94 KG/M2

## 2018-04-27 DIAGNOSIS — C51.9 MALIGNANT NEOPLASM OF VULVA (H): Primary | ICD-10-CM

## 2018-04-27 DIAGNOSIS — C51.9 VULVAR CANCER, CARCINOMA (H): Primary | ICD-10-CM

## 2018-04-27 DIAGNOSIS — E83.42 HYPOMAGNESEMIA: ICD-10-CM

## 2018-04-27 DIAGNOSIS — E83.51 HYPOCALCEMIA: ICD-10-CM

## 2018-04-27 DIAGNOSIS — C51.9 MALIGNANT NEOPLASM OF VULVA (H): ICD-10-CM

## 2018-04-27 LAB
ALBUMIN SERPL-MCNC: 2.7 G/DL (ref 3.4–5)
ANION GAP SERPL CALCULATED.3IONS-SCNC: 10 MMOL/L (ref 3–14)
BUN SERPL-MCNC: 9 MG/DL (ref 7–30)
CALCIUM SERPL-MCNC: 7.6 MG/DL (ref 8.5–10.1)
CHLORIDE SERPL-SCNC: 100 MMOL/L (ref 94–109)
CO2 SERPL-SCNC: 24 MMOL/L (ref 20–32)
CREAT SERPL-MCNC: 0.95 MG/DL (ref 0.52–1.04)
GFR SERPL CREATININE-BSD FRML MDRD: 60 ML/MIN/1.7M2
GLUCOSE SERPL-MCNC: 84 MG/DL (ref 70–99)
MAGNESIUM SERPL-MCNC: 1.1 MG/DL (ref 1.6–2.3)
POTASSIUM SERPL-SCNC: 4 MMOL/L (ref 3.4–5.3)
SODIUM SERPL-SCNC: 133 MMOL/L (ref 133–144)

## 2018-04-27 PROCEDURE — 77386 ZZH IMRT TREATMENT DELIVERY, COMPLEX: CPT | Performed by: RADIOLOGY

## 2018-04-27 PROCEDURE — 36415 COLL VENOUS BLD VENIPUNCTURE: CPT

## 2018-04-27 PROCEDURE — 96361 HYDRATE IV INFUSION ADD-ON: CPT

## 2018-04-27 PROCEDURE — 83735 ASSAY OF MAGNESIUM: CPT | Performed by: NURSE PRACTITIONER

## 2018-04-27 PROCEDURE — 77336 RADIATION PHYSICS CONSULT: CPT | Performed by: RADIOLOGY

## 2018-04-27 PROCEDURE — 25000128 H RX IP 250 OP 636: Mod: ZF | Performed by: OBSTETRICS & GYNECOLOGY

## 2018-04-27 PROCEDURE — 96360 HYDRATION IV INFUSION INIT: CPT

## 2018-04-27 PROCEDURE — 80048 BASIC METABOLIC PNL TOTAL CA: CPT | Performed by: NURSE PRACTITIONER

## 2018-04-27 PROCEDURE — 82040 ASSAY OF SERUM ALBUMIN: CPT | Performed by: NURSE PRACTITIONER

## 2018-04-27 RX ORDER — MAGNESIUM SULFATE HEPTAHYDRATE 40 MG/ML
4 INJECTION, SOLUTION INTRAVENOUS ONCE
Status: COMPLETED | OUTPATIENT
Start: 2018-04-27 | End: 2018-04-27

## 2018-04-27 RX ADMIN — MAGNESIUM SULFATE HEPTAHYDRATE 4 G: 40 INJECTION, SOLUTION INTRAVENOUS at 12:53

## 2018-04-27 ASSESSMENT — PAIN SCALES - GENERAL: PAINLEVEL: NO PAIN (0)

## 2018-04-27 NOTE — PROGRESS NOTES
Infusion Nursing Note:  Julius Gilliam presents today for possible fluids and electrolyte replacement.    Patient seen by provider today: No   present during visit today: Not Applicable.    Note: Patient arrived to infusion center with complaints of some fatigue. Denies complaints of pain, dyspnea, dizziness, nausea, numbness and tingling.    Magnesium replaced per protocol -- see MAR for details.  Cr within normal limits; IV fluid bolus not given.    Intravenous Access:  Peripheral IV placed.    Treatment Conditions:  Lab Results   Component Value Date     04/27/2018                   Lab Results   Component Value Date    POTASSIUM 4.0 04/27/2018           Lab Results   Component Value Date    MAG 1.1 04/27/2018            Lab Results   Component Value Date    CR 0.95 04/27/2018                   Lab Results   Component Value Date    BERHANE 7.6 04/27/2018                Lab Results   Component Value Date    ALBUMIN 2.7 04/27/2018                   Results reviewed, labs MET treatment parameters, ok to proceed with treatment.    Post Infusion Assessment:  Patient tolerated infusion without incident.  Site patent and intact, free from redness, edema or discomfort.  Access discontinued per protocol.    Discharge Plan:   Patient declined prescription refills.  Patient and/or family verbalized understanding of discharge instructions and all questions answered.  AVS to patient via y prime.  Patient will return 5/1/2018 for next appointment.   Patient discharged in stable condition accompanied by: self.  Departure Mode: Ambulatory.    Justo Irving RN

## 2018-04-27 NOTE — LETTER
2018       RE: Julius Gilliam  [address]   [facility] [address]     Dear Colleague,    Thank you for referring your patient, Julius Gilliam, to the RADIATION ONCOLOGY CLINIC. Please see a copy of my visit note below.    RADIATION ONCOLOGY WEEKLY ON TREATMENT VISIT   Encounter Date: 2018    Patient Name: Julius Gilliam  MRN: [id number]  : 1955     Treatment Summary to Date  Treatment Site: Vulva Current Dose: 6170/6300cGy cGy Fractions: 35/36fx      Chemotherapy  Chemo concurrent with radx?: Yes  Oncologist: Dr. Vera  Drug Name/Frequency 1: Cisplatin weekly    Subjective: Ms. Gilliam presents to clinic today for her final on-treatment visit. She has one fraction remaining; her skin in the groin is healing well (now on the boost component of treatment). Her diarrhea is under control with 2 pills imodium daily. Otherwise, she notes some diminished appetite and general fatigue which is improved with rest. No other complaints today, and she weakly denies hematuria, dysuria, urinary frequency, pain and hematochezia.    ROS:   Nutrition Alteration  Diet Type: Patient's Preference  Nutrition Note: low appetite  Skin  Skin Reaction:  (Skin seen on txm table per MD's)  Skin Progress: Encouraged pt to get in the shower to daily to help cleanse the area and with healing  Skin Note: has proshield uses occasionally        Cardiovascular  Cardio/Resp Note: Pt reports a painful cough, seen in ED for evaluation  Gastrointestinal  Nausea: 0 - None  Diarrhea: 0 - None (has been on imodium, none today)  Genitourinary  Dysuria: 0 - None   Note: Using proshield and trying clean the area well  Psychosocial  Pyschosocial Note: fatigue setting in  Pain Assessment  0-10 Pain Scale: 0  Pain Note: No complaints of pain from pt    Objective:   Wt 62.6 kg (138 lb)  BMI 21.94 kg/m2  Gen: Appears well, in no acute distress  Skin & Vulva: Moderate diffuse erythema over treatment field with healing dry  desquamation in groin with patches of moist desquamation in skin folds. Vulvar lesion with significant regression; persistent distal vaginal nodularity, particularly on the left side.    Treatment-related toxicities (CTCAE v4.0):  1. Fatigue: Grade 1: Fatigue relieved by rest  2. Pain: Grade 0: No toxicity  3. Diarrhea: Grade 1: Increase of <4 stools per day over baseline; mild increase in ostomy output compared to baseline  4. Dermatitis: Grade 2: Moderate to brisk erythema; patchy moist desquamation, mostly confined to skin folds and creases; moderate erythema    Assessment:    Ms. Gilliam will complete her radiation therapy course on Monday. She has tolerated treatment quite well.    Plan:   1. Complete radiotherapy on Monday  2. She was provided with a vaginal dilator and instructed in its use. It is OK to wait if it causes too much pain, but she was encouraged to attempt to use it daily if possible.  3. FU with Dr. Vera on 5/21 with CT scan. FU with our clinic on the same day.    Mosaiq chart and setup information reviewed  MVCT images reviewed    Medication Review  Med list reviewed with patient?: Yes    Educational Topic Discussed  Additional Instructions: D/C instructions reviewed with pt  Education Instructions: reviewed    Pt was seen and discussed with staff, Dr. Leal.    Kirt Olson MD PGY-4  Radiation Oncology Resident, [facility]  Phone: [phone]    I saw and examined the patient with the resident.  I have reviewed and agree with the resident's note and plan of care.      Yvon Leal MD

## 2018-04-27 NOTE — MR AVS SNAPSHOT
After Visit Summary   4/27/2018    Julius Gilliam    MRN: [id number]           Patient Information     Date Of Birth          1955        Visit Information        Provider Department      4/27/2018 9:00 AM Yvon Leal MD Radiation Oncology Clinic         Follow-ups after your visit        Your next 10 appointments already scheduled     Apr 27, 2018 11:30 AM CDT   [facility] Lab Draw with UC MASONIC LAB DRAW   [facility] Masonic Lab Draw ([facility])    [address]  [address]  [facility] [address]   [phone]            Apr 27, 2018 12:00 PM CDT   Infusion 120 with UC ONCOLOGY INFUSION, UC 19 ATC   [facility] ([facility])    [address]  [address]  [place] [address]   [phone]            Apr 30, 2018  2:30 PM CDT   EXTERNAL RADIATION TREATMENT with [facility] RAD ONC MARIE   Radiation Oncology Clinic ([facility])    [facility]  1st Floor  [address] [place] [address]   [phone]            May 01, 2018  6:30 AM CDT   Masonic Lab Draw with UC MASONIC LAB DRAW   [facility] Masonic Lab Draw ([facility])    [address]  [address]  [facility] [place] [facility] [address]   [phone]            May 01, 2018  7:00 AM CDT   Infusion 120 with UC ONCOLOGY INFUSION, UC 13 ATC   [facility] ([facility])    [address] [place] [address]  [address]  [facility] [address]   [phone]            May 18, 2018  7:00 AM CDT   Lab with UC LAB   [facility] Lab ([facility])    [address] [place] [address]  1st Floor  [facility] [address]   [phone]            May 18, 2018  8:00 AM CDT   (Arrive by 7:45 AM)   Return General Liver with Malgorzata Madden MD   [facility] Hepatology ([facility])    [address] [place] [address]  [address]  [facility]  [address]   [phone]            May 21, 2018 11:30 AM CDT   Return Visit with Yvon Leal MD   Radiation Oncology Clinic ([facility])    [facility]  1st Floor  [address] [address] [phone]              Who to contact     Please call your clinic at [phone] to:    Ask questions about your health    Make or cancel appointments    Discuss your medicines    Learn about your test results    Speak to your doctor            Additional Information About Your Visit        [employer] Information     [employer] gives you secure access to your electronic health record. If you see a primary care provider, you can also send messages to your care team and make appointments. If you have questions, please call your primary care clinic.  If you do not have a primary care provider, please call [phone] and they will assist you.      [employer] is an electronic gateway that provides easy, online access to your medical records. With [employer], you can request a clinic appointment, read your test results, renew a prescription or communicate with your care team.     To access your existing account, please contact your [facility] or call [phone] for assistance.        Care EveryWhere ID     This is your Care EveryWhere ID. This could be used by other organizations to access your [facility] medical records  [id number]         Blood Pressure from Last 3 Encounters:   04/23/18 112/71   04/20/18 108/59   04/18/18 124/79    Weight from Last 3 Encounters:   04/23/18 62.2 kg (137 lb 1.6 oz)   04/20/18 65.3 kg (144 lb)   04/18/18 65.5 kg (144 lb 6.4 oz)              Today, you had the following     No orders found for display       Primary Care Provider Office Phone # Fax #    Tova Montesinos [phone]       [facility] [address] [facility] [address]        Equal Access to Services     CAREY CAMPA: Baylee  melania Miguel, waroseannda luqadaha, qaybta kaaldo eddy, talha brodyin hayaabuck baueraguila yovanieliecerrosalino cárdenas robert. So [facility] [phone].    ATENCIÓN: Si allyla petr, tiene a elizabeth disposición servicios gratuitos de asistencia lingüística. Teodora al [phone].    We comply with applicable federal civil rights laws and [place] laws. We do not discriminate on the basis of race, color, national origin, age, disability, sex, sexual orientation, or gender identity.            Thank you!     Thank you for choosing RADIATION ONCOLOGY CLINIC  for your care. Our goal is always to provide you with excellent care. Hearing back from our patients is one way we can continue to improve our services. Please take a few minutes to complete the written survey that you may receive in the mail after your visit with us. Thank you!             Your Updated Medication List - Protect others around you: Learn how to safely use, store and throw away your medicines at www.disposemymeds.org.          This list is accurate as of 4/27/18  9:09 AM.  Always use your most recent med list.                   Brand Name Dispense Instructions for use Diagnosis    acetaminophen 325 MG tablet    TYLENOL    30 tablet    Take 2 tablets (650 mg) by mouth every 6 hours    H/O lymph node excision       guaiFENesin-codeine 100-10 MG/5ML Soln solution    ROBITUSSIN AC    420 mL    Take 5-10 mLs by mouth every 4 hours as needed for cough    Cough       iohexol 140 MG/ML Soln solution   Start taking on:  5/16/2018    OMNIPAQUE    50 mL    Take 50 mLs by mouth once for 1 dose    Malignant neoplasm of vulva (H), Encounter for long-term (current) use of medications       loperamide 2 MG capsule    IMODIUM     Take 2 mg by mouth 4 times daily as needed for diarrhea        LORazepam 1 MG tablet    ATIVAN    30 tablet    Take 1 tablet (1 mg) by mouth every 6 hours as needed (nausea/vomiting, anxiety or sleep )    Encounter for long-term (current) use of medications,  Malignant neoplasm of vulva (H)       magnesium oxide 400 (241.3 Mg) MG tablet    MAG-OX    60 tablet    Take 1 tablet (400 mg) by mouth 2 times daily    Malignant neoplasm of vulva (H), Hypomagnesemia       Multi-vitamin Tabs tablet      Take 1 tablet by mouth daily        oxyCODONE IR 5 MG tablet    ROXICODONE    30 tablet    Take 1 tablet (5 mg) by mouth every 4 hours as needed for pain    H/O lymph node excision       potassium chloride SA 20 MEQ CR tablet    KLOR-CON    60 tablet    Take 1 tablet (20 mEq) by mouth 2 times daily    Malignant neoplasm of vulva (H), Hypokalemia       prochlorperazine 10 MG tablet    COMPAZINE    30 tablet    Take 1 tablet (10 mg) by mouth every 6 hours as needed (nausea/vomiting)    Encounter for long-term (current) use of medications, Malignant neoplasm of vulva (H)       silver sulfADIAZINE 1 % cream    SILVADENE    400 g    Apply topically 2 times daily    Dermatitis

## 2018-04-27 NOTE — MR AVS SNAPSHOT
After Visit Summary   4/27/2018    Julius Gilliam    MRN: [id number]           Patient Information     Date Of Birth          1955        Visit Information        Provider Department      4/27/2018 12:00 PM UC 19 ATC; UC ONCOLOGY INFUSION Formerly [facility]        Today's Diagnoses     Vulvar cancer, carcinoma (H)    -  1    Hypomagnesemia        Hypocalcemia        Malignant neoplasm of vulva (H)          Care Instructions    Contact Numbers    [facility] Main Line: [phone]  [facility] Triage:  [phone]    Call triage with chills and/or temperature greater than or equal to 100.5, uncontrolled nausea/vomiting, diarrhea, constipation, dizziness, shortness of breath, chest pain, bleeding, unexplained bruising, or any new/concerning symptoms, questions/concerns.     If you are having any concerning symptoms or wish to speak to a provider before your next infusion visit, please call your care coordinator or triage to notify them so we can adequately serve you.       After Hours: [phone]    If after hours, weekends, or holidays, call main hospital  and ask for Oncology doctor on call.           April 2018 Sunday Monday Tuesday Wednesday Thursday Friday Saturday   1     2     [facility] LAB DRAW    6:30 AM   (15 min.)    [facility] LAB DRAW   [facility] Lab Draw     [facility] ONC INFUSION 360    7:00 AM   (360 min.)    ONCOL[facility]Y INFUS[facility]   [facility]    2:15 PM   (30 min.)   P RAD ONC MARIE   [facility] 3     P EXTERNAL RADIATION TREATMT    2:30 PM   (30 min.)   P RAD ONC MARIE   Rad[facility] Oncology Clinic 4     P EXTERNAL RADIATION TREATMT    2:30 PM   (30 min.)   P RAD ONC MARIE   [facility] Oncology Clinic 5     P EXTERNAL RADIATION TREATMT    2:30 PM   (30 min.)   P RAD ONC MARIE   Radiation Oncology Clinic 6     LEVEL 4    8:30 AM   (240 min.)   UR CH 05   [facility], Infusion Services      Admission    9:10 AM   Adry Groves MD   Unit 6D Observation [facility]   (Discharge: 4/7/2018)     CT CHEST PULMONARY EMBOLISM W   10:30 AM   (30 min.)   URCT1   [facility], Radiology     [facility] EXTERNAL RADIATION TREATMT    2:30 PM   (30 min.)   [facility] RAD ONC MARIE   Radiation Oncology Clinic     [facility] ON TREATMENT VISIT    2:45 PM   (15 min.)   Yvon Leal MD   Radiation Oncology Clinic 7     ECH STRESS TEST   11:05 AM   (60 min.)   [id number]   [facility],  Echocardiography     ECH DOBUTAMINE STRESS TEST   11:25 AM   (60 min.)   [id number]   [facility],  Echocardiography   8     9     P EXTERNAL RADIATION TREATMT    2:15 PM   (30 min.)   [facility] RAD ONC MARIE   Radiation Oncology Clinic 10     [facility] LAB DRAW    6:30 AM   (15 min.)    [facility] LAB DRAW   [facility] Lab Draw     [facility] ONC INFUSION 360    7:00 AM   (360 min.)   UC ONCOLOGY INFUSION   [facility] Cancer [facility]     UMP RETURN    7:15 AM   (30 min.)   Karin Scott APRN CNP   [facility] EXTERNAL RADIATION TREATMT    2:30 PM   (30 min.)   [facility] RAD ONC MARIE   Radiation Oncology Clinic 11     [facility] EXTERNAL RADIATION TREATMT    2:30 PM   (30 min.)   [facility] RAD ONC MARIE   Radiation Oncology Clinic 12     [facility] LAB DRAW    2:15 PM   (15 min.)    [facility] LAB DRAW   [facility] Lab Draw     [facility] EXTERNAL RADIATION TREATMT    2:30 PM   (30 min.)   [facility] RAD ONC MARIE   Radiation Oncology Clinic     [facility] ONC INFUSION 120    3:00 PM   (120 min.)   UC ONCOLOGY INFUSION   [facility] Cancer [facility] 13     [facility] ON TREATMENT VISIT    9:15 AM   (15 min.)   Yvon Leal MD   Radiation Oncology Clinic     [facility] EXTERNAL RADIATION TREATMT    2:30 PM   (30 min.)   [facility] RAD ONC MARIE   Radiation Oncology Clinic 14       15     16     [facility] LAB DRAW    7:30 AM   (15 min.)    [facility] LAB DRAW   [facility] Lab Draw     [facility] ONC INFUSION 360    8:00 AM   (360 min.)   UC ONCOLOGY INFUSION   [facility]  [facility]     UMP EXTERNAL RADIATION TREATMT    2:30 PM   (30 min.)   UMP RAD ONC MARIE   Radiation Oncology Clinic 17     UMP EXTERNAL RADIATION TREATMT    2:30 PM   (30 min.)   UMP RAD ONC MARIE   [facility]cology [facility] 18     UMP EXTERNAL RADIATION TREATMT    7:45 AM   (30 min.)   P RAD ONC MARIE   Radiation Oncology Clinic     [facility] LAB DRAW    3:00 PM   (15 min.)   UC [facility] LAB DRAW   [facility] Mason[facility] Lab Draw     UMP ONC INFUSION 120    3:30 PM   (120 min.)   UC ONCOLOGY INFUSION   [facility] 19     UMP EXTERNAL RADIATION TREATMT    2:30 PM   (30 min.)   P RAD ONC MARIE   Radiation Oncology Clinic 20     [facility] LAB DRAW    6:30 AM   (15 min.)   UC [facility] LAB DRAW   [facility] Lab Draw     P ONC INFUSION 120    7:00 AM   (120 min.)   UC ONCOLOGY INFUSION   [facility]     UMP EXTERNAL RADIATION TREATMT    9:30 AM   (30 min.)   P RAD ONC MARIE   Radiation Oncology Clinic     [facility] ON TREATMENT VISIT   10:00 AM   (15 min.)   Yvon Leal MD   Radiation Oncology Clinic 21       22     23     [facility] LAB DRAW   12:30 PM   (15 min.)   UC [facility] LAB DRAW   [facility] Lab Draw     UMP ONC INFUSION 120    1:00 PM   (120 min.)   UC ONCOLOGY INFUSION   [facility]     UMP EXTERNAL RADIATION TREATMT    2:15 PM   (30 min.)   P RAD ONC MARIE   [facility] Oncology Clinic 24     UMP EXTERNAL RADIATION TREATMT    2:30 PM   (30 min.)   UMP RAD ONC MARIE   [facility]cology Clinic 25     UMP EXTERNAL RADIATION TREATMT    2:30 PM   (30 min.)   UMP RAD ONC MARIE   [facility]cology Clinic 26     UMP EXTERNAL RADIATION TREATMT    2:30 PM   (30 min.)   UMP RAD ONC MARIE   [facility] 27     UMP EXTERNAL RADIATION TREATMT    8:30 AM   (30 min.)   UMP RAD ONC MARIE   Radiation Oncology Clinic     UMP ON TREATMENT VISIT    9:00 AM   (15 min.)   Yvon Leal MD   Radiation Oncology Clinic     [facility] LAB DRAW    11:30 AM   (15 min.)    [facility] LAB DRAW   [facility] Lab Draw     UMP ONC INFUSION 120   12:00 PM   (120 min.)    ONCOLOGY INFUSION   [facility] Cancer Clinic 28       29     30     UMP EXTERNAL RADIATION TREATMT    8:00 AM   (30 min.)   P RAD ONC MARIE   Radiation Oncology Clinic                                     May 2018   Chris Monday Tuesday Wednesday Thursday Friday Saturday             1     [facility] LAB DRAW    6:30 AM   (15 min.)    [facility] LAB DRAW   [facility] Lab Draw     UMP ONC INFUSION 120    7:00 AM   (120 min.)    ONCOLOGY INFUSION   [facility] Cancer Clinic 2     3     4     5       6     7     8     9     10     11     12       13     14     15     16     17     18     LAB WITH  CLINIC    7:00 AM   (15 min.)    LAB   [facility] Lab     UMP RETURN GENERAL LIVER    7:45 AM   (30 min.)   Malgorzata Madden MD   [facility] Hepatology 19       20     21     UMP RETURN   11:30 AM   (30 min.)   Yvon Leal MD   Radiation Oncology Clinic     CT CHEST/ABDOMEN/PELVIS W   12:05 PM   (20 min.)   UCCT2   [facility] Imaging Center CT     UMP RETURN    2:15 PM   (30 min.)   Santosh Vera MD   [facility] Cancer [facility] 22     23     24     25     26       27     28     29     30     31                            Lab Results:  Recent Results (from the past 12 hour(s))   BMP - Basic Metabolic Panel    Collection Time: 04/27/18 11:57 AM   Result Value Ref Range    Sodium 133 133 - 144 mmol/L    Potassium 4.0 3.4 - 5.3 mmol/L    Chloride 100 94 - 109 mmol/L    Carbon Dioxide 24 20 - 32 mmol/L    Anion Gap 10 3 - 14 mmol/L    Glucose 84 70 - 99 mg/dL    Urea Nitrogen 9 7 - 30 mg/dL    Creatinine 0.95 0.52 - 1.04 mg/dL    GFR Estimate 60 (L) >60 mL/min/1.7m2    GFR Estimate If Black 72 >60 mL/min/1.7m2    Calcium 7.6 (L) 8.5 - 10.1 mg/dL   Mag    Collection Time: 04/27/18 11:57 AM   Result Value Ref Range    Magnesium 1.1 (L) 1.6 - 2.3 mg/dL   Albumin     Collection Time: 04/27/18 11:57 AM   Result Value Ref Range    Albumin 2.7 (L) 3.4 - 5.0 g/dL               Follow-ups after your visit        Your next 10 appointments already scheduled     Apr 30, 2018  8:00 AM CDT   EXTERNAL RADIATION TREATMENT with [facility] RAD ONC MARIE   Radiation Oncology Clinic ([facility])    [facility]  1st Floor  [address] [address]   [phone]            May 01, 2018  6:30 AM CDT   [facility] Lab Draw with  [facility] LAB DRAW   [facility]    [address]  [address]  [place] [address]   [phone]            May 01, 2018  7:00 AM CDT   Infusion 120 with UC ONCOLOGY INFUSION, UC 13 ATC   [facility] Cancer Clinic ([facility])    [address]  [address]  [place] [address]   [phone]            May 18, 2018  7:00 AM CDT   Lab with  LAB   [facility] Lab ([facility])    [address]  1st Floor  [place] [address]   [phone]            May 18, 2018  8:00 AM CDT   (Arrive by 7:45 AM)   Return General Liver with Malgorzata Madden MD   [facility] Hepatology ([facility])    [address]  [address]  [place] [address]   [phone]            May 21, 2018 11:30 AM CDT   Return Visit with Yvon Leal MD   Radiation Oncology Clinic ([facility])    [facility]  1st Floor  [address] [address]   [phone]            May 21, 2018 12:20 PM CDT   (Arrive by 12:05 PM)   CT CHEST/ABDOMEN/PELVIS W CONTRAST with UCCT2   [facility] Imaging [place] CT ([facility])    [address]  1st Floor  [place] [address]   [phone]           Please bring any scans or X-rays taken at other hospitals, if similar tests were done. Also bring a list of your  medicines, including vitamins, minerals and over-the-counter drugs. It is safest to leave personal items at home.  Be sure to tell your doctor:   If you have any allergies.   If there s any chance you are pregnant.   If you are breastfeeding.  How to prepare:   Do not eat or drink for 2 hours before your exam. If you need to take medicine, you may take it with small sips of water. (We may ask you to take liquid medicine as well.)   Please wear loose clothing, such as a sweat suit or jogging clothes. Avoid snaps, zippers and other metal. We may ask you to undress and put on a hospital gown.  Please arrive 30 minutes early for your CT. Once in the department you might be asked to drink water 15-20 minutes prior to your exam.  If indicated you may be asked to drink an oral contrast in advance of your CT.  If this is the case, the imaging team will let you know or be in contact with you prior to your appointment  Patients over 70 or patients with diabetes or kidney problems:   If you haven t had a blood test (creatinine test) within the last 30 days, the Cardiologist/Radiologist may require you to get this test prior to your exam.  If you have diabetes:   Continue to take your metformin medication on the day of your exam  If you have any questions, please call the Imaging Department where you will have your exam.            May 21, 2018  2:30 PM CDT   (Arrive by 2:15 PM)   Return Visit with Santosh Vear MD   [facility] Cancer [facility] ([facility])    [address] [place] [address]  [address]  [facility] [address] [phone]              Who to contact     If you have questions or need follow up information about today's clinic visit or your schedule please contact [facility] CANCER [facility] directly at [phone].  Normal or non-critical lab and imaging results will be communicated to you by MyChart, letter or phone within 4 business days after the clinic has received the  results. If you do not hear from us within 7 days, please contact the clinic through [employer] or phone. If you have a critical or abnormal lab result, we will notify you by phone as soon as possible.  Submit refill requests through [employer] or call your pharmacy and they will forward the refill request to us. Please allow 3 business days for your refill to be completed.          Additional Information About Your Visit        [employer] Information     [employer] gives you secure access to your electronic health record. If you see a primary care provider, you can also send messages to your care team and make appointments. If you have questions, please call your primary care clinic.  If you do not have a primary care provider, please call [phone] and they will assist you.        Care EveryWhere ID     This is your Care EveryWhere ID. This could be used by other organizations to access your [facility] medical records  [id number]        Your Vitals Were     Pulse Temperature Respirations Pulse Oximetry BMI (Body Mass Index)       75 97.6  F (36.4  C) 16 98% 22.15 kg/m2        Blood Pressure from Last 3 Encounters:   04/27/18 124/73   04/23/18 112/71   04/20/18 108/59    Weight from Last 3 Encounters:   04/27/18 63.2 kg (139 lb 4.8 oz)   04/27/18 62.6 kg (138 lb)   04/23/18 62.2 kg (137 lb 1.6 oz)              We Performed the Following     Albumin     BMP - Basic Metabolic Panel     [facility]        Primary Care Provider Office Phone # Fax #    Tova Montesinos, [phone]       [facility] [address] [facility] [address]        Equal Access to Services     [facility]: Hadii aad ku hadasho Soomaali, waaxda luqadaha, qaybta kaalmada surjit, talha jones. So [facility] [phone].    ATENCIÓN: Si habla español, tiene a elizabeth disposición servicios gratuitos de asistencia lingüística. Llame al [phone].    We comply with applicable federal civil rights laws and [place]  laws. We do not discriminate on the basis of race, color, national origin, age, disability, sex, sexual orientation, or gender identity.            Thank you!     Thank you for choosing [facility] CANCER CLINIC  for your care. Our goal is always to provide you with excellent care. Hearing back from our patients is one way we can continue to improve our services. Please take a few minutes to complete the written survey that you may receive in the mail after your visit with us. Thank you!             Your Updated Medication List - Protect others around you: Learn how to safely use, store and throw away your medicines at www.disposemymeds.org.          This list is accurate as of 4/27/18  4:21 PM.  Always use your most recent med list.                   Brand Name Dispense Instructions for use Diagnosis    acetaminophen 325 MG tablet    TYLENOL    30 tablet    Take 2 tablets (650 mg) by mouth every 6 hours    H/O lymph node excision       guaiFENesin-codeine 100-10 MG/5ML Soln solution    ROBITUSSIN AC    420 mL    Take 5-10 mLs by mouth every 4 hours as needed for cough    Cough       iohexol 140 MG/ML Soln solution   Start taking on:  5/16/2018    OMNIPAQUE    50 mL    Take 50 mLs by mouth once for 1 dose    Malignant neoplasm of vulva (H), Encounter for long-term (current) use of medications       loperamide 2 MG capsule    IMODIUM     Take 2 mg by mouth 4 times daily as needed for diarrhea        LORazepam 1 MG tablet    ATIVAN    30 tablet    Take 1 tablet (1 mg) by mouth every 6 hours as needed (nausea/vomiting, anxiety or sleep )    Encounter for long-term (current) use of medications, Malignant neoplasm of vulva (H)       magnesium oxide 400 (241.3 Mg) MG tablet    MAG-OX    60 tablet    Take 1 tablet (400 mg) by mouth 2 times daily    Malignant neoplasm of vulva (H), Hypomagnesemia       Multi-vitamin Tabs tablet      Take 1 tablet by mouth daily        oxyCODONE IR 5 MG tablet    ROXICODONE    30 tablet     Take 1 tablet (5 mg) by mouth every 4 hours as needed for pain    H/O lymph node excision       potassium chloride SA 20 MEQ CR tablet    KLOR-CON    60 tablet    Take 1 tablet (20 mEq) by mouth 2 times daily    Malignant neoplasm of vulva (H), Hypokalemia       prochlorperazine 10 MG tablet    COMPAZINE    30 tablet    Take 1 tablet (10 mg) by mouth every 6 hours as needed (nausea/vomiting)    Encounter for long-term (current) use of medications, Malignant neoplasm of vulva (H)       silver sulfADIAZINE 1 % cream    SILVADENE    400 g    Apply topically 2 times daily    Dermatitis

## 2018-04-27 NOTE — PROGRESS NOTES
RADIATION ONCOLOGY WEEKLY ON TREATMENT VISIT   Encounter Date: 2018    Patient Name: Julius Gilliam  MRN: [id number]  : 1955     Treatment Summary to Date  Treatment Site: Vulva Current Dose: 6170/6300cGy cGy Fractions: 35/36fx      Chemotherapy  Chemo concurrent with radx?: Yes  Oncologist: Dr. Vera  Drug Name/Frequency 1: Cisplatin weekly    Subjective: Ms. Gilliam presents to clinic today for her final on-treatment visit. She has one fraction remaining; her skin in the groin is healing well (now on the boost component of treatment). Her diarrhea is under control with 2 pills imodium daily. Otherwise, she notes some diminished appetite and general fatigue which is improved with rest. No other complaints today, and she weakly denies hematuria, dysuria, urinary frequency, pain and hematochezia.    ROS:   Nutrition Alteration  Diet Type: Patient's Preference  Nutrition Note: low appetite  Skin  Skin Reaction:  (Skin seen on txm table per MD's)  Skin Progress: Encouraged pt to get in the shower to daily to help cleanse the area and with healing  Skin Note: has proshield uses occasionally        Cardiovascular  Cardio/Resp Note: Pt reports a painful cough, seen in ED for evaluation  Gastrointestinal  Nausea: 0 - None  Diarrhea: 0 - None (has been on imodium, none today)  Genitourinary  Dysuria: 0 - None   Note: Using proshield and trying clean the area well  Psychosocial  Pyschosocial Note: fatigue setting in  Pain Assessment  0-10 Pain Scale: 0  Pain Note: No complaints of pain from pt    Objective:   Wt 62.6 kg (138 lb)  BMI 21.94 kg/m2  Gen: Appears well, in no acute distress  Skin & Vulva: Moderate diffuse erythema over treatment field with healing dry desquamation in groin with patches of moist desquamation in skin folds. Vulvar lesion with significant regression; persistent distal vaginal nodularity, particularly on the left side.    Treatment-related toxicities (CTCAE  v4.0):  1. Fatigue: Grade 1: Fatigue relieved by rest  2. Pain: Grade 0: No toxicity  3. Diarrhea: Grade 1: Increase of <4 stools per day over baseline; mild increase in ostomy output compared to baseline  4. Dermatitis: Grade 2: Moderate to brisk erythema; patchy moist desquamation, mostly confined to skin folds and creases; moderate erythema    Assessment:    Ms. Gilliam will complete her radiation therapy course on Monday. She has tolerated treatment quite well.    Plan:   1. Complete radiotherapy on Monday  2. She was provided with a vaginal dilator and instructed in its use. It is OK to wait if it causes too much pain, but she was encouraged to attempt to use it daily if possible.  3. FU with Dr. Vera on 5/21 with CT scan. FU with our clinic on the same day.    Mosaiq chart and setup information reviewed  MVCT images reviewed    Medication Review  Med list reviewed with patient?: Yes    Educational Topic Discussed  Additional Instructions: D/C instructions reviewed with pt  Education Instructions: reviewed    Pt was seen and discussed with staff, Dr. Leal.    Kirt Olson MD PGY-4  Radiation Oncology Resident, [facility]  Phone: [phone]    I saw and examined the patient with the resident.  I have reviewed and agree with the resident's note and plan of care.      Yvon Leal MD

## 2018-04-27 NOTE — NURSING NOTE
Chief Complaint   Patient presents with     Labs Only     venipuncture, vitals checked     piv placed for infusion

## 2018-04-27 NOTE — PATIENT INSTRUCTIONS
Contact Numbers    [facility] Main Line: [phone]  [facility] Triage:  [phone]    Call triage with chills and/or temperature greater than or equal to 100.5, uncontrolled nausea/vomiting, diarrhea, constipation, dizziness, shortness of breath, chest pain, bleeding, unexplained bruising, or any new/concerning symptoms, questions/concerns.     If you are having any concerning symptoms or wish to speak to a provider before your next infusion visit, please call your care coordinator or triage to notify them so we can adequately serve you.       After Hours: [phone]    If after hours, weekends, or holidays, call main hospital  and ask for Oncology doctor on call.           April 2018 Sunday Monday Tuesday Wednesday Thursday Friday Saturday   1     2     [facility] MASONIC LAB DRAW    6:30 AM   (15 min.)    MASONIC LAB DRAW   [facility] Lab Draw     [facility] ONC INFUSION 360    7:00 AM   (360 min.)    ONCOLOGY INFUSION   [facility] Cancer [facility] EXTERNAL RADIATION TREATMT    2:15 PM   (30 min.)   [facility] RAD ONC MARIE   Radiation Oncology Clinic 3     [facility] EXTERNAL RADIATION TREATMT    2:30 PM   (30 min.)   [facility] RAD ONC MARIE   Radiation Oncology Clinic 4     [facility] EXTERNAL RADIATION TREATMT    2:30 PM   (30 min.)   [facility] RAD ONC MARIE   Radiation Oncology Clinic 5     [facility] EXTERNAL RADIATION TREATMT    2:30 PM   (30 min.)   [facility] RAD ONC MARIE   Radiation Oncology Clinic 6     LEVEL 4    8:30 AM   (240 min.)   UR CH 05   [facility] [place], Infusion Services     Admission    9:10 AM   Adry Groves MD   Unit 6D Observation [facility]   (Discharge: 4/7/2018)     CT CHEST PULMONARY EMBOLISM W   10:30 AM   (30 min.)   URCT1   [facility], Radiology     [facility] EXTERNAL RADIATION TREATMT    2:30 PM   (30 min.)   [facility] RAD ONC MARIE   Radiation Oncology Clinic     [facility] ON TREATMENT VISIT    2:45 PM   (15 min.)   Yvon Leal MD   Radiation Oncology Clinic 7     ECH STRESS TEST   11:05 AM   (60 min.)   [id number]   [facility],  [place],  Echocardiography     ECH DOBUTAMINE STRESS TEST   11:25 AM   (60 min.)   [id number]   [facility], [place],  Echocardiography   8     9     [facility] EXTERNAL RADIATION TREATMT    2:15 PM   (30 min.)   [facility] RAD ONC MARIE   Radiation Oncology Clinic 10     [facility] LAB DRAW    6:30 AM   (15 min.)    M[facility]IC LAB DRAW   [facility] Mason[facility] Lab Draw     P ONC INFUSION 360    7:00 AM   (360 min.)   [facility] ONCOLOGY INFUSION   [facility]     UMP RETURN    7:15 AM   (30 min.)   Karin Scott APRN CNP   [facility]    2:30 PM   (30 min.)   P RAD ONC MARIE   Radiation Oncology Clinic 11     [facility] EXTERNAL RADIATION TREATMT    2:30 PM   (30 min.)   [facility] RAD ONC MARIE   Radiation Oncology Clinic 12     P MASONIC LAB DRAW    2:15 PM   (15 min.)    M[facility] LAB DRAW   [facility] Masonic Lab Draw     P EXTERNAL RADIATION TREATMT    2:30 PM   (30 min.)   [facility] RAD ONC MARIE   Radiation Oncology Clinic     P ONC INFUSION 120    3:00 PM   (120 min.)   UC ONCOLOGY INFUSION   [facility] 13     UMP ON TREATMENT VISIT    9:15 AM   (15 min.)   Yvon Leal MD   Radiation Oncology Clinic     UMP EXTERNAL RADIATION TREATMT    2:30 PM   (30 min.)   P RAD ONC MARIE   Radiation Oncology Clinic 14       15     16     [facility] LAB DRAW    7:30 AM   (15 min.)    [facility] LAB DRAW   [facility] Lab Draw     P ONC INFUSION 360    8:00 AM   (360 min.)   UC ONCOLOGY INFUSION   [facility]     UMP EXTERNAL RADIATION TREATMT    2:30 PM   (30 min.)   P RAD ONC MARIE   Radiation Oncology Clinic 17     UMP EXTERNAL RADIATION TREATMT    2:30 PM   (30 min.)   P RAD ONC MARIE   Radiation Oncology Clinic 18     UMP EXTERNAL RADIATION TREATMT    7:45 AM   (30 min.)   [facility] RAD ONC MARIE   Radiation Oncology Clinic     [facility] LAB DRAW    3:00 PM   (15 min.)   [facility] LAB DRAW   [facility] Mason[facility] Lab Draw     P ONC INFUSION  120    3:30 PM   (120 min.)   UC ONCOLOGY INFUSION   [facility] Cancer [facility] 19     UMP EXTERNAL RADIATION TREATMT    2:30 PM   (30 min.)   P RAD ONC MARIE   Radiation Oncology Clinic 20     [facility] LAB DRAW    6:30 AM   (15 min.)   [facility] LAB DRAW   [facility] Masonic Lab Draw     P ONC INFUSION 120    7:00 AM   (120 min.)   UC ONCOLOGY INFUSION   [facility]     UMP EXTERNAL RADIATION TREATMT    9:30 AM   (30 min.)   P RAD ONC MARIE   Radiation Oncology Clinic     UMP ON TREATMENT VISIT   10:00 AM   (15 min.)   Yvon Leal MD   Radiation Oncology Clinic 21       22     23     [facility] LAB DRAW   12:30 PM   (15 min.)   [facility]     P ONC INFUSION 120    1:00 PM   (120 min.)    ONCOLOGY INFUSION   [facility]     UMP EXTERNAL RADIATION TREATMT    2:15 PM   (30 min.)   UMP RAD ONC MARIE   [facility]iation Oncology Clinic 24     UMP EXTERNAL RADIATION TREATMT    2:30 PM   (30 min.)   P RAD ONC MARIE   Radiation Oncology Clinic 25     UMP EXTERNAL RADIATION TREATMT    2:30 PM   (30 min.)   UMP RAD ONC MARIE   [facility] Oncology Clinic 26     UMP EXTERNAL RADIATION TREATMT    2:30 PM   (30 min.)   P RAD ONC MARIE   [facility] Oncology Clinic 27     UMP EXTERNAL RADIATION TREATMT    8:30 AM   (30 min.)   P RAD ONC MARIE   Radiation Oncology Clinic     UMP ON TREATMENT VISIT    9:00 AM   (15 min.)   Yvon Leal MD   Radiation Oncology Clinic     [facility] LAB DRAW   11:30 AM   (15 min.)    M[facility] LAB DRAW   [facility] Masonic Lab Draw     UMP ONC INFUSION 120   12:00 PM   (120 min.)    ONCOLOGY INFUSION   [facility] 28       29     30     UMP EXTERNAL RADIATION TREATMT    8:00 AM   (30 min.)   P RAD ONC MARIE   Radiation Oncology Clinic                                     May 2018   Chris Monday Tuesday Wednesday Thursday Friday Saturday             1     [facility] LAB DRAW    6:30 AM   (15  min.)   [facility] LAB DRAW   [facility] Lab Draw     UMP ONC INFUSION 120    7:00 AM   (120 min.)   UC ONCOLOGY INFUSION   [facility] Cancer [facility] 2     3     4     5       6     7     8     9     10     11     12       13     14     15     16     17     18     LAB WITH HB CLINIC    7:00 AM   (15 min.)    LAB   [facility] Lab     UMP RETURN GENERAL LIVER    7:45 AM   (30 min.)   Malgorzata Madden MD   [facility] Hepatology 19       20     21     UMP RETURN   11:30 AM   (30 min.)   Yvon Leal MD   Radiation Oncology Clinic     CT CHEST/ABDOMEN/PELVIS W   12:05 PM   (20 min.)   UCCT2   [facility] CT     UMP RETURN    2:15 PM   (30 min.)   Santosh Vera MD   [facility] 22     23     24     25     26       27     28     29     30     31                            Lab Results:  Recent Results (from the past 12 hour(s))   BMP - Basic Metabolic Panel    Collection Time: 04/27/18 11:57 AM   Result Value Ref Range    Sodium 133 133 - 144 mmol/L    Potassium 4.0 3.4 - 5.3 mmol/L    Chloride 100 94 - 109 mmol/L    Carbon Dioxide 24 20 - 32 mmol/L    Anion Gap 10 3 - 14 mmol/L    Glucose 84 70 - 99 mg/dL    Urea Nitrogen 9 7 - 30 mg/dL    Creatinine 0.95 0.52 - 1.04 mg/dL    GFR Estimate 60 (L) >60 mL/min/1.7m2    GFR Estimate If Black 72 >60 mL/min/1.7m2    Calcium 7.6 (L) 8.5 - 10.1 mg/dL   Mag    Collection Time: 04/27/18 11:57 AM   Result Value Ref Range    Magnesium 1.1 (L) 1.6 - 2.3 mg/dL   Albumin    Collection Time: 04/27/18 11:57 AM   Result Value Ref Range    Albumin 2.7 (L) 3.4 - 5.0 g/dL

## 2018-04-27 NOTE — PATIENT INSTRUCTIONS
Discharge Information - Gynecological Cancer  Many side effects of treatment are temporary.  They will slowly improve after your treatments have ended.  Skin problems often improve within two to four weeks.  It can take longer for your energy to return.    Go to all of your follow-up visits with your doctor.  Call if you have new symptoms or problems.    Keep up focusing on good nutrition for at least four more weeks.  If you maintain your weight and nutrition, you will recover faster.    If you have given up some foods, go back to them very slowly and in small amounts.  If they cause cramps or diarrhea, wait another week or two before trying them again.    Drink plenty of fluids.    Keep caring for skin in the treated area  - Continue with sitz baths if needed and applying skin ointment or cream as needed or until your first follow-up visit.  - Avoid very hot water when bathing  - Avoid deodorant, perfumed soap, ointments, salves and tight underwear for six weeks.  - Do not expose treated skin to direct sunlight, heat lamps, sun lamps or other extreme hot or cold.  You will need to do this for the rest of your life.    Keep using your vaginal dilator if you have been given one.  You will need to do this for the rest of your life.    You may need to use water soluble gel during sex to avoid discomfort.  You may need to do this for the rest of your life.  When should I call my doctor?  [phone]  Call your doctor if you have any of these new symptoms:    Nausea, vomiting or diarrhea.    Feeling dizzy    Weight loss    Pain    Skin problems    Vaginal bleeding or fluid that soaks more than three pads a day or any bleeding with sex    A fever over 100.4 (38 C) (taken under the tongue)    Any sudden change in your condition

## 2018-04-30 ENCOUNTER — APPOINTMENT (OUTPATIENT)
Dept: RADIATION ONCOLOGY | Facility: CLINIC | Age: 63
End: 2018-04-30
Attending: RADIOLOGY
Payer: COMMERCIAL

## 2018-04-30 PROCEDURE — 77386 ZZH IMRT TREATMENT DELIVERY, COMPLEX: CPT | Performed by: RADIOLOGY

## 2018-05-01 ENCOUNTER — CARE COORDINATION (OUTPATIENT)
Dept: ONCOLOGY | Facility: CLINIC | Age: 63
End: 2018-05-01

## 2018-05-01 NOTE — PROGRESS NOTES
Care Coordinator Note  Left message for patient to call her local clinic to set up appointment to go in for Mg/K lab draw and possible IV hydration.  Patient's local provider's office notified regarding patient and potential need for electrolyte replacement, recent clinic notes faxed.     Heide SUAZO, RN  Care Coordinator  Gynecologic Cancer   Office:  [phone]  Pager: [phone] #[phone]

## 2018-05-02 ENCOUNTER — CARE COORDINATION (OUTPATIENT)
Dept: ONCOLOGY | Facility: CLINIC | Age: 63
End: 2018-05-02

## 2018-05-02 NOTE — PROGRESS NOTES
Care Coordinator Note  Left message for patient that I had been in touch with her local physician's office regarding lab draw and potential electrolyte replacement, IV hydration.  Asked that patient return my call.     Heide Perez MSN, RN  Care Coordinator  Gynecologic Cancer   Office:  [phone]  Pager: [phone] #[phone]

## 2018-05-08 ENCOUNTER — ONCOLOGY VISIT (OUTPATIENT)
Dept: RADIATION ONCOLOGY | Facility: CLINIC | Age: 63
End: 2018-05-08

## 2018-05-08 NOTE — MR AVS SNAPSHOT
After Visit Summary   5/8/2018    Julius Gilliam    MRN: [id number]           Patient Information     Date Of Birth          1955        Visit Information        Provider Department      5/8/2018 10:00 PM Yvon Leal MD Radiation Oncology Clinic         Follow-ups after your visit        Your next 10 appointments already scheduled     May 18, 2018  7:00 AM CDT   Lab with UC LAB   [facility] ([facility])    [address]  1st Floor  [place] [address]   [phone]            May 18, 2018  8:00 AM CDT   (Arrive by 7:45 AM)   Return General Liver with Malgorzata Madden MD   [facility] Hepatology ([facility])    [address]  [address]  [place] [address] [phone]            May 21, 2018 11:30 AM CDT   Return Visit with Yvon Leal MD   Radiation Oncology Clinic ([facility])    [facility]  1st Floor  [address] [address]   [phone]            May 21, 2018 12:20 PM CDT   CT CHEST/ABDOMEN/PELVIS W CONTRAST with UCCT2   [facility] ([facility])    [address]  1st Floor  [place] [address] [phone]           Please bring any scans or X-rays taken at other hospitals, if similar tests were done. Also bring a list of your medicines, including vitamins, minerals and over-the-counter drugs. It is safest to leave personal items at home.  Be sure to tell your doctor:   If you have any allergies.   If there s any chance you are pregnant.   If you are breastfeeding.  How to prepare:   Do not eat or drink for 2 hours before your exam. If you need to take medicine, you may take it with small sips of water. (We may ask you to take liquid medicine as well.)   Please wear loose clothing, such as a sweat suit or jogging clothes. Avoid snaps, zippers and other metal. We may ask you to  undress and put on a hospital gown.  Please arrive 30 minutes early for your CT. Once in the department you might be asked to drink water 15-20 minutes prior to your exam.  If indicated you may be asked to drink an oral contrast in advance of your CT.  If this is the case, the imaging team will let you know or be in contact with you prior to your appointment  Patients over 70 or patients with diabetes or kidney problems:   If you haven t had a blood test (creatinine test) within the last 30 days, the Cardiologist/Radiologist may require you to get this test prior to your exam.  If you have diabetes:   Continue to take your metformin medication on the day of your exam  If you have any questions, please call the Imaging Department where you will have your exam.            May 21, 2018  2:30 PM CDT   (Arrive by 2:15 PM)   Return Visit with Santosh Vera MD   [facility] ([facility])    [address]  [address] [place] [address] [phone]              Who to contact     Please call your clinic at [phone] to:    Ask questions about your health    Make or cancel appointments    Discuss your medicines    Learn about your test results    Speak to your doctor            Additional Information About Your Visit        Copytele Information     [employer] gives you secure access to your electronic health record. If you see a primary care provider, you can also send messages to your care team and make appointments. If you have questions, please call your primary care clinic.  If you do not have a primary care provider, please call [phone] and they will assist you.      [employer] is an electronic gateway that provides easy, online access to your medical records. With [employer], you can request a clinic appointment, read your test results, renew a prescription or communicate with your care team.     To access your existing account, please contact your  [facility] or call [phone] for assistance.        Care EveryWhere ID     This is your Care EveryWhere ID. This could be used by other organizations to access your [place] medical records  [id number]         Blood Pressure from Last 3 Encounters:   No data found for BP    Weight from Last 3 Encounters:   No data found for Wt              Today, you had the following     No orders found for display       Primary Care Provider Office Phone # Fax #    Tova Montesinos, [phone]       [facility] [address] [facility] [address]        Equal Access to Services     ANT[facility]INE [facility] : Hadii aad ku hadasho Soomaali, waaxda luqadaha, qaybta kaalmada adeegyada, talha herrera hayaan adeeg dinesh cárdenas . So [facility] [phone].    ATENCIÓN: Si habla español, tiene a elizabeth disposición servicios gratuitos de asistencia lingüística. LlSt. [facility] [phone].    We comply with applicable federal civil rights laws and [place] laws. We do not discriminate on the basis of race, color, national origin, age, disability, sex, sexual orientation, or gender identity.            Thank you!     Thank you for choosing RADIATION ONCOLOGY CLINIC  for your care. Our goal is always to provide you with excellent care. Hearing back from our patients is one way we can continue to improve our services. Please take a few minutes to complete the written survey that you may receive in the mail after your visit with us. Thank you!             Your Updated Medication List - Protect others around you: Learn how to safely use, store and throw away your medicines at www.disposemymeds.org.          This list is accurate as of 5/8/18 11:59 PM.  Always use your most recent med list.                   Brand Name Dispense Instructions for use Diagnosis    acetaminophen 325 MG tablet    TYLENOL    30 tablet    Take 2 tablets (650 mg) by mouth every 6 hours    H/O lymph node excision        guaiFENesin-codeine 100-10 MG/5ML Soln solution    ROBITUSSIN AC    420 mL    Take 5-10 mLs by mouth every 4 hours as needed for cough    Cough       iohexol 140 MG/ML Soln solution   Start taking on:  5/16/2018    OMNIPAQUE    50 mL    Take 50 mLs by mouth once for 1 dose    Malignant neoplasm of vulva (H), Encounter for long-term (current) use of medications       loperamide 2 MG capsule    IMODIUM     Take 2 mg by mouth 4 times daily as needed for diarrhea        LORazepam 1 MG tablet    ATIVAN    30 tablet    Take 1 tablet (1 mg) by mouth every 6 hours as needed (nausea/vomiting, anxiety or sleep )    Encounter for long-term (current) use of medications, Malignant neoplasm of vulva (H)       magnesium oxide 400 (241.3 Mg) MG tablet    MAG-OX    60 tablet    Take 1 tablet (400 mg) by mouth 2 times daily    Malignant neoplasm of vulva (H), Hypomagnesemia       Multi-vitamin Tabs tablet      Take 1 tablet by mouth daily        oxyCODONE IR 5 MG tablet    ROXICODONE    30 tablet    Take 1 tablet (5 mg) by mouth every 4 hours as needed for pain    H/O lymph node excision       potassium chloride SA 20 MEQ CR tablet    KLOR-CON    60 tablet    Take 1 tablet (20 mEq) by mouth 2 times daily    Malignant neoplasm of vulva (H), Hypokalemia       prochlorperazine 10 MG tablet    COMPAZINE    30 tablet    Take 1 tablet (10 mg) by mouth every 6 hours as needed (nausea/vomiting)    Encounter for long-term (current) use of medications, Malignant neoplasm of vulva (H)       silver sulfADIAZINE 1 % cream    SILVADENE    400 g    Apply topically 2 times daily    Dermatitis

## 2018-05-10 NOTE — PROCEDURES
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  STAGE: FIGO IIIC (T2 N2c)  CONCURRENT SYSTEMIC THERAPY: Weekly IV cisplatin                  Details of the treatments summarized below are found in records kept in the Department of Radiation Oncology at [facility].     Treatment Summary:  Radiation Oncology - Course: 1 Protocol:   Treatment Site Dose Modality From To Days Fx.  Vulva/Bilat Groin/Pelvis  4,550 cGy 06 X  3/12/2018  2018  35 26  Vulvar Boost         1,800 cGy 06 X  4/17/2018  2018  13 10     Dose per Fraction: 175 cGy/fx to vulva, groin and pelvis; 180 cGy/fx to the vulvar boost field        Total Dose:  6,350 cGy to the vulva           COMMENTS:                      Ms. Lowe is a 62 year old woman with squamous cell carcinoma of the vulva. On presentation, she had a   bulky mass involving the bilateral labia minora and labia majora, with extension to the anterior anus and   encroachment upon the urethra. She also had bulky left-sided lymphadenopathy, and underwent bilateral   inguinal dissection with positive left-sided lymph nodes with extracapsular extension. Because of the extent of   the primary disease in the vulva, she was not a surgical candidate.  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  We therefore recommended concurrent chemoradiation therapy with weekly cisplatin.   This was delivered as detailed above.     During treatment, she developed (by CTCAE v4.0) grade 1 diarrhea, grade 2 dermatitis, grade 2 acute pain   reaction, grade 1 dysuria, and grade 1 fatigue. Dermatitis was managed with aquaphor, proshield, and Silvadene   in the groin at areas of  skin breakdown. Of note, after 3-4 weeks of treatment, her tumor was seen to regress   quite nicely. At the completion of treatment, there was no visible vulvar tumor, and only mild-moderate   induration in the distal posterior vagina. There were no unplanned radiotherapy treatment breaks; she was   briefly admitted to the hospital from 4/6/18 - 4/7/18 after she was seen in the ED for right chest wall pain.   During that time she had a CT chest (which showed a 3mm nodule in the RLL, indeterminate) and serial   troponins (negative for MI). Stress echo on 4/6/18 showed no evidence of inducible ischemia. She did have a   urinalysis consistent with urinary tract infection, for which she was treated with 1gm IV rocephin followed by   ciprofloxacin x 5 days.     PAIN MANAGEMENT:  She denied any pain at the completion of radiation therapy.                              FOLLOW UP PLAN: Return to clinic in 1 month. Continue followup with gynecologic oncology (Dr. Vera) as scheduled, with PET/CT as planned to restage with particular attention to the incidentally found   3mm pulmonary nodule.                           Resident Physician:  Kirt Olson M.D.   Staff Physician: Yvon Leal M.D.  Physicist: Hudson Benedict, PhD     CC: MD Tova Moore MD                                 Radiation Oncology:  [facility] [address] [place] [address]

## 2018-05-15 ENCOUNTER — TELEPHONE (OUTPATIENT)
Dept: GASTROENTEROLOGY | Facility: CLINIC | Age: 63
End: 2018-05-15

## 2018-05-15 ASSESSMENT — ENCOUNTER SYMPTOMS
ALTERED TEMPERATURE REGULATION: 0
VOMITING: 1
WEIGHT GAIN: 1
NIGHT SWEATS: 0
POLYPHAGIA: 0
WEIGHT LOSS: 0
ABDOMINAL PAIN: 1
NAUSEA: 1
DECREASED APPETITE: 0
CONSTIPATION: 0
FEVER: 0
BLOATING: 1
BLOOD IN STOOL: 0
RECTAL PAIN: 0
INCREASED ENERGY: 0
FATIGUE: 0
DIARRHEA: 1
HEARTBURN: 0
POLYDIPSIA: 0
BOWEL INCONTINENCE: 0
HALLUCINATIONS: 0
CHILLS: 0
JAUNDICE: 0

## 2018-05-17 ENCOUNTER — TRANSFERRED RECORDS (OUTPATIENT)
Dept: HEALTH INFORMATION MANAGEMENT | Facility: CLINIC | Age: 63
End: 2018-05-17

## 2018-05-18 ENCOUNTER — HOSPITAL ENCOUNTER (OUTPATIENT)
Dept: ULTRASOUND IMAGING | Facility: CLINIC | Age: 63
Discharge: HOME | End: 2018-05-18
Attending: PHYSICIAN ASSISTANT | Admitting: PHYSICIAN ASSISTANT
Payer: COMMERCIAL

## 2018-05-18 ENCOUNTER — OFFICE VISIT (OUTPATIENT)
Dept: GASTROENTEROLOGY | Facility: CLINIC | Age: 63
End: 2018-05-18
Attending: INTERNAL MEDICINE
Payer: COMMERCIAL

## 2018-05-18 ENCOUNTER — TELEPHONE (OUTPATIENT)
Dept: GASTROENTEROLOGY | Facility: CLINIC | Age: 63
End: 2018-05-18

## 2018-05-18 VITALS
HEIGHT: 67 IN | OXYGEN SATURATION: 97 % | HEART RATE: 82 BPM | TEMPERATURE: 97.4 F | SYSTOLIC BLOOD PRESSURE: 133 MMHG | BODY MASS INDEX: 21.09 KG/M2 | DIASTOLIC BLOOD PRESSURE: 83 MMHG | WEIGHT: 134.4 LBS

## 2018-05-18 DIAGNOSIS — B18.1 VIRAL HEPATITIS B CHRONIC (H): ICD-10-CM

## 2018-05-18 DIAGNOSIS — E83.42 HYPOMAGNESEMIA: ICD-10-CM

## 2018-05-18 DIAGNOSIS — C51.9 VULVAR CANCER, CARCINOMA (H): ICD-10-CM

## 2018-05-18 DIAGNOSIS — M79.89 LEFT LEG SWELLING: Primary | ICD-10-CM

## 2018-05-18 DIAGNOSIS — M79.89 LEFT LEG SWELLING: ICD-10-CM

## 2018-05-18 DIAGNOSIS — E83.51 HYPOCALCEMIA: ICD-10-CM

## 2018-05-18 LAB
ALBUMIN SERPL-MCNC: 3 G/DL (ref 3.4–5)
ANION GAP SERPL CALCULATED.3IONS-SCNC: 8 MMOL/L (ref 3–14)
BUN SERPL-MCNC: 10 MG/DL (ref 7–30)
CALCIUM SERPL-MCNC: 7.9 MG/DL (ref 8.5–10.1)
CHLORIDE SERPL-SCNC: 99 MMOL/L (ref 94–109)
CO2 SERPL-SCNC: 28 MMOL/L (ref 20–32)
CREAT SERPL-MCNC: 1.05 MG/DL (ref 0.52–1.04)
GFR SERPL CREATININE-BSD FRML MDRD: 53 ML/MIN/1.7M2
GLUCOSE SERPL-MCNC: 101 MG/DL (ref 70–99)
MAGNESIUM SERPL-MCNC: 1.3 MG/DL (ref 1.6–2.3)
POTASSIUM SERPL-SCNC: 3.3 MMOL/L (ref 3.4–5.3)
SODIUM SERPL-SCNC: 135 MMOL/L (ref 133–144)

## 2018-05-18 PROCEDURE — G0463 HOSPITAL OUTPT CLINIC VISIT: HCPCS | Mod: ZF

## 2018-05-18 PROCEDURE — 93971 EXTREMITY STUDY: CPT | Mod: LT

## 2018-05-18 ASSESSMENT — PAIN SCALES - GENERAL: PAINLEVEL: MILD PAIN (3)

## 2018-05-18 NOTE — TELEPHONE ENCOUNTER
Left voicemail and released result to [employer].    Message  Received: Today       Shila Kwon PA-C Guidarelli, Jacob, CASSANDRA                     Please call patient and inform her that she did not have a DVT in her leg.

## 2018-05-18 NOTE — PROGRESS NOTES
Hepatology Clinic note  Julius Gilliam   Date of Birth 1955  Date of Service 5/18/2018         Assessment/plan:   Julius Gilliam is a 63 year old female with hepatitis C, genotype 1A.  She is treatment naïve.  Fibrosis scan previously showed Stage 1 fibrosis. Patient has recently finished chemoradiation for vulvar cancer with lymph node metastasis and was discharged last week after hospitalization for SBO requiring bowel resection.  She has a follow-up scan on Monday to evaluate her recent cancer treatment.  We discussed that he has minimal scarring in her liver today and that there is no urgency to treatment in the upcoming months. Patient has pancytopenia likely due to chemotherapy. She otherwise has normal liver function.  Her priority should be ensuring that her cancer is controlled and that she has recovered from her recent abdominal surgery.  We will plan to treat Hepatitis C in the near future. She noted left lateral leg swelling that has occurred since her recent discharge, will rule out DVT.     - Will need HIV status on file  -Rule out left leg DVT with ultrasound today  - Hepatic panel, CBC, BMP, INR   -Return to clinic in 3 months to discuss hepatitis C treatment    Shila Kwon PA-C   [facility] Hepatology clinic    CC: Mary Kate Montesinos NP     -----------------------------------------------------       HPI:   Julius Gilliam is a 63 year old female with  presenting for the follow-up for Hepatitis C.     Hepatitis C   -Genotype 1a  -Diagnosed:   -History: ? History of tattoo  -Fibrosis scan: Stage 1, 6.9 kilopascals  -Prior treatments: naive     Patient was recently hospitalized for small bowel obstruction, s/p ex lap and small bowel resection on 5/7/2018 in [facility].  She has a follow-up CT scan on Monday to determine ongoing course for her valvular cancer diagnosis. Noted PET scan is also ordered.    Patient states that since discharge she has swelling in her  "left leg.  The swelling in her abdomen has improved. She no longer has diarrhea which had been persistent throughout radiation therapy.     Patient denies jaundice  or confusion.  Patient also denies melena, hematochezia or hematemesis. Patient denies fevers, sweats or chills.    She hasn't drank alcohol in the last six weeks. History of 1-2 drinks a day. She owns a bar.     Previous labs:   Hep B Surface antigen : nonreactive  Hep B core antibody: nonreactive   HIV    Medical hx Surgical hx   Past Medical History:   Diagnosis Date     Deafness in right ear      Hypertension      Multiple facial bone fractures (H)      Vertigo      Vulvar cancer (H) 12/2017    Past Surgical History:   Procedure Laterality Date     DISSECT LYMPH NODE INGUINAL N/A 2/9/2018    Procedure: DISSECT LYMPH NODE INGUINAL;  Bilateral Inguinal Lymph Node Dissection;  Surgeon: Santosh Vera MD;  Location: [facility] OR     ENDOSCOPIC STRIPPING VEIN(S)       FACIAL RECONSTRUCTION SURGERY      post horse trauma     repair of left arm fracture                   Medications:     Current Outpatient Prescriptions   Medication     acetaminophen (TYLENOL) 325 MG tablet     guaiFENesin-codeine (ROBITUSSIN AC) 100-10 MG/5ML SOLN solution     loperamide (IMODIUM) 2 MG capsule     LORazepam (ATIVAN) 1 MG tablet     magnesium oxide (MAG-OX) 400 (241.3 MG) MG tablet     multivitamin, therapeutic with minerals (MULTI-VITAMIN) TABS tablet     oxyCODONE IR (ROXICODONE) 5 MG tablet     potassium chloride SA (KLOR-CON) 20 MEQ CR tablet     prochlorperazine (COMPAZINE) 10 MG tablet     silver sulfADIAZINE (SILVADENE) 1 % cream     No current facility-administered medications for this visit.             Allergies:   No Known Allergies         Review of Systems:   10 points ROS was obtained and highlighted in the HPI, otherwise negative.          Physical Exam:   VS:  /83  Pulse 82  Temp 97.4  F (36.3  C) (Oral)  Ht 1.689 m (5' 6.5\")  Wt 61 kg (134 lb " 6.4 oz)  SpO2 97%  BMI 21.37 kg/m2      Gen- well, NAD, A+Ox3, normal color  Lym- no palpable LAD  CVS- RRR  RS- CTA  Abd- soft, nontender. Healing midline scar without erythema.   Extr- hands normal, no MATTHEW  Skin- no rash or jaundice  Neuro- no asterixis  Psych- normal mood         Data:   Reviewed in person and significant for:    Lab Results   Component Value Date     05/18/2018      Lab Results   Component Value Date    POTASSIUM 3.3 05/18/2018     Lab Results   Component Value Date    CHLORIDE 99 05/18/2018     Lab Results   Component Value Date    CO2 28 05/18/2018     Lab Results   Component Value Date    BUN 10 05/18/2018     Lab Results   Component Value Date    CR 1.05 05/18/2018       Lab Results   Component Value Date    WBC 1.6 04/16/2018     Lab Results   Component Value Date    HGB 10.2 04/16/2018     Lab Results   Component Value Date    HCT 29.3 04/16/2018     Lab Results   Component Value Date    MCV 99 04/16/2018     Lab Results   Component Value Date     04/16/2018       Lab Results   Component Value Date    AST 16 04/20/2018     Lab Results   Component Value Date    ALT 10 04/20/2018     No results found for: BILICONJ   Lab Results   Component Value Date    BILITOTAL 0.3 04/20/2018       Lab Results   Component Value Date    ALBUMIN 3.0 05/18/2018     Lab Results   Component Value Date    PROTTOTAL 6.0 04/20/2018      Lab Results   Component Value Date    ALKPHOS 106 04/20/2018       Lab Results   Component Value Date    INR 0.91 03/23/2018

## 2018-05-18 NOTE — NURSING NOTE
Chief Complaint   Patient presents with     RECHECK     Hepatitis C virus infection     Se Mcgarry MA

## 2018-05-18 NOTE — MR AVS SNAPSHOT
After Visit Summary   5/18/2018    Julius Gilliam    MRN: [id number]           Patient Information     Date Of Birth          1955        Visit Information        Provider Department      5/18/2018 8:00 AM Shila Kwon PA-C [facility] Hepatology        Today's Diagnoses     Left leg swelling    -  1       Follow-ups after your visit        Follow-up notes from your care team     Return in about 3 months (around 8/18/2018).      Your next 10 appointments already scheduled     May 18, 2018  9:30 AM CDT   US LOWER EXTREMITY VENOUS DUPLEX LEFT with URUS1   [facility], [place], Ultrasound ([facility], [facility])    [address] [address] [phone]           Please bring a list of your medicines (including vitamins, minerals and over-the-counter drugs). Also, tell your doctor about any allergies you may have. Wear comfortable clothes and leave your valuables at home.  You do not need to do anything special to prepare for your exam.  Please call the Imaging Department at your exam site with any questions.            May 21, 2018 11:30 AM CDT   Return Visit with Yvon Leal MD   Radiation Oncology Clinic ([facility])    [facility]  1st Floor  [address] [id number] [phone]            May 21, 2018 12:20 PM CDT   CT CHEST/ABDOMEN/PELVIS W CONTRAST with UCCT2   [facility] CT ([facility])    [address]  1st [facility] [id number] [phone]           Please bring any scans or X-rays taken at other hospitals, if similar tests were done. Also bring a list of your medicines, including vitamins, minerals and over-the-counter drugs. It is safest to leave personal items at home.  Be sure to tell your doctor:   If you have any allergies.   If there s any chance you are pregnant.   If you are breastfeeding.  How to  prepare:   Do not eat or drink for 2 hours before your exam. If you need to take medicine, you may take it with small sips of water. (We may ask you to take liquid medicine as well.)   Please wear loose clothing, such as a sweat suit or jogging clothes. Avoid snaps, zippers and other metal. We may ask you to undress and put on a hospital gown.  Please arrive 30 minutes early for your CT. Once in the department you might be asked to drink water 15-20 minutes prior to your exam.  If indicated you may be asked to drink an oral contrast in advance of your CT.  If this is the case, the imaging team will let you know or be in contact with you prior to your appointment  Patients over 70 or patients with diabetes or kidney problems:   If you haven t had a blood test (creatinine test) within the last 30 days, the Cardiologist/Radiologist may require you to get this test prior to your exam.  If you have diabetes:   Continue to take your metformin medication on the day of your exam  If you have any questions, please call the Imaging Department where you will have your exam.            May 21, 2018  2:30 PM CDT   (Arrive by 2:15 PM)   Return Visit with Santosh Vera MD   [facility] ([facility])    [address]  [address]  [place] [place] [facility] [address] [phone]            Aug 16, 2018  1:00 PM CDT   Lab with  LAB   [facility] ([facility])    [address]  1st Floor  [facility] [id number] [phone]            Aug 16, 2018  1:45 PM CDT   (Arrive by 1:30 PM)   Return General Liver with Shila Kwon PA-C   [facility] Hepatology ([facility])    [address]  [address]  [place] [place] [facility] [place] [id number] [phone]              Future tests that were ordered for you today     Open Future Orders        Priority Expected Expires Ordered    US Lower Extremity Venous Duplex Left Routine  "5/18/2018 5/18/2019 5/18/2018     Lower Extremity Venous Duplex Bilateral Routine  5/18/2019 5/18/2018            Who to contact     If you have questions or need follow up information about today's clinic visit or your schedule please contact [facility] HEPATOLOG[facility] directly at [phone].  Normal or non-critical lab and imaging results will be communicated to you by MyChart, letter or phone within 4 business days after the clinic has received the results. If you do not hear from us within 7 days, please contact the clinic through [facility] or phone. If you have a critical or abnormal lab result, we will notify you by phone as soon as possible.  Submit refill requests through [employer] or call your pharmacy and they will forward the refill request to us. Please allow 3 business days for your refill to be completed.          Additional Information About Your Visit        [facility] Information     [employer] gives you secure access to your electronic health record. If you see a primary care provider, you can also send messages to your care team and make appointments. If you have questions, please call your primary care clinic.  If you do not have a primary care provider, please call [phone] and they will assist you.        Care EveryWhere ID     This is your Care EveryWhere ID. This could be used by other organizations to access your [place] medical records  [id number]        Your Vitals Were     Pulse Temperature Height Pulse Oximetry BMI (Body Mass Index)       82 97.4  F (36.3  C) (Oral) 1.689 m (5' 6.5\") 97% 21.37 kg/m2        Blood Pressure from Last 3 Encounters:   05/18/18 133/83   04/27/18 124/73   04/23/18 112/71    Weight from Last 3 Encounters:   05/18/18 61 kg (134 lb 6.4 oz)   04/27/18 63.2 kg (139 lb 4.8 oz)   04/27/18 62.6 kg (138 lb)               Primary Care Provider Office Phone # Fax #    Tova ADE Montesinos [phone]       [facility] [address] [place] [address]   "      Equal Access to Services     [facility]: Hadii melania blue nbacecilio Juancarlosali, waroseannda luqadaha, qaybta kazeinatalha kirk. So [facility] [phone].    ATENCIÓN: Si habla español, tiene a elizabeth disposición servicios gratuitos de asistencia lingüística. Teodora al [phone].    We comply with applicable federal civil rights laws and [place] laws. We do not discriminate on the basis of race, color, national origin, age, disability, sex, sexual orientation, or gender identity.            Thank you!     Thank you for choosing [facility] HEPATOLOGY  for your care. Our goal is always to provide you with excellent care. Hearing back from our patients is one way we can continue to improve our services. Please take a few minutes to complete the written survey that you may receive in the mail after your visit with us. Thank you!             Your Updated Medication List - Protect others around you: Learn how to safely use, store and throw away your medicines at www.disposemymeds.org.          This list is accurate as of 5/18/18  9:24 AM.  Always use your most recent med list.                   Brand Name Dispense Instructions for use Diagnosis    acetaminophen 325 MG tablet    TYLENOL    30 tablet    Take 2 tablets (650 mg) by mouth every 6 hours    H/O lymph node excision       guaiFENesin-codeine 100-10 MG/5ML Soln solution    ROBITUSSIN AC    420 mL    Take 5-10 mLs by mouth every 4 hours as needed for cough    Cough       loperamide 2 MG capsule    IMODIUM     Take 2 mg by mouth 4 times daily as needed for diarrhea        LORazepam 1 MG tablet    ATIVAN    30 tablet    Take 1 tablet (1 mg) by mouth every 6 hours as needed (nausea/vomiting, anxiety or sleep )    Encounter for long-term (current) use of medications, Malignant neoplasm of vulva (H)       magnesium oxide 400 (241.3 Mg) MG tablet    MAG-OX    60 tablet    Take 1 tablet (400 mg) by mouth 2 times daily    Malignant neoplasm  of vulva (H), Hypomagnesemia       Multi-vitamin Tabs tablet      Take 1 tablet by mouth daily        oxyCODONE IR 5 MG tablet    ROXICODONE    30 tablet    Take 1 tablet (5 mg) by mouth every 4 hours as needed for pain    H/O lymph node excision       potassium chloride SA 20 MEQ CR tablet    KLOR-CON    60 tablet    Take 1 tablet (20 mEq) by mouth 2 times daily    Malignant neoplasm of vulva (H), Hypokalemia       prochlorperazine 10 MG tablet    COMPAZINE    30 tablet    Take 1 tablet (10 mg) by mouth every 6 hours as needed (nausea/vomiting)    Encounter for long-term (current) use of medications, Malignant neoplasm of vulva (H)       silver sulfADIAZINE 1 % cream    SILVADENE    400 g    Apply topically 2 times daily    Dermatitis

## 2018-05-18 NOTE — LETTER
5/18/2018    RE: Julius Gilliam  [address]   [facility] [address]       Hepatology Clinic note  Julius Gilliam   Date of Birth 1955  Date of Service 5/18/2018         Assessment/plan:   Julius Gilliam is a 63 year old female with hepatitis C, genotype 1A.  She is treatment naïve.  Fibrosis scan previously showed Stage 1 fibrosis. Patient has recently finished chemoradiation for vulvar cancer with lymph node metastasis and was discharged last week after hospitalization for SBO requiring bowel resection.  She has a follow-up scan on Monday to evaluate her recent cancer treatment.  We discussed that he has minimal scarring in her liver today and that there is no urgency to treatment in the upcoming months. Patient has pancytopenia likely due to chemotherapy. She otherwise has normal liver function.  Her priority should be ensuring that her cancer is controlled and that she has recovered from her recent abdominal surgery.  We will plan to treat Hepatitis C in the near future. She noted left lateral leg swelling that has occurred since her recent discharge, will rule out DVT.     - Will need HIV status on file  -Rule out left leg DVT with ultrasound today  - Hepatic panel, CBC, BMP, INR   -Return to clinic in 3 months to discuss hepatitis C treatment    Shila Kwon PA-C   [facility] Hepatology clinic    CC: Mary Kate Montesinos NP     -----------------------------------------------------       HPI:   Julius Gilliam is a 63 year old female with  presenting for the follow-up for Hepatitis C.     Hepatitis C   -Genotype 1a  -Diagnosed:   -History: ? History of tattoo  -Fibrosis scan: Stage 1, 6.9 kilopascals  -Prior treatments: naive     Patient was recently hospitalized for small bowel obstruction, s/p ex lap and small bowel resection on 5/7/2018 in [facility].  She has a follow-up CT scan on Monday to determine ongoing course for her valvular cancer diagnosis. Noted PET scan is  also ordered.    Patient states that since discharge she has swelling in her left leg.  The swelling in her abdomen has improved. She no longer has diarrhea which had been persistent throughout radiation therapy.     Patient denies jaundice  or confusion.  Patient also denies melena, hematochezia or hematemesis. Patient denies fevers, sweats or chills.    She hasn't drank alcohol in the last six weeks. History of 1-2 drinks a day. She owns a bar.     Previous labs:   Hep B Surface antigen : nonreactive  Hep B core antibody: nonreactive   HIV    Medical hx Surgical hx   Past Medical History:   Diagnosis Date     Deafness in right ear      Hypertension      Multiple facial bone fractures (H)      Vertigo      Vulvar cancer (H) 12/2017    Past Surgical History:   Procedure Laterality Date     DISSECT LYMPH NODE INGUINAL N/A 2/9/2018    Procedure: DISSECT LYMPH NODE INGUINAL;  Bilateral Inguinal Lymph Node Dissection;  Surgeon: Santosh Vera MD;  Location: [facility] OR     ENDOSCOPIC STRIPPING VEIN(S)       FACIAL RECONSTRUCTION SURGERY      post horse trauma     repair of left arm fracture                   Medications:     Current Outpatient Prescriptions   Medication     acetaminophen (TYLENOL) 325 MG tablet     guaiFENesin-codeine (ROBITUSSIN AC) 100-10 MG/5ML SOLN solution     loperamide (IMODIUM) 2 MG capsule     LORazepam (ATIVAN) 1 MG tablet     magnesium oxide (MAG-OX) 400 (241.3 MG) MG tablet     multivitamin, therapeutic with minerals (MULTI-VITAMIN) TABS tablet     oxyCODONE IR (ROXICODONE) 5 MG tablet     potassium chloride SA (KLOR-CON) 20 MEQ CR tablet     prochlorperazine (COMPAZINE) 10 MG tablet     silver sulfADIAZINE (SILVADENE) 1 % cream     No current facility-administered medications for this visit.             Allergies:   No Known Allergies         Review of Systems:   10 points ROS was obtained and highlighted in the HPI, otherwise negative.          Physical Exam:   VS:  /83  Pulse  "82  Temp 97.4  F (36.3  C) (Oral)  Ht 1.689 m (5' 6.5\")  Wt 61 kg (134 lb 6.4 oz)  SpO2 97%  BMI 21.37 kg/m2      Gen- well, NAD, A+Ox3, normal color  Lym- no palpable LAD  CVS- RRR  RS- CTA  Abd- soft, nontender. Healing midline scar without erythema.   Extr- hands normal, no MATTHEW  Skin- no rash or jaundice  Neuro- no asterixis  Psych- normal mood         Data:   Reviewed in person and significant for:    Lab Results   Component Value Date     05/18/2018      Lab Results   Component Value Date    POTASSIUM 3.3 05/18/2018     Lab Results   Component Value Date    CHLORIDE 99 05/18/2018     Lab Results   Component Value Date    CO2 28 05/18/2018     Lab Results   Component Value Date    BUN 10 05/18/2018     Lab Results   Component Value Date    CR 1.05 05/18/2018       Lab Results   Component Value Date    WBC 1.6 04/16/2018     Lab Results   Component Value Date    HGB 10.2 04/16/2018     Lab Results   Component Value Date    HCT 29.3 04/16/2018     Lab Results   Component Value Date    MCV 99 04/16/2018     Lab Results   Component Value Date     04/16/2018       Lab Results   Component Value Date    AST 16 04/20/2018     Lab Results   Component Value Date    ALT 10 04/20/2018     No results found for: BILICONJ   Lab Results   Component Value Date    BILITOTAL 0.3 04/20/2018       Lab Results   Component Value Date    ALBUMIN 3.0 05/18/2018     Lab Results   Component Value Date    PROTTOTAL 6.0 04/20/2018      Lab Results   Component Value Date    ALKPHOS 106 04/20/2018       Lab Results   Component Value Date    INR 0.91 03/23/2018       Shila Kwon PA-C      "

## 2018-05-19 ENCOUNTER — TELEPHONE (OUTPATIENT)
Dept: CARE COORDINATION | Facility: CLINIC | Age: 63
End: 2018-05-19

## 2018-05-19 NOTE — TELEPHONE ENCOUNTER
On-Call Social Work Services Note    Date of  Intervention: 05/19/18  Collaborated with:  Patient, Kimi Leburn    Data:  Julius Gilliam is a 63 year old female who comes to [facility] for oncology care related to vulva cancer. She has outpatient appointments on Monday 05/21/18 and requesting accommodates at [facility].    Intervention:  GA spoke to Julius Gilliam via phone. She is planning to come for one night arriving Sun 05/20. She had originally planned to come with her daughter Zhou as her caregiver. Zhou has a medical appointment for her 13 year old son at [facility] on Monday, so they are attempting to arrange logistics. They are aware 13 year old may not stay at [facility]. Julius may see if someone else avail to transport her. Julius may be staying alone at [facility] but feels comfortable and we reviewed caregiver exemption form. GA faxed referral to [facility] incl caregiver exemption form.     Assessment:  outpatient oncology care    Plan:    Anticipated Disposition:  outpatient oncology appointments    Barriers to d/c plan:  Julius making arrangements for transport with her family    Follow Up:  None at this time.     MAIKEL Awan, [facility] [place] [facility]  Social Work Services, Emergency Dept [facility]  Pager: [phone] Mon-Sat 9 am - 9 pm, on-call/after hours pager [phone]

## 2018-05-21 ENCOUNTER — RADIANT APPOINTMENT (OUTPATIENT)
Dept: CT IMAGING | Facility: CLINIC | Age: 63
End: 2018-05-21
Attending: OBSTETRICS & GYNECOLOGY
Payer: COMMERCIAL

## 2018-05-21 ENCOUNTER — ONCOLOGY VISIT (OUTPATIENT)
Dept: ONCOLOGY | Facility: CLINIC | Age: 63
End: 2018-05-21
Attending: OBSTETRICS & GYNECOLOGY
Payer: COMMERCIAL

## 2018-05-21 ENCOUNTER — OFFICE VISIT (OUTPATIENT)
Dept: RADIATION ONCOLOGY | Facility: CLINIC | Age: 63
End: 2018-05-21
Attending: RADIOLOGY
Payer: COMMERCIAL

## 2018-05-21 VITALS
DIASTOLIC BLOOD PRESSURE: 83 MMHG | SYSTOLIC BLOOD PRESSURE: 138 MMHG | OXYGEN SATURATION: 96 % | BODY MASS INDEX: 21.19 KG/M2 | TEMPERATURE: 98.4 F | HEART RATE: 78 BPM | WEIGHT: 133.3 LBS

## 2018-05-21 VITALS
BODY MASS INDEX: 21.19 KG/M2 | DIASTOLIC BLOOD PRESSURE: 93 MMHG | WEIGHT: 133.3 LBS | OXYGEN SATURATION: 96 % | HEART RATE: 78 BPM | SYSTOLIC BLOOD PRESSURE: 138 MMHG

## 2018-05-21 DIAGNOSIS — Z79.899 ENCOUNTER FOR LONG-TERM (CURRENT) USE OF MEDICATIONS: ICD-10-CM

## 2018-05-21 DIAGNOSIS — C51.9 MALIGNANT NEOPLASM OF VULVA (H): ICD-10-CM

## 2018-05-21 DIAGNOSIS — I89.0 LYMPHEDEMA OF EXTREMITY: Primary | ICD-10-CM

## 2018-05-21 DIAGNOSIS — C51.9 VULVAR CANCER, CARCINOMA (H): Primary | ICD-10-CM

## 2018-05-21 PROCEDURE — 99214 OFFICE O/P EST MOD 30 MIN: CPT | Mod: ZP | Performed by: OBSTETRICS & GYNECOLOGY

## 2018-05-21 PROCEDURE — G0463 HOSPITAL OUTPT CLINIC VISIT: HCPCS | Mod: ZF

## 2018-05-21 PROCEDURE — G0463 HOSPITAL OUTPT CLINIC VISIT: HCPCS | Mod: 27 | Performed by: RADIOLOGY

## 2018-05-21 RX ORDER — IOPAMIDOL 755 MG/ML
81 INJECTION, SOLUTION INTRAVASCULAR ONCE
Status: COMPLETED | OUTPATIENT
Start: 2018-05-21 | End: 2018-05-21

## 2018-05-21 RX ADMIN — IOPAMIDOL 81 ML: 755 INJECTION, SOLUTION INTRAVASCULAR at 12:49

## 2018-05-21 NOTE — PROGRESS NOTES
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 FIGO IIIC (T2 N2c)    RADIATION THERAPY DELIVERED: 6,350 cGy in 36 fractions, completed 4/30/2018, delivered with weekly cisplatin.    INTERVAL SINCE COMPLETION OF RT: Approximately 3 weeks since completion on 4/30/2018.    SUBJECTIVE:  Ms. Gilliam is a 63 year old woman with squamous cell carcinoma of the vulva. On presentation, she had a bulky mass involving the bilateral labia minora and labia majora, with extension to the anterior anus and encroachment upon the urethra. She also had bulky left-sided lymphadenopathy, and underwent bilateral inguinal dissection with positive left-sided lymph nodes with extracapsular extension. Because of the extent of the primary disease in the vulva, she was not a surgical candidate.  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  We therefore recommended concurrent chemoradiation therapy with weekly cisplatin. This was delivered as detailed above.      During treatment, she developed (by CTCAE v4.0) grade 1 diarrhea, grade 2 dermatitis, grade 2 acute pain reaction, grade 1 dysuria, and grade 1 fatigue. Dermatitis was managed with aquaphor, proshield, and Silvadene in the groin at areas of skin breakdown. Of note, after 3-4 weeks of treatment, her tumor was seen to regress quite nicely. At the completion of treatment, there was no visible vulvar tumor, and only mild-moderate induration in the distal posterior vagina. There were no unplanned radiotherapy treatment breaks; she was briefly admitted to the hospital from 4/6/18 - 4/7/18 after she was seen in the ED for right chest wall pain. During that time she had a CT chest (which showed a 3mm nodule in the RLL, indeterminate) and serial troponins (negative for MI). Stress echo on 4/6/18  "showed no evidence of inducible ischemia. She did have a urinalysis consistent with urinary tract infection, for which she was treated with 1gm IV rocephin followed by ciprofloxacin x 5 days.    She returns today for routine follow up, now 3 weeks s/p completion of chemoRT. Unfortunately, she was admitted to the hospital from 5/6/18 to 5/12/18 for small bowel obstruction after presenting with increasing abdominal pain. It was felt to be likely related to an \"internal hernia\" - outside CT images and interpretation are not available at this time. 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 She underwent small bowel resection with anastomosis, and was discharged on 5/12/18. She continues to follow with hepatology for hepatitis C (last seen 5/18/18), and she has an appointment with Dr. Vera as well as a CT chest/abdomen/pelvis this afternoon.    She is recovering from her recent hospitalization, and other than decreased appetite she has done fairly well. She had a left lower extremity ultrasound to rule out DVT, which was negative. She has been using her vaginal dilator approximately 2-3 times/week.  She continues to deny any pain in the vulva/perineum.  She states that she had shasta having diarrhea, but her bowel movements normalized in the last 2 days.  She has no trouble with urination and notes no vaginal discharge or bleeding.     PHYSICAL EXAM:  VITALS: BP (!) 138/93  Pulse 78  Wt 60.5 kg (133 lb 4.8 oz)  SpO2 96%  BMI 21.19 kg/m2  GEN: Appears well, alert, oriented, and in NAD  HEENT: EOMI, normal conjunctiva, MMM  CV: Good distal perfusion  RESP: Breathing comfortably on room air  ABDOMEN: Soft, NT, ND, bowel sounds present  SKIN: Normal color and turgor  GENITOURINARY: Vulva, mons and inguinal folds with moderate persistent hyperpigmentation. Three small, firm 1-1.5 cm areas of likely folliculitis without evidence " of infection. Vulva with no obvious mass.  However, after retraction of the labia, there is a small area at the posterior distal vagina at the introitus, perineum and wilberto-anus that appears weeping and erythematous. On digital examination, the vagina is patent; there is some induration along the distal vaginal wall, most prominent at the distal 1 cm and at 6:00 position.     PSYCH: Appropriate mood and affect    LABS AND IMAGING: Reviewed    IMPRESSION/PLAN:   Ms. Gilliam is a 63 year old female with FIGO IIIC vulvar cancer with extracapsular extension of an involved left inguinal node. She is status bilateral inguinal/femoral lymph node dissection and now approximately 3 weeks s/p completion of definitive chemoradiotherapy for the vulva and adjuvant chemoRT for the regional lymph nodes, and is recovering well from her acute radiation-related toxicities. She is also recovering from a small bowel obstruction resulting in resection and anastomosis (at 1 week s/p completion of therapy, this is unlikely to be related to her chemoRT given no time for late effects to manifest).    We reviewed the risk of small bowel obstruction including underweight, smoking and previous abdominal surgery.  She is strongly advised to quit smoking (currently 1/2 PPD).  She will think about it and talk to her primary care physician about nicotine patch or other tools.      We also addressed lymphedema risk given that she has received both lymph node dissection and sandip irradiation.  An order for lymphedema therapy was placed (as external referral).     Continue followup with Dr. Vera as planned, including this afternoon after CT imaging. We would like to see her back in clinic in 3 months.    Ms. Gilliam was seen and discussed with staff, Dr. Elvis Olson MD PGY-4  Radiation Oncology Resident, [facility]  Phone: [phone]        I saw and examined the patient with the resident.  I have reviewed and agree with  the resident's note and plan of care.      Yvon Leal MD

## 2018-05-21 NOTE — LETTER
2018       RE: Julius Gilliam  [address]   [facility] [address]     Dear Colleague,    Thank you for referring your patient, Julius Gilliam, to the [facility]. Please see a copy of my visit note below.    Follow Up Notes     Date: 18    RE: Julius Gilliam  : 1955      Julius Gilliam is a 63 year old woman with a diagnosis of vulvar cancer.   She is here today for follow up and disease management.     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 Lymph nodes, left inguinal:   - Metastatic squamous cell carcinoma to three of seven lymph nodes examined (3/7)   - The largest metastatic focus is 2.0 cm in greatest dimension with extranodal extension     B. Lymph nodes, right inguinal:   - Two reactive lymph nodes, negative for malignancy (0/2)     3/12/18: Cisplatin + radiation    3/19/18: Cisplatin + radiation    3/26/18: Cisplatin + radiation    18: Cisplatin    18: ED for chest pain; testing negative; 3 mm RLL nodule identified    4/10/18: Cisplatin    18: Radiation    18: Cisplatin CANCELLED due to neutropenia    18: Radiation     18: Final radiation treatment    18: Admission to [facility] for SBO. Small bowel obstruction with incidental Meckel's diverticulum. No evidence of metastasis or carcinomatosis.    18: CT CAP   1. In this patient with history  of vulvar cancer:  a. Stable pulmonary nodules since at least 12/19/2017. Continued  follow-up recommended.  b. Small to moderate ascites with karli and infiltrative appearance of  the mesentery, most prominent in left upper quadrant. Though these  findings may be postsurgical/reactive they are nonspecific and new.   Consider short interval follow-up CT for further evaluation as  peritoneal carcinomatosis could have a similar appearance.  c. Increased conspicuity of splenic hypodensities when compared with  prior examinations. Attention on follow-up.  d. Chronic mediastinal soft tissue thickening about the great vessels  without significant change since 12/19/2017 without associated FDG  avidity.   e. Prominent retroperitoneal lymph node.  Attention on follow-up.  2. New small right pleural effusion with mild bibasilar atelectasis.   3. The bowel is nondilated. Surgical changes of small bowel resection  and bilateral inguinal lymph node dissections.       Interval history:   Patient admitted to [facility] in [place] for SBO. Patient had bowel resection with incidental Meckel's diverticulum. No evidence of carcinomatosis. Patient has otherwise been doing well. Recently finished radiation therapy with improving vaginal symptoms.     Answers for HPI/ROS submitted by the patient on 5/15/2018   General Symptoms: Yes  Skin Symptoms: No  HENT Symptoms: No  EYE SYMPTOMS: No  HEART SYMPTOMS: No  LUNG SYMPTOMS: No  INTESTINAL SYMPTOMS: Yes  URINARY SYMPTOMS: No  GYNECOLOGIC SYMPTOMS: No  BREAST SYMPTOMS: No  SKELETAL SYMPTOMS: No  BLOOD SYMPTOMS: No  NERVOUS SYSTEM SYMPTOMS: No  MENTAL HEALTH SYMPTOMS: No  Fever: No  Loss of appetite: No  Weight loss: No  Weight gain: Yes  Fatigue: No  Night sweats: No  Chills: No  Increased stress: No  Excessive hunger: No  Excessive thirst: No  Feeling hot or cold when others believe the temperature is normal: No  Loss of height: No  Post-operative complications: No  Surgical site pain:  No  Hallucinations: No  Change in or Loss of Energy: No  Hyperactivity: No  Confusion: No  Heart burn or indigestion: No  Nausea: Yes  Vomiting: Yes  Abdominal pain: Yes  Bloating: Yes  Constipation: No  Diarrhea: Yes  Blood in stool: No  Black stools: No  Rectal or Anal pain: No  Fecal incontinence: No  Yellowing of skin or eyes: No  Vomit with blood: No  Change in stools: Yes      Past Medical History:  Past Medical History:   Diagnosis Date     Deafness in right ear      Hypertension      Multiple facial bone fractures (H)     also left ankle and left wrist     Vertigo      Vulvar cancer (H) 12/2017         Past Surgical History:  Past Surgical History:   Procedure Laterality Date     DISSECT LYMPH NODE INGUINAL N/A 2/9/2018    Procedure: DISSECT LYMPH NODE INGUINAL;  Bilateral Inguinal Lymph Node Dissection;  Surgeon: Santosh Vera MD;  Location: [facility] OR     ENDOSCOPIC STRIPPING VEIN(S)       FACIAL RECONSTRUCTION SURGERY      post horse trauma     repair of left arm fracture           Health Maintenance Due   Topic Date Due     TETANUS IMMUNIZATION (SYSTEM ASSIGNED)  03/12/1974     HIV SCREEN (SYSTEM ASSIGNED)  03/12/1974     PAP SCREENING Q3 YR (SYSTEM ASSIGNED)  03/12/1977     ADVANCE DIRECTIVE PLANNING Q5 YRS  03/12/2011       Current Medications:   Current Outpatient Prescriptions   Medication Sig Dispense Refill     acetaminophen (TYLENOL) 325 MG tablet Take 2 tablets (650 mg) by mouth every 6 hours 30 tablet 0     LORazepam (ATIVAN) 1 MG tablet Take 1 tablet (1 mg) by mouth every 6 hours as needed (nausea/vomiting, anxiety or sleep ) 30 tablet 1     magnesium oxide (MAG-OX) 400 (241.3 MG) MG tablet Take 1 tablet (400 mg) by mouth 2 times daily 60 tablet 3     oxyCODONE IR (ROXICODONE) 5 MG tablet Take 1 tablet (5 mg) by mouth every 4 hours as needed for pain 30 tablet 0     potassium chloride SA (KLOR-CON) 20 MEQ CR tablet Take 1 tablet (20 mEq) by mouth 2 times daily 60 tablet 0     loperamide  (IMODIUM) 2 MG capsule Take 2 mg by mouth 4 times daily as needed for diarrhea       prochlorperazine (COMPAZINE) 10 MG tablet Take 1 tablet (10 mg) by mouth every 6 hours as needed (nausea/vomiting) (Patient not taking: Reported on 2018) 30 tablet 2         Allergies:    No Known Allergies     Social History:  Social History   Substance Use Topics     Smoking status: Current Every Day Smoker     Packs/day: 0.50     Types: Cigarettes     Smokeless tobacco: Never Used      Comment: as of 2018 has been smoking for 30 years. currently cut back to 4 cigs a day     Alcohol use Yes      Comment: 8 x per week       History   Drug Use No         Family History:   Family History   Problem Relation Age of Onset     CANCER Mother 81     Breast ca     Myocardial Infarction Father 55      at 92         Physical Exam:   /83  Pulse 78  Temp 98.4  F (36.9  C) (Oral)  Wt 60.5 kg (133 lb 4.8 oz)  SpO2 96%  BMI 21.19 kg/m2  Body mass index is 21.19 kg/(m^2).    General Appearance: healthy and alert, no distress     HEENT: no gross thyromegaly       Cardiovascular: Well perfused     Respiratory: No increased work of breathing    Musculoskeletal: extremities non tender. There is mild lymphedema on BLE, L>R    Skin: There are radiation changes to the skin of the groin without ulceration.    Neurological: normal gait, no gross defects     Psychiatric: appropriate mood and affect                               Hematological: normal cervical, supraclavicular lymph nodes     Gastrointestinal:       abdomen soft, non-tender, non-distended, no organomegaly or masses    Genitourinary:  external genitalia with stigmata of RT. There is an appropriate amount of tenderness with the exam.    Assessment:    Julius Gilliam is a 63 year old woman with a diagnosis of vulvar cancer.   She is here today for follow up and disease management.      25 minutes were spent with this patient, over 50% of that time was spent in symptom  management, treatment planning and in counseling and coordination of care.      Plan:     1.) Vulvar cancer: CT scan today reviewed with radiologist. Lung nodule appears stable. Signs of ascites. Ascites likely due to recent surgery versus reactive from chemoradiation versus carcinomatosis. Unlikely due to carcinomatosis as patient had a recent bowel resection without evidence of disease. Would like patient to return in 3 months with repeat scan at that time. We can reassess ascites at that point. Suspect that it will resolve.       2.) Hypomagnesemia: reviewed. Patient has had multiple infusions.     3.) Labs and/or tests ordered include:  None.      4.) Health maintenance issues addressed today include: none    5.)         She verbalized understanding of the above.     Oanh Barahona, MS4    Scribe Disclosure:   I, Oanh Barahona, am serving as a scribe; to document services personally performed by Dr. Vera- -based on data collection and the provider's statements to me.     I have examined this patient with our resident who acted as as scribe and agree with the above findings and plan.    Santosh Vera

## 2018-05-21 NOTE — MR AVS SNAPSHOT
After Visit Summary   5/21/2018    Julius Gilliam    MRN: [id number]           Patient Information     Date Of Birth          1955        Visit Information        Provider Department      5/21/2018 2:30 PM Santosh Vera MD [facility]        Today's Diagnoses     Vulvar cancer, carcinoma (H)    -  1       Follow-ups after your visit        Your next 10 appointments already scheduled     Aug 15, 2018  1:00 PM CDT   Return Visit with Yvon Leal MD   Radiation Oncology Clinic ([facility])    [facility]  1st Floor  [address] [id number] [phone]            Aug 16, 2018  1:00 PM CDT   Lab with  LAB   [facility] ([facility])    [address]  1st Floor  [facility] [id number] [phone]            Aug 16, 2018  1:45 PM CDT   (Arrive by 1:30 PM)   Return General Liver with Shila Kwon PA-C   [facility] ([facility])    [address] [place] [address]  [address]  [facility] [address] [phone]              Who to contact     If you have questions or need follow up information about today's clinic visit or your schedule please contact [facility] directly at [phone].  Normal or non-critical lab and imaging results will be communicated to you by MyChart, letter or phone within 4 business days after the clinic has received the results. If you do not hear from us within 7 days, please contact the clinic through MyChart or phone. If you have a critical or abnormal lab result, we will notify you by phone as soon as possible.  Submit refill requests through [employer] or call your pharmacy and they will forward the refill request to us. Please allow 3 business days for your refill to be completed.          Additional Information About Your Visit        [employer] Information     [employer] gives you secure access  to your electronic health record. If you see a primary care provider, you can also send messages to your care team and make appointments. If you have questions, please call your primary care clinic.  If you do not have a primary care provider, please call [phone] and they will assist you.        Care EveryWhere ID     This is your Care EveryWhere ID. This could be used by other organizations to access your [facility] medical records  [id number]        Your Vitals Were     Pulse Temperature Pulse Oximetry BMI (Body Mass Index)          78 98.4  F (36.9  C) (Oral) 96% 21.19 kg/m2         Blood Pressure from Last 3 Encounters:   05/21/18 138/83   05/21/18 (!) 138/93   05/18/18 133/83    Weight from Last 3 Encounters:   05/21/18 60.5 kg (133 lb 4.8 oz)   05/21/18 60.5 kg (133 lb 4.8 oz)   05/18/18 61 kg (134 lb 6.4 oz)              Today, you had the following     No orders found for display       Primary Care Provider Office Phone # Fax #    Tova MontesinosADE [phone]       [facility] [address] [facility] [address]        Equal Access to Services     CAREY COSME : Hadii aad ku hadasho Soomaali, waaxda luqadaha, qaybta kaalmada adeegyada, talha kaurn horace jones. So [facility] [phone].    ATENCIÓN: Si habla español, tiene a elizabeth disposición servicios gratuitos de asistencia lingüística. Llame al [phone].    We comply with applicable federal civil rights laws and [place] laws. We do not discriminate on the basis of race, color, national origin, age, disability, sex, sexual orientation, or gender identity.            Thank you!     Thank you for choosing [facility]CER [facility]  for your care. Our goal is always to provide you with excellent care. Hearing back from our patients is one way we can continue to improve our services. Please take a few minutes to complete the written survey that you may receive in the mail after your visit with us. Thank  you!             Your Updated Medication List - Protect others around you: Learn how to safely use, store and throw away your medicines at www.disposemymeds.org.          This list is accurate as of 5/21/18 11:59 PM.  Always use your most recent med list.                   Brand Name Dispense Instructions for use Diagnosis    acetaminophen 325 MG tablet    TYLENOL    30 tablet    Take 2 tablets (650 mg) by mouth every 6 hours    H/O lymph node excision       loperamide 2 MG capsule    IMODIUM     Take 2 mg by mouth 4 times daily as needed for diarrhea        LORazepam 1 MG tablet    ATIVAN    30 tablet    Take 1 tablet (1 mg) by mouth every 6 hours as needed (nausea/vomiting, anxiety or sleep )    Encounter for long-term (current) use of medications, Malignant neoplasm of vulva (H)       magnesium oxide 400 (241.3 Mg) MG tablet    MAG-OX    60 tablet    Take 1 tablet (400 mg) by mouth 2 times daily    Malignant neoplasm of vulva (H), Hypomagnesemia       oxyCODONE IR 5 MG tablet    ROXICODONE    30 tablet    Take 1 tablet (5 mg) by mouth every 4 hours as needed for pain    H/O lymph node excision       potassium chloride SA 20 MEQ CR tablet    KLOR-CON    60 tablet    Take 1 tablet (20 mEq) by mouth 2 times daily    Malignant neoplasm of vulva (H), Hypokalemia       prochlorperazine 10 MG tablet    COMPAZINE    30 tablet    Take 1 tablet (10 mg) by mouth every 6 hours as needed (nausea/vomiting)    Encounter for long-term (current) use of medications, Malignant neoplasm of vulva (H)

## 2018-05-21 NOTE — PROGRESS NOTES
Follow Up Notes     Date: 18    RE: Julius Gilliam  : 1955      Julius Gilliam is a 63 year old woman with a diagnosis of vulvar cancer.   She is here today for follow up and disease management.     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 Lymph nodes, left inguinal:   - Metastatic squamous cell carcinoma to three of seven lymph nodes examined (3/7)   - The largest metastatic focus is 2.0 cm in greatest dimension with extranodal extension     B. Lymph nodes, right inguinal:   - Two reactive lymph nodes, negative for malignancy (0/2)     3/12/18: Cisplatin + radiation    3/19/18: Cisplatin + radiation    3/26/18: Cisplatin + radiation    18: Cisplatin    18: ED for chest pain; testing negative; 3 mm RLL nodule identified    4/10/18: Cisplatin    18: Radiation    18: Cisplatin CANCELLED due to neutropenia    18: Radiation     18: Final radiation treatment    18: Admission to [facility] for SBO. Small bowel obstruction with incidental Meckel's diverticulum. No evidence of metastasis or carcinomatosis.    18: CT CAP   1. In this patient with history of vulvar cancer:  a. Stable pulmonary nodules since at least 2017. Continued  follow-up recommended.  b. Small to moderate ascites with karli and infiltrative appearance of  the mesentery, most prominent in left upper quadrant.  Though these  findings may be postsurgical/reactive they are nonspecific and new.   Consider short interval follow-up CT for further evaluation as  peritoneal carcinomatosis could have a similar appearance.  c. Increased conspicuity of splenic hypodensities when compared with  prior examinations. Attention on follow-up.  d. Chronic mediastinal soft tissue thickening about the great vessels  without significant change since 12/19/2017 without associated FDG  avidity.   e. Prominent retroperitoneal lymph node.  Attention on follow-up.  2. New small right pleural effusion with mild bibasilar atelectasis.   3. The bowel is nondilated. Surgical changes of small bowel resection  and bilateral inguinal lymph node dissections.       Interval history:   Patient admitted to [facility] in [place] for SBO. Patient had bowel resection with incidental Meckel's diverticulum. No evidence of carcinomatosis. Patient has otherwise been doing well. Recently finished radiation therapy with improving vaginal symptoms.     Answers for HPI/ROS submitted by the patient on 5/15/2018   General Symptoms: Yes  Skin Symptoms: No  HENT Symptoms: No  EYE SYMPTOMS: No  HEART SYMPTOMS: No  LUNG SYMPTOMS: No  INTESTINAL SYMPTOMS: Yes  URINARY SYMPTOMS: No  GYNECOLOGIC SYMPTOMS: No  BREAST SYMPTOMS: No  SKELETAL SYMPTOMS: No  BLOOD SYMPTOMS: No  NERVOUS SYSTEM SYMPTOMS: No  MENTAL HEALTH SYMPTOMS: No  Fever: No  Loss of appetite: No  Weight loss: No  Weight gain: Yes  Fatigue: No  Night sweats: No  Chills: No  Increased stress: No  Excessive hunger: No  Excessive thirst: No  Feeling hot or cold when others believe the temperature is normal: No  Loss of height: No  Post-operative complications: No  Surgical site pain: No  Hallucinations: No  Change in or Loss of Energy: No  Hyperactivity: No  Confusion: No  Heart burn or indigestion: No  Nausea: Yes  Vomiting: Yes  Abdominal pain: Yes  Bloating: Yes  Constipation: No  Diarrhea: Yes  Blood in stool:  No  Black stools: No  Rectal or Anal pain: No  Fecal incontinence: No  Yellowing of skin or eyes: No  Vomit with blood: No  Change in stools: Yes      Past Medical History:  Past Medical History:   Diagnosis Date     Deafness in right ear      Hypertension      Multiple facial bone fractures (H)     also left ankle and left wrist     Vertigo      Vulvar cancer (H) 12/2017         Past Surgical History:  Past Surgical History:   Procedure Laterality Date     DISSECT LYMPH NODE INGUINAL N/A 2/9/2018    Procedure: DISSECT LYMPH NODE INGUINAL;  Bilateral Inguinal Lymph Node Dissection;  Surgeon: Santosh Vera MD;  Location: [facility] OR     ENDOSCOPIC STRIPPING VEIN(S)       FACIAL RECONSTRUCTION SURGERY      post horse trauma     repair of left arm fracture           Health Maintenance Due   Topic Date Due     TETANUS IMMUNIZATION (SYSTEM ASSIGNED)  03/12/1974     HIV SCREEN (SYSTEM ASSIGNED)  03/12/1974     PAP SCREENING Q3 YR (SYSTEM ASSIGNED)  03/12/1977     ADVANCE DIRECTIVE PLANNING Q5 YRS  03/12/2011       Current Medications:   Current Outpatient Prescriptions   Medication Sig Dispense Refill     acetaminophen (TYLENOL) 325 MG tablet Take 2 tablets (650 mg) by mouth every 6 hours 30 tablet 0     LORazepam (ATIVAN) 1 MG tablet Take 1 tablet (1 mg) by mouth every 6 hours as needed (nausea/vomiting, anxiety or sleep ) 30 tablet 1     magnesium oxide (MAG-OX) 400 (241.3 MG) MG tablet Take 1 tablet (400 mg) by mouth 2 times daily 60 tablet 3     oxyCODONE IR (ROXICODONE) 5 MG tablet Take 1 tablet (5 mg) by mouth every 4 hours as needed for pain 30 tablet 0     potassium chloride SA (KLOR-CON) 20 MEQ CR tablet Take 1 tablet (20 mEq) by mouth 2 times daily 60 tablet 0     loperamide (IMODIUM) 2 MG capsule Take 2 mg by mouth 4 times daily as needed for diarrhea       prochlorperazine (COMPAZINE) 10 MG tablet Take 1 tablet (10 mg) by mouth every 6 hours as needed (nausea/vomiting) (Patient not taking: Reported on  2018) 30 tablet 2         Allergies:    No Known Allergies     Social History:  Social History   Substance Use Topics     Smoking status: Current Every Day Smoker     Packs/day: 0.50     Types: Cigarettes     Smokeless tobacco: Never Used      Comment: as of 2018 has been smoking for 30 years. currently cut back to 4 cigs a day     Alcohol use Yes      Comment: 8 x per week       History   Drug Use No         Family History:   Family History   Problem Relation Age of Onset     CANCER Mother 81     Breast ca     Myocardial Infarction Father 55      at 92         Physical Exam:   /83  Pulse 78  Temp 98.4  F (36.9  C) (Oral)  Wt 60.5 kg (133 lb 4.8 oz)  SpO2 96%  BMI 21.19 kg/m2  Body mass index is 21.19 kg/(m^2).    General Appearance: healthy and alert, no distress     HEENT: no gross thyromegaly       Cardiovascular: Well perfused     Respiratory: No increased work of breathing    Musculoskeletal: extremities non tender. There is mild lymphedema on BLE, L>R    Skin: There are radiation changes to the skin of the groin without ulceration.    Neurological: normal gait, no gross defects     Psychiatric: appropriate mood and affect                               Hematological: normal cervical, supraclavicular lymph nodes     Gastrointestinal:       abdomen soft, non-tender, non-distended, no organomegaly or masses    Genitourinary:  external genitalia with stigmata of RT. There is an appropriate amount of tenderness with the exam.    Assessment:    Julius Gilliam is a 63 year old woman with a diagnosis of vulvar cancer.   She is here today for follow up and disease management.      25 minutes were spent with this patient, over 50% of that time was spent in symptom management, treatment planning and in counseling and coordination of care.      Plan:     1.) Vulvar cancer: CT scan today reviewed with radiologist. Lung nodule appears stable. Signs of ascites. Ascites likely due to recent surgery  versus reactive from chemoradiation versus carcinomatosis. Unlikely due to carcinomatosis as patient had a recent bowel resection without evidence of disease. Would like patient to return in 3 months with repeat scan at that time. We can reassess ascites at that point. Suspect that it will resolve.       2.) Hypomagnesemia: reviewed. Patient has had multiple infusions.     3.) Labs and/or tests ordered include:  None.      4.) Health maintenance issues addressed today include: none    5.)         She verbalized understanding of the above.     Oanh Barahona, MS4    Scribe Disclosure:   I, Oanh Barahona, am serving as a scribe; to document services personally performed by Dr. Vera- -based on data collection and the provider's statements to me.     I have examined this patient with our resident who acted as as scribe and agree with the above findings and plan.    Santosh Vera

## 2018-05-21 NOTE — DISCHARGE INSTRUCTIONS

## 2018-05-21 NOTE — NURSING NOTE
"Oncology Rooming Note    May 21, 2018 2:21 PM   Julius Gilliam is a 63 year old female who presents for:    Chief Complaint   Patient presents with     Oncology Clinic Visit     Vulvar CA; CT results     Initial Vitals: /83  Pulse 78  Temp 98.4  F (36.9  C) (Oral)  Wt 60.5 kg (133 lb 4.8 oz)  SpO2 96%  BMI 21.19 kg/m2 Estimated body mass index is 21.19 kg/(m^2) as calculated from the following:    Height as of 5/18/18: 1.689 m (5' 6.5\").    Weight as of this encounter: 60.5 kg (133 lb 4.8 oz). Body surface area is 1.68 meters squared.  Data Unavailable Comment: Data Unavailable   No LMP recorded. Patient is postmenopausal.  Allergies reviewed: Yes  Medications reviewed: Yes    Medications: Medication refills not needed today.  Pharmacy name entered into EPIC: Data Unavailable    Clinical concerns: Patient states there are no new concerns to discuss with provider.  Dr Vera was not notified.       8 minutes for nursing intake (face to face time)     Tenisha Virk CMA              "

## 2018-05-21 NOTE — MR AVS SNAPSHOT
"              After Visit Summary   5/21/2018    Julius Gilliam    MRN: [id number]           Patient Information     Date Of Birth          1955        Visit Information        Provider Department      5/21/2018 11:30 AM Yvon Leal MD [facility]        Today's Diagnoses     Lymphedema of extremity    -  1       Follow-ups after your visit        Additional Services     LYMPHEDEMA THERAPY REFERRAL       *This therapy referral will be filtered to a centralized scheduling office at [facility] and the patient will receive a call to schedule an appointment at a [facility] location most convenient for them. *   If you have not heard from the scheduling office within 2 business days, please call [phone] for all locations, with the exception of [place], please call [phone].     Treatment: PT or OT Evaluation & Treatment  Special Instructions: Evaluate and treat  PT/OT Treatment Diagnosis: Edema    Please be aware that coverage of these services is subject to the terms and limitations of your health insurance plan.  Call member services at your health plan with any benefit or coverage questions.      **Note to Provider:  If you are referring outside of [facility] for the therapy appointment, please list the name of the location in the \"special instructions\" above, print the referral and give to the patient to schedule the appointment.                  Your next 10 appointments already scheduled     May 21, 2018  2:30 PM CDT   (Arrive by 2:15 PM)   Return Visit with Santosh Vera MD   [facility] ([facility])    [address]  [address]  [place] [id number] [phone]            Aug 15, 2018  1:00 PM CDT   Return Visit with Yvon Leal MD   [facility] ([facility])    [facility]  1st Floor  [address] [address] [phone] "            Aug 16, 2018  1:00 PM CDT   Lab with  LAB   [facility] ([facility])    [address]  1st Floor  [place] [address] [phone]            Aug 16, 2018  1:45 PM CDT   (Arrive by 1:30 PM)   Return General Liver with Shila Kwon PA-C   [facility] ([facility])    [address]  [address]  [place] [address] [phone]              Who to contact     Please call your clinic at [phone] to:    Ask questions about your health    Make or cancel appointments    Discuss your medicines    Learn about your test results    Speak to your doctor            Additional Information About Your Visit        [employer] Information     [employer] gives you secure access to your electronic health record. If you see a primary care provider, you can also send messages to your care team and make appointments. If you have questions, please call your primary care clinic.  If you do not have a primary care provider, please call [phone] and they will assist you.      [employer] is an electronic gateway that provides easy, online access to your medical records. With [employer], you can request a clinic appointment, read your test results, renew a prescription or communicate with your care team.     To access your existing account, please contact your [facility] or call [phone] for assistance.        Care EveryWhere ID     This is your Care EveryWhere ID. This could be used by other organizations to access your [place] medical records  [id number]        Your Vitals Were     Pulse Pulse Oximetry BMI (Body Mass Index)             78 96% 21.19 kg/m2          Blood Pressure from Last 3 Encounters:   05/21/18 (!) 138/93   05/18/18 133/83   04/27/18 124/73    Weight from Last 3 Encounters:   05/21/18 60.5 kg (133 lb 4.8 oz)   05/18/18 61 kg (134 lb 6.4 oz)   04/27/18 63.2 kg (139 lb 4.8 oz)               We Performed the Following     LYMPHEDEMA THERAPY REFERRAL        Primary Care Provider Office Phone # Fax #    Tova Montesinos, [phone]       [facility] [address] [facility] [address]        Equal Access to Services     CAREY COSME : Hadii melania blue hadjaneeno Sojoannali, waaxda luqadaha, qaybta kaalmada adeegyada, waxivy sharon natashabuck vu korinarosalino jones. So [facility] [phone].    ATENCIÓN: Si habla español, tiene a elizabeth disposición servicios gratuitos de asistencia lingüística. Silvestreame al [phone].    We comply with applicable federal civil rights laws and [place] laws. We do not discriminate on the basis of race, color, national origin, age, disability, sex, sexual orientation, or gender identity.            Thank you!     Thank you for choosing [facility]  for your care. Our goal is always to provide you with excellent care. Hearing back from our patients is one way we can continue to improve our services. Please take a few minutes to complete the written survey that you may receive in the mail after your visit with us. Thank you!             Your Updated Medication List - Protect others around you: Learn how to safely use, store and throw away your medicines at www.disposemymeds.org.          This list is accurate as of 5/21/18 12:25 PM.  Always use your most recent med list.                   Brand Name Dispense Instructions for use Diagnosis    acetaminophen 325 MG tablet    TYLENOL    30 tablet    Take 2 tablets (650 mg) by mouth every 6 hours    H/O lymph node excision       loperamide 2 MG capsule    IMODIUM     Take 2 mg by mouth 4 times daily as needed for diarrhea        LORazepam 1 MG tablet    ATIVAN    30 tablet    Take 1 tablet (1 mg) by mouth every 6 hours as needed (nausea/vomiting, anxiety or sleep )    Encounter for long-term (current) use of medications, Malignant neoplasm of vulva (H)       magnesium oxide 400 (241.3 Mg) MG tablet    MAG-OX     60 tablet    Take 1 tablet (400 mg) by mouth 2 times daily    Malignant neoplasm of vulva (H), Hypomagnesemia       oxyCODONE IR 5 MG tablet    ROXICODONE    30 tablet    Take 1 tablet (5 mg) by mouth every 4 hours as needed for pain    H/O lymph node excision       potassium chloride SA 20 MEQ CR tablet    KLOR-CON    60 tablet    Take 1 tablet (20 mEq) by mouth 2 times daily    Malignant neoplasm of vulva (H), Hypokalemia       prochlorperazine 10 MG tablet    COMPAZINE    30 tablet    Take 1 tablet (10 mg) by mouth every 6 hours as needed (nausea/vomiting)    Encounter for long-term (current) use of medications, Malignant neoplasm of vulva (H)

## 2018-05-21 NOTE — PROGRESS NOTES
"FOLLOW-UP VISIT    Patient Name: Julius Gilliam      : 1955     Age: 63 year old        ______________________________________________________________________________     Chief Complaint   Patient presents with     Cancer     Follow up for radiaiton to the vulva     BP (!) 138/93  Pulse 78  Wt 60.5 kg (133 lb 4.8 oz)  SpO2 96%  BMI 21.19 kg/m2     Date Radiation Completed: Vulva/groin/pelvis 6350 cGy completed on 18    Pain  Denies    Labs  Other Labs: Yes: today at the [facility],     Imaging  CT: Today at the [facility], chest/abd/pelvis        Diarrhea: 0- None    Constipation: 0- None    Nausea: 0- None    Vomitin- No vomiting    Genitourinary system: 0- Normal    Dysuria: 0- None    Vaginal dilator use: Using dilator, several times/week with good results.    Other Appointments: Yes    MD Name: Dr. Darden Appointment Date: Today   MD Name: Appointment Date:   MD Name: Appointment Date:   Other Appointment Notes:     Residual Radiation side effect: Skin healing well, energy level still low r/t pt recovering from suegery     Additional Instructions: Pt had abd surgery 18 r/t \"my bowels were twisted\". Recovering well at this time.     Nurse face-to-face time: Level 2:  10 min face to face time        "

## 2018-05-21 NOTE — LETTER
"2018      RE: Julius Gilliam  PO   [facility] [address]       FOLLOW-UP VISIT    Patient Name: Julius Gilliam      : 1955     Age: 63 year old        ______________________________________________________________________________     Chief Complaint   Patient presents with     Cancer     Follow up for radiaiton to the vulva     BP (!) 138/93  Pulse 78  Wt 60.5 kg (133 lb 4.8 oz)  SpO2 96%  BMI 21.19 kg/m2     Date Radiation Completed: Vulva/groin/pelvis 6350 cGy completed on 18    Pain  Denies    Labs  Other [place]: [place] [facility] [place] [facility] [place]        Diarrhea: 0- None    Constipation: 0- None    Nausea: 0- None    Vomitin- No vomiting    Genitourinary system: 0- Normal    Dysuria: 0- None    Vaginal dilator use: Using dilator, several times/week with good results.    Other Appointments: Yes    MD Name: Dr. Darden Appointment Date: Today   MD Name: Appointment Date:   MD Name: Appointment Date:   Other Appointment Notes:     Residual Radiation side effect: Skin healing well, energy level still low r/t pt recovering from suegery     Additional Instructions: Pt had abd surgery 18 r/t \"my bowels were twisted\". Recovering well at this time.     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 FIGO IIIC (T2 N2c)    RADIATION THERAPY DELIVERED: 6,350 cGy in 36 fractions, completed 2018, delivered with weekly cisplatin.    INTERVAL SINCE COMPLETION OF RT: Approximately 3 weeks since completion on 2018.    SUBJECTIVE:  Ms. Gilliam is a 63 year old woman with squamous cell carcinoma of the vulva. On presentation, she had a bulky mass involving the bilateral labia minora and labia " "majora, with extension to the anterior anus and encroachment upon the urethra. She also had bulky left-sided lymphadenopathy, and underwent bilateral inguinal dissection with positive left-sided lymph nodes with extracapsular extension. Because of the extent of the primary disease in the vulva, she was not a surgical candidate.  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  We therefore recommended concurrent chemoradiation therapy with weekly cisplatin. This was delivered as detailed above.      During treatment, she developed (by CTCAE v4.0) grade 1 diarrhea, grade 2 dermatitis, grade 2 acute pain reaction, grade 1 dysuria, and grade 1 fatigue. Dermatitis was managed with aquaphor, proshield, and Silvadene in the groin at areas of skin breakdown. Of note, after 3-4 weeks of treatment, her tumor was seen to regress quite nicely. At the completion of treatment, there was no visible vulvar tumor, and only mild-moderate induration in the distal posterior vagina. There were no unplanned radiotherapy treatment breaks; she was briefly admitted to the hospital from 4/6/18 - 4/7/18 after she was seen in the ED for right chest wall pain. During that time she had a CT chest (which showed a 3mm nodule in the RLL, indeterminate) and serial troponins (negative for MI). Stress echo on 4/6/18 showed no evidence of inducible ischemia. She did have a urinalysis consistent with urinary tract infection, for which she was treated with 1gm IV rocephin followed by ciprofloxacin x 5 days.    She returns today for routine follow up, now 3 weeks s/p completion of chemoRT. Unfortunately, she was admitted to the hospital from 5/6/18 to 5/12/18 for small bowel obstruction after presenting with increasing abdominal pain. It was felt to be likely related to an \"internal hernia\" - outside CT images and interpretation are not available at this time. 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 She underwent small bowel resection with anastomosis, and was discharged on 5/12/18. She continues to follow with hepatology for hepatitis C (last seen 5/18/18), and she has an appointment with Dr. Vera as well as a CT chest/abdomen/pelvis this afternoon.    She is recovering from her recent hospitalization, and other than decreased appetite she has done fairly well. She had a left lower extremity ultrasound to rule out DVT, which was negative. She has been using her vaginal dilator approximately 2-3 times/week.  She continues to deny any pain in the vulva/perineum.  She states that she had shasta having diarrhea, but her bowel movements normalized in the last 2 days.  She has no trouble with urination and notes no vaginal discharge or bleeding.     PHYSICAL EXAM:  VITALS: BP (!) 138/93  Pulse 78  Wt 60.5 kg (133 lb 4.8 oz)  SpO2 96%  BMI 21.19 kg/m2  GEN: Appears well, alert, oriented, and in NAD  HEENT: EOMI, normal conjunctiva, MMM  CV: Good distal perfusion  RESP: Breathing comfortably on room air  ABDOMEN: Soft, NT, ND, bowel sounds present  SKIN: Normal color and turgor  GENITOURINARY: Vulva, mons and inguinal folds with moderate persistent hyperpigmentation. Three small, firm 1-1.5 cm areas of likely folliculitis without evidence of infection. Vulva with no obvious mass.  However, after retraction of the labia, there is a small area at the posterior distal vagina at the introitus, perineum and wilberto-anus that appears weeping and erythematous. On digital examination, the vagina is patent; there is some induration along the distal vaginal wall, most prominent at the distal 1 cm and at 6:00 position.     PSYCH: Appropriate mood and affect    LABS AND IMAGING: Reviewed    IMPRESSION/PLAN:   Ms. Gilliam is a 63 year old female with FIGO IIIC vulvar cancer with extracapsular extension of an involved left inguinal node. She is status bilateral  inguinal/femoral lymph node dissection and now approximately 3 weeks s/p completion of definitive chemoradiotherapy for the vulva and adjuvant chemoRT for the regional lymph nodes, and is recovering well from her acute radiation-related toxicities. She is also recovering from a small bowel obstruction resulting in resection and anastomosis (at 1 week s/p completion of therapy, this is unlikely to be related to her chemoRT given no time for late effects to manifest).    We reviewed the risk of small bowel obstruction including underweight, smoking and previous abdominal surgery.  She is strongly advised to quit smoking (currently 1/2 PPD).  She will think about it and talk to her primary care physician about nicotine patch or other tools.      We also addressed lymphedema risk given that she has received both lymph node dissection and sandip irradiation.  An order for lymphedema therapy was placed (as external referral).     Continue followup with Dr. Vera as planned, including this afternoon after CT imaging. We would like to see her back in clinic in 3 months.    Ms. Gilliam was seen and discussed with staff, Dr. Elvis Olson MD PGY-4  Radiation Oncology Resident, [facility]  Phone: [phone]        I saw and examined the patient with the resident.  I have reviewed and agree with the resident's note and plan of care.      MD Yvon Lizarraga MD

## 2018-05-21 NOTE — LETTER
"2018       RE: Julius Gilliam  PO   [facility] [address]     Dear Colleague,    Thank you for referring your patient, Julius Gilliam, to the RADIATION ONCOLOGY CLINIC. Please see a copy of my visit note below.    FOLLOW-UP VISIT    Patient Name: Julius Gilliam      : 1955     Age: 63 year old        ______________________________________________________________________________     Chief Complaint   Patient presents with     Cancer     Follow up for radiaiton to the vulva     BP (!) 138/93  Pulse 78  Wt 60.5 kg (133 lb 4.8 oz)  SpO2 96%  BMI 21.19 kg/m2     Date Radiation Completed: Vulva/groin/pelvis 6350 cGy completed on 18    Pain  Denies    Labs  Other Labs: Yes: [place] [facility] [place],     Imaging  CT: Today at the [facility] [place], chest/abd/pelvis        Diarrhea: 0- None    Constipation: 0- None    Nausea: 0- None    Vomitin- No vomiting    Genitourinary system: 0- Normal    Dysuria: 0- None    Vaginal dilator use: Using dilator, several times/week with good results.    Other Appointments: Yes    MD Name: Dr. Darden Appointment Date: Today   MD Name: Appointment Date:   MD Name: Appointment Date:   Other Appointment Notes:     Residual Radiation side effect: Skin healing well, energy level still low r/t pt recovering from suegery     Additional Instructions: Pt had abd surgery 18 r/t \"my bowels were twisted\". Recovering well at this time.     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 FIGO IIIC (T2 N2c)    RADIATION THERAPY DELIVERED: 6,350 cGy in 36 fractions, completed 2018, delivered with weekly cisplatin.    INTERVAL SINCE COMPLETION OF RT: Approximately 3 weeks since completion on 2018.    SUBJECTIVE:  Ms. Gilliam " "is a 63 year old woman with squamous cell carcinoma of the vulva. On presentation, she had a bulky mass involving the bilateral labia minora and labia majora, with extension to the anterior anus and encroachment upon the urethra. She also had bulky left-sided lymphadenopathy, and underwent bilateral inguinal dissection with positive left-sided lymph nodes with extracapsular extension. Because of the extent of the primary disease in the vulva, she was not a surgical candidate.  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  We therefore recommended concurrent chemoradiation therapy with weekly cisplatin. This was delivered as detailed above.      During treatment, she developed (by CTCAE v4.0) grade 1 diarrhea, grade 2 dermatitis, grade 2 acute pain reaction, grade 1 dysuria, and grade 1 fatigue. Dermatitis was managed with aquaphor, proshield, and Silvadene in the groin at areas of skin breakdown. Of note, after 3-4 weeks of treatment, her tumor was seen to regress quite nicely. At the completion of treatment, there was no visible vulvar tumor, and only mild-moderate induration in the distal posterior vagina. There were no unplanned radiotherapy treatment breaks; she was briefly admitted to the hospital from 4/6/18 - 4/7/18 after she was seen in the ED for right chest wall pain. During that time she had a CT chest (which showed a 3mm nodule in the RLL, indeterminate) and serial troponins (negative for MI). Stress echo on 4/6/18 showed no evidence of inducible ischemia. She did have a urinalysis consistent with urinary tract infection, for which she was treated with 1gm IV rocephin followed by ciprofloxacin x 5 days.    She returns today for routine follow up, now 3 weeks s/p completion of chemoRT. Unfortunately, she was admitted to the hospital from 5/6/18 to 5/12/18 for small bowel obstruction after presenting with increasing abdominal pain. It was felt to be likely related to an \"internal " "hernia\" - outside CT images and interpretation are not available at this time. 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 She underwent small bowel resection with anastomosis, and was discharged on 5/12/18. She continues to follow with hepatology for hepatitis C (last seen 5/18/18), and she has an appointment with Dr. Vera as well as a CT chest/abdomen/pelvis this afternoon.    She is recovering from her recent hospitalization, and other than decreased appetite she has done fairly well. She had a left lower extremity ultrasound to rule out DVT, which was negative. She has been using her vaginal dilator approximately 2-3 times/week.  She continues to deny any pain in the vulva/perineum.  She states that she had shasta having diarrhea, but her bowel movements normalized in the last 2 days.  She has no trouble with urination and notes no vaginal discharge or bleeding.     PHYSICAL EXAM:  VITALS: BP (!) 138/93  Pulse 78  Wt 60.5 kg (133 lb 4.8 oz)  SpO2 96%  BMI 21.19 kg/m2  GEN: Appears well, alert, oriented, and in NAD  HEENT: EOMI, normal conjunctiva, MMM  CV: Good distal perfusion  RESP: Breathing comfortably on room air  ABDOMEN: Soft, NT, ND, bowel sounds present  SKIN: Normal color and turgor  GENITOURINARY: Vulva, mons and inguinal folds with moderate persistent hyperpigmentation. Three small, firm 1-1.5 cm areas of likely folliculitis without evidence of infection. Vulva with no obvious mass.  However, after retraction of the labia, there is a small area at the posterior distal vagina at the introitus, perineum and wilberto-anus that appears weeping and erythematous. On digital examination, the vagina is patent; there is some induration along the distal vaginal wall, most prominent at the distal 1 cm and at 6:00 position.     PSYCH: Appropriate mood and affect    LABS AND IMAGING: Reviewed    IMPRESSION/PLAN:   Ms. " Mane is a 63 year old female with FIGO IIIC vulvar cancer with extracapsular extension of an involved left inguinal node. She is status bilateral inguinal/femoral lymph node dissection and now approximately 3 weeks s/p completion of definitive chemoradiotherapy for the vulva and adjuvant chemoRT for the regional lymph nodes, and is recovering well from her acute radiation-related toxicities. She is also recovering from a small bowel obstruction resulting in resection and anastomosis (at 1 week s/p completion of therapy, this is unlikely to be related to her chemoRT given no time for late effects to manifest).    We reviewed the risk of small bowel obstruction including underweight, smoking and previous abdominal surgery.  She is strongly advised to quit smoking (currently 1/2 PPD).  She will think about it and talk to her primary care physician about nicotine patch or other tools.      We also addressed lymphedema risk given that she has received both lymph node dissection and sandip irradiation.  An order for lymphedema therapy was placed (as external referral).     Continue followup with Dr. Vera as planned, including this afternoon after CT imaging. We would like to see her back in clinic in 3 months.    Ms. Gilliam was seen and discussed with staff, Dr. Elvis Olson MD PGY-4  Radiation Oncology Resident, [facility]  Phone: [phone]        I saw and examined the patient with the resident.  I have reviewed and agree with the resident's note and plan of care.      Yvon Leal MD    Again, thank you for allowing me to participate in the care of your patient.      Sincerely,    Yvon Leal MD

## 2018-05-22 LAB — RADIOLOGIST FLAGS: NORMAL

## 2018-08-14 DIAGNOSIS — C51.9 MALIGNANT NEOPLASM OF VULVA (H): Primary | ICD-10-CM

## 2018-08-22 ENCOUNTER — TELEPHONE (OUTPATIENT)
Dept: GASTROENTEROLOGY | Facility: CLINIC | Age: 63
End: 2018-08-22

## 2018-08-22 NOTE — TELEPHONE ENCOUNTER
Left message for pt reminding them of upcoming appointment.  Instructed pt to bring updated medications list.  Instructed pt to arrive an hour and a half to two hours prior to appt time for labs.  Stefan Aaron, CMA

## 2018-08-24 ENCOUNTER — OFFICE VISIT (OUTPATIENT)
Dept: RADIATION ONCOLOGY | Facility: CLINIC | Age: 63
End: 2018-08-24
Attending: RADIOLOGY
Payer: COMMERCIAL

## 2018-08-24 ENCOUNTER — ONCOLOGY VISIT (OUTPATIENT)
Dept: ONCOLOGY | Facility: CLINIC | Age: 63
End: 2018-08-24
Attending: OBSTETRICS & GYNECOLOGY
Payer: COMMERCIAL

## 2018-08-24 ENCOUNTER — OFFICE VISIT (OUTPATIENT)
Dept: GASTROENTEROLOGY | Facility: CLINIC | Age: 63
End: 2018-08-24
Attending: PHYSICIAN ASSISTANT
Payer: COMMERCIAL

## 2018-08-24 VITALS
BODY MASS INDEX: 21.53 KG/M2 | SYSTOLIC BLOOD PRESSURE: 143 MMHG | RESPIRATION RATE: 16 BRPM | OXYGEN SATURATION: 97 % | HEIGHT: 66 IN | TEMPERATURE: 97.8 F | DIASTOLIC BLOOD PRESSURE: 90 MMHG | WEIGHT: 134 LBS

## 2018-08-24 VITALS
HEART RATE: 87 BPM | WEIGHT: 134 LBS | SYSTOLIC BLOOD PRESSURE: 137 MMHG | OXYGEN SATURATION: 99 % | DIASTOLIC BLOOD PRESSURE: 81 MMHG | BODY MASS INDEX: 21.31 KG/M2

## 2018-08-24 VITALS
BODY MASS INDEX: 21.4 KG/M2 | HEART RATE: 74 BPM | SYSTOLIC BLOOD PRESSURE: 149 MMHG | OXYGEN SATURATION: 97 % | WEIGHT: 134.6 LBS | DIASTOLIC BLOOD PRESSURE: 87 MMHG | TEMPERATURE: 97.8 F

## 2018-08-24 DIAGNOSIS — M79.89 LEFT LEG SWELLING: ICD-10-CM

## 2018-08-24 DIAGNOSIS — B18.2 CHRONIC HEPATITIS C WITHOUT HEPATIC COMA (H): Chronic | ICD-10-CM

## 2018-08-24 DIAGNOSIS — B18.1 VIRAL HEPATITIS B CHRONIC (H): ICD-10-CM

## 2018-08-24 DIAGNOSIS — C51.9 MALIGNANT NEOPLASM OF VULVA (H): Primary | ICD-10-CM

## 2018-08-24 LAB
ALBUMIN SERPL-MCNC: 3.8 G/DL (ref 3.4–5)
ALP SERPL-CCNC: 121 U/L (ref 40–150)
ALT SERPL W P-5'-P-CCNC: 29 U/L (ref 0–50)
ANION GAP SERPL CALCULATED.3IONS-SCNC: 11 MMOL/L (ref 3–14)
AST SERPL W P-5'-P-CCNC: 40 U/L (ref 0–45)
BILIRUB DIRECT SERPL-MCNC: 0.4 MG/DL (ref 0–0.2)
BILIRUB SERPL-MCNC: 1.8 MG/DL (ref 0.2–1.3)
BUN SERPL-MCNC: 17 MG/DL (ref 7–30)
CALCIUM SERPL-MCNC: 8.7 MG/DL (ref 8.5–10.1)
CHLORIDE SERPL-SCNC: 97 MMOL/L (ref 94–109)
CO2 SERPL-SCNC: 25 MMOL/L (ref 20–32)
CREAT SERPL-MCNC: 1 MG/DL (ref 0.52–1.04)
ERYTHROCYTE [DISTWIDTH] IN BLOOD BY AUTOMATED COUNT: 13.5 % (ref 10–15)
GFR SERPL CREATININE-BSD FRML MDRD: 56 ML/MIN/1.7M2
GLUCOSE SERPL-MCNC: 86 MG/DL (ref 70–99)
HCT VFR BLD AUTO: 37 % (ref 35–47)
HGB BLD-MCNC: 12.7 G/DL (ref 11.7–15.7)
HIV 1+2 AB+HIV1 P24 AG SERPL QL IA: NONREACTIVE
INR PPP: 1.01 (ref 0.86–1.14)
MCH RBC QN AUTO: 36.1 PG (ref 26.5–33)
MCHC RBC AUTO-ENTMCNC: 34.3 G/DL (ref 31.5–36.5)
MCV RBC AUTO: 105 FL (ref 78–100)
PLATELET # BLD AUTO: 161 10E9/L (ref 150–450)
POTASSIUM SERPL-SCNC: 3.5 MMOL/L (ref 3.4–5.3)
PROT SERPL-MCNC: 7 G/DL (ref 6.8–8.8)
RBC # BLD AUTO: 3.52 10E12/L (ref 3.8–5.2)
SODIUM SERPL-SCNC: 134 MMOL/L (ref 133–144)
WBC # BLD AUTO: 4.9 10E9/L (ref 4–11)

## 2018-08-24 PROCEDURE — G0463 HOSPITAL OUTPT CLINIC VISIT: HCPCS | Mod: ZF

## 2018-08-24 PROCEDURE — G0463 HOSPITAL OUTPT CLINIC VISIT: HCPCS | Mod: 25 | Performed by: RADIOLOGY

## 2018-08-24 PROCEDURE — 85610 PROTHROMBIN TIME: CPT | Performed by: PHYSICIAN ASSISTANT

## 2018-08-24 PROCEDURE — G0463 HOSPITAL OUTPT CLINIC VISIT: HCPCS | Mod: 27

## 2018-08-24 PROCEDURE — 80076 HEPATIC FUNCTION PANEL: CPT | Performed by: PHYSICIAN ASSISTANT

## 2018-08-24 PROCEDURE — 80048 BASIC METABOLIC PNL TOTAL CA: CPT | Performed by: PHYSICIAN ASSISTANT

## 2018-08-24 PROCEDURE — 36415 COLL VENOUS BLD VENIPUNCTURE: CPT | Performed by: PHYSICIAN ASSISTANT

## 2018-08-24 PROCEDURE — 99214 OFFICE O/P EST MOD 30 MIN: CPT | Mod: ZP | Performed by: OBSTETRICS & GYNECOLOGY

## 2018-08-24 PROCEDURE — 87389 HIV-1 AG W/HIV-1&-2 AB AG IA: CPT | Performed by: PHYSICIAN ASSISTANT

## 2018-08-24 PROCEDURE — 85027 COMPLETE CBC AUTOMATED: CPT | Performed by: PHYSICIAN ASSISTANT

## 2018-08-24 ASSESSMENT — PAIN SCALES - GENERAL
PAINLEVEL: NO PAIN (0)

## 2018-08-24 NOTE — MR AVS SNAPSHOT
After Visit Summary   8/24/2018    Julius Gilliam    MRN: [id number]           Patient Information     Date Of Birth          1955        Visit Information        Provider Department      8/24/2018 9:45 AM Shila Kwon PA-C [facility] Hepatology        Today's Diagnoses     Chronic hepatitis C without hepatic coma (H)           Follow-ups after your visit        Your next 10 appointments already scheduled     Nov 28, 2018  1:00 PM CST   Return Visit with Yvon Leal MD   Radiation Oncology Clinic ([facility])    [facility]  1st Floor  [address] [facility] [id number] [phone]              Who to contact     If you have questions or need follow up information about today's clinic visit or your schedule please contact [facility] HEPATOLOGY directly at [phone].  Normal or non-critical lab and imaging results will be communicated to you by [employer], letter or phone within 4 business days after the clinic has received the results. If you do not hear from us within 7 days, please contact the clinic through [employer] or phone. If you have a critical or abnormal lab result, we will notify you by phone as soon as possible.  Submit refill requests through [employer] or call your pharmacy and they will forward the refill request to us. Please allow 3 business days for your refill to be completed.          Additional Information About Your Visit        MyChart Information     [employer] gives you secure access to your electronic health record. If you see a primary care provider, you can also send messages to your care team and make appointments. If you have questions, please call your primary care clinic.  If you do not have a primary care provider, please call [phone] and they will assist you.        Care EveryWhere ID     This is your Care EveryWhere ID. This could be used by other organizations to access your [facility]  records  [id number]        Your Vitals Were     Pulse Temperature Pulse Oximetry BMI (Body Mass Index)          74 97.8  F (36.6  C) (Oral) 97% 21.4 kg/m2         Blood Pressure from Last 3 Encounters:   08/24/18 137/81   08/24/18 143/90   08/24/18 149/87    Weight from Last 3 Encounters:   08/24/18 60.8 kg (134 lb)   08/24/18 60.8 kg (134 lb)   08/24/18 61.1 kg (134 lb 9.6 oz)              Today, you had the following     No orders found for display       Primary Care Provider Office Phone # Fax #    Tova Montesinos, [phone]       [facility] [address] [facility] [address]        Equal Access to Services     CAREY COSME : Baylee bach Sosandro, waaxda luqadaha, qaybta kaalmada surjit, talha cárdenas . So [facility] [phone].    ATENCIÓN: Si habla español, tiene a elizabeth disposición servicios gratuitos de asistencia lingüística. Teodora al [phone].    We comply with applicable federal civil rights laws and [place] laws. We do not discriminate on the basis of race, color, national origin, age, disability, sex, sexual orientation, or gender identity.            Thank you!     Thank you for choosing [facility] HEPATOLOGY  for your care. Our goal is always to provide you with excellent care. Hearing back from our patients is one way we can continue to improve our services. Please take a few minutes to complete the written survey that you may receive in the mail after your visit with us. Thank you!             Your Updated Medication List - Protect others around you: Learn how to safely use, store and throw away your medicines at www.disposemymeds.org.          This list is accurate as of 8/24/18  3:52 PM.  Always use your most recent med list.                   Brand Name Dispense Instructions for use Diagnosis    acetaminophen 325 MG tablet    TYLENOL    30 tablet    Take 2 tablets (650 mg) by mouth every 6 hours    H/O lymph node excision        loperamide 2 MG capsule    IMODIUM     Take 2 mg by mouth 4 times daily as needed for diarrhea        LORazepam 1 MG tablet    ATIVAN    30 tablet    Take 1 tablet (1 mg) by mouth every 6 hours as needed (nausea/vomiting, anxiety or sleep )    Encounter for long-term (current) use of medications, Malignant neoplasm of vulva (H)       magnesium oxide 400 (241.3 Mg) MG tablet    MAG-OX    60 tablet    Take 1 tablet (400 mg) by mouth 2 times daily    Malignant neoplasm of vulva (H), Hypomagnesemia       oxyCODONE IR 5 MG tablet    ROXICODONE    30 tablet    Take 1 tablet (5 mg) by mouth every 4 hours as needed for pain    H/O lymph node excision       potassium chloride SA 20 MEQ CR tablet    KLOR-CON    60 tablet    Take 1 tablet (20 mEq) by mouth 2 times daily    Malignant neoplasm of vulva (H), Hypokalemia       prochlorperazine 10 MG tablet    COMPAZINE    30 tablet    Take 1 tablet (10 mg) by mouth every 6 hours as needed (nausea/vomiting)    Encounter for long-term (current) use of medications, Malignant neoplasm of vulva (H)

## 2018-08-24 NOTE — PROGRESS NOTES
FOLLOW-UP VISIT    Patient Name: Julius Gilliam      : 1955     Age: 63 year old        ______________________________________________________________________________     Chief Complaint   Patient presents with     Cancer     Follow up for Vulva/groin/pelvis 6350 cGy completed on 18     Pulse 87  Wt 60.8 kg (134 lb)  SpO2 99%  BMI 21.31 kg/m2     Date Radiation Completed: 18    Pain  Denies    Labs  Other Labs: Yes: 18    Imaging  None        Diarrhea: 0- None    Constipation: 0- None    Nausea: 0- None    Vomitin- No vomiting    Genitourinary system: 0- Normal    Dysuria: 0- None    Vaginal dilator use: 3-4 times per week     Other Appointments:     MD Name:  Appointment Date:    MD Name: Appointment Date:   MD Name: Appointment Date:   Other Appointment Notes:     Residual Radiation side effect: none side effects      Additional Instructions:     Nurse face-to-face time: Level 2:  5 min face to face time

## 2018-08-24 NOTE — MR AVS SNAPSHOT
After Visit Summary   8/24/2018    Julius Gilliam    MRN: [id number]           Patient Information     Date Of Birth          1955        Visit Information        Provider Department      8/24/2018 2:30 PM Yvon Leal MD [facility]        Today's Diagnoses     Malignant neoplasm of vulva (H)    -  1       Follow-ups after your visit        Follow-up notes from your care team     Return in about 3 months (around 11/24/2018).      Your next 10 appointments already scheduled     Nov 28, 2018  1:00 PM CST   Return Visit with Yvon Leal MD   Rad[facility] Clinic ([facility])    [facility]  1st Floor  [address] [id number] [phone]              Who to contact     Please call your clinic at [phone] to:    Ask questions about your health    Make or cancel appointments    Discuss your medicines    Learn about your test results    Speak to your doctor            Additional Information About Your Visit        MyChart Information     [employer] gives you secure access to your electronic health record. If you see a primary care provider, you can also send messages to your care team and make appointments. If you have questions, please call your primary care clinic.  If you do not have a primary care provider, please call [phone] and they will assist you.      [employer] is an electronic gateway that provides easy, online access to your medical records. With [employer], you can request a clinic appointment, read your test results, renew a prescription or communicate with your care team.     To access your existing account, please contact your [facility] or call [phone] for assistance.        Care EveryWhere ID     This is your Care EveryWhere ID. This could be used by other organizations to access your [place] medical records  [id number]        Your Vitals Were     Pulse Pulse  Oximetry BMI (Body Mass Index)             87 99% 21.31 kg/m2          Blood Pressure from Last 3 Encounters:   08/24/18 137/81   08/24/18 143/90   08/24/18 149/87    Weight from Last 3 Encounters:   08/24/18 60.8 kg (134 lb)   08/24/18 60.8 kg (134 lb)   08/24/18 61.1 kg (134 lb 9.6 oz)               Primary Care Provider Office Phone # Fax Kodak Montesinos [phone]       [facility] [address] [facility] [address]        Equal Access to Services     CAREY COSME : Hadii melania Miguel, waroseannda janny, qaybta kaalmada surjit, talha jones. So [facility] [phone].    ATENCIÓN: Si habla español, tiene a elizabeth disposición servicios gratuitos de asistencia lingüística. [facility] [phone].    We comply with applicable federal civil rights laws and [place] laws. We do not discriminate on the basis of race, color, national origin, age, disability, sex, sexual orientation, or gender identity.            Thank you!     Thank you for choosing [facility]  for your care. Our goal is always to provide you with excellent care. Hearing back from our patients is one way we can continue to improve our services. Please take a few minutes to complete the written survey that you may receive in the mail after your visit with us. Thank you!             Your Updated Medication List - Protect others around you: Learn how to safely use, store and throw away your medicines at www.disposemymeds.org.          This list is accurate as of 8/24/18  4:54 PM.  Always use your most recent med list.                   Brand Name Dispense Instructions for use Diagnosis    acetaminophen 325 MG tablet    TYLENOL    30 tablet    Take 2 tablets (650 mg) by mouth every 6 hours    H/O lymph node excision       loperamide 2 MG capsule    IMODIUM     Take 2 mg by mouth 4 times daily as needed for diarrhea        LORazepam 1 MG tablet    ATIVAN    30 tablet    Take  1 tablet (1 mg) by mouth every 6 hours as needed (nausea/vomiting, anxiety or sleep )    Encounter for long-term (current) use of medications, Malignant neoplasm of vulva (H)       magnesium oxide 400 (241.3 Mg) MG tablet    MAG-OX    60 tablet    Take 1 tablet (400 mg) by mouth 2 times daily    Malignant neoplasm of vulva (H), Hypomagnesemia       oxyCODONE IR 5 MG tablet    ROXICODONE    30 tablet    Take 1 tablet (5 mg) by mouth every 4 hours as needed for pain    H/O lymph node excision       potassium chloride SA 20 MEQ CR tablet    KLOR-CON    60 tablet    Take 1 tablet (20 mEq) by mouth 2 times daily    Malignant neoplasm of vulva (H), Hypokalemia       prochlorperazine 10 MG tablet    COMPAZINE    30 tablet    Take 1 tablet (10 mg) by mouth every 6 hours as needed (nausea/vomiting)    Encounter for long-term (current) use of medications, Malignant neoplasm of vulva (H)

## 2018-08-24 NOTE — PROGRESS NOTES
Hepatology Clinic note  Julius Gilliam   Date of Birth 1955  Date of Service 8/24/2018         Assessment/plan:   Julius Gilliam is a 63 year old female history of Hepatitis C, genotype 1a. Treatment naive. Previous Fibrosis scan showing Stage 1, 6.9 kilopascals.   Currently there are no biochemical or physical signs of cirrhosis. We discussed the natural course of Hepatitis C virus and the benefits of treating the disease. We discussed the treatment regimen and medication side effects. Patient would be a good candidate for Hepatitis C treatment to prevent worsening fibrosis and to prevent extrahepatic manifestations of the disease. She is currently drinking a significant amount of alcohol and discussed that she should reduce the amount. Her transaminases are elevated from baseline and she has a slight increase in her Dbilirubin likely related to alcohol intake. She three months post treatment for chemotherapy and radiation for history of metastatic vulvar cancer.     - If she is cleared from a oncology standpoint, will plan on treating Hepatitis C in the near future    - Repeat HCV RNA at the end of treatment and 12-weeks after finishing treatment to determine SVR  - If patient achieves SVR, she does not need regular follow-up in Hepatology Clinic.   - Follow-up in Hepatology Clinic as needed    Shila Kwon PA-C   [facility]    -----------------------------------------------------       HPI:   Julius Gilliam is a 63 year old female presenting for the follow-up.    Hepatitis C   -Genotype 1a  -Diagnosed: November 2018  -History: ? History of tattoo  -Fibrosis scan: Stage 1, 6.9 kilopascals  -Prior treatments: naive      Patient was last seen 5/18/2018. She has not had any hospitalizations or ER visits. She denies any new medications.     She states her appetite is finally back to normal. Her weight has been stable. She is having regular bowel movements, one to two a  day.  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 She continues to have persistent leg swelling on her left leg. Previous LLE U/S negative for DVT. History of leg fracture.     She is uncertain if she has any further treatment for her history of metastatic vulvar cancer (metastatic to lymph node). She has repeat imaging CT of C/A/P ordered and will be meeting with Gyn-Onc and Rad-Onc this afternoon.     Patient denies jaundice,  abdominal distension or confusion.  Patient also denies melena, hematochezia or hematemesis. Patient denies weight loss, fevers, sweats or chills.    She states that she has been drinking a few drinks a day, 4-5 times a week. She continues to smoke 1/2 pack a day. She does not have a planned quit date.     Medical hx Surgical hx   Past Medical History:   Diagnosis Date     Deafness in right ear      Hypertension      Multiple facial bone fractures (H)      Vertigo      Vulvar cancer (H) 12/2017    Past Surgical History:   Procedure Laterality Date     DISSECT LYMPH NODE INGUINAL N/A 2/9/2018    Procedure: DISSECT LYMPH NODE INGUINAL;  Bilateral Inguinal Lymph Node Dissection;  Surgeon: Santosh Vera MD;  Location: [facility] OR     ENDOSCOPIC STRIPPING VEIN(S)       FACIAL RECONSTRUCTION SURGERY      post horse trauma     repair of left arm fracture                   Medications:     Current Outpatient Prescriptions   Medication     acetaminophen (TYLENOL) 325 MG tablet     loperamide (IMODIUM) 2 MG capsule     LORazepam (ATIVAN) 1 MG tablet     magnesium oxide (MAG-OX) 400 (241.3 MG) MG tablet     potassium chloride SA (KLOR-CON) 20 MEQ CR tablet     oxyCODONE IR (ROXICODONE) 5 MG tablet     prochlorperazine (COMPAZINE) 10 MG tablet     No current facility-administered medications for this visit.             Allergies:   No Known Allergies         Review of Systems:   10 points ROS was obtained and highlighted in the HPI, otherwise negative.          Physical Exam:    VS:  /87  Pulse 74  Temp 97.8  F (36.6  C) (Oral)  Wt 61.1 kg (134 lb 9.6 oz)  SpO2 97%  BMI 21.4 kg/m2      Gen- well, NAD, A+Ox3, normal color  Lym- no palpable LAD  CVS- RRR  RS- CTA  Abd- soft,nontender. Well healed surgical scars. Hepatomegaly.   Extr- hands normal, no MATTHEW  Skin- no rash or jaundice  Neuro- no asterixis  Psych- normal mood         Data:   Reviewed in person and significant for:    Lab Results   Component Value Date     05/18/2018      Lab Results   Component Value Date    POTASSIUM 3.3 05/18/2018     Lab Results   Component Value Date    CHLORIDE 99 05/18/2018     Lab Results   Component Value Date    CO2 28 05/18/2018     Lab Results   Component Value Date    BUN 10 05/18/2018     Lab Results   Component Value Date    CR 1.05 05/18/2018       Lab Results   Component Value Date    WBC 1.6 04/16/2018     Lab Results   Component Value Date    HGB 10.2 04/16/2018     Lab Results   Component Value Date    HCT 29.3 04/16/2018     Lab Results   Component Value Date    MCV 99 04/16/2018     Lab Results   Component Value Date     04/16/2018       Lab Results   Component Value Date    AST 16 04/20/2018     Lab Results   Component Value Date    ALT 10 04/20/2018     No results found for: BILICONJ   Lab Results   Component Value Date    BILITOTAL 0.3 04/20/2018       Lab Results   Component Value Date    ALBUMIN 3.0 05/18/2018     Lab Results   Component Value Date    PROTTOTAL 6.0 04/20/2018      Lab Results   Component Value Date    ALKPHOS 106 04/20/2018       Lab Results   Component Value Date    INR 0.91 03/23/2018       Radiology 5/21/2018:   CT C/A/P:   1. In this patient with history of vulvar cancer:  a. Stable pulmonary nodules since at least 12/19/2017. Continued  follow-up recommended.  b. Small to moderate ascites with karli and infiltrative appearance of  the mesentery, most prominent in left upper quadrant. Though these  findings may be postsurgical/reactive they  are nonspecific and new.   Consider short interval follow-up CT for further evaluation as  peritoneal carcinomatosis could have a similar appearance.  c. Increased conspicuity of splenic hypodensities when compared with  prior examinations. Attention on follow-up.  d. Chronic mediastinal soft tissue thickening about the great vessels  without significant change since 12/19/2017 without associated FDG  avidity.   e. Prominent retroperitoneal lymph node.  Attention on follow-up.  2. New small right pleural effusion with mild bibasilar atelectasis.   3. The bowel is nondilated. Surgical changes of small bowel resection  and bilateral inguinal lymph node dissections.

## 2018-08-24 NOTE — LETTER
2018       RE: Julius Gilliam  [address]  [facility] [address]     Dear Colleague,    Thank you for referring your patient, Julius Gilliam, to the [facility]. Please see a copy of my visit note below.    Follow Up Notes     ED: 2018      RE: Julius Gilliam  : 1955      Julius Gilliam is a 63 year old woman with a diagnosis of stage IIIC vulvar cancer. She is here today for follow up and disease management.     Today she is feeling well overall. Since her SBO in [facility], she has noted RLE swelling and no LLE. This started the Monday after she was discharged. She has no pain in her left leg, is on her feet a lot with work and still not bothersome. Denies numbness or tingling. Denies SOB or chest pain. She feels back to normal routine for the most part. No residual effects of radiation. Regular BM, eating well.    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 Lymph nodes, left inguinal:   - Metastatic squamous cell carcinoma to three of seven lymph nodes examined (3/7)   - The largest metastatic focus is 2.0 cm in greatest dimension with extranodal extension     B. Lymph nodes, right inguinal:   - Two reactive lymph nodes, negative for malignancy (0/2)     3/12/18: Cisplatin + radiation    3/19/18: Cisplatin + radiation    3/26/18: Cisplatin + radiation    18: Cisplatin    18: ED  for chest pain; testing negative; 3 mm RLL nodule identified    4/10/18: Cisplatin    4/13/18: Radiation    4/17/18: Cisplatin CANCELLED due to neutropenia    4/20/18: Radiation     4/27/18: Final radiation treatment    5/7/18: Admission to [facility] for SBO. Small bowel obstruction with incidental Meckel's diverticulum. No evidence of metastasis or carcinomatosis.    5/18/18: LLE doppler US neg for DVT.    5/21/18: CT CAP   1. In this patient with history of vulvar cancer:  a. Stable pulmonary nodules since at least 12/19/2017. Continued  follow-up recommended.  b. Small to moderate ascites with karli and infiltrative appearance of  the mesentery, most prominent in left upper quadrant. Though these  findings may be postsurgical/reactive they are nonspecific and new.   Consider short interval follow-up CT for further evaluation as  peritoneal carcinomatosis could have a similar appearance.  c. Increased conspicuity of splenic hypodensities when compared with  prior examinations. Attention on follow-up.  d. Chronic mediastinal soft tissue thickening about the great vessels  without significant change since 12/19/2017 without associated FDG  avidity.   e. Prominent retroperitoneal lymph node.  Attention on follow-up.  2. New small right pleural effusion with mild bibasilar atelectasis.   3. The bowel is nondilated. Surgical changes of small bowel resection  and bilateral inguinal lymph node dissections.    Past Medical History:  Past Medical History:   Diagnosis Date     Deafness in right ear      Hypertension      Multiple facial bone fractures (H)     also left ankle and left wrist     Vertigo      Vulvar cancer (H) 12/2017     Past Surgical History:  Past Surgical History:   Procedure Laterality Date     DISSECT LYMPH NODE INGUINAL N/A 2/9/2018    Procedure: DISSECT LYMPH NODE INGUINAL;  Bilateral Inguinal Lymph Node Dissection;  Surgeon: Santosh Vera MD;  Location: [facility] OR     ENDOSCOPIC STRIPPING  VEIN(S)       FACIAL RECONSTRUCTION SURGERY      post horse trauma     repair of left arm fracture         Health Maintenance Due   Topic Date Due     TETANUS IMMUNIZATION (SYSTEM ASSIGNED)  1974     HIV SCREEN (SYSTEM ASSIGNED)  1974     PAP SCREENING Q3 YR (SYSTEM ASSIGNED)  1977     ADVANCE DIRECTIVE PLANNING Q5 YRS  2011   Colonoscopy: never had  Mammogram: has been many years    Current Medications:   Current Outpatient Prescriptions   Medication Sig Dispense Refill     acetaminophen (TYLENOL) 325 MG tablet Take 2 tablets (650 mg) by mouth every 6 hours 30 tablet 0     loperamide (IMODIUM) 2 MG capsule Take 2 mg by mouth 4 times daily as needed for diarrhea       LORazepam (ATIVAN) 1 MG tablet Take 1 tablet (1 mg) by mouth every 6 hours as needed (nausea/vomiting, anxiety or sleep ) 30 tablet 1     magnesium oxide (MAG-OX) 400 (241.3 MG) MG tablet Take 1 tablet (400 mg) by mouth 2 times daily 60 tablet 3     oxyCODONE IR (ROXICODONE) 5 MG tablet Take 1 tablet (5 mg) by mouth every 4 hours as needed for pain 30 tablet 0     potassium chloride SA (KLOR-CON) 20 MEQ CR tablet Take 1 tablet (20 mEq) by mouth 2 times daily 60 tablet 0     prochlorperazine (COMPAZINE) 10 MG tablet Take 1 tablet (10 mg) by mouth every 6 hours as needed (nausea/vomiting) (Patient not taking: Reported on 2018) 30 tablet 2     Allergies:    No Known Allergies     Social History:  Social History   Substance Use Topics     Smoking status: Current Every Day Smoker     Packs/day: 0.50     Types: Cigarettes     Smokeless tobacco: Never Used      Comment: as of  has been smoking for 30 years. currently cut back to 4 cigs a day     Alcohol use Yes      Comment: 8 x per week       History   Drug Use No   Smoking 1/2ppd now, has not tried to quit or heard about options    Family History:   Family History   Problem Relation Age of Onset     Cancer Mother 81     Breast ca     Myocardial Infarction Father 55       "at 92     Physical Exam:   /90 (BP Location: Right arm, Patient Position: Chair, Cuff Size: Adult Regular)  Temp 97.8  F (36.6  C) (Oral)  Resp 16  Ht 1.689 m (5' 6.5\")  Wt 60.8 kg (134 lb)  SpO2 97%  BMI 21.31 kg/m2     General Appearance: healthy and alert, no distress  HEENT: no gross thyromegaly   Cardiovascular: Well perfused   Respiratory: No increased work of breathing  Musculoskeletal: extremities non tender. There is moderate lymphedema of LLE and none of RLE  Skin: There are radiation changes and scars from b/l LND to the skin of the groin without ulceration.  Neurological: normal gait, no gross defects   Psychiatric: appropriate mood and affect                            Hematological: normal cervical, supraclavicular lymph nodes. Normal inguinal lymph nodes with overlying scars   Gastrointestinal:       abdomen soft, non-tender, non-distended, no organomegaly or masses  Genitourinary:  external genitalia with stigmata of RT. There is an appropriate amount of tenderness with the exam. No lesions noted.    Assessment:    Julius Gilliam is a 63 year old woman with a diagnosis of stage IIIC vulvar cancer.   She is here today for follow up and disease management.      30 minutes were spent with this patient, over 50% of that time was spent in symptom management, treatment planning and in counseling and coordination of care.      Plan:     1.) Vulvar cancer: No evidence of recurrence. Repeat CT CAP to evaluate previous mild abnromalities     2.) Hypomagnesemia: reviewed. Patient has had multiple infusions.     3.) Labs and/or tests ordered include:  CT CAP.      4.) Health maintenance issues addressed today include: smoking cessation, colonoscopy, mammogram.    5.)        LLE lymphedema: Has already discussed seeing lymphedema clinic in [place] with primary and will plan to go there.      Scribe Disclosure:   I, Eleonora Nguyen MD PGY3, am serving as a scribe; to document services personally " performed by Dr. Vera- -based on data collection and the provider's statements to me.     I have examined this patient with our resident who acted as as scribe and agree with the above findings and plan.    Santosh Vera

## 2018-08-24 NOTE — LETTER
2018       RE: Julius Gilliam   [address]  [facility] [address]     Dear Colleague,    Thank you for referring your patient, Julius Gilliam, to the RADIATION ONCOLOGY CLINIC. Please see a copy of my visit note below.    FOLLOW-UP VISIT    Patient Name: Julius Gilliam      : 1955     Age: 63 year old        ______________________________________________________________________________     Chief Complaint   Patient presents with     Cancer     Follow up for Vulva/groin/pelvis 6350 cGy completed on 18     Pulse 87  Wt 60.8 kg (134 lb)  SpO2 99%  BMI 21.31 kg/m2     Date Radiation Completed: 18    Pain  Denies    Labs  Other Labs: Yes: 18    Imaging  None        Diarrhea: 0- None    Constipation: 0- None    Nausea: 0- None    Vomitin- No vomiting    Genitourinary system: 0- Normal    Dysuria: 0- None    Vaginal dilator use: 3-4 times per week     Other Appointments:     MD Name:  Appointment Date:    MD Name: Appointment Date:   MD Name: Appointment Date:   Other Appointment Notes:     Residual Radiation side effect: none side effects      Additional Instructions:     Nurse face-to-face time: Level 2:  5 min face to face time          [facility], [place]  Radiation Oncology Follow-up Note  2018    Julius Gilliam   MRN: [id number]  : 1955     DDISEASE TREATED: 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 FIGO IIIC (T2 N2c)     RADIATION THERAPY DELIVERED: 6,350 cGy in 36 fractions, completed 2018, delivered with weekly cisplatin.     INTERVAL SINCE COMPLETION OF RT: Approximately  4 months since completion on 2018.     SUBJECTIVE:  Ms. Gilliam is a 63 year old woman with squamous cell carcinoma of the vulva. On presentation, she had a bulky mass involving the bilateral labia minora and labia majora, with extension to the anterior anus and encroachment upon  "the urethra. She also had bulky left-sided lymphadenopathy, and underwent bilateral inguinal dissection with positive left-sided lymph nodes with extracapsular extension. Because of the extent of the primary disease in the vulva, she was not a surgical candidate for vulvectomy.  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  We therefore recommended concurrent chemoradiation therapy with weekly cisplatin. This was delivered as detailed above.      During treatment, she developed (by CTCAE v4.0) grade 1 diarrhea, grade 2 dermatitis, grade 2 acute pain reaction, grade 1 dysuria, and grade 1 fatigue. Dermatitis was managed with aquaphor, proshield, and Silvadene in the groin at areas of skin breakdown. Of note, after 3-4 weeks of treatment, her tumor was seen to regress quite nicely. At the completion of treatment, there was no visible vulvar tumor, and only mild-moderate induration in the distal posterior vagina. There were no unplanned radiotherapy treatment breaks; she was briefly admitted to the hospital from 4/6/18 - 4/7/18 after she was seen in the ED for right chest wall pain. During that time she had a CT chest (which showed a 3mm nodule in the RLL, indeterminate). Unfortunately, after completion of chemoradiation she was admitted to the hospital from 5/6/18 to 5/12/18 for small bowel obstruction after presenting with increasing abdominal pain. It was felt to be likely related to an \"internal hernia , not related to chemoradiation as it occurred too soon for late effects to manifest. 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 She underwent small bowel resection with anastomosis, and was discharged on 5/12/18.  She was last seen by Dr. Vera on 5/21/2018. At that time CT scan showed a stable lung nodule and ascites thought to be due to her recent surgery vs reactive from chemoradiation, less " likely carcinomatosis.     Ms. Gilliam saw Dr. Vera today for follow-up visit.  His recommendation was to get restaging CT CAP.      At our visit, Julius states that she is overall doing well. She has been using her vaginal dilator approximately 3-4 times/week but is not sexually active.  She continues to deny any pain in the vulva/perineum, unusual vaginal discharge, or any notable lesions.  She does endorse diarrhea approximately 1-2 times per week, this is a change from prior to treatment when she did not have diarrhea.  Overall, she feels like her diarrhea is improving over the last several months and she only takes Imodium as needed.    OBJECTIVE:   /81  Pulse 87  Wt 60.8 kg (134 lb)  SpO2 99%  BMI 21.31 kg/m2    PHYSICAL EXAM:  Gen: Alert, in NAD  Eyes: EOMI, sclera anicteric  HENT      Head: NC/AT     Ears: No external auricular lesions     Nose/sinus: No rhinorrhea or epistaxis     Oral Cavity/Oropharynx: MMM, no thrush noted  Pulm: Breathing comfortably on room air, no audible wheezes or ronchi  CV: Well-perfused, no cyanosis  Abdominal: Soft, nondistended  Skin: Some hyperpigmentation and rough, dry skin in areas of treated field.  Extremities: 1+ bilateral lower extremity edema.  Varicose veins present superior to right knee.  Neurologic/MSK: motor grossly intact, normal gait  Lymph: No inguinal lymphadenopathy  Gyn: External genitalia notable for some mild hyperpigmentation and loss of hair.  There is some mild swelling of the labia majora.  There is a small area of redness and desquamation inferior to the introitus but no clear evidence of tumor or infection.  No perianal lesions other than the redness described above.  On digital exam, there is a slight firmness in the distal right vagina approximately 1 cm from the introitus at the 5 o'clock position.  Psych: Appropriate mood and affect    IMPRESSION: Ms. Gilliam is a 63 year old female with FIGO IIIC vulvar cancer with extracapsular  extension of an involved left inguinal node. She is status bilateral inguinal/femoral lymph node dissection and definitive chemoradiotherapy for the vulva and adjuvant chemoRT for the regional lymph nodes.  She has had a good clinical response to chemoradiotherapy with no concerning lesions visible or palpable on exam.  CT imaging would be helpful for more complete reevaluation.  It is possible that her diarrhea is related to radiotherapy, however it seems to be improving and not significantly impacting her quality of life at this point.     RECOMMENDATIONS:    1. Follow-up with Dr. Leal in radiation oncology in 3 months  2. Restaging CT chest/abdomen/pelvis with IV and oral contrast soon is reasonable.   3. Patient has order for lymphedema clinic treatment closer to her home town, recommended establishing care with them soon is reasonable for her.  4. We will contact Dr. Vera's office to determine when he would like to see her for follow-up and try to coordinate if possible  5. Provided the patient with a medium dilator to use if she tolerate, otherwise continue dilator use with S+.      The patient was seen and examined with Dr. Leal, who agrees with the above assessment and plan.    Moises Alvarado MD PGY-2   Radiation Oncology, [facility]  [phone] clinic  Pager [phone]      I saw and examined the patient with the resident.  I have reviewed and edited the resident's note and agree with the plan of care.      Yvon Leal MD    Again, thank you for allowing me to participate in the care of your patient.      Sincerely,    Yvon Leal MD

## 2018-08-24 NOTE — PROGRESS NOTES
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 FIGO IIIC (T2 N2c)     RADIATION THERAPY DELIVERED: 6,350 cGy in 36 fractions, completed 2018, delivered with weekly cisplatin.     INTERVAL SINCE COMPLETION OF RT: Approximately 4 months since completion on 2018.     SUBJECTIVE:  Ms. Gilliam is a 63 year old woman with squamous cell carcinoma of the vulva. On presentation, she had a bulky mass involving the bilateral labia minora and labia majora, with extension to the anterior anus and encroachment upon the urethra. She also had bulky left-sided lymphadenopathy, and underwent bilateral inguinal dissection with positive left-sided lymph nodes with extracapsular extension. Because of the extent of the primary disease in the vulva, she was not a surgical candidate for vulvectomy.  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  We therefore recommended concurrent chemoradiation therapy with weekly cisplatin. This was delivered as detailed above.      During treatment, she developed (by CTCAE v4.0) grade 1 diarrhea, grade 2 dermatitis, grade 2 acute pain reaction, grade 1 dysuria, and grade 1 fatigue. Dermatitis was managed with aquaphor, proshield, and Silvadene in the groin at areas of skin breakdown. Of note, after 3-4 weeks of treatment, her tumor was seen to regress quite nicely. At the completion of treatment, there was no visible vulvar tumor, and only mild-moderate induration in the distal posterior vagina. There were no unplanned radiotherapy treatment breaks; she was briefly admitted to the hospital from 18 - 18 after she was seen in the ED for right chest wall pain. During that time she had a CT chest (which showed a 3mm nodule in the RLL,  "indeterminate). Unfortunately, after completion of chemoradiation she was admitted to the hospital from 5/6/18 to 5/12/18 for small bowel obstruction after presenting with increasing abdominal pain. It was felt to be likely related to an \"internal hernia , not related to chemoradiation as it occurred too soon for late effects to manifest. 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 She underwent small bowel resection with anastomosis, and was discharged on 5/12/18.  She was last seen by Dr. Vera on 5/21/2018. At that time CT scan showed a stable lung nodule and ascites thought to be due to her recent surgery vs reactive from chemoradiation, less likely carcinomatosis.     Ms. Gilliam saw Dr. Vera today for follow-up visit.  His recommendation was to get restaging CT CAP.      At our visit, Julius states that she is overall doing well. She has been using her vaginal dilator approximately 3-4 times/week but is not sexually active.  She continues to deny any pain in the vulva/perineum, unusual vaginal discharge, or any notable lesions.  She does endorse diarrhea approximately 1-2 times per week, this is a change from prior to treatment when she did not have diarrhea.  Overall, she feels like her diarrhea is improving over the last several months and she only takes Imodium as needed.    OBJECTIVE:   /81  Pulse 87  Wt 60.8 kg (134 lb)  SpO2 99%  BMI 21.31 kg/m2    PHYSICAL EXAM:  Gen: Alert, in NAD  Eyes: EOMI, sclera anicteric  HENT      Head: NC/AT     Ears: No external auricular lesions     Nose/sinus: No rhinorrhea or epistaxis     Oral Cavity/Oropharynx: MMM, no thrush noted  Pulm: Breathing comfortably on room air, no audible wheezes or ronchi  CV: Well-perfused, no cyanosis  Abdominal: Soft, nondistended  Skin: Some hyperpigmentation and rough, dry skin in areas of treated field.  Extremities: 1+ " bilateral lower extremity edema.  Varicose veins present superior to right knee.  Neurologic/MSK: motor grossly intact, normal gait  Lymph: No inguinal lymphadenopathy  Gyn: External genitalia notable for some mild hyperpigmentation and loss of hair.  There is some mild swelling of the labia majora.  There is a small area of redness and desquamation inferior to the introitus but no clear evidence of tumor or infection.  No perianal lesions other than the redness described above.  On digital exam, there is a slight firmness in the distal right vagina approximately 1 cm from the introitus at the 5 o'clock position.  Psych: Appropriate mood and affect    IMPRESSION: Ms. Gilliam is a 63 year old female with FIGO IIIC vulvar cancer with extracapsular extension of an involved left inguinal node. She is status bilateral inguinal/femoral lymph node dissection and definitive chemoradiotherapy for the vulva and adjuvant chemoRT for the regional lymph nodes.  She has had a good clinical response to chemoradiotherapy with no concerning lesions visible or palpable on exam.  CT imaging would be helpful for more complete reevaluation.  It is possible that her diarrhea is related to radiotherapy, however it seems to be improving and not significantly impacting her quality of life at this point.     RECOMMENDATIONS:    1. Follow-up with Dr. Leal in radiation oncology in 3 months  2. Restaging CT chest/abdomen/pelvis with IV and oral contrast soon is reasonable.   3. Patient has order for lymphedema clinic treatment closer to her home town, recommended establishing care with them soon is reasonable for her.  4. We will contact Dr. Vera's office to determine when he would like to see her for follow-up and try to coordinate if possible  5. Provided the patient with a medium dilator to use if she tolerate, otherwise continue dilator use with S+.      The patient was seen and examined with Dr. Leal, who agrees with the above  assessment and plan.    Moises Alvarado MD PGY-2   Radiation Oncology, [facility], [place]  [phone] clinic  Pager [phone]      I saw and examined the patient with the resident.  I have reviewed and edited the resident's note and agree with the plan of care.      Yvon Leal MD

## 2018-08-24 NOTE — NURSING NOTE
Chief Complaint   Patient presents with     RECHECK     follow up Hep C     /87  Pulse 74  Temp 97.8  F (36.6  C) (Oral)  Wt 61.1 kg (134 lb 9.6 oz)  SpO2 97%  BMI 21.4 kg/m2  Talisha Barnett MA

## 2018-08-24 NOTE — PROGRESS NOTES
Follow Up Notes     ED: 2018      RE: Julius Gilliam  : 1955      Julius Gilliam is a 63 year old woman with a diagnosis of stage IIIC vulvar cancer. She is here today for follow up and disease management.     Today she is feeling well overall. Since her SBO in [facility], she has noted RLE swelling and no LLE. This started the Monday after she was discharged. She has no pain in her left leg, is on her feet a lot with work and still not bothersome. Denies numbness or tingling. Denies SOB or chest pain. She feels back to normal routine for the most part. No residual effects of radiation. Regular BM, eating well.    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 Lymph nodes, left inguinal:   - Metastatic squamous cell carcinoma to three of seven lymph nodes examined (3/7)   - The largest metastatic focus is 2.0 cm in greatest dimension with extranodal extension     B. Lymph nodes, right inguinal:   - Two reactive lymph nodes, negative for malignancy (0/2)     3/12/18: Cisplatin + radiation    3/19/18: Cisplatin + radiation    3/26/18: Cisplatin + radiation    18: Cisplatin    18: ED for chest pain; testing negative; 3 mm RLL nodule identified    4/10/18: Cisplatin    18: Radiation    18: Cisplatin CANCELLED due to neutropenia    18: Radiation     18: Final radiation treatment    18: Admission  to [facility] for SBO. Small bowel obstruction with incidental Meckel's diverticulum. No evidence of metastasis or carcinomatosis.    5/18/18: LLE doppler US neg for DVT.    5/21/18: CT CAP   1. In this patient with history of vulvar cancer:  a. Stable pulmonary nodules since at least 12/19/2017. Continued  follow-up recommended.  b. Small to moderate ascites with karli and infiltrative appearance of  the mesentery, most prominent in left upper quadrant. Though these  findings may be postsurgical/reactive they are nonspecific and new.   Consider short interval follow-up CT for further evaluation as  peritoneal carcinomatosis could have a similar appearance.  c. Increased conspicuity of splenic hypodensities when compared with  prior examinations. Attention on follow-up.  d. Chronic mediastinal soft tissue thickening about the great vessels  without significant change since 12/19/2017 without associated FDG  avidity.   e. Prominent retroperitoneal lymph node.  Attention on follow-up.  2. New small right pleural effusion with mild bibasilar atelectasis.   3. The bowel is nondilated. Surgical changes of small bowel resection  and bilateral inguinal lymph node dissections.    Past Medical History:  Past Medical History:   Diagnosis Date     Deafness in right ear      Hypertension      Multiple facial bone fractures (H)     also left ankle and left wrist     Vertigo      Vulvar cancer (H) 12/2017     Past Surgical History:  Past Surgical History:   Procedure Laterality Date     DISSECT LYMPH NODE INGUINAL N/A 2/9/2018    Procedure: DISSECT LYMPH NODE INGUINAL;  Bilateral Inguinal Lymph Node Dissection;  Surgeon: Santosh Vera MD;  Location: [facility] OR     ENDOSCOPIC STRIPPING VEIN(S)       FACIAL RECONSTRUCTION SURGERY      post horse trauma     repair of left arm fracture         Health Maintenance Due   Topic Date Due     TETANUS IMMUNIZATION (SYSTEM ASSIGNED)  03/12/1974     HIV SCREEN (SYSTEM ASSIGNED)   "1974     PAP SCREENING Q3 YR (SYSTEM ASSIGNED)  1977     ADVANCE DIRECTIVE PLANNING Q5 YRS  2011   Colonoscopy: never had  Mammogram: has been many years    Current Medications:   Current Outpatient Prescriptions   Medication Sig Dispense Refill     acetaminophen (TYLENOL) 325 MG tablet Take 2 tablets (650 mg) by mouth every 6 hours 30 tablet 0     loperamide (IMODIUM) 2 MG capsule Take 2 mg by mouth 4 times daily as needed for diarrhea       LORazepam (ATIVAN) 1 MG tablet Take 1 tablet (1 mg) by mouth every 6 hours as needed (nausea/vomiting, anxiety or sleep ) 30 tablet 1     magnesium oxide (MAG-OX) 400 (241.3 MG) MG tablet Take 1 tablet (400 mg) by mouth 2 times daily 60 tablet 3     oxyCODONE IR (ROXICODONE) 5 MG tablet Take 1 tablet (5 mg) by mouth every 4 hours as needed for pain 30 tablet 0     potassium chloride SA (KLOR-CON) 20 MEQ CR tablet Take 1 tablet (20 mEq) by mouth 2 times daily 60 tablet 0     prochlorperazine (COMPAZINE) 10 MG tablet Take 1 tablet (10 mg) by mouth every 6 hours as needed (nausea/vomiting) (Patient not taking: Reported on 2018) 30 tablet 2     Allergies:    No Known Allergies     Social History:  Social History   Substance Use Topics     Smoking status: Current Every Day Smoker     Packs/day: 0.50     Types: Cigarettes     Smokeless tobacco: Never Used      Comment: as of  has been smoking for 30 years. currently cut back to 4 cigs a day     Alcohol use Yes      Comment: 8 x per week       History   Drug Use No   Smoking 1/2ppd now, has not tried to quit or heard about options    Family History:   Family History   Problem Relation Age of Onset     Cancer Mother 81     Breast ca     Myocardial Infarction Father 55      at 92     Physical Exam:   /90 (BP Location: Right arm, Patient Position: Chair, Cuff Size: Adult Regular)  Temp 97.8  F (36.6  C) (Oral)  Resp 16  Ht 1.689 m (5' 6.5\")  Wt 60.8 kg (134 lb)  SpO2 97%  BMI 21.31 kg/m2 "     General Appearance: healthy and alert, no distress  HEENT: no gross thyromegaly   Cardiovascular: Well perfused   Respiratory: No increased work of breathing  Musculoskeletal: extremities non tender. There is moderate lymphedema of LLE and none of RLE  Skin: There are radiation changes and scars from b/l LND to the skin of the groin without ulceration.  Neurological: normal gait, no gross defects   Psychiatric: appropriate mood and affect                            Hematological: normal cervical, supraclavicular lymph nodes. Normal inguinal lymph nodes with overlying scars   Gastrointestinal:       abdomen soft, non-tender, non-distended, no organomegaly or masses  Genitourinary:  external genitalia with stigmata of RT. There is an appropriate amount of tenderness with the exam. No lesions noted.    Assessment:    Julius Gilliam is a 63 year old woman with a diagnosis of stage IIIC vulvar cancer.   She is here today for follow up and disease management.      30 minutes were spent with this patient, over 50% of that time was spent in symptom management, treatment planning and in counseling and coordination of care.      Plan:     1.) Vulvar cancer: No evidence of recurrence. Repeat CT CAP to evaluate previous mild abnromalities     2.) Hypomagnesemia: reviewed. Patient has had multiple infusions.     3.) Labs and/or tests ordered include:  CT CAP.      4.) Health maintenance issues addressed today include: smoking cessation, colonoscopy, mammogram.    5.)        LLE lymphedema: Has already discussed seeing lymphedema clinic in [place] with primary and will plan to go there.      Scribe Disclosure:   I, Eleonora Nguyen MD PGY3, am serving as a scribe; to document services personally performed by Dr. Vera- -based on data collection and the provider's statements to me.     I have examined this patient with our resident who acted as as scribe and agree with the above findings and plan.    Santosh Perez  [place]

## 2018-08-24 NOTE — LETTER
8/24/2018      RE: Julius Gilliam  [address]  [facility] [address]       Hepatology Clinic note  Julius Gilliam   Date of Birth 1955  Date of Service 8/24/2018         Assessment/plan:   Julius Gilliam is a 63 year old female history of Hepatitis C, genotype 1a. Treatment naive. Previous Fibrosis scan showing Stage 1, 6.9 kilopascals.   Currently there are no biochemical or physical signs of cirrhosis. We discussed the natural course of Hepatitis C virus and the benefits of treating the disease. We discussed the treatment regimen and medication side effects. Patient would be a good candidate for Hepatitis C treatment to prevent worsening fibrosis and to prevent extrahepatic manifestations of the disease. She is currently drinking a significant amount of alcohol and discussed that she should reduce the amount. Her transaminases are elevated from baseline and she has a slight increase in her Dbilirubin likely related to alcohol intake. She three months post treatment for chemotherapy and radiation for history of metastatic vulvar cancer.     - If she is cleared from a oncology standpoint, will plan on treating Hepatitis C in the near future    - Repeat HCV RNA at the end of treatment and 12-weeks after finishing treatment to determine SVR  - If patient achieves SVR, she does not need regular follow-up in Hepatology Clinic.   - Follow-up in Hepatology Clinic as needed    Shila Kwon PA-C   [facility] Hepatology clinic    -----------------------------------------------------       HPI:   Julius Gilliam is a 63 year old female presenting for the follow-up.    Hepatitis C   -Genotype 1a  -Diagnosed: November 2018  -History: ? History of tattoo  -Fibrosis scan: Stage 1, 6.9 kilopascals  -Prior treatments: naive      Patient was last seen 5/18/2018. She has not had any hospitalizations or ER visits. She denies any new medications.     She states her appetite is finally back to normal. Her  weight has been stable. She is having regular bowel movements, one to two a day.  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 She continues to have persistent leg swelling on her left leg. Previous LLE U/S negative for DVT. History of leg fracture.     She is uncertain if she has any further treatment for her history of metastatic vulvar cancer (metastatic to lymph node). She has repeat imaging CT of C/A/P ordered and will be meeting with Gyn-Onc and Rad-Onc this afternoon.     Patient denies jaundice,  abdominal distension or confusion.  Patient also denies melena, hematochezia or hematemesis. Patient denies weight loss, fevers, sweats or chills.    She states that she has been drinking a few drinks a day, 4-5 times a week. She continues to smoke 1/2 pack a day. She does not have a planned quit date.     Medical hx Surgical hx   Past Medical History:   Diagnosis Date     Deafness in right ear      Hypertension      Multiple facial bone fractures (H)      Vertigo      Vulvar cancer (H) 12/2017    Past Surgical History:   Procedure Laterality Date     DISSECT LYMPH NODE INGUINAL N/A 2/9/2018    Procedure: DISSECT LYMPH NODE INGUINAL;  Bilateral Inguinal Lymph Node Dissection;  Surgeon: Santosh Vera MD;  Location: [facility] OR     ENDOSCOPIC STRIPPING VEIN(S)       FACIAL RECONSTRUCTION SURGERY      post horse trauma     repair of left arm fracture                   Medications:     Current Outpatient Prescriptions   Medication     acetaminophen (TYLENOL) 325 MG tablet     loperamide (IMODIUM) 2 MG capsule     LORazepam (ATIVAN) 1 MG tablet     magnesium oxide (MAG-OX) 400 (241.3 MG) MG tablet     potassium chloride SA (KLOR-CON) 20 MEQ CR tablet     oxyCODONE IR (ROXICODONE) 5 MG tablet     prochlorperazine (COMPAZINE) 10 MG tablet     No current facility-administered medications for this visit.             Allergies:   No Known Allergies         Review of Systems:   10 points ROS was  obtained and highlighted in the HPI, otherwise negative.          Physical Exam:   VS:  /87  Pulse 74  Temp 97.8  F (36.6  C) (Oral)  Wt 61.1 kg (134 lb 9.6 oz)  SpO2 97%  BMI 21.4 kg/m2      Gen- well, NAD, A+Ox3, normal color  Lym- no palpable LAD  CVS- RRR  RS- CTA  Abd- soft,nontender. Well healed surgical scars. Hepatomegaly.   Extr- hands normal, no MATTHEW  Skin- no rash or jaundice  Neuro- no asterixis  Psych- normal mood         Data:   Reviewed in person and significant for:    Lab Results   Component Value Date     05/18/2018      Lab Results   Component Value Date    POTASSIUM 3.3 05/18/2018     Lab Results   Component Value Date    CHLORIDE 99 05/18/2018     Lab Results   Component Value Date    CO2 28 05/18/2018     Lab Results   Component Value Date    BUN 10 05/18/2018     Lab Results   Component Value Date    CR 1.05 05/18/2018       Lab Results   Component Value Date    WBC 1.6 04/16/2018     Lab Results   Component Value Date    HGB 10.2 04/16/2018     Lab Results   Component Value Date    HCT 29.3 04/16/2018     Lab Results   Component Value Date    MCV 99 04/16/2018     Lab Results   Component Value Date     04/16/2018       Lab Results   Component Value Date    AST 16 04/20/2018     Lab Results   Component Value Date    ALT 10 04/20/2018     No results found for: BILICONJ   Lab Results   Component Value Date    BILITOTAL 0.3 04/20/2018       Lab Results   Component Value Date    ALBUMIN 3.0 05/18/2018     Lab Results   Component Value Date    PROTTOTAL 6.0 04/20/2018      Lab Results   Component Value Date    ALKPHOS 106 04/20/2018       Lab Results   Component Value Date    INR 0.91 03/23/2018       Radiology 5/21/2018:   CT C/A/P:   1. In this patient with history of vulvar cancer:  a. Stable pulmonary nodules since at least 12/19/2017. Continued  follow-up recommended.  b. Small to moderate ascites with karli and infiltrative appearance of  the mesentery, most prominent  in left upper quadrant. Though these  findings may be postsurgical/reactive they are nonspecific and new.   Consider short interval follow-up CT for further evaluation as  peritoneal carcinomatosis could have a similar appearance.  c. Increased conspicuity of splenic hypodensities when compared with  prior examinations. Attention on follow-up.  d. Chronic mediastinal soft tissue thickening about the great vessels  without significant change since 12/19/2017 without associated FDG  avidity.   e. Prominent retroperitoneal lymph node.  Attention on follow-up.  2. New small right pleural effusion with mild bibasilar atelectasis.   3. The bowel is nondilated. Surgical changes of small bowel resection  and bilateral inguinal lymph node dissections.         Shila Kwon PA-C

## 2018-08-24 NOTE — NURSING NOTE
"Oncology Rooming Note    August 24, 2018 12:53 PM   Julius Gilliam is a 63 year old female who presents for:    Chief Complaint   Patient presents with     Oncology Clinic Visit     return - vulva ca       Initial Vitals: /90 (BP Location: Right arm, Patient Position: Chair, Cuff Size: Adult Regular)  Temp 97.8  F (36.6  C) (Oral)  Resp 16  Ht 1.689 m (5' 6.5\")  Wt 60.8 kg (134 lb)  SpO2 97%  BMI 21.31 kg/m2 Estimated body mass index is 21.31 kg/(m^2) as calculated from the following:    Height as of this encounter: 1.689 m (5' 6.5\").    Weight as of this encounter: 60.8 kg (134 lb). Body surface area is 1.69 meters squared.  No Pain (0) Comment: Data Unavailable   No LMP recorded. Patient is postmenopausal.  Allergies reviewed: Yes  Medications reviewed: Yes    Medications: Medication refills not needed today.  Pharmacy name entered into [facility]: [facility] [place], [place] - [phone]    Clinical concerns: 3 month follow up and discuss CT      6 minutes for nursing intake (face to face time)     Carmen Hancock CMA            "

## 2018-08-24 NOTE — MR AVS SNAPSHOT
"              After Visit Summary   8/24/2018    Julius Gilliam    MRN: [id number]           Patient Information     Date Of Birth          1955        Visit Information        Provider Department      8/24/2018 1:30 PM Santosh Vera MD [facility]        Today's Diagnoses     Malignant neoplasm of vulva (H)    -  1       Follow-ups after your visit        Who to contact     If you have questions or need follow up information about today's clinic visit or your schedule please contact [facility] directly at [phone].  Normal or non-critical lab and imaging results will be communicated to you by authorGENhart, letter or phone within 4 business days after the clinic has received the results. If you do not hear from us within 7 days, please contact the clinic through [email or web address] or phone. If you have a critical or abnormal lab result, we will notify you by phone as soon as possible.  Submit refill requests through UXPin or call your pharmacy and they will forward the refill request to us. Please allow 3 business days for your refill to be completed.          Additional Information About Your Visit        MyChart Information     UXPin gives you secure access to your electronic health record. If you see a primary care provider, you can also send messages to your care team and make appointments. If you have questions, please call your primary care clinic.  If you do not have a primary care provider, please call [phone] and they will assist you.        Care EveryWhere ID     This is your Care EveryWhere ID. This could be used by other organizations to access your [facility] medical records  [id number]        Your Vitals Were     Temperature Respirations Height Pulse Oximetry BMI (Body Mass Index)       97.8  F (36.6  C) (Oral) 16 1.689 m (5' 6.5\") 97% 21.31 kg/m2        Blood Pressure from Last 3 Encounters:   08/24/18 143/90   08/24/18 149/87   05/21/18 138/83 "    Weight from Last 3 Encounters:   08/24/18 60.8 kg (134 lb)   08/24/18 61.1 kg (134 lb 9.6 oz)   05/21/18 60.5 kg (133 lb 4.8 oz)              Today, you had the following     No orders found for display       Primary Care Provider Office Phone # Fax #    Tova Montesinos [phone]       [facility] [address] [facility] [address]        Equal Access to Services     CAREY COSME : Hadii aad ku hadasho Soomaali, waaxda luqadaha, qaybta kaalmada adeegyada, waxay idiin haybahmann horace cárdenas . So [facility] [phone].    ATENCIÓN: Si habla español, tiene a elizabeth disposición servicios gratuitos de asistencia lingüística. Llame al [phone].    We comply with applicable federal civil rights laws and [place] laws. We do not discriminate on the basis of race, color, national origin, age, disability, sex, sexual orientation, or gender identity.            Thank you!     Thank you for choosing [facility]  for your care. Our goal is always to provide you with excellent care. Hearing back from our patients is one way we can continue to improve our services. Please take a few minutes to complete the written survey that you may receive in the mail after your visit with us. Thank you!             Your Updated Medication List - Protect others around you: Learn how to safely use, store and throw away your medicines at www.disposemymeds.org.          This list is accurate as of 8/24/18  2:45 PM.  Always use your most recent med list.                   Brand Name Dispense Instructions for use Diagnosis    acetaminophen 325 MG tablet    TYLENOL    30 tablet    Take 2 tablets (650 mg) by mouth every 6 hours    H/O lymph node excision       loperamide 2 MG capsule    IMODIUM     Take 2 mg by mouth 4 times daily as needed for diarrhea        LORazepam 1 MG tablet    ATIVAN    30 tablet    Take 1 tablet (1 mg) by mouth every 6 hours as needed (nausea/vomiting, anxiety or sleep )     Encounter for long-term (current) use of medications, Malignant neoplasm of vulva (H)       magnesium oxide 400 (241.3 Mg) MG tablet    MAG-OX    60 tablet    Take 1 tablet (400 mg) by mouth 2 times daily    Malignant neoplasm of vulva (H), Hypomagnesemia       oxyCODONE IR 5 MG tablet    ROXICODONE    30 tablet    Take 1 tablet (5 mg) by mouth every 4 hours as needed for pain    H/O lymph node excision       potassium chloride SA 20 MEQ CR tablet    KLOR-CON    60 tablet    Take 1 tablet (20 mEq) by mouth 2 times daily    Malignant neoplasm of vulva (H), Hypokalemia       prochlorperazine 10 MG tablet    COMPAZINE    30 tablet    Take 1 tablet (10 mg) by mouth every 6 hours as needed (nausea/vomiting)    Encounter for long-term (current) use of medications, Malignant neoplasm of vulva (H)

## 2018-10-22 ENCOUNTER — TRANSFERRED RECORDS (OUTPATIENT)
Dept: HEALTH INFORMATION MANAGEMENT | Facility: CLINIC | Age: 63
End: 2018-10-22

## 2018-12-04 ENCOUNTER — TELEPHONE (OUTPATIENT)
Dept: ONCOLOGY | Facility: CLINIC | Age: 63
End: 2018-12-04

## 2018-12-07 ENCOUNTER — TELEPHONE (OUTPATIENT)
Dept: ONCOLOGY | Facility: CLINIC | Age: 63
End: 2018-12-07

## 2018-12-07 NOTE — TELEPHONE ENCOUNTER
Tobacco Treatment Team at the [facility] attempted to reach Ms. Gilliam on 12/7/2018 regarding the tobacco cessation program to help Ms. Gilliam to quit smoking. We will attempt to reach Ms. Gilliam another time.

## 2018-12-11 NOTE — TELEPHONE ENCOUNTER
MD reviewed pt question and CT that was done in October  Per MD no CT needed in January at this time      Attempted to contact pt via home phone, no answer, unable to leave message  Attempted to contact pt via cell phone, unable to leave message voice mail full    My chart message sent to pt with the above informaton

## 2018-12-21 NOTE — TELEPHONE ENCOUNTER
Tobacco Treatment Team at the [facility] attempted to reach Ms. Gilliam on 12/21/2018 regarding the tobacco cessation program to help Ms. Gilliam to quit smoking. We will attempt to reach Ms. Gilliam another time.

## 2019-04-04 PROBLEM — C51.9 MALIGNANT NEOPLASM OF VULVA (H): Status: ACTIVE | Noted: 2017-12-13

## 2019-05-02 ENCOUNTER — TRANSFERRED RECORDS (OUTPATIENT)
Dept: HEALTH INFORMATION MANAGEMENT | Facility: CLINIC | Age: 64
End: 2019-05-02

## 2019-05-02 NOTE — TELEPHONE ENCOUNTER
Tobacco Treatment Team at the [facility] attempted to reach Ms. Gilliam on 5/2/2019 regarding the tobacco cessation program to help Ms. Gilliam to quit smoking. We will attempt to reach Ms. Gilliam another time.

## 2019-05-06 ENCOUNTER — ONCOLOGY VISIT (OUTPATIENT)
Dept: ONCOLOGY | Facility: CLINIC | Age: 64
End: 2019-05-06
Attending: OBSTETRICS & GYNECOLOGY
Payer: COMMERCIAL

## 2019-05-06 ENCOUNTER — TRANSFERRED RECORDS (OUTPATIENT)
Dept: HEALTH INFORMATION MANAGEMENT | Facility: CLINIC | Age: 64
End: 2019-05-06

## 2019-05-06 VITALS
OXYGEN SATURATION: 97 % | WEIGHT: 144.4 LBS | BODY MASS INDEX: 22.96 KG/M2 | DIASTOLIC BLOOD PRESSURE: 98 MMHG | SYSTOLIC BLOOD PRESSURE: 143 MMHG | HEART RATE: 84 BPM | TEMPERATURE: 97.6 F

## 2019-05-06 DIAGNOSIS — C51.9 MALIGNANT NEOPLASM OF VULVA (H): Primary | ICD-10-CM

## 2019-05-06 PROCEDURE — 99214 OFFICE O/P EST MOD 30 MIN: CPT | Mod: ZP | Performed by: OBSTETRICS & GYNECOLOGY

## 2019-05-06 ASSESSMENT — PAIN SCALES - GENERAL: PAINLEVEL: NO PAIN (0)

## 2019-05-06 NOTE — PROGRESS NOTES
Follow Up Notes       RE: Julius Gilliam  : 1955  ED: 2019      Julius Gilliam is a 64 year old woman with a diagnosis of stage IIIC vulvar cancer. She is here today for follow up and disease management.     Today she is feeling well overall. Since her SBO in [facility], she has noted RLE swelling and no LLE. This started the Monday after she was discharged. She has no pain in her left leg, is on her feet a lot with work and still not bothersome. Denies numbness or tingling. Denies SOB or chest pain. She feels back to normal routine for the most part. No residual effects of radiation. Regular BM, eating well.    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 Lymph nodes, left inguinal:   - Metastatic squamous cell carcinoma to three of seven lymph nodes examined (3/7)   - The largest metastatic focus is 2.0 cm in greatest dimension with extranodal extension     B. Lymph nodes, right inguinal:   - Two reactive lymph nodes, negative for malignancy (0/2)     3/12/18: Cisplatin + radiation    3/19/18: Cisplatin + radiation    3/26/18: Cisplatin + radiation    18: Cisplatin    18: ED for chest pain; testing negative; 3 mm RLL nodule identified    4/10/18: Cisplatin    18: Radiation    18: Cisplatin CANCELLED due to neutropenia    18: Radiation     18: Final radiation treatment    18: Admission to  [facility] for SBO. Small bowel obstruction with incidental Meckel's diverticulum. No evidence of metastasis or carcinomatosis.    5/18/18: LLE doppler US neg for DVT.    5/21/18: CT CAP   1. In this patient with history of vulvar cancer:  a. Stable pulmonary nodules since at least 12/19/2017. Continued  follow-up recommended.  b. Small to moderate ascites with karli and infiltrative appearance of  the mesentery, most prominent in left upper quadrant. Though these  findings may be postsurgical/reactive they are nonspecific and new.   Consider short interval follow-up CT for further evaluation as  peritoneal carcinomatosis could have a similar appearance.  c. Increased conspicuity of splenic hypodensities when compared with  prior examinations. Attention on follow-up.  d. Chronic mediastinal soft tissue thickening about the great vessels  without significant change since 12/19/2017 without associated FDG  avidity.   e. Prominent retroperitoneal lymph node.  Attention on follow-up.  2. New small right pleural effusion with mild bibasilar atelectasis.   3. The bowel is nondilated. Surgical changes of small bowel resection  and bilateral inguinal lymph node dissections.    5/2019 CT ([facility])        Past Medical History:  Past Medical History:   Diagnosis Date     Deafness in right ear      Hypertension      Multiple facial bone fractures (H)     also left ankle and left wrist     Vertigo      Vulvar cancer (H) 12/2017     Past Surgical History:  Past Surgical History:   Procedure Laterality Date     DISSECT LYMPH NODE INGUINAL N/A 2/9/2018    Procedure: DISSECT LYMPH NODE INGUINAL;  Bilateral Inguinal Lymph Node Dissection;  Surgeon: Santosh Vera MD;  Location: [facility] OR     ENDOSCOPIC STRIPPING VEIN(S)       FACIAL RECONSTRUCTION SURGERY      post horse trauma     repair of left arm fracture         Health Maintenance Due   Topic Date Due     PREVENTIVE CARE VISIT  1955     PAP SCREENING Q3  YR (SYSTEM ASSIGNED)  03/12/1977     DTAP/TDAP/TD IMMUNIZATION (1 - Tdap) 03/12/1981     MAMMO SCREEN Q2 YR (SYSTEM ASSIGNED)  03/12/1996     COLON CANCER SCREEN (SYSTEM ASSIGNED)  03/12/2006     ZOSTER IMMUNIZATION (1 of 2) 03/12/2006     ADVANCE DIRECTIVE PLANNING Q5 YRS  03/12/2011     PHQ-2  01/01/2019   Colonoscopy: never had  Mammogram: has been many years    Current Medications:   Current Outpatient Medications   Medication Sig Dispense Refill     acetaminophen (TYLENOL) 325 MG tablet Take 2 tablets (650 mg) by mouth every 6 hours 30 tablet 0     loperamide (IMODIUM) 2 MG capsule Take 2 mg by mouth 4 times daily as needed for diarrhea       LORazepam (ATIVAN) 1 MG tablet Take 1 tablet (1 mg) by mouth every 6 hours as needed (nausea/vomiting, anxiety or sleep ) 30 tablet 1     magnesium oxide (MAG-OX) 400 (241.3 MG) MG tablet Take 1 tablet (400 mg) by mouth 2 times daily 60 tablet 3     oxyCODONE IR (ROXICODONE) 5 MG tablet Take 1 tablet (5 mg) by mouth every 4 hours as needed for pain (Patient not taking: Reported on 8/24/2018) 30 tablet 0     potassium chloride SA (KLOR-CON) 20 MEQ CR tablet Take 1 tablet (20 mEq) by mouth 2 times daily 60 tablet 0     prochlorperazine (COMPAZINE) 10 MG tablet Take 1 tablet (10 mg) by mouth every 6 hours as needed (nausea/vomiting) (Patient not taking: Reported on 5/18/2018) 30 tablet 2     Allergies:    No Known Allergies     Social History:  Social History     Tobacco Use     Smoking status: Current Every Day Smoker     Packs/day: 0.50     Types: Cigarettes     Smokeless tobacco: Never Used     Tobacco comment: as of 2018 has been smoking for 30 years. currently cut back to 4 cigs a day   Substance Use Topics     Alcohol use: Yes     Comment: 8 x per week       History   Drug Use No   Smoking 1/2ppd now, has not tried to quit or heard about options    Family History:   Family History   Problem Relation Age of Onset     Cancer Mother 81        Breast ca     Myocardial  Infarction Father 55         at 92     Physical Exam:   BP (!) 143/98   Pulse 84   Temp 97.6  F (36.4  C) (Oral)   Wt 65.5 kg (144 lb 6.4 oz)   SpO2 97%   BMI 22.96 kg/m       General Appearance: healthy and alert, no distress  HEENT: no gross thyromegaly   Cardiovascular: Well perfused   Respiratory: No increased work of breathing  Musculoskeletal: extremities non tender. There is moderate lymphedema of LLE and none of RLE  Skin: There are radiation changes and scars from b/l LND to the skin of the groin without ulceration.  Neurological: normal gait, no gross defects   Psychiatric: appropriate mood and affect                            Hematological: normal cervical, supraclavicular lymph nodes. Normal inguinal lymph nodes with overlying scars   Gastrointestinal:       abdomen soft, non-tender, non-distended, no organomegaly or masses  Genitourinary:  external genitalia with stigmata of RT. There is an appropriate amount of tenderness with the exam. No lesions noted.    Assessment:    Julius Gilliam is a 63 year old woman with a diagnosis of stage IIIC vulvar cancer.   She is here today for follow up and disease management.      30 minutes were spent with this patient, over 50% of that time was spent in symptom management, treatment planning and in counseling and coordination of care.      Plan:     1.) Vulvar cancer: No clear clinical evidence of recurrence. Repeat CT with some suspicious findings.  I have recommended evaluation with orthopedics.  She is without symptoms from apparent pelvic fracture (she denotes a recent fall).     2.) Hypomagnesemia: reviewed. Patient has had multiple infusions.     3.) Labs and/or tests ordered include:  Orthopedics follow-up, possible bone scan      4.) Health maintenance issues addressed today include: smoking cessation, colonoscopy, mammogram.    5.)        LLE lymphedema: Has already discussed seeing lymphedema clinic in [place] with primary and will plan to go  there.      Scribe Disclosure:   I, Eleonora Nguyen MD PGY3, am serving as a scribe; to document services personally performed by Dr. Vera- -based on data collection and the provider's statements to me.     I have examined this patient with our resident who acted as as scribe and agree with the above findings and plan.    Santosh Vera    Answers for HPI/ROS submitted by the patient on 5/3/2019   General Symptoms: No  Skin Symptoms: No  HENT Symptoms: No  EYE SYMPTOMS: No  HEART SYMPTOMS: No  LUNG SYMPTOMS: No  INTESTINAL SYMPTOMS: No  URINARY SYMPTOMS: No  GYNECOLOGIC SYMPTOMS: No  BREAST SYMPTOMS: No  SKELETAL SYMPTOMS: No  BLOOD SYMPTOMS: No  NERVOUS SYSTEM SYMPTOMS: No  MENTAL HEALTH SYMPTOMS: No

## 2019-05-06 NOTE — LETTER
2019       RE: Julius Gilliam  [address]  [facility] [address]     Dear Colleague,    Thank you for referring your patient, Julius Gilliam, to the [facility]. Please see a copy of my visit note below.    Follow Up Notes       RE: Julius Gilliam  : 1955  ED: 2019      Julius Gilliam is a 64 year old woman with a diagnosis of stage IIIC vulvar cancer. She is here today for follow up and disease management.     Today she is feeling well overall. Since her SBO in [facility], she has noted RLE swelling and no LLE. This started the Monday after she was discharged. She has no pain in her left leg, is on her feet a lot with work and still not bothersome. Denies numbness or tingling. Denies SOB or chest pain. She feels back to normal routine for the most part. No residual effects of radiation. Regular BM, eating well.    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 Lymph nodes, left inguinal:   - Metastatic squamous cell carcinoma to three of seven lymph nodes examined (3/7)   - The largest metastatic focus is 2.0 cm in greatest dimension with extranodal extension     B. Lymph nodes, right inguinal:   - Two reactive lymph nodes, negative for malignancy (0/2)     3/12/18: Cisplatin + radiation    3/19/18: Cisplatin + radiation    3/26/18: Cisplatin + radiation    18: Cisplatin    18: ED for  chest pain; testing negative; 3 mm RLL nodule identified    4/10/18: Cisplatin    4/13/18: Radiation    4/17/18: Cisplatin CANCELLED due to neutropenia    4/20/18: Radiation     4/27/18: Final radiation treatment    5/7/18: Admission to [facility] for SBO. Small bowel obstruction with incidental Meckel's diverticulum. No evidence of metastasis or carcinomatosis.    5/18/18: LLE doppler US neg for DVT.    5/21/18: CT CAP   1. In this patient with history of vulvar cancer:  a. Stable pulmonary nodules since at least 12/19/2017. Continued  follow-up recommended.  b. Small to moderate ascites with karli and infiltrative appearance of  the mesentery, most prominent in left upper quadrant. Though these  findings may be postsurgical/reactive they are nonspecific and new.   Consider short interval follow-up CT for further evaluation as  peritoneal carcinomatosis could have a similar appearance.  c. Increased conspicuity of splenic hypodensities when compared with  prior examinations. Attention on follow-up.  d. Chronic mediastinal soft tissue thickening about the great vessels  without significant change since 12/19/2017 without associated FDG  avidity.   e. Prominent retroperitoneal lymph node.  Attention on follow-up.  2. New small right pleural effusion with mild bibasilar atelectasis.   3. The bowel is nondilated. Surgical changes of small bowel resection  and bilateral inguinal lymph node dissections.    5/2019 CT ([facility])        Past Medical History:  Past Medical History:   Diagnosis Date     Deafness in right ear      Hypertension      Multiple facial bone fractures (H)     also left ankle and left wrist     Vertigo      Vulvar cancer (H) 12/2017     Past Surgical History:  Past Surgical History:   Procedure Laterality Date     DISSECT LYMPH NODE INGUINAL N/A 2/9/2018    Procedure: DISSECT LYMPH NODE INGUINAL;  Bilateral Inguinal Lymph Node Dissection;  Surgeon: Santosh Vera MD;  Location:  [facility] OR     ENDOSCOPIC STRIPPING VEIN(S)       FACIAL RECONSTRUCTION SURGERY      post horse trauma     repair of left arm fracture         Health Maintenance Due   Topic Date Due     PREVENTIVE CARE VISIT  1955     PAP SCREENING Q3 YR (SYSTEM ASSIGNED)  03/12/1977     DTAP/TDAP/TD IMMUNIZATION (1 - Tdap) 03/12/1981     MAMMO SCREEN Q2 YR (SYSTEM ASSIGNED)  03/12/1996     COLON CANCER SCREEN (SYSTEM ASSIGNED)  03/12/2006     ZOSTER IMMUNIZATION (1 of 2) 03/12/2006     ADVANCE DIRECTIVE PLANNING Q5 YRS  03/12/2011     PHQ-2  01/01/2019   Colonoscopy: never had  Mammogram: has been many years    Current Medications:   Current Outpatient Medications   Medication Sig Dispense Refill     acetaminophen (TYLENOL) 325 MG tablet Take 2 tablets (650 mg) by mouth every 6 hours 30 tablet 0     loperamide (IMODIUM) 2 MG capsule Take 2 mg by mouth 4 times daily as needed for diarrhea       LORazepam (ATIVAN) 1 MG tablet Take 1 tablet (1 mg) by mouth every 6 hours as needed (nausea/vomiting, anxiety or sleep ) 30 tablet 1     magnesium oxide (MAG-OX) 400 (241.3 MG) MG tablet Take 1 tablet (400 mg) by mouth 2 times daily 60 tablet 3     oxyCODONE IR (ROXICODONE) 5 MG tablet Take 1 tablet (5 mg) by mouth every 4 hours as needed for pain (Patient not taking: Reported on 8/24/2018) 30 tablet 0     potassium chloride SA (KLOR-CON) 20 MEQ CR tablet Take 1 tablet (20 mEq) by mouth 2 times daily 60 tablet 0     prochlorperazine (COMPAZINE) 10 MG tablet Take 1 tablet (10 mg) by mouth every 6 hours as needed (nausea/vomiting) (Patient not taking: Reported on 5/18/2018) 30 tablet 2     Allergies:    No Known Allergies     Social History:  Social History     Tobacco Use     Smoking status: Current Every Day Smoker     Packs/day: 0.50     Types: Cigarettes     Smokeless tobacco: Never Used     Tobacco comment: as of 2018 has been smoking for 30 years. currently cut back to 4 cigs a day   Substance Use Topics     Alcohol use: Yes      Comment: 8 x per week       History   Drug Use No   Smoking 1/2ppd now, has not tried to quit or heard about options    Family History:   Family History   Problem Relation Age of Onset     Cancer Mother 81        Breast ca     Myocardial Infarction Father 55         at 92     Physical Exam:   BP (!) 143/98   Pulse 84   Temp 97.6  F (36.4  C) (Oral)   Wt 65.5 kg (144 lb 6.4 oz)   SpO2 97%   BMI 22.96 kg/m        General Appearance: healthy and alert, no distress  HEENT: no gross thyromegaly   Cardiovascular: Well perfused   Respiratory: No increased work of breathing  Musculoskeletal: extremities non tender. There is moderate lymphedema of LLE and none of RLE  Skin: There are radiation changes and scars from b/l LND to the skin of the groin without ulceration.  Neurological: normal gait, no gross defects   Psychiatric: appropriate mood and affect                            Hematological: normal cervical, supraclavicular lymph nodes. Normal inguinal lymph nodes with overlying scars   Gastrointestinal:       abdomen soft, non-tender, non-distended, no organomegaly or masses  Genitourinary:  external genitalia with stigmata of RT. There is an appropriate amount of tenderness with the exam. No lesions noted.    Assessment:    Julius Gilliam is a 63 year old woman with a diagnosis of stage IIIC vulvar cancer.   She is here today for follow up and disease management.      30 minutes were spent with this patient, over 50% of that time was spent in symptom management, treatment planning and in counseling and coordination of care.      Plan:     1.) Vulvar cancer: No clear clinical evidence of recurrence. Repeat CT with some suspicious findings.  I have recommended evaluation with orthopedics.  She is without symptoms from apparent pelvic fracture (she denotes a recent fall).     2.) Hypomagnesemia: reviewed. Patient has had multiple infusions.     3.) Labs and/or tests ordered include:  Orthopedics follow-up,  possible bone scan      4.) Health maintenance issues addressed today include: smoking cessation, colonoscopy, mammogram.    5.)        LLE lymphedema: Has already discussed seeing lymphedema clinic in [place] with primary and will plan to go there.      Scribe Disclosure:   I, Eleonora Nguyen MD PGY3, am serving as a scribe; to document services personally performed by Dr. Vera- -based on data collection and the provider's statements to me.     I have examined this patient with our resident who acted as as scribe and agree with the above findings and plan.    Santosh Vera    Answers for HPI/ROS submitted by the patient on 5/3/2019   General Symptoms: No  Skin Symptoms: No  HENT Symptoms: No  EYE SYMPTOMS: No  HEART SYMPTOMS: No  LUNG SYMPTOMS: No  INTESTINAL SYMPTOMS: No  URINARY SYMPTOMS: No  GYNECOLOGIC SYMPTOMS: No  BREAST SYMPTOMS: No  SKELETAL SYMPTOMS: No  BLOOD SYMPTOMS: No  NERVOUS SYSTEM SYMPTOMS: No  MENTAL HEALTH SYMPTOMS: No      Again, thank you for allowing me to participate in the care of your patient.      Sincerely,    Santosh Vera MD

## 2019-06-12 ENCOUNTER — TELEPHONE (OUTPATIENT)
Dept: ONCOLOGY | Facility: CLINIC | Age: 64
End: 2019-06-12

## 2019-06-12 NOTE — TELEPHONE ENCOUNTER
MD reviewed CT [facility]    Pt was told to address the pelvic fractures  MD would like pt contacted to see if she saw ortho to address this    6/5  Spoke with pt   She had a fall in January and was diagnosed with a fractured pelvis at that time.  Did not do any treatment, did not see Ortho was told they would eventually heal on own with no intervention.  Was told to follow up with primary care mD    Pt has followed with local MD and chiropractor for this issue and at current is not having any issues    1/8/19 films done at [facility] showing pelvic fracture.  No mention in CT from 5/6/19 that these were compared.   Spoke with radiology at [facility] they reviewed and indicated these were same fractures on CT that were noted on January scans      MD informed

## 2020-03-11 ENCOUNTER — HEALTH MAINTENANCE LETTER (OUTPATIENT)
Age: 65
End: 2020-03-11

## 2020-12-27 ENCOUNTER — HEALTH MAINTENANCE LETTER (OUTPATIENT)
Age: 65
End: 2020-12-27

## 2021-03-09 ENCOUNTER — TELEPHONE (OUTPATIENT)
Dept: ONCOLOGY | Facility: CLINIC | Age: 66
End: 2021-03-09

## 2021-03-09 NOTE — TELEPHONE ENCOUNTER
----- Message from Joslyn Garcia sent at 3/9/2021  3:38 PM CST -----  Regarding: sooner than later scheduling  Griselda Cota an ONC RN from [facility] in [facility] called and said that her DrNicolas down there wanted this pt seen pretty soon. She is having bleeding and lots of irritation in her female parts.    You have no RTN openings until May 3rd.      What would you like to do?    She faxing the ONC note and a CT scan from a month ago to our location.     Her # is [phone]    Thanks!    Joslyn ORDONEZ ( Clinic Coordinator)  [facility]  [phone], press option 5, then option 2

## 2021-03-09 NOTE — TELEPHONE ENCOUNTER
Spoke with nurse a medical oncology at [facility]  Pt was seen by MD yesterday, pelvic exam done  Notes bleeding, redness, irritation  Pt has not been seen since 2019, pt lost to follow up and did not feel issues were important  Pt has hx of medical issues that she has been dealing with    Medical oncologist wants pt to see specialist    Offered appt 3/22 at 1030

## 2021-03-19 NOTE — PROGRESS NOTES
Follow Up Notes       RE: Julius Gilliam  : 1955  ED: 3/22/2021       Julius Gilliam is a 66 year old woman with a diagnosis of stage III vulvar cancer. She is here today for follow up and disease management.     Today she is feeling well overall. Since her SBO in [facility], she has noted RLE swelling and no LLE. This started the Monday after she was discharged. She has no pain in her left leg, is on her feet a lot with work and still not bothersome. Denies numbness or tingling. Denies SOB or chest pain. She feels back to normal routine for the most part. No residual effects of radiation. Regular BM, eating well.    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 Lymph nodes, left inguinal:   - Metastatic squamous cell carcinoma to three of seven lymph nodes examined (3/7)   - The largest metastatic focus is 2.0 cm in greatest dimension with extranodal extension     B. Lymph nodes, right inguinal:   - Two reactive lymph nodes, negative for malignancy (0/2)     3/12/18 through 18: chemo-RT    18: Admission to [facility] for SBO. Small bowel obstruction with incidental Meckel's diverticulum. No evidence of metastasis or carcinomatosis.    18: LLE doppler US neg for DVT.    18: CT CAP   1. In this patient with history of vulvar cancer:  a. Stable pulmonary nodules since at least 2017. Continued  follow-up  recommended.  b. Small to moderate ascites with karli and infiltrative appearance of  the mesentery, most prominent in left upper quadrant. Though these  findings may be postsurgical/reactive they are nonspecific and new.   Consider short interval follow-up CT for further evaluation as  peritoneal carcinomatosis could have a similar appearance.  c. Increased conspicuity of splenic hypodensities when compared with  prior examinations. Attention on follow-up.  d. Chronic mediastinal soft tissue thickening about the great vessels  without significant change since 12/19/2017 without associated FDG  avidity.   e. Prominent retroperitoneal lymph node.  Attention on follow-up.  2. New small right pleural effusion with mild bibasilar atelectasis.   3. The bowel is nondilated. Surgical changes of small bowel resection  and bilateral inguinal lymph node dissections.    5/2019 CT ([facility])      She has been following closer to [place] for the last few years, but presents today owing to VB.  She has recently undergone a CT in [place] which demonstrated the following:    Last CT scan performed due to liver failure on 02/03/2021 while she was admitted at [facility] showed ground-glass opacity in both upper lobes concerning for pneumonia, a small bilateral pleural effusion, a small amount of ascites within the right and left upper quadrant and moderate ascites in the pelvis. Extensive subcutaneous edema. Advanced arteriosclerotic disease. No clear signs of recurrent malignancy.     She was subsequently diagnosed with atrial fib and placed on blood thinner.  This was followed by an episode of GI bleeding (no scope from above or below) which prompted discontinuation of the anti-coagulation.      She has no vulvar symptoms.      Past Medical History:  Past Medical History:   Diagnosis Date     Deafness in right ear      Hypertension      Multiple facial bone fractures (H)     also left ankle and left wrist      Vertigo      Vulvar cancer (H) 12/2017     Past Surgical History:  Past Surgical History:   Procedure Laterality Date     DISSECT LYMPH NODE INGUINAL N/A 2/9/2018    Procedure: DISSECT LYMPH NODE INGUINAL;  Bilateral Inguinal Lymph Node Dissection;  Surgeon: Santosh Vera MD;  Location: [facility] OR     ENDOSCOPIC STRIPPING VEIN(S)       FACIAL RECONSTRUCTION SURGERY      post horse trauma     repair of left arm fracture         Health Maintenance Due   Topic Date Due     DEXA  Never done     ADVANCE CARE PLANNING  Never done     MAMMO SCREENING  Never done     Pneumococcal Vaccine: Pediatrics (0 to 5 Years) and At-Risk Patients (6 to 64 Years) (1 of 2 - PPSV23) Never done     Pneumococcal Vaccine: 65+ Years (1 of 2 - PPSV23) Never done     COLORECTAL CANCER SCREENING  Never done     COVID-19 Vaccine (1) Never done     HEPATITIS B IMMUNIZATION (1 of 3 - Risk 3-dose series) Never done     DTAP/TDAP/TD IMMUNIZATION (1 - Tdap) Never done     ZOSTER IMMUNIZATION (1 of 2) Never done     INFLUENZA VACCINE (1) Never done     PHQ-2  01/01/2021     FALL RISK ASSESSMENT  03/12/2021     MEDICARE ANNUAL WELLNESS VISIT  03/12/2021   Colonoscopy: never had  Mammogram: has been many years    Current Medications:   Current Outpatient Medications   Medication Sig Dispense Refill     acetaminophen (TYLENOL) 325 MG tablet Take 2 tablets (650 mg) by mouth every 6 hours 30 tablet 0     loperamide (IMODIUM) 2 MG capsule Take 2 mg by mouth 4 times daily as needed for diarrhea       LORazepam (ATIVAN) 1 MG tablet Take 1 tablet (1 mg) by mouth every 6 hours as needed (nausea/vomiting, anxiety or sleep ) (Patient not taking: Reported on 5/6/2019) 30 tablet 1     magnesium oxide (MAG-OX) 400 (241.3 MG) MG tablet Take 1 tablet (400 mg) by mouth 2 times daily 60 tablet 3     oxyCODONE IR (ROXICODONE) 5 MG tablet Take 1 tablet (5 mg) by mouth every 4 hours as needed for pain (Patient not taking: Reported on 8/24/2018) 30 tablet 0      "potassium chloride SA (KLOR-CON) 20 MEQ CR tablet Take 1 tablet (20 mEq) by mouth 2 times daily 60 tablet 0     prochlorperazine (COMPAZINE) 10 MG tablet Take 1 tablet (10 mg) by mouth every 6 hours as needed (nausea/vomiting) (Patient not taking: Reported on 2018) 30 tablet 2     Allergies:    No Known Allergies     Social History:  Social History     Tobacco Use     Smoking status: Current Every Day Smoker     Packs/day: 0.50     Types: Cigarettes     Smokeless tobacco: Never Used     Tobacco comment: as of  has been smoking for 30 years.    Substance Use Topics     Alcohol use: Yes     Comment: 8 x per week       History   Drug Use No   Smoking 1/2ppd now, has not tried to quit or heard about options    Family History:   Family History   Problem Relation Age of Onset     Cancer Mother 81        Breast ca     Myocardial Infarction Father 55         at 92     Physical Exam:   /69   Pulse 86   Temp 97.3  F (36.3  C) (Tympanic)   Ht 1.702 m (5' 7\")   Wt 60.3 kg (133 lb)   BMI 20.83 kg/m     PS1    General Appearance: healthy and alert, no distress  HEENT: no gross thyromegaly   Cardiovascular: Well perfused   Respiratory: No increased work of breathing  Musculoskeletal: extremities non tender. There is moderate lymphedema of LLE and none of RLE  Skin: There are radiation changes and scars from b/l LND to the skin of the groin without ulceration.  Neurological: normal gait, no gross defects   Psychiatric: appropriate mood and affect                            Hematological: normal cervical, supraclavicular lymph nodes. Normal inguinal lymph nodes with overlying scars   Gastrointestinal:       abdomen soft, non-tender, non-distended, no organomegaly or masses  Genitourinary:  external genitalia with stigmata of RT. There is an appropriate amount of tenderness with the exam. No lesions noted.    Assessment:    Julius Gilliam is a 63 year old woman with a diagnosis of stage IIIC vulvar cancer. "   She is here today for follow up and disease management.      30 minutes were spent with this patient, over 50% of that time was spent in symptom management, treatment planning and in counseling and coordination of care.      Plan:     1.) Vulvar cancer: No clear clinical evidence of recurrence.  She is without symptoms from apparent pelvic fracture (she denotes a recent fall).     2.) Labs and/or tests ordered include:  Orthopedics follow-up, possible bone scan      4.) Health maintenance issues addressed today include: smoking cessation, colonoscopy, mammogram.      Santosh Vera

## 2021-03-22 ENCOUNTER — ONCOLOGY VISIT (OUTPATIENT)
Dept: ONCOLOGY | Facility: CLINIC | Age: 66
End: 2021-03-22
Attending: OBSTETRICS & GYNECOLOGY
Payer: COMMERCIAL

## 2021-03-22 VITALS
BODY MASS INDEX: 20.88 KG/M2 | DIASTOLIC BLOOD PRESSURE: 69 MMHG | SYSTOLIC BLOOD PRESSURE: 110 MMHG | HEIGHT: 67 IN | HEART RATE: 86 BPM | TEMPERATURE: 97.3 F | WEIGHT: 133 LBS

## 2021-03-22 DIAGNOSIS — C51.9 MALIGNANT NEOPLASM OF VULVA (H): Primary | ICD-10-CM

## 2021-03-22 PROCEDURE — 99214 OFFICE O/P EST MOD 30 MIN: CPT | Performed by: OBSTETRICS & GYNECOLOGY

## 2021-03-22 PROCEDURE — G0463 HOSPITAL OUTPT CLINIC VISIT: HCPCS

## 2021-03-22 ASSESSMENT — MIFFLIN-ST. JEOR: SCORE: 1180.91

## 2021-03-22 ASSESSMENT — PAIN SCALES - GENERAL: PAINLEVEL: NO PAIN (0)

## 2021-03-22 NOTE — NURSING NOTE
"Oncology Rooming Note    March 22, 2021 10:38 AM   Julius Gilliam is a 66 year old female who presents for:    Chief Complaint   Patient presents with     RECHECK     Malignant neoplasm of vulva (H)      Initial Vitals: /69   Pulse 86   Temp 97.3  F (36.3  C) (Tympanic)   Ht 1.702 m (5' 7\")   Wt 60.3 kg (133 lb)   BMI 20.83 kg/m   Estimated body mass index is 20.83 kg/m  as calculated from the following:    Height as of this encounter: 1.702 m (5' 7\").    Weight as of this encounter: 60.3 kg (133 lb). Body surface area is 1.69 meters squared.  No Pain (0) Comment: Data Unavailable   No LMP recorded. Patient is postmenopausal.  Allergies reviewed: Yes  Medications reviewed: Yes    Medications: Medication refills not needed today.  Pharmacy name entered into EPIC: [facility] [place], [place] - [address] [place] [address] [phone]    Clinical concerns: No new needs      Zehra Motley CMA              "

## 2021-03-22 NOTE — LETTER
3/22/2021         RE: Julius Gilliam  [address]  [facility] [address]        Dear Colleague,    Thank you for referring your patient, Julius Gilliam, to the [facility]. Please see a copy of my visit note below.    Follow Up Notes       RE: Julius Gilliam  : 1955  ED: 3/22/2021       Julius Gilliam is a 66 year old woman with a diagnosis of stage III vulvar cancer. She is here today for follow up and disease management.     Today she is feeling well overall. Since her SBO in [facility], she has noted RLE swelling and no LLE. This started the Monday after she was discharged. She has no pain in her left leg, is on her feet a lot with work and still not bothersome. Denies numbness or tingling. Denies SOB or chest pain. She feels back to normal routine for the most part. No residual effects of radiation. Regular BM, eating well.    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 Lymph nodes, left inguinal:   - Metastatic squamous cell carcinoma to three of seven lymph nodes examined (3/7)   - The largest metastatic focus is 2.0 cm in greatest dimension with extranodal extension     B. Lymph nodes, right inguinal:   - Two reactive lymph nodes, negative for malignancy (0/2)     3/12/18 through 18: chemo-RT    18: Admission to [facility] for SBO. Small bowel obstruction with incidental  Meckel's diverticulum. No evidence of metastasis or carcinomatosis.    5/18/18: LLE doppler US neg for DVT.    5/21/18: CT CAP   1. In this patient with history of vulvar cancer:  a. Stable pulmonary nodules since at least 12/19/2017. Continued  follow-up recommended.  b. Small to moderate ascites with karli and infiltrative appearance of  the mesentery, most prominent in left upper quadrant. Though these  findings may be postsurgical/reactive they are nonspecific and new.   Consider short interval follow-up CT for further evaluation as  peritoneal carcinomatosis could have a similar appearance.  c. Increased conspicuity of splenic hypodensities when compared with  prior examinations. Attention on follow-up.  d. Chronic mediastinal soft tissue thickening about the great vessels  without significant change since 12/19/2017 without associated FDG  avidity.   e. Prominent retroperitoneal lymph node.  Attention on follow-up.  2. New small right pleural effusion with mild bibasilar atelectasis.   3. The bowel is nondilated. Surgical changes of small bowel resection  and bilateral inguinal lymph node dissections.    5/2019 CT ([facility])      She has been following closer to [place] for the last few years, but presents today owing to VB.  She has recently undergone a CT in [place] which demonstrated the following:    Last CT scan performed due to liver failure on 02/03/2021 while she was admitted at [facility] showed ground-glass opacity in both upper lobes concerning for pneumonia, a small bilateral pleural effusion, a small amount of ascites within the right and left upper quadrant and moderate ascites in the pelvis. Extensive subcutaneous edema. Advanced arteriosclerotic disease. No clear signs of recurrent malignancy.     She was subsequently diagnosed with atrial fib and placed on blood thinner.  This was followed by an episode of GI bleeding (no scope from above or below) which prompted  discontinuation of the anti-coagulation.      She has no vulvar symptoms.      Past Medical History:  Past Medical History:   Diagnosis Date     Deafness in right ear      Hypertension      Multiple facial bone fractures (H)     also left ankle and left wrist     Vertigo      Vulvar cancer (H) 12/2017     Past Surgical History:  Past Surgical History:   Procedure Laterality Date     DISSECT LYMPH NODE INGUINAL N/A 2/9/2018    Procedure: DISSECT LYMPH NODE INGUINAL;  Bilateral Inguinal Lymph Node Dissection;  Surgeon: Santosh Vera MD;  Location: [facility] OR     ENDOSCOPIC STRIPPING VEIN(S)       FACIAL RECONSTRUCTION SURGERY      post horse trauma     repair of left arm fracture         Health Maintenance Due   Topic Date Due     DEXA  Never done     ADVANCE CARE PLANNING  Never done     MAMMO SCREENING  Never done     Pneumococcal Vaccine: Pediatrics (0 to 5 Years) and At-Risk Patients (6 to 64 Years) (1 of 2 - PPSV23) Never done     Pneumococcal Vaccine: 65+ Years (1 of 2 - PPSV23) Never done     COLORECTAL CANCER SCREENING  Never done     COVID-19 Vaccine (1) Never done     HEPATITIS B IMMUNIZATION (1 of 3 - Risk 3-dose series) Never done     DTAP/TDAP/TD IMMUNIZATION (1 - Tdap) Never done     ZOSTER IMMUNIZATION (1 of 2) Never done     INFLUENZA VACCINE (1) Never done     PHQ-2  01/01/2021     FALL RISK ASSESSMENT  03/12/2021     MEDICARE ANNUAL WELLNESS VISIT  03/12/2021   Colonoscopy: never had  Mammogram: has been many years    Current Medications:   Current Outpatient Medications   Medication Sig Dispense Refill     acetaminophen (TYLENOL) 325 MG tablet Take 2 tablets (650 mg) by mouth every 6 hours 30 tablet 0     loperamide (IMODIUM) 2 MG capsule Take 2 mg by mouth 4 times daily as needed for diarrhea       LORazepam (ATIVAN) 1 MG tablet Take 1 tablet (1 mg) by mouth every 6 hours as needed (nausea/vomiting, anxiety or sleep ) (Patient not taking: Reported on 5/6/2019) 30 tablet 1     magnesium oxide  "(MAG-OX) 400 (241.3 MG) MG tablet Take 1 tablet (400 mg) by mouth 2 times daily 60 tablet 3     oxyCODONE IR (ROXICODONE) 5 MG tablet Take 1 tablet (5 mg) by mouth every 4 hours as needed for pain (Patient not taking: Reported on 2018) 30 tablet 0     potassium chloride SA (KLOR-CON) 20 MEQ CR tablet Take 1 tablet (20 mEq) by mouth 2 times daily 60 tablet 0     prochlorperazine (COMPAZINE) 10 MG tablet Take 1 tablet (10 mg) by mouth every 6 hours as needed (nausea/vomiting) (Patient not taking: Reported on 2018) 30 tablet 2     Allergies:    No Known Allergies     Social History:  Social History     Tobacco Use     Smoking status: Current Every Day Smoker     Packs/day: 0.50     Types: Cigarettes     Smokeless tobacco: Never Used     Tobacco comment: as of  has been smoking for 30 years.    Substance Use Topics     Alcohol use: Yes     Comment: 8 x per week       History   Drug Use No   Smoking 1/2ppd now, has not tried to quit or heard about options    Family History:   Family History   Problem Relation Age of Onset     Cancer Mother 81        Breast ca     Myocardial Infarction Father 55         at 92     Physical Exam:   /69   Pulse 86   Temp 97.3  F (36.3  C) (Tympanic)   Ht 1.702 m (5' 7\")   Wt 60.3 kg (133 lb)   BMI 20.83 kg/m     PS1    General Appearance: healthy and alert, no distress  HEENT: no gross thyromegaly   Cardiovascular: Well perfused   Respiratory: No increased work of breathing  Musculoskeletal: extremities non tender. There is moderate lymphedema of LLE and none of RLE  Skin: There are radiation changes and scars from b/l LND to the skin of the groin without ulceration.  Neurological: normal gait, no gross defects   Psychiatric: appropriate mood and affect                            Hematological: normal cervical, supraclavicular lymph nodes. Normal inguinal lymph nodes with overlying scars   Gastrointestinal:       abdomen soft, non-tender, non-distended, no " organomegaly or masses  Genitourinary:  external genitalia with stigmata of RT. There is an appropriate amount of tenderness with the exam. No lesions noted.    Assessment:    Julius Gilliam is a 63 year old woman with a diagnosis of stage IIIC vulvar cancer.   She is here today for follow up and disease management.      30 minutes were spent with this patient, over 50% of that time was spent in symptom management, treatment planning and in counseling and coordination of care.      Plan:     1.) Vulvar cancer: No clear clinical evidence of recurrence.  She is without symptoms from apparent pelvic fracture (she denotes a recent fall).     2.) Labs and/or tests ordered include:  Orthopedics follow-up, possible bone scan      4.) Health maintenance issues addressed today include: smoking cessation, colonoscopy, mammogram.      Santosh Vera

## 2021-04-25 ENCOUNTER — HEALTH MAINTENANCE LETTER (OUTPATIENT)
Age: 66
End: 2021-04-25

## 2021-10-09 ENCOUNTER — HEALTH MAINTENANCE LETTER (OUTPATIENT)
Age: 66
End: 2021-10-09

## 2022-03-26 ENCOUNTER — HEALTH MAINTENANCE LETTER (OUTPATIENT)
Age: 67
End: 2022-03-26

## 2022-05-21 ENCOUNTER — HEALTH MAINTENANCE LETTER (OUTPATIENT)
Age: 67
End: 2022-05-21

## 2022-09-17 ENCOUNTER — HEALTH MAINTENANCE LETTER (OUTPATIENT)
Age: 67
End: 2022-09-17

## 2023-01-23 ENCOUNTER — HEALTH MAINTENANCE LETTER (OUTPATIENT)
Age: 68
End: 2023-01-23

## 2023-10-23 ENCOUNTER — ONCOLOGY VISIT (OUTPATIENT)
Dept: ONCOLOGY | Facility: CLINIC | Age: 68
End: 2023-10-23
Attending: OBSTETRICS & GYNECOLOGY
Payer: COMMERCIAL

## 2023-10-23 VITALS
DIASTOLIC BLOOD PRESSURE: 85 MMHG | HEART RATE: 75 BPM | RESPIRATION RATE: 20 BRPM | HEIGHT: 66 IN | SYSTOLIC BLOOD PRESSURE: 161 MMHG | BODY MASS INDEX: 21.81 KG/M2 | OXYGEN SATURATION: 93 % | TEMPERATURE: 97.7 F | WEIGHT: 135.7 LBS

## 2023-10-23 DIAGNOSIS — C51.9 MALIGNANT NEOPLASM OF VULVA (H): Primary | ICD-10-CM

## 2023-10-23 PROCEDURE — G0463 HOSPITAL OUTPT CLINIC VISIT: HCPCS | Performed by: OBSTETRICS & GYNECOLOGY

## 2023-10-23 PROCEDURE — 99214 OFFICE O/P EST MOD 30 MIN: CPT | Performed by: OBSTETRICS & GYNECOLOGY

## 2023-10-23 ASSESSMENT — PAIN SCALES - GENERAL: PAINLEVEL: NO PAIN (0)

## 2023-10-23 NOTE — PROGRESS NOTES
Follow Up Notes       RE: Julius Gilliam  : 1955  ED: 10/2/2023       Julius Gilliam is a 68 year old woman with a diagnosis of stage III vulvar cancer. She is here today for follow up and disease management.     Today she is feeling well overall. She reports some perianal itching recently, which she has been told is due to a hemorrhoid. She denies any vulvar pain/itching. She has ongoing lymphedema in her left lower leg since surgery. If she wears her compression stockings, she reports the lymphedema is significantly improved, but she did not wear them today because they are difficult to get on. She denies any pelvic pain or vaginal bleeding. She has not noticed any vulvar changes or groin lumps recently. Patient is currently smoking 1/2 ppd.     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 Lymph nodes, left inguinal:   - Metastatic squamous cell carcinoma to three of seven lymph nodes examined (3/7)   - The largest metastatic focus is 2.0 cm in greatest dimension with extranodal extension     B. Lymph nodes, right inguinal:   - Two reactive lymph nodes, negative for malignancy (0/2)     3/12/18 through 18: chemo-RT    18: Admission to [facility] for SBO. Small bowel obstruction with incidental Meckel's diverticulum. No evidence of metastasis or carcinomatosis.    18: LLE doppler US neg for DVT.    18:  CT CAP   1. In this patient with history of vulvar cancer:  a. Stable pulmonary nodules since at least 12/19/2017. Continued  follow-up recommended.  b. Small to moderate ascites with karli and infiltrative appearance of  the mesentery, most prominent in left upper quadrant. Though these  findings may be postsurgical/reactive they are nonspecific and new.   Consider short interval follow-up CT for further evaluation as  peritoneal carcinomatosis could have a similar appearance.  c. Increased conspicuity of splenic hypodensities when compared with  prior examinations. Attention on follow-up.  d. Chronic mediastinal soft tissue thickening about the great vessels  without significant change since 12/19/2017 without associated FDG  avidity.   e. Prominent retroperitoneal lymph node.  Attention on follow-up.  2. New small right pleural effusion with mild bibasilar atelectasis.   3. The bowel is nondilated. Surgical changes of small bowel resection  and bilateral inguinal lymph node dissections.    5/2019 CT ([facility])      CT scan performed due to liver failure on 02/03/2021 while she was admitted at [facility] showed ground-glass opacity in both upper lobes concerning for pneumonia, a small bilateral pleural effusion, a small amount of ascites within the right and left upper quadrant and moderate ascites in the pelvis. Extensive subcutaneous edema. Advanced arteriosclerotic disease. No clear signs of recurrent malignancy.     She was subsequently diagnosed with atrial fib and placed on blood thinner.  This was followed by an episode of GI bleeding (no scope from above or below) which prompted discontinuation of the anticoagulation.      10/13/22 PET CT   Impression:   1. No evidence for FDG avid residual or metastatic disease.   2. Specifically, no abnormal uptake associated with the duodenum or   ampullary region. Findings on the recent CT were likely an artifact   of incomplete distention. The  biliary dilatation remains   indeterminate but is likely due to prior obstruction. If there are   elevated biliary markers, and ERCP could be considered.   3. No abnormal uptake associated with the soft tissue thickening   along the vulva and perineum extending into the inguinal regions.   These findings are consistent with evolving posttreatment changes. No   evidence of recurrent malignancy.     6/21/23 CT CAP  IMPRESSION:   1. Slight scarring in the apices. Evidence of remote granulomatous   disease. No mediastinal or hilar adenopathy. No pleural effusion on   either side.     2.  Normal cardiac size. Moderate coronary artery calcifications.   Atherosclerotic thoracic aorta, ectatic ascending segment without   aneurysm or dissection. Atherosclerotic, tortuous and mildly ectatic   distal abdominal aorta without aneurysm.     3.  Interval development of mild thickening of the cecum with   adjacent inflammatory changes, likely representing an infectious or   an inflammatory process. No obstruction, free gas or free fluid.   Normal appendix. Mild thickening of the rectosigmoid may be   attributable to prior treatment. Postoperative changes of small bowel   resection with anastomotic staples, unchanged. Fatty liver. Stable   hypodense splenic foci.     4.  Anteverted postmenopausal uterus with tiny amount of endometrial   gas, uncertain origin. In the absence of recent procedure, findings   raise the possibility of fistulization with adjacent small bowel   loops.     5. Slight stranding in the superficial soft tissues of the perineum   likely attributable to prior treatment. Subtle sclerosis involving   the sacral ala bilaterally indicative of treatment effect.   Degenerative changes both shoulders, spine and joints of the pelvis.   Right convex thoracolumbar curve. Ununited fracture involving the   left pubic bone adjacent to the symphysis.     10/12/23 CT CAP  IMPRESSION:   1. Overall, no significant change since  06/21/2023.   2.  Stable clustered mediastinal lymphadenopathy along the left   paratracheal space.   3.  Stable scattered pulmonary nodules measuring up to 5 mm in the   right upper lobe.   4.  Stable soft tissue stranding in the perineum, likely reflecting   posterior mid changes.     Past Medical History:  Past Medical History:   Diagnosis Date    Deafness in right ear     Hypertension     Multiple facial bone fractures (H)     also left ankle and left wrist    Vertigo     Vulvar cancer (H) 12/2017     Past Surgical History:  Past Surgical History:   Procedure Laterality Date    DISSECT LYMPH NODE INGUINAL N/A 2/9/2018    Procedure: DISSECT LYMPH NODE INGUINAL;  Bilateral Inguinal Lymph Node Dissection;  Surgeon: Santosh Vera MD;  Location: [facility] OR    ENDOSCOPIC STRIPPING VEIN(S)      FACIAL RECONSTRUCTION SURGERY      post horse trauma    repair of left arm fracture       Health Maintenance Due   Topic Date Due    NICOTINE/TOBACCO CESSATION COUNSELING Q 1 YR  Never done    DEXA  Never done    ADVANCE CARE PLANNING  Never done    MAMMO SCREENING  Never done    COVID-19 Vaccine (1) Never done    ZOSTER IMMUNIZATION (1 of 2) Never done    RSV VACCINE 60+ (1 - 1-dose 60+ series) Never done    Pneumococcal Vaccine: 65+ Years (2 - PPSV23 or PCV20) 03/07/2019    FALL RISK ASSESSMENT  03/12/2021    LUNG CANCER SCREENING  02/03/2022    COLORECTAL CANCER SCREENING  02/04/2022    MEDICARE ANNUAL WELLNESS VISIT  06/01/2022    PHQ-2 (once per calendar year)  01/01/2023    LIPID  04/07/2023    INFLUENZA VACCINE (1) Never done   Colonoscopy: 6/15/2021, repeat due in 10 years  Mammogram: 5/6/2021 negative    Current Medications:   Current Outpatient Medications   Medication Sig Dispense Refill    acetaminophen (TYLENOL) 325 MG tablet Take 2 tablets (650 mg) by mouth every 6 hours 30 tablet 0    loperamide (IMODIUM) 2 MG capsule Take 2 mg by mouth 4 times daily as needed for diarrhea      magnesium oxide (MAG-OX) 400  "(241.3 MG) MG tablet Take 1 tablet (400 mg) by mouth 2 times daily 60 tablet 3    potassium chloride SA (KLOR-CON) 20 MEQ CR tablet Take 1 tablet (20 mEq) by mouth 2 times daily 60 tablet 0     Allergies:   No Known Allergies     Social History:  Social History     Tobacco Use    Smoking status: Every Day     Packs/day: .5     Types: Cigarettes    Smokeless tobacco: Never    Tobacco comments:     as of 2018 has been smoking for 30 years.    Substance Use Topics    Alcohol use: Yes     Comment: 8 x per week   Smoking 1/2ppd now, has not tried to quit or heard about options    Family History:   Family History   Problem Relation Age of Onset    Cancer Mother 81        Breast ca    Myocardial Infarction Father 55         at 92     Physical Exam:   BP (!) 161/85 (BP Location: Right arm, Patient Position: Sitting, Cuff Size: Adult Regular)   Pulse 75   Temp 97.7  F (36.5  C) (Oral)   Resp 20   Ht 1.67 m (5' 5.75\")   Wt 61.6 kg (135 lb 11.2 oz)   SpO2 93%   BMI 22.07 kg/m     PS1    General Appearance: alert, no distress  HEENT:  no gross thyromegaly   Cardiovascular: well perfused   Respiratory: no increased work of breathing  Musculoskeletal: extremities non tender, there is moderate lymphedema of LLE and none of RLE  Skin: there are radiation changes and scars from b/l LND to the skin of the groin without ulceration.  Neurological: normal gait, no gross defects   Psychiatric: appropriate mood and affect                            Hematological: normal inguinal lymph nodes with overlying scars   Gastrointestinal: abdomen soft, non-tender, non-distended, no organomegaly or masses  Genitourinary: external genitalia with stigmata of RT and lymphedema, there is an appropriate amount of tenderness with the exam, no vulvar or vaginal lesions noted, perianal acetowhite change adjacent to external hemorrhoid (we discussed biopsy - but as she is interested in having the hemorrhoid addressed (and also has a history of " internal hemorrhoids which need interval assessment per her primary MD) I have deferred biopsy in favor of CRS consult with hopes that all three issues might be simultaneously addressed    Assessment:    Julius Gilliam is a 68 year old woman with a diagnosis of stage IIIC vulvar cancer.   She is here today for follow up and disease management.      30 minutes were spent with this patient, over 50% of that time was spent in symptom management, treatment planning and in counseling and coordination of care.      Plan:     1.) Vulvar cancer: No clear clinical evidence of recurrence. Return to clinic in 6 months for surveillance visit.  She has notable lymphedema which is at baseline.  We have discussed care of this and she is amenable to stockings and improving diet.  Her recent CT scan (today ) is unremarkable with the exception of persistent LNs int he mediastinum (long term and current smoker) but these are unchanged    2.) Perianal dysplasia and external hemorrhoid: Recommended referral to colorectal surgery for surgical treatment of hemorrhoid, excision of which will likely treat this adjacent perianal dysplasia as well.      2.) Labs and/or tests ordered include: None.     4.) Health maintenance issues addressed today include: Smoking cessation, colonoscopy, mammogram.    Patient seen with Dr. Vera.    Teresa Birmingham MD  OB/GYN PGY-2  10/23/2023 10:46 AM    I have examined this patient personally with Dr. Birmingham and agree with the above findings and plan.    Santosh Vera

## 2023-10-23 NOTE — LETTER
10/23/2023         RE: Julius Gilliam  [address]  [facility] [place] [address]        Dear Colleague,    Thank you for referring your patient, Julius Gilliam, to the [facility]. Please see a copy of my visit note below.    Follow Up Notes       RE: Julius Gilliam  : 1955  ED: 10/2/2023       Julius Gilliam is a 68 year old woman with a diagnosis of stage III vulvar cancer. She is here today for follow up and disease management.     Today she is feeling well overall. She reports some perianal itching recently, which she has been told is due to a hemorrhoid. She denies any vulvar pain/itching. She has ongoing lymphedema in her left lower leg since surgery. If she wears her compression stockings, she reports the lymphedema is significantly improved, but she did not wear them today because they are difficult to get on. She denies any pelvic pain or vaginal bleeding. She has not noticed any vulvar changes or groin lumps recently. Patient is currently smoking 1/2 ppd.     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 Lymph nodes, left inguinal:   - Metastatic squamous cell carcinoma to three of seven lymph nodes examined (3/7)   - The largest metastatic focus is 2.0 cm in greatest dimension with extranodal extension     B. Lymph nodes, right inguinal:   - Two reactive lymph nodes, negative for  malignancy (0/2)     3/12/18 through 4/27/18: chemo-RT    5/7/18: Admission to [facility] for SBO. Small bowel obstruction with incidental Meckel's diverticulum. No evidence of metastasis or carcinomatosis.    5/18/18: LLE doppler US neg for DVT.    5/21/18: CT CAP   1. In this patient with history of vulvar cancer:  a. Stable pulmonary nodules since at least 12/19/2017. Continued  follow-up recommended.  b. Small to moderate ascites with karli and infiltrative appearance of  the mesentery, most prominent in left upper quadrant. Though these  findings may be postsurgical/reactive they are nonspecific and new.   Consider short interval follow-up CT for further evaluation as  peritoneal carcinomatosis could have a similar appearance.  c. Increased conspicuity of splenic hypodensities when compared with  prior examinations. Attention on follow-up.  d. Chronic mediastinal soft tissue thickening about the great vessels  without significant change since 12/19/2017 without associated FDG  avidity.   e. Prominent retroperitoneal lymph node.  Attention on follow-up.  2. New small right pleural effusion with mild bibasilar atelectasis.   3. The bowel is nondilated. Surgical changes of small bowel resection  and bilateral inguinal lymph node dissections.    5/2019 CT ([facility])      CT scan performed due to liver failure on 02/03/2021 while she was admitted at [facility] showed ground-glass opacity in both upper lobes concerning for pneumonia, a small bilateral pleural effusion, a small amount of ascites within the right and left upper quadrant and moderate ascites in the pelvis. Extensive subcutaneous edema. Advanced arteriosclerotic disease. No clear signs of recurrent malignancy.     She was subsequently diagnosed with atrial fib and placed on blood thinner.  This was followed by an episode of GI bleeding (no scope from above or below) which prompted discontinuation of the anticoagulation.      10/13/22  PET CT   Impression:   1. No evidence for FDG avid residual or metastatic disease.   2. Specifically, no abnormal uptake associated with the duodenum or   ampullary region. Findings on the recent CT were likely an artifact   of incomplete distention. The biliary dilatation remains   indeterminate but is likely due to prior obstruction. If there are   elevated biliary markers, and ERCP could be considered.   3. No abnormal uptake associated with the soft tissue thickening   along the vulva and perineum extending into the inguinal regions.   These findings are consistent with evolving posttreatment changes. No   evidence of recurrent malignancy.     6/21/23 CT CAP  IMPRESSION:   1. Slight scarring in the apices. Evidence of remote granulomatous   disease. No mediastinal or hilar adenopathy. No pleural effusion on   either side.     2.  Normal cardiac size. Moderate coronary artery calcifications.   Atherosclerotic thoracic aorta, ectatic ascending segment without   aneurysm or dissection. Atherosclerotic, tortuous and mildly ectatic   distal abdominal aorta without aneurysm.     3.  Interval development of mild thickening of the cecum with   adjacent inflammatory changes, likely representing an infectious or   an inflammatory process. No obstruction, free gas or free fluid.   Normal appendix. Mild thickening of the rectosigmoid may be   attributable to prior treatment. Postoperative changes of small bowel   resection with anastomotic staples, unchanged. Fatty liver. Stable   hypodense splenic foci.     4.  Anteverted postmenopausal uterus with tiny amount of endometrial   gas, uncertain origin. In the absence of recent procedure, findings   raise the possibility of fistulization with adjacent small bowel   loops.     5. Slight stranding in the superficial soft tissues of the perineum   likely attributable to prior treatment. Subtle sclerosis involving   the sacral ala bilaterally indicative of treatment effect.    Degenerative changes both shoulders, spine and joints of the pelvis.   Right convex thoracolumbar curve. Ununited fracture involving the   left pubic bone adjacent to the symphysis.     10/12/23 CT CAP  IMPRESSION:   1. Overall, no significant change since 06/21/2023.   2.  Stable clustered mediastinal lymphadenopathy along the left   paratracheal space.   3.  Stable scattered pulmonary nodules measuring up to 5 mm in the   right upper lobe.   4.  Stable soft tissue stranding in the perineum, likely reflecting   posterior mid changes.     Past Medical History:  Past Medical History:   Diagnosis Date    Deafness in right ear     Hypertension     Multiple facial bone fractures (H)     also left ankle and left wrist    Vertigo     Vulvar cancer (H) 12/2017     Past Surgical History:  Past Surgical History:   Procedure Laterality Date    DISSECT LYMPH NODE INGUINAL N/A 2/9/2018    Procedure: DISSECT LYMPH NODE INGUINAL;  Bilateral Inguinal Lymph Node Dissection;  Surgeon: Santosh Vera MD;  Location: [facility] OR    ENDOSCOPIC STRIPPING VEIN(S)      FACIAL RECONSTRUCTION SURGERY      post horse trauma    repair of left arm fracture       Health Maintenance Due   Topic Date Due    NICOTINE/TOBACCO CESSATION COUNSELING Q 1 YR  Never done    DEXA  Never done    ADVANCE CARE PLANNING  Never done    MAMMO SCREENING  Never done    COVID-19 Vaccine (1) Never done    ZOSTER IMMUNIZATION (1 of 2) Never done    RSV VACCINE 60+ (1 - 1-dose 60+ series) Never done    Pneumococcal Vaccine: 65+ Years (2 - PPSV23 or PCV20) 03/07/2019    FALL RISK ASSESSMENT  03/12/2021    LUNG CANCER SCREENING  02/03/2022    COLORECTAL CANCER SCREENING  02/04/2022    MEDICARE ANNUAL WELLNESS VISIT  06/01/2022    PHQ-2 (once per calendar year)  01/01/2023    LIPID  04/07/2023    INFLUENZA VACCINE (1) Never done   Colonoscopy: 6/15/2021, repeat due in 10 years  Mammogram: 5/6/2021 negative    Current Medications:   Current Outpatient Medications  "  Medication Sig Dispense Refill    acetaminophen (TYLENOL) 325 MG tablet Take 2 tablets (650 mg) by mouth every 6 hours 30 tablet 0    loperamide (IMODIUM) 2 MG capsule Take 2 mg by mouth 4 times daily as needed for diarrhea      magnesium oxide (MAG-OX) 400 (241.3 MG) MG tablet Take 1 tablet (400 mg) by mouth 2 times daily 60 tablet 3    potassium chloride SA (KLOR-CON) 20 MEQ CR tablet Take 1 tablet (20 mEq) by mouth 2 times daily 60 tablet 0     Allergies:   No Known Allergies     Social History:  Social History     Tobacco Use    Smoking status: Every Day     Packs/day: .5     Types: Cigarettes    Smokeless tobacco: Never    Tobacco comments:     as of 2018 has been smoking for 30 years.    Substance Use Topics    Alcohol use: Yes     Comment: 8 x per week   Smoking 1/2ppd now, has not tried to quit or heard about options    Family History:   Family History   Problem Relation Age of Onset    Cancer Mother 81        Breast ca    Myocardial Infarction Father 55         at 92     Physical Exam:   BP (!) 161/85 (BP Location: Right arm, Patient Position: Sitting, Cuff Size: Adult Regular)   Pulse 75   Temp 97.7  F (36.5  C) (Oral)   Resp 20   Ht 1.67 m (5' 5.75\")   Wt 61.6 kg (135 lb 11.2 oz)   SpO2 93%   BMI 22.07 kg/m     PS1    General Appearance: alert, no distress  HEENT:  no gross thyromegaly   Cardiovascular: well perfused   Respiratory: no increased work of breathing  Musculoskeletal: extremities non tender, there is moderate lymphedema of LLE and none of RLE  Skin: there are radiation changes and scars from b/l LND to the skin of the groin without ulceration.  Neurological: normal gait, no gross defects   Psychiatric: appropriate mood and affect                            Hematological: normal inguinal lymph nodes with overlying scars   Gastrointestinal: abdomen soft, non-tender, non-distended, no organomegaly or masses  Genitourinary: external genitalia with stigmata of RT and lymphedema, there " is an appropriate amount of tenderness with the exam, no vulvar or vaginal lesions noted, perianal acetowhite change adjacent to external hemorrhoid (we discussed biopsy - but as she is interested in having the hemorrhoid addressed (and also has a history of internal hemorrhoids which need interval assessment per her primary MD) I have deferred biopsy in favor of CRS consult with hopes that all three issues might be simultaneously addressed    Assessment:    Julius Gilliam is a 68 year old woman with a diagnosis of stage IIIC vulvar cancer.   She is here today for follow up and disease management.      30 minutes were spent with this patient, over 50% of that time was spent in symptom management, treatment planning and in counseling and coordination of care.      Plan:     1.) Vulvar cancer: No clear clinical evidence of recurrence. Return to clinic in 6 months for surveillance visit.  She has notable lymphedema which is at baseline.  We have discussed care of this and she is amenable to stockings and improving diet.  Her recent CT scan (today ) is unremarkable with the exception of persistent LNs int he mediastinum (long term and current smoker) but these are unchanged    2.) Perianal dysplasia and external hemorrhoid: Recommended referral to colorectal surgery for surgical treatment of hemorrhoid, excision of which will likely treat this adjacent perianal dysplasia as well.      2.) Labs and/or tests ordered include: None.     4.) Health maintenance issues addressed today include: Smoking cessation, colonoscopy, mammogram.    Patient seen with Dr. Vera.    Teresa Birmingham MD  OB/GYN PGY-2  10/23/2023 10:46 AM    I have examined this patient personally with Dr. Birmingham and agree with the above findings and plan.    Santosh Vera

## 2023-10-23 NOTE — NURSING NOTE
"Oncology Rooming Note    October 23, 2023 10:12 AM   Julius Gilliam is a 68 year old female who presents for:    Chief Complaint   Patient presents with    Oncology Clinic Visit     Malignant neoplasm of vulva      Initial Vitals: BP (!) 161/85 (BP Location: Right arm, Patient Position: Sitting, Cuff Size: Adult Regular)   Pulse 75   Temp 97.7  F (36.5  C) (Oral)   Resp 20   Ht 1.67 m (5' 5.75\")   Wt 61.6 kg (135 lb 11.2 oz)   SpO2 93%   BMI 22.07 kg/m   Estimated body mass index is 22.07 kg/m  as calculated from the following:    Height as of this encounter: 1.67 m (5' 5.75\").    Weight as of this encounter: 61.6 kg (135 lb 11.2 oz). Body surface area is 1.69 meters squared.  No Pain (0) Comment: Data Unavailable   No LMP recorded. Patient is postmenopausal.  Allergies reviewed: Yes  Medications reviewed: Yes    Medications: Medication refills not needed today.  Pharmacy name entered into EPIC:    [facility] [place], [place] - [address] [place] [address] [phone]  [facility] [address] [place] [address]    Clinical concerns: none       Nidia Austin              "

## 2023-11-08 NOTE — PATIENT INSTRUCTIONS
It was a pleasure seeing you in clinic today-please reach out if there are any further questions that arise following today's visit.  During business hours, you may reach me at [phone].  For urgent/emergent questions after business hours, you may reach the on-call Gynecologic Oncology Resident through the [facility]  at [phone].    All normal test results are usually communicated via letter or [employer] message.  Abnormal results (those that require a change in the previously discussed plan of care) are usually communicated via a phone call.    I recommend signing up for [facility] access if you have not already done so.  This allows you online access to your lab results and also helps you communicate efficiently with your clinic should any questions arise in your care.    Follow up appointment in 6 months with MD scheduling will call you to help make an appointment  referral to colorectal for dysplasia, scheduling will call you to help make an appointment      Dr Jody Henriquez RN  Phone:  [phone]  Fax:  [phone]

## 2023-12-16 ENCOUNTER — HEALTH MAINTENANCE LETTER (OUTPATIENT)
Age: 68
End: 2023-12-16

## 2023-12-18 NOTE — NURSING NOTE
Chief Complaint   Patient presents with     Blood Draw     Labs drawn via /PIV placed by RN. VS taken.     Kimi Darden RN     no weight-bearing restrictions

## 2024-02-24 ENCOUNTER — HEALTH MAINTENANCE LETTER (OUTPATIENT)
Age: 69
End: 2024-02-24

## 2025-01-12 ENCOUNTER — HEALTH MAINTENANCE LETTER (OUTPATIENT)
Age: 70
End: 2025-01-12

## (undated) DEVICE — PREP POVIDONE IODINE SCRUB 7.5% 120ML

## (undated) DEVICE — DRAIN JACKSON PRATT 10MM FLAT 4/4 PERF SU130-1311

## (undated) DEVICE — Device

## (undated) DEVICE — CATH TRAY FOLEY SURESTEP 16FR W/URNE MTR STLK LATEX A303316A

## (undated) DEVICE — GLOVE PROTEXIS POWDER FREE SMT 7.5  2D72PT75X

## (undated) DEVICE — WIPES FOLEY CARE SURESTEP PROVON DFC100

## (undated) DEVICE — SOL WATER IRRIG 1000ML BOTTLE 2F7114

## (undated) DEVICE — SU VICRYL 2-0 CT-2 27" J333H

## (undated) DEVICE — PREP SKIN SCRUB TRAY 4461A

## (undated) DEVICE — SUCTION MANIFOLD DORNOCH ULTRA CART UL-CL500

## (undated) DEVICE — DRSG PRIMAPORE 03 1/8X6" 66000318

## (undated) DEVICE — ESU ELEC BLADE 6" COATED E1450-6

## (undated) DEVICE — DRSG PRIMAPORE 02X3" 7133

## (undated) DEVICE — SU VICRYL 2-0 CT-1 27" UND J259H

## (undated) DEVICE — CLIP HORIZON LG ORANGE 004200

## (undated) DEVICE — LINEN TOWEL PACK X6 WHITE 5487

## (undated) DEVICE — SOL NACL 0.9% IRRIG 1000ML BOTTLE 2F7124

## (undated) DEVICE — PREP POVIDONE IODINE SOLUTION 10% 120ML

## (undated) DEVICE — LINEN TOWEL PACK X30 5481

## (undated) DEVICE — ESU GROUND PAD ADULT W/CORD E7507

## (undated) DEVICE — PREP CHLORAPREP 26ML TINTED ORANGE  260815

## (undated) DEVICE — SU ETHILON 2-0 FS 18" 664H

## (undated) DEVICE — DRAPE LEGGINGS CLEAR 8430

## (undated) DEVICE — BLADE CLIPPER SGL USE 9680

## (undated) DEVICE — DRAPE SHEET MED 44X70" 9355

## (undated) DEVICE — CLIP HORIZON MED BLUE 002200

## (undated) DEVICE — DRAIN JACKSON PRATT RESERVOIR 100ML SU130-1305

## (undated) DEVICE — SYR 10ML LL W/O NDL 302995

## (undated) RX ORDER — METOPROLOL TARTRATE 1 MG/ML
INJECTION, SOLUTION INTRAVENOUS
Status: DISPENSED
Start: 2018-04-07

## (undated) RX ORDER — PHENAZOPYRIDINE HYDROCHLORIDE 200 MG/1
TABLET, FILM COATED ORAL
Status: DISPENSED
Start: 2018-02-09

## (undated) RX ORDER — ONDANSETRON 2 MG/ML
INJECTION INTRAMUSCULAR; INTRAVENOUS
Status: DISPENSED
Start: 2018-02-09

## (undated) RX ORDER — ALBUMIN, HUMAN INJ 5% 5 %
SOLUTION INTRAVENOUS
Status: DISPENSED
Start: 2018-02-09

## (undated) RX ORDER — SODIUM CHLORIDE 9 MG/ML
INJECTION, SOLUTION INTRAVENOUS
Status: DISPENSED
Start: 2018-03-30

## (undated) RX ORDER — FENTANYL CITRATE 50 UG/ML
INJECTION, SOLUTION INTRAMUSCULAR; INTRAVENOUS
Status: DISPENSED
Start: 2018-02-09

## (undated) RX ORDER — CEFAZOLIN SODIUM 2 G/100ML
INJECTION, SOLUTION INTRAVENOUS
Status: DISPENSED
Start: 2018-02-09

## (undated) RX ORDER — LIDOCAINE HYDROCHLORIDE 10 MG/ML
INJECTION, SOLUTION EPIDURAL; INFILTRATION; INTRACAUDAL; PERINEURAL
Status: DISPENSED
Start: 2018-03-30

## (undated) RX ORDER — PROPOFOL 10 MG/ML
INJECTION, EMULSION INTRAVENOUS
Status: DISPENSED
Start: 2018-02-09

## (undated) RX ORDER — DOBUTAMINE HYDROCHLORIDE 200 MG/100ML
INJECTION INTRAVENOUS
Status: DISPENSED
Start: 2018-04-07